# Patient Record
Sex: MALE | Race: WHITE | NOT HISPANIC OR LATINO
[De-identification: names, ages, dates, MRNs, and addresses within clinical notes are randomized per-mention and may not be internally consistent; named-entity substitution may affect disease eponyms.]

---

## 2018-01-11 PROBLEM — Z00.00 ENCOUNTER FOR PREVENTIVE HEALTH EXAMINATION: Status: ACTIVE | Noted: 2018-01-11

## 2018-01-12 ENCOUNTER — APPOINTMENT (OUTPATIENT)
Dept: UROLOGY | Facility: CLINIC | Age: 68
End: 2018-01-12
Payer: MEDICARE

## 2018-01-12 VITALS
HEIGHT: 71 IN | SYSTOLIC BLOOD PRESSURE: 93 MMHG | HEART RATE: 105 BPM | BODY MASS INDEX: 20.16 KG/M2 | WEIGHT: 144 LBS | DIASTOLIC BLOOD PRESSURE: 61 MMHG

## 2018-01-12 PROCEDURE — 99203 OFFICE O/P NEW LOW 30 MIN: CPT

## 2018-01-12 PROCEDURE — 99214 OFFICE O/P EST MOD 30 MIN: CPT

## 2018-01-12 RX ORDER — EZETIMIBE 10 MG/1
10 TABLET ORAL
Qty: 30 | Refills: 0 | Status: ACTIVE | COMMUNITY
Start: 2017-10-23

## 2018-01-12 RX ORDER — TIOTROPIUM BROMIDE 18 UG/1
18 CAPSULE ORAL; RESPIRATORY (INHALATION)
Qty: 30 | Refills: 0 | Status: ACTIVE | COMMUNITY
Start: 2017-10-18

## 2018-01-12 RX ORDER — ALLOPURINOL 100 MG/1
100 TABLET ORAL
Qty: 90 | Refills: 0 | Status: ACTIVE | COMMUNITY
Start: 2017-03-07

## 2018-01-12 RX ORDER — ALBUTEROL SULFATE 0.63 MG/3ML
0.63 SOLUTION RESPIRATORY (INHALATION)
Qty: 300 | Refills: 0 | Status: ACTIVE | COMMUNITY
Start: 2017-10-18

## 2018-01-12 RX ORDER — PANTOPRAZOLE 40 MG/1
40 TABLET, DELAYED RELEASE ORAL
Qty: 30 | Refills: 0 | Status: ACTIVE | COMMUNITY
Start: 2017-12-28

## 2018-01-12 RX ORDER — TRIAMCINOLONE ACETONIDE 1 MG/G
0.1 CREAM TOPICAL
Qty: 454 | Refills: 0 | Status: ACTIVE | COMMUNITY
Start: 2017-10-19

## 2018-05-09 ENCOUNTER — APPOINTMENT (OUTPATIENT)
Dept: UROLOGY | Facility: CLINIC | Age: 68
End: 2018-05-09
Payer: MEDICARE

## 2018-05-09 VITALS
BODY MASS INDEX: 22.19 KG/M2 | HEART RATE: 98 BPM | HEIGHT: 70 IN | DIASTOLIC BLOOD PRESSURE: 67 MMHG | WEIGHT: 155 LBS | SYSTOLIC BLOOD PRESSURE: 106 MMHG

## 2018-05-09 DIAGNOSIS — J98.3 COMPENSATORY EMPHYSEMA: ICD-10-CM

## 2018-05-09 DIAGNOSIS — Z78.9 OTHER SPECIFIED HEALTH STATUS: ICD-10-CM

## 2018-05-09 DIAGNOSIS — J44.9 CHRONIC OBSTRUCTIVE PULMONARY DISEASE, UNSPECIFIED: ICD-10-CM

## 2018-05-09 DIAGNOSIS — E78.00 PURE HYPERCHOLESTEROLEMIA, UNSPECIFIED: ICD-10-CM

## 2018-05-09 PROCEDURE — 99213 OFFICE O/P EST LOW 20 MIN: CPT

## 2018-05-09 RX ORDER — MELOXICAM 7.5 MG/1
7.5 TABLET ORAL
Qty: 30 | Refills: 0 | Status: COMPLETED | COMMUNITY
Start: 2017-11-03 | End: 2018-05-09

## 2018-05-09 RX ORDER — METRONIDAZOLE 250 MG/1
250 TABLET ORAL
Qty: 21 | Refills: 0 | Status: COMPLETED | COMMUNITY
Start: 2018-01-09 | End: 2018-05-09

## 2018-05-09 RX ORDER — FENOFIBRATE 160 MG/1
160 TABLET ORAL
Qty: 90 | Refills: 0 | Status: COMPLETED | COMMUNITY
Start: 2017-10-11 | End: 2018-05-09

## 2018-05-09 RX ORDER — PREDNISONE 10 MG/1
10 TABLET ORAL
Qty: 41 | Refills: 0 | Status: COMPLETED | COMMUNITY
Start: 2017-10-19 | End: 2018-05-09

## 2018-05-09 RX ORDER — IBUPROFEN 600 MG/1
600 TABLET, FILM COATED ORAL
Qty: 28 | Refills: 0 | Status: COMPLETED | COMMUNITY
Start: 2017-08-07 | End: 2018-05-09

## 2018-05-09 RX ORDER — TAMSULOSIN HYDROCHLORIDE 0.4 MG/1
0.4 CAPSULE ORAL
Qty: 90 | Refills: 0 | Status: COMPLETED | COMMUNITY
Start: 2017-07-18 | End: 2018-05-09

## 2018-05-09 RX ORDER — PREDNISONE 50 MG/1
50 TABLET ORAL
Qty: 3 | Refills: 0 | Status: COMPLETED | COMMUNITY
Start: 2017-12-28 | End: 2018-05-09

## 2018-05-09 RX ORDER — DIPHENHYDRAMINE HCL 50 MG/1
50 CAPSULE ORAL
Qty: 1 | Refills: 0 | Status: COMPLETED | COMMUNITY
Start: 2017-12-28 | End: 2018-05-09

## 2018-05-09 RX ORDER — AMOXICILLIN 500 MG/1
500 CAPSULE ORAL
Qty: 30 | Refills: 0 | Status: COMPLETED | COMMUNITY
Start: 2017-08-03 | End: 2018-05-09

## 2018-05-09 RX ORDER — CIPROFLOXACIN HYDROCHLORIDE 500 MG/1
500 TABLET, FILM COATED ORAL
Qty: 7 | Refills: 0 | Status: COMPLETED | COMMUNITY
Start: 2018-01-09 | End: 2018-05-09

## 2018-05-09 RX ORDER — HYDROCODONE BITARTRATE AND ACETAMINOPHEN 5; 325 MG/1; MG/1
5-325 TABLET ORAL
Qty: 20 | Refills: 0 | Status: COMPLETED | COMMUNITY
Start: 2017-08-07 | End: 2018-05-09

## 2018-05-09 NOTE — HISTORY OF PRESENT ILLNESS
[Currently Experiencing ___] :  [unfilled] [Nocturia] : nocturia [Straining] : straining [None] : None [FreeTextEntry1] : NEIL PHOENIX is a 68 year old male with a past medical history of BPH.  Presents to the office today for a follow up, last seen on 1/12/2018. Patient currently experiencing nocturia 2-3 x a night x 20 years along with straining to urinate. Denies dysuria, hematuria, and flank pain. Patient states he is not taking Flomax. CT scan from 12/29/2017 revealed enlarged prostate, and urinary bladder mildly distended, demonstrating wall thickening.\par \par IPSS score is 10\par \par Sonogram in 10/2016 showed prostate 28g with PVR 40\par \par Denies family history of bladder, renal, and prostate cancer. Smoker x 30 years one pack a day. \par PSA 0.5 on 4/10/2018 [Urinary Incontinence] : no urinary incontinence [Urinary Urgency] : no urinary urgency [Urinary Frequency] : no urinary frequency [Weak Stream] : no weak stream [Intermittency] : no intermittency [Post-Void Dribbling] : no post-void dribbling [Dysuria] : no dysuria [Hematuria - Gross] : no gross hematuria

## 2018-05-09 NOTE — LETTER HEADER
[Natalio Underwood MD] : Natalio Underwood MD [[address]] : [address] [[address]] : [address] [[place] [address]] : [place] [address] [Tel [phone]] : Tel [phone] [Fax [phone]] : Fax [phone]

## 2018-05-09 NOTE — ASSESSMENT
[FreeTextEntry1] : NEIL PHOENIX is a 68 year old male with a past medical history of BPH.  Presents to the office today for a follow up, last seen on 1/12/2018. Patient currently experiencing nocturia 2-3 x a night x 20 years along with straining to urinate. Denies dysuria, hematuria, and flank pain. Patient states he is not taking Flomax. CT scan from 12/29/2017 revealed enlarged prostate, and urinary bladder mildly distended, demonstrating wall thickening.\par \par IPSS score is 10\par \par Sonogram in 10/2016 showed prostate 28g with PVR 40\par \par Denies family history of bladder, renal, and prostate cancer. Smoker x 30 years one pack a day. \par PSA 0.5 on 4/10/2018

## 2018-05-09 NOTE — REVIEW OF SYSTEMS
[see HPI] : see HPI [Nocturia] : nocturia [Negative] : Endocrine [Dysuria] : no dysuria [Incontinence] : no incontinence

## 2018-05-09 NOTE — LETTER BODY
[Dear  ___] : Dear  [unfilled], [Courtesy Letter:] : I had the pleasure of seeing your patient, [unfilled], in my office today. [Please see my note below.] : Please see my note below. [Sincerely,] : Sincerely, [FreeTextEntry2] : Dr. Fely Julien\par [address]\[place] [address] [FreeTextEntry3] : Natalio Underwood MD\par Director of Male Sexual Dysfunction and Urologic Prosthetics

## 2018-07-25 ENCOUNTER — APPOINTMENT (OUTPATIENT)
Dept: UROLOGY | Facility: CLINIC | Age: 68
End: 2018-07-25
Payer: MEDICARE

## 2018-07-25 VITALS
SYSTOLIC BLOOD PRESSURE: 99 MMHG | HEART RATE: 90 BPM | WEIGHT: 155 LBS | HEIGHT: 70 IN | DIASTOLIC BLOOD PRESSURE: 61 MMHG | BODY MASS INDEX: 22.19 KG/M2

## 2018-07-25 PROCEDURE — 99212 OFFICE O/P EST SF 10 MIN: CPT

## 2018-07-25 NOTE — HISTORY OF PRESENT ILLNESS
[None] : None [Currently Experiencing ___] :  [unfilled] [Nocturia] : nocturia [FreeTextEntry1] : NEIL PHOENIX is a 68 year old male with a past medical history of BPH. Presents to the office today for a follow up, last seen on 5/9/2018.Patient continues to experience nocturia 2-3 x a night and straining to urinate. CT scan from 12/29/2017 revealed enlarged prostate, and urinary bladder mildly distended, demonstrating wall thickening. Patient refused any medication at last visit. Patient did not have US done that was ordered last visit -- he states that he forgot. Uroflow to be done this visit, but patient was not able to hold his urine long enough to do the procedure, so he emptied bladder in patient bathroom.  Afterwards, he was very frustrated and left the office before further discussion could be had.

## 2018-07-25 NOTE — ASSESSMENT
[FreeTextEntry1] : NEIL PHOENIX is a 68 year old male with a past medical history of BPH. Presents to the office today for a follow up, last seen on 5/9/2018.Patient continues to experience nocturia 2-3 x a night and straining to urinate. CT scan from 12/29/2017 revealed enlarged prostate, and urinary bladder mildly distended, demonstrating wall thickening. Patient refused any medication at last visit. Patient did not have US done that was ordered last visit -- he states that he forgot. Uroflow to be done this visit, but patient was not able to hold his urine long enough to do the procedure, so he emptied bladder in patient bathroom.  Afterwards, he was very frustrated and left the office before further discussion could be had.

## 2018-07-25 NOTE — LETTER HEADER
[FreeTextEntry3] : Natalio Underwood MD \par [address] \par [address] \[place] [address] \par Tel [phone] \par Fax [phone] \par  \par

## 2018-07-25 NOTE — LETTER BODY
[Courtesy Letter:] : I had the pleasure of seeing your patient, [unfilled], in my office today. [Please see my note below.] : Please see my note below. [Consult Closing:] : Thank you very much for allowing me to participate in the care of this patient.  If you have any questions, please do not hesitate to contact me. [Sincerely,] : Sincerely, [FreeTextEntry2] : \par  Dr. Fely Julien\par [address]\[place] [address] \par \par

## 2018-07-30 ENCOUNTER — APPOINTMENT (OUTPATIENT)
Dept: UROLOGY | Facility: CLINIC | Age: 68
End: 2018-07-30
Payer: MEDICARE

## 2018-07-30 VITALS
SYSTOLIC BLOOD PRESSURE: 103 MMHG | WEIGHT: 155 LBS | DIASTOLIC BLOOD PRESSURE: 61 MMHG | HEART RATE: 108 BPM | HEIGHT: 70 IN | BODY MASS INDEX: 22.19 KG/M2

## 2018-07-30 PROCEDURE — 51798 US URINE CAPACITY MEASURE: CPT

## 2018-07-30 PROCEDURE — 51741 ELECTRO-UROFLOWMETRY FIRST: CPT

## 2018-07-30 PROCEDURE — 99213 OFFICE O/P EST LOW 20 MIN: CPT

## 2018-08-03 LAB
BILIRUB UR QL STRIP: NORMAL
CLARITY UR: CLEAR
COLLECTION METHOD: NORMAL
GLUCOSE UR-MCNC: NORMAL
HCG UR QL: NORMAL EU/DL
HGB UR QL STRIP.AUTO: NORMAL
KETONES UR-MCNC: NORMAL
LEUKOCYTE ESTERASE UR QL STRIP: NORMAL
NITRITE UR QL STRIP: NORMAL
PH UR STRIP: 6
PROT UR STRIP-MCNC: NORMAL
SP GR UR STRIP: 1.01

## 2019-06-07 RX ADMIN — Medication 250 MILLIGRAM(S): at 05:22

## 2019-06-14 ENCOUNTER — APPOINTMENT (OUTPATIENT)
Dept: UROLOGY | Facility: CLINIC | Age: 69
End: 2019-06-14
Payer: MEDICARE

## 2019-06-14 VITALS
BODY MASS INDEX: 22.19 KG/M2 | HEART RATE: 105 BPM | WEIGHT: 155 LBS | SYSTOLIC BLOOD PRESSURE: 105 MMHG | HEIGHT: 70 IN | DIASTOLIC BLOOD PRESSURE: 76 MMHG

## 2019-06-14 DIAGNOSIS — Z12.5 ENCOUNTER FOR SCREENING FOR MALIGNANT NEOPLASM OF PROSTATE: ICD-10-CM

## 2019-06-14 DIAGNOSIS — N40.1 BENIGN PROSTATIC HYPERPLASIA WITH LOWER URINARY TRACT SYMPMS: ICD-10-CM

## 2019-06-14 DIAGNOSIS — N13.8 BENIGN PROSTATIC HYPERPLASIA WITH LOWER URINARY TRACT SYMPMS: ICD-10-CM

## 2019-06-14 DIAGNOSIS — R35.1 NOCTURIA: ICD-10-CM

## 2019-06-14 PROCEDURE — 99213 OFFICE O/P EST LOW 20 MIN: CPT

## 2019-06-14 NOTE — PHYSICAL EXAM
[General Appearance - Well Developed] : well developed [General Appearance - Well Nourished] : well nourished [General Appearance - In No Acute Distress] : no acute distress [Well Groomed] : well groomed [Normal Appearance] : normal appearance [Abdomen Soft] : soft [Abdomen Tenderness] : non-tender [Skin Color & Pigmentation] : normal skin color and pigmentation [Costovertebral Angle Tenderness] : no ~M costovertebral angle tenderness [] : no rash [Edema] : no peripheral edema [Mood] : the mood was normal [Exaggerated Use Of Accessory Muscles For Inspiration] : no accessory muscle use [Affect] : the affect was normal [Normal Station and Gait] : the gait and station were normal for the patient's age [Not Anxious] : not anxious [No Focal Deficits] : no focal deficits

## 2019-06-14 NOTE — LETTER BODY
[Dear  ___] : Dear  [unfilled], [Please see my note below.] : Please see my note below. [Consult Letter:] : I had the pleasure of evaluating your patient, [unfilled]. [Consult Closing:] : Thank you very much for allowing me to participate in the care of this patient.  If you have any questions, please do not hesitate to contact me. [Sincerely,] : Sincerely, [FreeTextEntry3] : Natalio Underwood MD\par Director of Male Sexual Dysfunction and Urologic Prosthetics

## 2019-06-14 NOTE — ASSESSMENT
This writer attempted to contact patient's father Anish on 06/26/18    Reason for call return call, message need to wait for Dr. Jaime  and left detailed message.    If patient calls back:   Patient contacted by 1st floor [facility] Team (MA/TC). Inform patient that someone from the team will contact them, document that pt called and route to care team.     Ira Swift MA     [FreeTextEntry1] : NEIL PHOENIX is a 69 year old male with a past medical history of BPH. Presents to the office today for a follow up.Patient continues to experience nocturia 2-3 x a night and straining to urinate. CT scan from 12/29/2017 revealed enlarged prostate, and urinary bladder mildly distended, demonstrating wall thickening. Patient refused any medication at last visit. states that his urination is feeling great\par \par IPSS score is 10\par \par Sonogram in 10/2016 showed prostate 28g with PVR 40\par \par Denies family history of bladder, renal, and prostate cancer. Smoker x 30 years one pack a day. \par PSA 0.5 on 4/10/2018 \par \par Uroflow done last visit\par Qmax 33ml/s\par PVR 33ml. \par \par PSA Profile - Total; will obtain at [facility]

## 2019-06-14 NOTE — HISTORY OF PRESENT ILLNESS
[FreeTextEntry1] : NEIL PHOENIX is a 69 year old male with a past medical history of BPH. Presents to the office today for a follow up.Patient continues to experience nocturia 2-3 x a night and straining to urinate. CT scan from 12/29/2017 revealed enlarged prostate, and urinary bladder mildly distended, demonstrating wall thickening. Patient refused any medication at last visit. states that his urination is feeling great\par \par IPSS score is 10\par \par Sonogram in 10/2016 showed prostate 28g with PVR 40\par \par Denies family history of bladder, renal, and prostate cancer. Smoker x 30 years one pack a day. \par PSA 0.5 on 4/10/2018 \par \par Uroflow done last visit\par Qmax 33ml/s\par PVR 33ml. \par \par PSA Profile - Total; will obtain at [facility]\par

## 2019-09-20 ENCOUNTER — INPATIENT (INPATIENT)
Facility: HOSPITAL | Age: 69
LOS: 4 days | Discharge: HOME | End: 2019-09-25
Attending: INTERNAL MEDICINE | Admitting: INTERNAL MEDICINE
Payer: MEDICARE

## 2019-09-20 VITALS
RESPIRATION RATE: 18 BRPM | SYSTOLIC BLOOD PRESSURE: 139 MMHG | DIASTOLIC BLOOD PRESSURE: 75 MMHG | HEART RATE: 112 BPM | OXYGEN SATURATION: 96 % | TEMPERATURE: 97 F

## 2019-09-20 LAB
ALBUMIN SERPL ELPH-MCNC: 3.4 G/DL — LOW (ref 3.5–5.2)
ALP SERPL-CCNC: 66 U/L — SIGNIFICANT CHANGE UP (ref 30–115)
ALT FLD-CCNC: 45 U/L — HIGH (ref 0–41)
ANION GAP SERPL CALC-SCNC: 12 MMOL/L — SIGNIFICANT CHANGE UP (ref 7–14)
AST SERPL-CCNC: 91 U/L — HIGH (ref 0–41)
BASE EXCESS BLDV CALC-SCNC: 2 MMOL/L — SIGNIFICANT CHANGE UP (ref -2–2)
BASOPHILS # BLD AUTO: 0.09 K/UL — SIGNIFICANT CHANGE UP (ref 0–0.2)
BASOPHILS NFR BLD AUTO: 0.6 % — SIGNIFICANT CHANGE UP (ref 0–1)
BILIRUB SERPL-MCNC: 0.4 MG/DL — SIGNIFICANT CHANGE UP (ref 0.2–1.2)
BUN SERPL-MCNC: 14 MG/DL — SIGNIFICANT CHANGE UP (ref 10–20)
CA-I SERPL-SCNC: 1.17 MMOL/L — SIGNIFICANT CHANGE UP (ref 1.12–1.3)
CALCIUM SERPL-MCNC: 8.6 MG/DL — SIGNIFICANT CHANGE UP (ref 8.5–10.1)
CHLORIDE SERPL-SCNC: 102 MMOL/L — SIGNIFICANT CHANGE UP (ref 98–110)
CK SERPL-CCNC: 1589 U/L — HIGH (ref 0–225)
CO2 SERPL-SCNC: 23 MMOL/L — SIGNIFICANT CHANGE UP (ref 17–32)
CREAT SERPL-MCNC: 0.7 MG/DL — SIGNIFICANT CHANGE UP (ref 0.7–1.5)
EOSINOPHIL # BLD AUTO: 0.17 K/UL — SIGNIFICANT CHANGE UP (ref 0–0.7)
EOSINOPHIL NFR BLD AUTO: 1.1 % — SIGNIFICANT CHANGE UP (ref 0–8)
ERYTHROCYTE [SEDIMENTATION RATE] IN BLOOD: 84 MM/HR — HIGH (ref 0–10)
GAS PNL BLDV: 138 MMOL/L — SIGNIFICANT CHANGE UP (ref 136–145)
GAS PNL BLDV: SIGNIFICANT CHANGE UP
GLUCOSE SERPL-MCNC: 94 MG/DL — SIGNIFICANT CHANGE UP (ref 70–99)
HCO3 BLDV-SCNC: 26 MMOL/L — SIGNIFICANT CHANGE UP (ref 22–29)
HCT VFR BLD CALC: 33.9 % — LOW (ref 42–52)
HCT VFR BLDA CALC: 34.7 % — SIGNIFICANT CHANGE UP (ref 34–44)
HGB BLD CALC-MCNC: 11.3 G/DL — LOW (ref 14–18)
HGB BLD-MCNC: 10.7 G/DL — LOW (ref 14–18)
IMM GRANULOCYTES NFR BLD AUTO: 0.5 % — HIGH (ref 0.1–0.3)
LACTATE BLDV-MCNC: 1.2 MMOL/L — SIGNIFICANT CHANGE UP (ref 0.5–1.6)
LACTATE SERPL-SCNC: 1.3 MMOL/L — SIGNIFICANT CHANGE UP (ref 0.5–2.2)
LDH SERPL L TO P-CCNC: 408 U/L — HIGH (ref 50–242)
LYMPHOCYTES # BLD AUTO: 0.96 K/UL — LOW (ref 1.2–3.4)
LYMPHOCYTES # BLD AUTO: 6.2 % — LOW (ref 20.5–51.1)
MCHC RBC-ENTMCNC: 28.2 PG — SIGNIFICANT CHANGE UP (ref 27–31)
MCHC RBC-ENTMCNC: 31.6 G/DL — LOW (ref 32–37)
MCV RBC AUTO: 89.2 FL — SIGNIFICANT CHANGE UP (ref 80–94)
MONOCYTES # BLD AUTO: 0.85 K/UL — HIGH (ref 0.1–0.6)
MONOCYTES NFR BLD AUTO: 5.4 % — SIGNIFICANT CHANGE UP (ref 1.7–9.3)
NEUTROPHILS # BLD AUTO: 13.45 K/UL — HIGH (ref 1.4–6.5)
NEUTROPHILS NFR BLD AUTO: 86.2 % — HIGH (ref 42.2–75.2)
NRBC # BLD: 0 /100 WBCS — SIGNIFICANT CHANGE UP (ref 0–0)
PCO2 BLDV: 40 MMHG — LOW (ref 41–51)
PH BLDV: 7.43 — SIGNIFICANT CHANGE UP (ref 7.26–7.43)
PLATELET # BLD AUTO: 454 K/UL — HIGH (ref 130–400)
PO2 BLDV: 28 MMHG — SIGNIFICANT CHANGE UP (ref 20–40)
POTASSIUM BLDV-SCNC: 3.5 MMOL/L — SIGNIFICANT CHANGE UP (ref 3.3–5.6)
POTASSIUM SERPL-MCNC: 4 MMOL/L — SIGNIFICANT CHANGE UP (ref 3.5–5)
POTASSIUM SERPL-SCNC: 4 MMOL/L — SIGNIFICANT CHANGE UP (ref 3.5–5)
PROT SERPL-MCNC: 6.5 G/DL — SIGNIFICANT CHANGE UP (ref 6–8)
RBC # BLD: 3.8 M/UL — LOW (ref 4.7–6.1)
RBC # FLD: 13.7 % — SIGNIFICANT CHANGE UP (ref 11.5–14.5)
SAO2 % BLDV: 50 % — SIGNIFICANT CHANGE UP
SODIUM SERPL-SCNC: 137 MMOL/L — SIGNIFICANT CHANGE UP (ref 135–146)
WBC # BLD: 15.6 K/UL — HIGH (ref 4.8–10.8)
WBC # FLD AUTO: 15.6 K/UL — HIGH (ref 4.8–10.8)

## 2019-09-20 PROCEDURE — 93010 ELECTROCARDIOGRAM REPORT: CPT

## 2019-09-20 PROCEDURE — 71045 X-RAY EXAM CHEST 1 VIEW: CPT | Mod: 26

## 2019-09-20 PROCEDURE — 99285 EMERGENCY DEPT VISIT HI MDM: CPT

## 2019-09-20 RX ORDER — INFLUENZA VIRUS VACCINE 15; 15; 15; 15 UG/.5ML; UG/.5ML; UG/.5ML; UG/.5ML
0.5 SUSPENSION INTRAMUSCULAR ONCE
Refills: 0 | Status: DISCONTINUED | OUTPATIENT
Start: 2019-09-20 | End: 2019-09-25

## 2019-09-20 RX ORDER — IPRATROPIUM/ALBUTEROL SULFATE 18-103MCG
3 AEROSOL WITH ADAPTER (GRAM) INHALATION
Refills: 0 | Status: COMPLETED | OUTPATIENT
Start: 2019-09-20 | End: 2019-09-20

## 2019-09-20 RX ORDER — SODIUM CHLORIDE 9 MG/ML
1000 INJECTION, SOLUTION INTRAVENOUS ONCE
Refills: 0 | Status: COMPLETED | OUTPATIENT
Start: 2019-09-20 | End: 2019-09-20

## 2019-09-20 RX ADMIN — Medication 3 MILLILITER(S): at 19:02

## 2019-09-20 RX ADMIN — Medication 3 MILLILITER(S): at 19:46

## 2019-09-20 RX ADMIN — Medication 3 MILLILITER(S): at 19:42

## 2019-09-20 RX ADMIN — SODIUM CHLORIDE 1000 MILLILITER(S): 9 INJECTION, SOLUTION INTRAVENOUS at 16:24

## 2019-09-20 NOTE — ED PROVIDER NOTE - PHYSICAL EXAMINATION
Physical Exam    Vital Signs: I have reviewed the initial vital signs.  Constitutional: well-nourished, appears stated age, no acute distress  Eyes: Conjunctiva pink, Sclera clear, PERRLA, EOMI.  Cardiovascular: S1 and S2, regular rate, regular rhythm, well-perfused extremities, radial pulses equal and 2+  Respiratory: unlabored respiratory effort, clear to auscultation bilaterally no wheezing, rales and rhonchi  Gastrointestinal: soft, non-tender abdomen, no pulsatile mass, normal bowl sounds  Musculoskeletal: supple neck, no lower extremity edema, no midline tenderness, from of extremities.   Integumentary: warm, dry, no rash  Neurologic: awake, alert, cranial nerves II-XII grossly intact, extremities’ motor and sensory functions grossly intact

## 2019-09-20 NOTE — ED PROVIDER NOTE - NS ED ROS FT
Constitutional: (-) fever, (-) chills, (+) myalgias  Eyes: (-)visual changes  ENT: (-) nasal congestion  Cardiovascular: (-) chest pain, (-) syncope  Respiratory: (-) cough, (-) shortness of breath, (-) dyspnea,   Gastrointestinal: (-) vomiting, (-) diarrhea, (-)nausea  Musculoskeletal: (-) neck pain, (-) back pain, (-) joint pain, (+) myalgias  Integumentary: (-) rash,  Neurological: (-) headache, (-) altered mental status, (-) dizziness, (-) tingling, (-)numbness,   Peripheral Vascular: -) leg swelling  :(-)dysuria, (-) hematuria  Allergic/Immunologic: (-) pruritus

## 2019-09-20 NOTE — ED ADULT NURSE NOTE - NSIMPLEMENTINTERV_GEN_ALL_ED
Implemented All Universal Safety Interventions:  [place] to call system. Call bell, personal items and telephone within reach. Instruct patient to call for assistance. Room bathroom lighting operational. Non-slip footwear when patient is off stretcher. Physically safe environment: no spills, clutter or unnecessary equipment. Stretcher in lowest position, wheels locked, appropriate side rails in place.

## 2019-09-20 NOTE — ED ADULT NURSE NOTE - OBJECTIVE STATEMENT
Pt presents to ED, sent in by PCP for new diagnosis of polymyositis. Pt states he was diagnosed in June but never came to the ED and now his PCP told him that it is affecting his liver/kidneys. Pt reports he has lost over 30 lbs since June and he used to be very active but now barely has the energy to walk down the street.

## 2019-09-20 NOTE — ED PROVIDER NOTE - CARE PLAN
Principal Discharge DX:	Rhabdomyolysis  Secondary Diagnosis:	Elevated CK  Secondary Diagnosis:	Leukocytosis

## 2019-09-20 NOTE — ED PROVIDER NOTE - OBJECTIVE STATEMENT
68 yo male, pmh of copd, hypothyroidism, polymyositis, presents to ed for myalgias. As per pt, general body pains for past month, saw giulia getting treated for polymyosists, sent in from pmd for abnl kidney fx and ck elevation. At this time pt states pains come and go, better with meds from giulia, pt thinks steroids but unsure, 68 yo male, pmh of copd, hypothyroidism, polymyositis, presents to ed for myalgias. As per pt, general body pains for past month, saw giulia getting treated for polymyosists, sent in from pmd for abnl kidney fx and ck elevation. At this time pt states pains come and go, better with meds from rhe, pt thinks steroids but unsure. Denies fever, chills, cp, sob, abd pain, nvd, dizziness, ha, visual changes, rash.

## 2019-09-20 NOTE — PATIENT PROFILE ADULT - STATED REASON FOR ADMISSION
"I went to my doctor for pain that started about 3 months ago and got worse over the last month and a loss of appetite with 30lb weight loss over the last 3 months. My doctor told me to go to the hospital because my kidneys were starting to fail."

## 2019-09-20 NOTE — ED PROVIDER NOTE - ATTENDING CONTRIBUTION TO CARE
70 yo m with pmh of hypothyroidism, gerd, ckd, polymyositis, sent by PMD for IVf.  pt says recently had blood work and was told that he needs ivf to help his kidneyes.  no fever, no rash, no n/v/d, no headahce, no cp, no sob.  pt says chronic b/l thighs, shoulder, and pelvic pain.  pt denies being on steroids.  exam: nad, ncat, perrl, eomi, mmm, rrr, ctab, abd soft, nt,nd aox3, imp: pt with h/o polymyositis, sent for admission by pmd for ivf.  check labs, ekg, cxr, ivf

## 2019-09-20 NOTE — PATIENT PROFILE ADULT - VISION (WITH CORRECTIVE LENSES IF THE PATIENT USUALLY WEARS THEM):
Partially impaired: cannot see medication labels or newsprint, but can see obstacles in path, and the surrounding layout; can count fingers at arm's length/Uses glasses for reading

## 2019-09-20 NOTE — ED ADULT NURSE NOTE - NS ED NOTE ABUSE SUSPICION NEGLECT YN
[facility] [place] [address]  [address] [facility] [address]  Dept: [phone]  Dept Fax: [phone]  Loc: [phone]      December 19, 2017    Patient: Gema Quiñones   Date of Visit: 12/19/2017       To Whom It May Concern:    Raymon Rosenthal No

## 2019-09-21 LAB
ANION GAP SERPL CALC-SCNC: 11 MMOL/L — SIGNIFICANT CHANGE UP (ref 7–14)
APPEARANCE UR: CLEAR — SIGNIFICANT CHANGE UP
BACTERIA # UR AUTO: NEGATIVE — SIGNIFICANT CHANGE UP
BASOPHILS # BLD AUTO: 0.06 K/UL — SIGNIFICANT CHANGE UP (ref 0–0.2)
BASOPHILS NFR BLD AUTO: 0.5 % — SIGNIFICANT CHANGE UP (ref 0–1)
BILIRUB UR-MCNC: NEGATIVE — SIGNIFICANT CHANGE UP
BUN SERPL-MCNC: 8 MG/DL — LOW (ref 10–20)
C3 SERPL-MCNC: 122 MG/DL — SIGNIFICANT CHANGE UP (ref 81–157)
C4 SERPL-MCNC: 26 MG/DL — SIGNIFICANT CHANGE UP (ref 13–39)
CALCIUM SERPL-MCNC: 8.1 MG/DL — LOW (ref 8.5–10.1)
CHLORIDE SERPL-SCNC: 103 MMOL/L — SIGNIFICANT CHANGE UP (ref 98–110)
CHOLEST SERPL-MCNC: 107 MG/DL — SIGNIFICANT CHANGE UP (ref 100–200)
CK SERPL-CCNC: 851 U/L — HIGH (ref 0–225)
CO2 SERPL-SCNC: 23 MMOL/L — SIGNIFICANT CHANGE UP (ref 17–32)
COLOR SPEC: YELLOW — SIGNIFICANT CHANGE UP
CREAT SERPL-MCNC: 0.6 MG/DL — LOW (ref 0.7–1.5)
CRP SERPL-MCNC: 9.04 MG/DL — HIGH (ref 0–0.4)
DIFF PNL FLD: NEGATIVE — SIGNIFICANT CHANGE UP
EOSINOPHIL # BLD AUTO: 0.4 K/UL — SIGNIFICANT CHANGE UP (ref 0–0.7)
EOSINOPHIL NFR BLD AUTO: 3.5 % — SIGNIFICANT CHANGE UP (ref 0–8)
EPI CELLS # UR: 0 /HPF — SIGNIFICANT CHANGE UP (ref 0–5)
GLUCOSE SERPL-MCNC: 97 MG/DL — SIGNIFICANT CHANGE UP (ref 70–99)
GLUCOSE UR QL: NEGATIVE — SIGNIFICANT CHANGE UP
HCT VFR BLD CALC: 31.3 % — LOW (ref 42–52)
HCV AB S/CO SERPL IA: 0.17 S/CO — SIGNIFICANT CHANGE UP (ref 0–0.99)
HCV AB SERPL-IMP: SIGNIFICANT CHANGE UP
HDLC SERPL-MCNC: 25 MG/DL — LOW
HGB BLD-MCNC: 10.1 G/DL — LOW (ref 14–18)
HYALINE CASTS # UR AUTO: 2 /LPF — SIGNIFICANT CHANGE UP (ref 0–7)
IMM GRANULOCYTES NFR BLD AUTO: 0.6 % — HIGH (ref 0.1–0.3)
KETONES UR-MCNC: NEGATIVE — SIGNIFICANT CHANGE UP
LEUKOCYTE ESTERASE UR-ACNC: NEGATIVE — SIGNIFICANT CHANGE UP
LIPID PNL WITH DIRECT LDL SERPL: 70 MG/DL — SIGNIFICANT CHANGE UP (ref 4–129)
LYMPHOCYTES # BLD AUTO: 1.04 K/UL — LOW (ref 1.2–3.4)
LYMPHOCYTES # BLD AUTO: 9.1 % — LOW (ref 20.5–51.1)
MAGNESIUM SERPL-MCNC: 1.4 MG/DL — LOW (ref 1.8–2.4)
MCHC RBC-ENTMCNC: 28.5 PG — SIGNIFICANT CHANGE UP (ref 27–31)
MCHC RBC-ENTMCNC: 32.3 G/DL — SIGNIFICANT CHANGE UP (ref 32–37)
MCV RBC AUTO: 88.2 FL — SIGNIFICANT CHANGE UP (ref 80–94)
MONOCYTES # BLD AUTO: 0.87 K/UL — HIGH (ref 0.1–0.6)
MONOCYTES NFR BLD AUTO: 7.6 % — SIGNIFICANT CHANGE UP (ref 1.7–9.3)
NEUTROPHILS # BLD AUTO: 9.02 K/UL — HIGH (ref 1.4–6.5)
NEUTROPHILS NFR BLD AUTO: 78.7 % — HIGH (ref 42.2–75.2)
NITRITE UR-MCNC: NEGATIVE — SIGNIFICANT CHANGE UP
NRBC # BLD: 0 /100 WBCS — SIGNIFICANT CHANGE UP (ref 0–0)
PH UR: 6 — SIGNIFICANT CHANGE UP (ref 5–8)
PLATELET # BLD AUTO: 432 K/UL — HIGH (ref 130–400)
POTASSIUM SERPL-MCNC: 3.4 MMOL/L — LOW (ref 3.5–5)
POTASSIUM SERPL-SCNC: 3.4 MMOL/L — LOW (ref 3.5–5)
PROT UR-MCNC: ABNORMAL
RBC # BLD: 3.55 M/UL — LOW (ref 4.7–6.1)
RBC # FLD: 13.8 % — SIGNIFICANT CHANGE UP (ref 11.5–14.5)
RBC CASTS # UR COMP ASSIST: 1 /HPF — SIGNIFICANT CHANGE UP (ref 0–4)
SODIUM SERPL-SCNC: 137 MMOL/L — SIGNIFICANT CHANGE UP (ref 135–146)
SP GR SPEC: 1.02 — SIGNIFICANT CHANGE UP (ref 1.01–1.02)
TOTAL CHOLESTEROL/HDL RATIO MEASUREMENT: 4.3 RATIO — SIGNIFICANT CHANGE UP (ref 4–5.5)
TRIGL SERPL-MCNC: 94 MG/DL — SIGNIFICANT CHANGE UP (ref 10–149)
TSH SERPL-MCNC: 5.45 UIU/ML — HIGH (ref 0.27–4.2)
UROBILINOGEN FLD QL: SIGNIFICANT CHANGE UP
WBC # BLD: 11.46 K/UL — HIGH (ref 4.8–10.8)
WBC # FLD AUTO: 11.46 K/UL — HIGH (ref 4.8–10.8)
WBC UR QL: 2 /HPF — SIGNIFICANT CHANGE UP (ref 0–5)

## 2019-09-21 PROCEDURE — 99223 1ST HOSP IP/OBS HIGH 75: CPT | Mod: AI

## 2019-09-21 RX ORDER — ALBUTEROL 90 UG/1
2 AEROSOL, METERED ORAL EVERY 6 HOURS
Refills: 0 | Status: DISCONTINUED | OUTPATIENT
Start: 2019-09-21 | End: 2019-09-25

## 2019-09-21 RX ORDER — IPRATROPIUM/ALBUTEROL SULFATE 18-103MCG
3 AEROSOL WITH ADAPTER (GRAM) INHALATION EVERY 6 HOURS
Refills: 0 | Status: DISCONTINUED | OUTPATIENT
Start: 2019-09-21 | End: 2019-09-25

## 2019-09-21 RX ORDER — ALLOPURINOL 300 MG
100 TABLET ORAL DAILY
Refills: 0 | Status: DISCONTINUED | OUTPATIENT
Start: 2019-09-21 | End: 2019-09-25

## 2019-09-21 RX ORDER — ENOXAPARIN SODIUM 100 MG/ML
40 INJECTION SUBCUTANEOUS DAILY
Refills: 0 | Status: DISCONTINUED | OUTPATIENT
Start: 2019-09-21 | End: 2019-09-25

## 2019-09-21 RX ORDER — LEVOTHYROXINE SODIUM 125 MCG
25 TABLET ORAL DAILY
Refills: 0 | Status: DISCONTINUED | OUTPATIENT
Start: 2019-09-21 | End: 2019-09-25

## 2019-09-21 RX ORDER — PANTOPRAZOLE SODIUM 20 MG/1
40 TABLET, DELAYED RELEASE ORAL
Refills: 0 | Status: DISCONTINUED | OUTPATIENT
Start: 2019-09-21 | End: 2019-09-25

## 2019-09-21 RX ORDER — SODIUM CHLORIDE 9 MG/ML
1000 INJECTION INTRAMUSCULAR; INTRAVENOUS; SUBCUTANEOUS
Refills: 0 | Status: DISCONTINUED | OUTPATIENT
Start: 2019-09-21 | End: 2019-09-25

## 2019-09-21 RX ORDER — CHLORHEXIDINE GLUCONATE 213 G/1000ML
1 SOLUTION TOPICAL
Refills: 0 | Status: DISCONTINUED | OUTPATIENT
Start: 2019-09-21 | End: 2019-09-25

## 2019-09-21 RX ORDER — TIOTROPIUM BROMIDE 18 UG/1
1 CAPSULE ORAL; RESPIRATORY (INHALATION) DAILY
Refills: 0 | Status: DISCONTINUED | OUTPATIENT
Start: 2019-09-21 | End: 2019-09-25

## 2019-09-21 RX ORDER — POTASSIUM CHLORIDE 20 MEQ
20 PACKET (EA) ORAL ONCE
Refills: 0 | Status: COMPLETED | OUTPATIENT
Start: 2019-09-21 | End: 2019-09-21

## 2019-09-21 RX ADMIN — Medication 3 MILLILITER(S): at 14:12

## 2019-09-21 RX ADMIN — PANTOPRAZOLE SODIUM 40 MILLIGRAM(S): 20 TABLET, DELAYED RELEASE ORAL at 05:21

## 2019-09-21 RX ADMIN — TIOTROPIUM BROMIDE 1 CAPSULE(S): 18 CAPSULE ORAL; RESPIRATORY (INHALATION) at 10:38

## 2019-09-21 RX ADMIN — SODIUM CHLORIDE 75 MILLILITER(S): 9 INJECTION INTRAMUSCULAR; INTRAVENOUS; SUBCUTANEOUS at 03:04

## 2019-09-21 RX ADMIN — Medication 20 MILLIEQUIVALENT(S): at 22:16

## 2019-09-21 RX ADMIN — Medication 25 MICROGRAM(S): at 05:21

## 2019-09-21 RX ADMIN — Medication 3 MILLILITER(S): at 21:07

## 2019-09-21 RX ADMIN — Medication 100 MILLIGRAM(S): at 12:06

## 2019-09-21 RX ADMIN — ENOXAPARIN SODIUM 40 MILLIGRAM(S): 100 INJECTION SUBCUTANEOUS at 12:06

## 2019-09-21 NOTE — H&P ADULT - ASSESSMENT
70 yo male with PMH pmh of copd not on home oxygen, hypothyroidism, polymyositis presents to ed after being referred by the PMD for elevated CPK and worsening myalgia.    # polymyositis with elevated CPK, rule out rhabdomyolysis:    - CPK 1589, ESR 85  - creatinine 0.7  - start NS at 75/hr  - trend CPK  - start prednisone 50 mg daily   - consider rheumatology consult. won't order inpatient get records from outpatient rheumatology     # Hypothyroid: - c/w synthroid will get TSH level   # DLD: will hold ezetimibe and fenofibrate not sure if cause myositis, will get lipid panel   # COPD: stable, c/w inhalers     # DVT: ppx  # GI ppx: PPI  # Regular diet  # Home 70 yo male with PMH pmh of copd not on home oxygen, hypothyroidism, polymyositis presents to ed after being referred by the PMD for elevated CPK and worsening myalgia.    # polymyositis with elevated CPK, rule out rhabdomyolysis:    - CPK 1589, ESR 85, elevated WBC to 15 in light of recent prednisone intake (no signs of infection)   - creatinine 0.7  - start NS at 75/hr  - trend CPK  - start prednisone 50 mg daily   - consider rheumatology consult. won't order inpatient blood work for now, get records from outpatient rheumatology     # Hypothyroid: - c/w synthroid will get TSH level   # DLD: will hold ezetimibe and fenofibrate not sure if cause myositis, will get lipid panel   # COPD: stable, c/w inhalers     # DVT: ppx  # GI ppx: PPI  # Regular diet  # Home

## 2019-09-21 NOTE — DIETITIAN INITIAL EVALUATION ADULT. - ENERGY NEEDS
Estimated Calorie Needs: MSJ-1400 x AF 1.3-1.5= 1820-2100kcal/day -Due to PCM  Estimated Protein Needs: 80-92grams/day (1.3-1.5grams/kg of admit weight) -Due to PCM  Estimated Fluid Needs: 1820-2100mL/day

## 2019-09-21 NOTE — DIETITIAN INITIAL EVALUATION ADULT. - ADD RECOMMEND
1.Recommend provide Chocolate Ensure Enlive Q8hrs 2.Recommend provide Chocolate Ensure Pudding Q8hrs 3.Recommend provide a MVI Q24hrs 4.Recommend provide an appetite stimulant 5.Recommend conduct a swallow evaluation

## 2019-09-21 NOTE — DIETITIAN INITIAL EVALUATION ADULT. - OTHER INFO
Dx: 68y/o male with pmhx noted above presented with myalgia and elevated CK levels. Admitted with rhabdomyolysis. 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 Skin is intact (Gama Score-18)      Subjective Data: The patient reports following a regular diet at home. However, for the past three month prior to admission, the patient consumed only two meals at <75% (Breakfast: Half of an rico and egg sandwich on a roll), (Dinner: the second half of the rico and egg sandwich on a roll). The patient c/o chewing difficulty due to unknown etiology and states he would chew his food slowly. Denies MVI use.

## 2019-09-21 NOTE — DIETITIAN INITIAL EVALUATION ADULT. - FACTORS AFF FOOD INTAKE
The patient reports consuming <50% secondary to poor appetite and chewing difficulty. Per observation, the patient consumed <10% of turkey sandwich for dinner (9/21). The patient agreed to receive a mechanical soft diet and oral supplements. The patient c/o having diarrhea for a few days and had a pasty bowel movement today (9/21)./difficulty chewing

## 2019-09-21 NOTE — H&P ADULT - HISTORY OF PRESENT ILLNESS
70 yo male with PMH pmh of copd not on home oxygen, hypothyroidism, polymyositis presents to ed after being referred by the PMD for elevated CPK and worsening myalgia. pt  reports history goes back to mid june when he started experiencing all over muscle ache and weakness. he endorses weakness interfering with daily activity like climbing the chair or standing for seated position. he followed up with PMD who referred to rheumatologist who diagnosed him with polymyositis and started on steroids with improvement in the symptoms. pt today went to see him pmd for worsening myalgia and blood work revealed elevated high cpk level and referred him to ED. pt also endorses wt loss of 30 pounds since june, rash on lower ext, generalized arthralgia he denies fever, chills, chest pain, cough, palpitations, SOB, change in bowel or urinary habits. in the ED, pt was hemodynamically stable.

## 2019-09-21 NOTE — DIETITIAN INITIAL EVALUATION ADULT. - PHYSICAL APPEARANCE
BMI-19; Based on nutrition focused physical examination, the patient displays severe muscle wasting located at the bitemporal, clavicular, pectoralis, anterior thigh, patellar and posterior calf regions; severe fat loss located at the periorbital and upper arm regions.

## 2019-09-21 NOTE — DIETITIAN INITIAL EVALUATION ADULT. - RD TO REMAIN AVAILABLE
Intervention:1.Meals and Snacks 2.Medical Food Supplement 3.Vitamin and Mineral Supplement 4.Coordination of Care   Monitor/Evaluate: Diet order, energy intake, nutrition focused physical findings, K, Mg, anemia profile/yes

## 2019-09-21 NOTE — H&P ADULT - NSHPPHYSICALEXAM_GEN_ALL_CORE
GENERAL: NAD, lying in bed comfortably, cachectic   HEAD:  Atraumatic, Normocephalic  EYES: EOMI, PERRLA, conjunctiva and sclera clear  ENT: Moist mucous membranes  NECK: Supple, No JVD  CHEST/LUNG: Clear to auscultation bilaterally; No rales, rhonchi, wheezing, or rubs. Unlabored respirations  HEART: Regular rate and rhythm; No murmurs, rubs, or gallops  ABDOMEN: Bowel sounds present; Soft, Nontender, Nondistended.   EXTREMITIES:  2+ Peripheral Pulses, brisk capillary refill. No clubbing, cyanosis, or edema, rash on lower ext   NERVOUS SYSTEM:  Alert & Oriented X3, speech clear. No deficits

## 2019-09-21 NOTE — H&P ADULT - NSHPLABSRESULTS_GEN_ALL_CORE
Complete Blood Count + Automated Diff (09.20.19 @ 16:05)    WBC Count: 15.60 K/uL    RBC Count: 3.80 M/uL    Hemoglobin: 10.7 g/dL    Hematocrit: 33.9 %    Mean Cell Volume: 89.2 fL    Mean Cell Hemoglobin: 28.2 pg    Mean Cell Hemoglobin Conc: 31.6 g/dL    Red Cell Distrib Width: 13.7 %    Platelet Count - Automated: 454 K/uL    Auto Neutrophil #: 13.45 K/uL    Auto Lymphocyte #: 0.96 K/uL    Auto Monocyte #: 0.85 K/uL    Auto Eosinophil #: 0.17 K/uL    Auto Basophil #: 0.09 K/uL    Auto Neutrophil %: 86.2: Differential percentages must be correlated with absolute numbers for  clinical significance. %    Auto Lymphocyte %: 6.2 %    Auto Monocyte %: 5.4 %    Auto Eosinophil %: 1.1 %    Auto Basophil %: 0.6 %    Auto Immature Granulocyte %: 0.5 %    Nucleated RBC: 0 /100 WBCs    Comprehensive Metabolic Panel (09.20.19 @ 16:05)    Sodium, Serum: 137 mmol/L    Potassium, Serum: 4.0 mmol/L    Chloride, Serum: 102 mmol/L    Carbon Dioxide, Serum: 23 mmol/L    Anion Gap, Serum: 12 mmol/L    Blood Urea Nitrogen, Serum: 14 mg/dL    Creatinine, Serum: 0.7 mg/dL    Glucose, Serum: 94 mg/dL    Calcium, Total Serum: 8.6 mg/dL    Protein Total, Serum: 6.5 g/dL    Albumin, Serum: 3.4 g/dL    Bilirubin Total, Serum: 0.4 mg/dL    Alkaline Phosphatase, Serum: 66 U/L    Aspartate Aminotransferase (AST/SGOT): 91 U/L    Alanine Aminotransferase (ALT/SGPT): 45 U/L    eGFR if Non : 98: Interpretative comment  The units for eGFR are mL/min/1.73M2 (normalized body surface area). The  eGFR is calculated from a serum creatinine using the CKD-EPI equation.  Other variables required for calculation are race, age and sex. Among  patients with chronic kidney disease (CKD), the eGFR is useful in  determining the stage of disease according to KDOQI CKD classification.  All eGFR results are reported numerically with the following  interpretation.          GFR                    With                 Without     (ml/min/1.73 m2)    Kidney Damage       Kidney Damage        >= 90                    Stage 1                     Normal        60-89                    Stage 2                     Decreased GFR        30-59     Stage 3                     Stage 3        15-29                    Stage 4                     Stage 4        < 15                      Stage 5                     Stage 5  Each stage of CKD assumes that the associated GFR level has been in  effect for at least 3 months. Determination of stages one and two (with  eGFR > 59 ml/min/m2) requires estimation of kidney damage for at least 3  months as defined by structural or functional abnormalities.  Limitations: All estimates of GFR will be less accurate for patients at  extremes of muscle mass (including but not limited to frail elderly,  critically ill, or cancer patients), those with unusual diets, and those  with conditions associated with reduced secretion or extrarenal  elimination of creatinine. The eGFR equation is not recommended for use  in patients with unstable creatinine levels. mL/min/1.73M2    eGFR if African American: 113 mL/min/1.73M2    < from: Xray Chest 1 View-PORTABLE IMMEDIATE (09.20.19 @ 16:28) >      Impression:      No radiographic evidence of acute cardiopulmonary disease.    < end of copied text >

## 2019-09-21 NOTE — H&P ADULT - ATTENDING COMMENTS
A 70 yo male with PMH of Hypothyroidism, COPD and skin cancer was sent from his MD office for myalgia and elevated CK level. Patient is c/o muscle aches for tow months, it is getting worse, in his arms, shoulders and his thighs, also he reports poor appetite with weight loss 30 Lbs in three months, he needs two days to finish one [place], also he has multiple skin lesion on his achilles and thighs, macular, painless and large with smaller ones on hand joints. He also reports joints pain in his hands, ankles and shoulders. He has worsening cough, chest pain and muscle pain with cough, no abdominal pain, his stool is loose, no melena or hematochezia. No urinary symptoms. No new changes in his medications, he was on statin but stopped few months ago. In the ED , /75, labs showed WBC 15K, Hb 10.7, CK 1589    PHYSICAL EXAM:  GENERAL: NAD, well-developed  HEAD:  Atraumatic, Normocephalic  EYES: EOMI, PERRLA, conjunctiva and sclera clear  NECK: Supple, No JVD  CHEST/LUNG: Clear to auscultation bilaterally; No wheeze  HEART: Regular rate and rhythm; S1 S2  ABDOMEN: Soft, Nontender, Nondistended; Bowel sounds present  EXTREMITIES:  2+ Peripheral Pulses, No clubbing, cyanosis, or edema  PSYCH: AAOx3  NEUROLOGY: non-focal  SKIN: fair skin, medium size macular spot on the ankles and thighs, small spot on his MCPs.     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   Also facial erythema (Heliotrope sign)  Check WILFREDO, AntiJo-1, Anti-RO, Anti-La  Rheumatology consult, will need skin Biopsy, consult burn.   He will benefit from cancer screening especially lung and colon cancer.     COPD: stable  Continue Spiriva and Combivent and albuterol.

## 2019-09-21 NOTE — DIETITIAN INITIAL EVALUATION ADULT. - PERTINENT LABORATORY DATA
(9/21) Na-137, K-3.4, CL-103, BUN-8, Cr-0.6, Glucose-97mg/dL, Corrected Ca-8.6 (WNL), Mg-1.4, H/H-10.1/31.3

## 2019-09-21 NOTE — DIETITIAN INITIAL EVALUATION ADULT. - MALNUTRITION
Severe protein calorie malnutrition in the context of chronic illness related to myositis as evidenced by <75% PO intake >1 month, >7.5% wt loss in >3 months, severe muscle loss (bitemporal, clavicular, pectoralis, anterior thigh, patellar, posterior calf regions) and severe fat loss (periorbital and upper arms). Severe protein calorie malnutrition in the context of chronic illness Severe protein calorie malnutrition in the context of chronic illness related to COPD and myositis as evidenced by <75% PO intake >1 month, >7.5% wt loss in >3 months, severe muscle loss (bitemporal, clavicular, pectoralis, anterior thigh, patellar, posterior calf regions) and severe fat loss (periorbital and upper arms).

## 2019-09-21 NOTE — CHART NOTE - NSCHARTNOTEFT_GEN_A_CORE
Upon Nutritional Assessment by the Registered Dietitian your patient was determined to meet criteria / has evidence of the following diagnosis/diagnoses:          [ ]  Mild Protein Calorie Malnutrition        [ ]  Moderate Protein Calorie Malnutrition        [x] Severe Protein Calorie Malnutrition        [ ] Unspecified Protein Calorie Malnutrition        [ ] Underweight / BMI <19        [ ] Morbid Obesity / BMI > 40      Severe protein calorie malnutrition in the context of chronic illness related to COPD and myositis as evidenced by <75% PO intake >1 month, >7.5% wt loss in >3 months, severe muscle loss (bitemporal, clavicular, pectoralis, anterior thigh, patellar, posterior calf regions) and severe fat loss (periorbital and upper arms).      Findings as based on:  •  Comprehensive nutrition assessment and consultation      Treatment:    The following diet has been recommended:    1.Recommend change diet to Mechanical Soft  2.Recommend provide Chocolate Ensure Enlive Q8hrs   3.Recommend provide Chocolate Ensure Pudding Q8hrs   4.Recommend provide a MVI Q24hrs   5.Recommend provide an appetite stimulant   6.Recommend conduct a swallow evaluation      PROVIDER Section:       By signing this assessment you are acknowledging and agree with the diagnosis/diagnoses assigned by the Registered Dietitian      Comments:

## 2019-09-22 LAB
ALBUMIN SERPL ELPH-MCNC: 2.6 G/DL — LOW (ref 3.5–5.2)
ALP SERPL-CCNC: 59 U/L — SIGNIFICANT CHANGE UP (ref 30–115)
ALT FLD-CCNC: 33 U/L — SIGNIFICANT CHANGE UP (ref 0–41)
ANION GAP SERPL CALC-SCNC: 13 MMOL/L — SIGNIFICANT CHANGE UP (ref 7–14)
AST SERPL-CCNC: 54 U/L — HIGH (ref 0–41)
BASOPHILS # BLD AUTO: 0.09 K/UL — SIGNIFICANT CHANGE UP (ref 0–0.2)
BASOPHILS NFR BLD AUTO: 0.8 % — SIGNIFICANT CHANGE UP (ref 0–1)
BILIRUB SERPL-MCNC: 0.2 MG/DL — SIGNIFICANT CHANGE UP (ref 0.2–1.2)
BUN SERPL-MCNC: 6 MG/DL — LOW (ref 10–20)
CALCIUM SERPL-MCNC: 8.1 MG/DL — LOW (ref 8.5–10.1)
CHLORIDE SERPL-SCNC: 106 MMOL/L — SIGNIFICANT CHANGE UP (ref 98–110)
CO2 SERPL-SCNC: 21 MMOL/L — SIGNIFICANT CHANGE UP (ref 17–32)
CREAT SERPL-MCNC: 0.6 MG/DL — LOW (ref 0.7–1.5)
EOSINOPHIL # BLD AUTO: 0.51 K/UL — SIGNIFICANT CHANGE UP (ref 0–0.7)
EOSINOPHIL NFR BLD AUTO: 4.4 % — SIGNIFICANT CHANGE UP (ref 0–8)
GLUCOSE SERPL-MCNC: 93 MG/DL — SIGNIFICANT CHANGE UP (ref 70–99)
HCT VFR BLD CALC: 30.8 % — LOW (ref 42–52)
HGB BLD-MCNC: 9.9 G/DL — LOW (ref 14–18)
IMM GRANULOCYTES NFR BLD AUTO: 0.5 % — HIGH (ref 0.1–0.3)
LYMPHOCYTES # BLD AUTO: 1.13 K/UL — LOW (ref 1.2–3.4)
LYMPHOCYTES # BLD AUTO: 9.8 % — LOW (ref 20.5–51.1)
MCHC RBC-ENTMCNC: 28.7 PG — SIGNIFICANT CHANGE UP (ref 27–31)
MCHC RBC-ENTMCNC: 32.1 G/DL — SIGNIFICANT CHANGE UP (ref 32–37)
MCV RBC AUTO: 89.3 FL — SIGNIFICANT CHANGE UP (ref 80–94)
MONOCYTES # BLD AUTO: 0.94 K/UL — HIGH (ref 0.1–0.6)
MONOCYTES NFR BLD AUTO: 8.1 % — SIGNIFICANT CHANGE UP (ref 1.7–9.3)
NEUTROPHILS # BLD AUTO: 8.82 K/UL — HIGH (ref 1.4–6.5)
NEUTROPHILS NFR BLD AUTO: 76.4 % — HIGH (ref 42.2–75.2)
NRBC # BLD: 0 /100 WBCS — SIGNIFICANT CHANGE UP (ref 0–0)
PLATELET # BLD AUTO: 415 K/UL — HIGH (ref 130–400)
POTASSIUM SERPL-MCNC: 4 MMOL/L — SIGNIFICANT CHANGE UP (ref 3.5–5)
POTASSIUM SERPL-SCNC: 4 MMOL/L — SIGNIFICANT CHANGE UP (ref 3.5–5)
PROT SERPL-MCNC: 5.3 G/DL — LOW (ref 6–8)
RBC # BLD: 3.45 M/UL — LOW (ref 4.7–6.1)
RBC # FLD: 14 % — SIGNIFICANT CHANGE UP (ref 11.5–14.5)
SODIUM SERPL-SCNC: 140 MMOL/L — SIGNIFICANT CHANGE UP (ref 135–146)
WBC # BLD: 11.55 K/UL — HIGH (ref 4.8–10.8)
WBC # FLD AUTO: 11.55 K/UL — HIGH (ref 4.8–10.8)

## 2019-09-22 PROCEDURE — 99233 SBSQ HOSP IP/OBS HIGH 50: CPT

## 2019-09-22 RX ORDER — ACETAMINOPHEN 500 MG
650 TABLET ORAL ONCE
Refills: 0 | Status: COMPLETED | OUTPATIENT
Start: 2019-09-22 | End: 2019-09-22

## 2019-09-22 RX ADMIN — ENOXAPARIN SODIUM 40 MILLIGRAM(S): 100 INJECTION SUBCUTANEOUS at 11:35

## 2019-09-22 RX ADMIN — TIOTROPIUM BROMIDE 1 CAPSULE(S): 18 CAPSULE ORAL; RESPIRATORY (INHALATION) at 07:31

## 2019-09-22 RX ADMIN — PANTOPRAZOLE SODIUM 40 MILLIGRAM(S): 20 TABLET, DELAYED RELEASE ORAL at 05:08

## 2019-09-22 RX ADMIN — Medication 3 MILLILITER(S): at 07:32

## 2019-09-22 RX ADMIN — Medication 100 MILLIGRAM(S): at 11:35

## 2019-09-22 RX ADMIN — Medication 650 MILLIGRAM(S): at 05:40

## 2019-09-22 RX ADMIN — Medication 3 MILLILITER(S): at 13:07

## 2019-09-22 RX ADMIN — Medication 3 MILLILITER(S): at 19:38

## 2019-09-22 RX ADMIN — Medication 650 MILLIGRAM(S): at 05:08

## 2019-09-22 RX ADMIN — Medication 25 MICROGRAM(S): at 05:08

## 2019-09-22 RX ADMIN — SODIUM CHLORIDE 75 MILLILITER(S): 9 INJECTION INTRAMUSCULAR; INTRAVENOUS; SUBCUTANEOUS at 12:19

## 2019-09-22 NOTE — SWALLOW BEDSIDE ASSESSMENT ADULT - SLP GENERAL OBSERVATIONS
pt awake alert without c/o pain. generalized weakness noted and low vocal intensity with reduced pitch range

## 2019-09-22 NOTE — SWALLOW BEDSIDE ASSESSMENT ADULT - SLP PERTINENT HISTORY OF CURRENT PROBLEM
PMH pmh of copd not on home oxygen, hypothyroidism, polymyositis presents to ed after being referred by the PMD for elevated CPK and worsening myalgia. pt  reports history goes back to mid june when he started experiencing all over muscle ache and weakness. pt with 30 lb weightloss in several months

## 2019-09-22 NOTE — SWALLOW BEDSIDE ASSESSMENT ADULT - COMMENTS
+ toleration observed without overt symptoms of penetration/aspiration mild oral dysphagia; multiple swallows. pt also reported needed liquid in between bites occasionally

## 2019-09-22 NOTE — SWALLOW BEDSIDE ASSESSMENT ADULT - NS SPL SWALLOW CLINIC TRIAL FT
suspected pharyngeal dysphagia; + toleration observed without overt symptoms of penetration/aspiration for small sips

## 2019-09-22 NOTE — SWALLOW BEDSIDE ASSESSMENT ADULT - SWALLOW EVAL: DIAGNOSIS
mild oral dysphagia; suspected pharyngeal dysphagia. + toleration observed without overt symptoms of penetration/aspiration for soft and thins via small bites/sips

## 2019-09-23 LAB
ALBUMIN SERPL ELPH-MCNC: 2.6 G/DL — LOW (ref 3.5–5.2)
ALP SERPL-CCNC: 59 U/L — SIGNIFICANT CHANGE UP (ref 30–115)
ALT FLD-CCNC: 32 U/L — SIGNIFICANT CHANGE UP (ref 0–41)
ANION GAP SERPL CALC-SCNC: 11 MMOL/L — SIGNIFICANT CHANGE UP (ref 7–14)
AST SERPL-CCNC: 54 U/L — HIGH (ref 0–41)
BASOPHILS # BLD AUTO: 0.09 K/UL — SIGNIFICANT CHANGE UP (ref 0–0.2)
BASOPHILS NFR BLD AUTO: 0.9 % — SIGNIFICANT CHANGE UP (ref 0–1)
BILIRUB SERPL-MCNC: 0.2 MG/DL — SIGNIFICANT CHANGE UP (ref 0.2–1.2)
BUN SERPL-MCNC: 5 MG/DL — LOW (ref 10–20)
CALCIUM SERPL-MCNC: 7.8 MG/DL — LOW (ref 8.5–10.1)
CHLORIDE SERPL-SCNC: 104 MMOL/L — SIGNIFICANT CHANGE UP (ref 98–110)
CO2 SERPL-SCNC: 22 MMOL/L — SIGNIFICANT CHANGE UP (ref 17–32)
CREAT SERPL-MCNC: 0.6 MG/DL — LOW (ref 0.7–1.5)
DSDNA AB FLD-ACNC: <0.2 AI — SIGNIFICANT CHANGE UP
DSDNA AB SER-ACNC: <12 IU/ML — SIGNIFICANT CHANGE UP
ENA JO1 AB SER-ACNC: <0.2 AI — SIGNIFICANT CHANGE UP
ENA SS-A AB FLD IA-ACNC: <0.2 AI — SIGNIFICANT CHANGE UP
EOSINOPHIL # BLD AUTO: 0.43 K/UL — SIGNIFICANT CHANGE UP (ref 0–0.7)
EOSINOPHIL NFR BLD AUTO: 4.1 % — SIGNIFICANT CHANGE UP (ref 0–8)
GLUCOSE SERPL-MCNC: 88 MG/DL — SIGNIFICANT CHANGE UP (ref 70–99)
HCT VFR BLD CALC: 30.3 % — LOW (ref 42–52)
HGB BLD-MCNC: 9.7 G/DL — LOW (ref 14–18)
IMM GRANULOCYTES NFR BLD AUTO: 0.4 % — HIGH (ref 0.1–0.3)
LYMPHOCYTES # BLD AUTO: 1.03 K/UL — LOW (ref 1.2–3.4)
LYMPHOCYTES # BLD AUTO: 9.7 % — LOW (ref 20.5–51.1)
MCHC RBC-ENTMCNC: 28.6 PG — SIGNIFICANT CHANGE UP (ref 27–31)
MCHC RBC-ENTMCNC: 32 G/DL — SIGNIFICANT CHANGE UP (ref 32–37)
MCV RBC AUTO: 89.4 FL — SIGNIFICANT CHANGE UP (ref 80–94)
MONOCYTES # BLD AUTO: 0.94 K/UL — HIGH (ref 0.1–0.6)
MONOCYTES NFR BLD AUTO: 8.9 % — SIGNIFICANT CHANGE UP (ref 1.7–9.3)
NEUTROPHILS # BLD AUTO: 8.04 K/UL — HIGH (ref 1.4–6.5)
NEUTROPHILS NFR BLD AUTO: 76 % — HIGH (ref 42.2–75.2)
NRBC # BLD: 0 /100 WBCS — SIGNIFICANT CHANGE UP (ref 0–0)
PLATELET # BLD AUTO: 408 K/UL — HIGH (ref 130–400)
POTASSIUM SERPL-MCNC: 3.7 MMOL/L — SIGNIFICANT CHANGE UP (ref 3.5–5)
POTASSIUM SERPL-SCNC: 3.7 MMOL/L — SIGNIFICANT CHANGE UP (ref 3.5–5)
PROT SERPL-MCNC: 5.1 G/DL — LOW (ref 6–8)
RBC # BLD: 3.39 M/UL — LOW (ref 4.7–6.1)
RBC # FLD: 14.1 % — SIGNIFICANT CHANGE UP (ref 11.5–14.5)
SODIUM SERPL-SCNC: 137 MMOL/L — SIGNIFICANT CHANGE UP (ref 135–146)
WBC # BLD: 10.57 K/UL — SIGNIFICANT CHANGE UP (ref 4.8–10.8)
WBC # FLD AUTO: 10.57 K/UL — SIGNIFICANT CHANGE UP (ref 4.8–10.8)

## 2019-09-23 PROCEDURE — 99232 SBSQ HOSP IP/OBS MODERATE 35: CPT

## 2019-09-23 RX ORDER — OXYCODONE AND ACETAMINOPHEN 5; 325 MG/1; MG/1
1 TABLET ORAL EVERY 6 HOURS
Refills: 0 | Status: DISCONTINUED | OUTPATIENT
Start: 2019-09-23 | End: 2019-09-25

## 2019-09-23 RX ADMIN — Medication 3 MILLILITER(S): at 07:40

## 2019-09-23 RX ADMIN — Medication 3 MILLILITER(S): at 13:16

## 2019-09-23 RX ADMIN — PANTOPRAZOLE SODIUM 40 MILLIGRAM(S): 20 TABLET, DELAYED RELEASE ORAL at 05:03

## 2019-09-23 RX ADMIN — OXYCODONE AND ACETAMINOPHEN 1 TABLET(S): 5; 325 TABLET ORAL at 07:47

## 2019-09-23 RX ADMIN — ENOXAPARIN SODIUM 40 MILLIGRAM(S): 100 INJECTION SUBCUTANEOUS at 11:53

## 2019-09-23 RX ADMIN — CHLORHEXIDINE GLUCONATE 1 APPLICATION(S): 213 SOLUTION TOPICAL at 05:05

## 2019-09-23 RX ADMIN — OXYCODONE AND ACETAMINOPHEN 1 TABLET(S): 5; 325 TABLET ORAL at 06:40

## 2019-09-23 RX ADMIN — Medication 25 MICROGRAM(S): at 05:03

## 2019-09-23 RX ADMIN — Medication 40 MILLIGRAM(S): at 12:18

## 2019-09-23 RX ADMIN — Medication 100 MILLIGRAM(S): at 11:53

## 2019-09-23 RX ADMIN — Medication 3 MILLILITER(S): at 19:53

## 2019-09-23 RX ADMIN — TIOTROPIUM BROMIDE 1 CAPSULE(S): 18 CAPSULE ORAL; RESPIRATORY (INHALATION) at 07:39

## 2019-09-23 NOTE — MEDICAL STUDENT PROGRESS NOTE(EDUCATION) - SUBJECTIVE AND OBJECTIVE BOX
Medical Student Note    Patient Information:  NEIL PHOENIX / 69y / Male / MRN#:[id number]    Hospital Day: 3d    HPI:  70 yo male with PMH pmh of copd not on home oxygen, hypothyroidism, polymyositis presents to ed after being referred by the PMD for elevated CPK and worsening myalgia. pt  reports history goes back to mid june when he started experiencing all over muscle ache and weakness. he endorses weakness interfering with daily activity like climbing the chair or standing for seated position. he followed up with PMD who referred to rheumatologist who diagnosed him with polymyositis and started on steroids with improvement in the symptoms. pt today went to see him pmd for worsening myalgia and blood work revealed elevated high cpk level and referred him to ED. pt also endorses wt loss of 30 pounds since june, rash on lower ext, generalized arthralgia he denies fever, chills, chest pain, cough, palpitations, SOB, change in bowel or urinary habits. in the ED, pt was hemodynamically stable. (09-21)    Interval History:  Patient seen and examined at bedside. No acute events overnight. Placed on prednisone 40 mg daily.  on 9/21.    Past Medical History:  Gout  COPD, mild  Polymyositis  Hypothyroid    Past Surgical History:  No significant past surgical history    Allergies:  No Known Allergies    Medications:  PRN:  ALBUTerol    90 MICROgram(s) HFA Inhaler 2 Puff(s) Inhalation every 6 hours PRN Bronchospasm  oxyCODONE    5 mG/acetaminophen 325 mG 1 Tablet(s) Oral every 6 hours PRN Moderate Pain (4 - 6)    Standing:  ALBUTerol/ipratropium for Nebulization 3 milliLiter(s) Nebulizer every 6 hours  allopurinol 100 milliGRAM(s) Oral daily  chlorhexidine 4% Liquid 1 Application(s) Topical <User Schedule>  enoxaparin Injectable 40 milliGRAM(s) SubCutaneous daily  influenza   Vaccine 0.5 milliLiter(s) IntraMuscular once  levothyroxine 25 MICROGram(s) Oral daily  pantoprazole    Tablet 40 milliGRAM(s) Oral before breakfast  predniSONE   Tablet 40 milliGRAM(s) Oral daily  sodium chloride 0.9%. 1000 milliLiter(s) (75 mL/Hr) IV Continuous <Continuous>  tiotropium 18 MICROgram(s) Capsule 1 Capsule(s) Inhalation daily    Home:  allopurinol 100 mg oral tablet: 1 tab(s) orally once a day  DuoNeb 0.5 mg-2.5 mg/3 mL inhalation solution: 3 milliliter(s) inhaled 3 times a day, As Needed  ezetimibe 10 mg oral tablet: 1 tab(s) orally once a day  fenofibrate 145 mg oral tablet: 1 tab(s) orally once a day  levothyroxine 25 mcg (0.025 mg) oral tablet: 1 tab(s) orally once a day  pantoprazole 40 mg oral delayed release tablet: 1 tab(s) orally once a day    Vitals:  T(C): 36.2, Max: 36.2 (09-23-19 @ 05:00)  T(F): 97.1, Max: 97.1 (09-23-19 @ 05:00)  HR: 94 (92 - 107)  BP: 96/64 (96/64 - 131/60)  RR: 18 (18 - 18)  SpO2: --    Physical Exam:  General: Awake, Alert. Not in acute distress.  Head: Normocephalic atraumatic.  Neck: Supple  Heart: Regular rate and rhythm; S1, S2; No murmurs.  Lungs: Clear to auscultation bilaterally.  Abdomen: Soft, nontender, nondistended.  Extremities: No edema in upper or lower extremities.  Back: nontender, nonerythematous 5 cm mass near lumbar spine  Neuro: AAOx3, No focal deficits. Muscle strength 5/5 grossly intact.  Skin: purple macules on knees, ankles, and thighs; erythematous around eyes    Labs:  CBC (09-23 @ 06:39)                        Hgb: 9.7<L>  WBC: 10.57 )---------------------( Plts: 408<H>                            Hct: 30.3<L>    Chem (09-23 @ 06:39)  Na: 137  |     Cl: 104     |  BUN: 5<L>  -----------------------------------------< Gluc: 88    K: 3.7   |  HCO3: 22  |  Cr: 0.6<L>    Ca 7.8<L> (09-23 @ 06:39)    LFTs (09-23 @ 06:39)  TPro 5.1<L>  /  Alb 2.6<L>  TBili 0.2  /  DBili x   AST 54<H>  /  ALT 32  /  AlkPhos 59

## 2019-09-23 NOTE — MEDICAL STUDENT PROGRESS NOTE(EDUCATION) - NS MD HP STUD ASPLAN ASSES FT
69M pmh of copd not on home oxygen, hypothyroidism, polymyositis presents to ED after being referred by the PMD for elevated CPK and worsening myalgia. Complaining of muscle weakness and aches since mid June. No fever.

## 2019-09-23 NOTE — MEDICAL STUDENT PROGRESS NOTE(EDUCATION) - NS MD HP STUD ASPLAN PLAN FT
Elevated CPK  -Myositis: possible dermatomyositis or polymyositis      -Repeat CPK  -Pending WILFREDO, AntiJo-1, Anti-RO, Anti-La and other immune workup  -Placed on prednisone 40 mg daily  -F/u Rheum    COPD  -continue Spiriva/Combivent/Albuterol

## 2019-09-24 DIAGNOSIS — R63.4 ABNORMAL WEIGHT LOSS: ICD-10-CM

## 2019-09-24 DIAGNOSIS — M33.90 DERMATOPOLYMYOSITIS, UNSPECIFIED, ORGAN INVOLVEMENT UNSPECIFIED: ICD-10-CM

## 2019-09-24 LAB
ALBUMIN SERPL ELPH-MCNC: 2.9 G/DL — LOW (ref 3.5–5.2)
ALDOLASE SERPL-CCNC: 22.9 U/L — HIGH (ref 3.3–10.3)
ALP SERPL-CCNC: 65 U/L — SIGNIFICANT CHANGE UP (ref 30–115)
ALT FLD-CCNC: 37 U/L — SIGNIFICANT CHANGE UP (ref 0–41)
ANA TITR SER: NEGATIVE — SIGNIFICANT CHANGE UP
ANION GAP SERPL CALC-SCNC: 12 MMOL/L — SIGNIFICANT CHANGE UP (ref 7–14)
AST SERPL-CCNC: 60 U/L — HIGH (ref 0–41)
BASOPHILS # BLD AUTO: 0.04 K/UL — SIGNIFICANT CHANGE UP (ref 0–0.2)
BASOPHILS NFR BLD AUTO: 0.3 % — SIGNIFICANT CHANGE UP (ref 0–1)
BILIRUB SERPL-MCNC: <0.2 MG/DL — SIGNIFICANT CHANGE UP (ref 0.2–1.2)
BUN SERPL-MCNC: 7 MG/DL — LOW (ref 10–20)
CALCIUM SERPL-MCNC: 8.5 MG/DL — SIGNIFICANT CHANGE UP (ref 8.5–10.1)
CHLORIDE SERPL-SCNC: 107 MMOL/L — SIGNIFICANT CHANGE UP (ref 98–110)
CK SERPL-CCNC: 808 U/L — HIGH (ref 0–225)
CO2 SERPL-SCNC: 24 MMOL/L — SIGNIFICANT CHANGE UP (ref 17–32)
CREAT SERPL-MCNC: 0.6 MG/DL — LOW (ref 0.7–1.5)
EOSINOPHIL # BLD AUTO: 0.02 K/UL — SIGNIFICANT CHANGE UP (ref 0–0.7)
EOSINOPHIL NFR BLD AUTO: 0.2 % — SIGNIFICANT CHANGE UP (ref 0–8)
GLUCOSE SERPL-MCNC: 104 MG/DL — HIGH (ref 70–99)
HCT VFR BLD CALC: 31.7 % — LOW (ref 42–52)
HGB BLD-MCNC: 9.9 G/DL — LOW (ref 14–18)
IMM GRANULOCYTES NFR BLD AUTO: 0.4 % — HIGH (ref 0.1–0.3)
LYMPHOCYTES # BLD AUTO: 1.28 K/UL — SIGNIFICANT CHANGE UP (ref 1.2–3.4)
LYMPHOCYTES # BLD AUTO: 10.9 % — LOW (ref 20.5–51.1)
MCHC RBC-ENTMCNC: 28.4 PG — SIGNIFICANT CHANGE UP (ref 27–31)
MCHC RBC-ENTMCNC: 31.2 G/DL — LOW (ref 32–37)
MCV RBC AUTO: 91.1 FL — SIGNIFICANT CHANGE UP (ref 80–94)
MONOCYTES # BLD AUTO: 0.76 K/UL — HIGH (ref 0.1–0.6)
MONOCYTES NFR BLD AUTO: 6.5 % — SIGNIFICANT CHANGE UP (ref 1.7–9.3)
NEUTROPHILS # BLD AUTO: 9.55 K/UL — HIGH (ref 1.4–6.5)
NEUTROPHILS NFR BLD AUTO: 81.7 % — HIGH (ref 42.2–75.2)
NRBC # BLD: 0 /100 WBCS — SIGNIFICANT CHANGE UP (ref 0–0)
PLATELET # BLD AUTO: 477 K/UL — HIGH (ref 130–400)
POTASSIUM SERPL-MCNC: 4.2 MMOL/L — SIGNIFICANT CHANGE UP (ref 3.5–5)
POTASSIUM SERPL-SCNC: 4.2 MMOL/L — SIGNIFICANT CHANGE UP (ref 3.5–5)
PROT SERPL-MCNC: 5.7 G/DL — LOW (ref 6–8)
RBC # BLD: 3.48 M/UL — LOW (ref 4.7–6.1)
RBC # FLD: 14.1 % — SIGNIFICANT CHANGE UP (ref 11.5–14.5)
SODIUM SERPL-SCNC: 143 MMOL/L — SIGNIFICANT CHANGE UP (ref 135–146)
WBC # BLD: 11.7 K/UL — HIGH (ref 4.8–10.8)
WBC # FLD AUTO: 11.7 K/UL — HIGH (ref 4.8–10.8)

## 2019-09-24 PROCEDURE — 99232 SBSQ HOSP IP/OBS MODERATE 35: CPT

## 2019-09-24 PROCEDURE — 99223 1ST HOSP IP/OBS HIGH 75: CPT

## 2019-09-24 RX ORDER — IOHEXOL 300 MG/ML
30 INJECTION, SOLUTION INTRAVENOUS ONCE
Refills: 0 | Status: COMPLETED | OUTPATIENT
Start: 2019-09-24 | End: 2019-09-24

## 2019-09-24 RX ORDER — FOLIC ACID 0.8 MG
1 TABLET ORAL DAILY
Refills: 0 | Status: DISCONTINUED | OUTPATIENT
Start: 2019-09-24 | End: 2019-09-25

## 2019-09-24 RX ORDER — METHOTREXATE 2.5 MG/1
10 TABLET ORAL
Refills: 0 | Status: DISCONTINUED | OUTPATIENT
Start: 2019-09-24 | End: 2019-09-25

## 2019-09-24 RX ADMIN — PANTOPRAZOLE SODIUM 40 MILLIGRAM(S): 20 TABLET, DELAYED RELEASE ORAL at 05:07

## 2019-09-24 RX ADMIN — ENOXAPARIN SODIUM 40 MILLIGRAM(S): 100 INJECTION SUBCUTANEOUS at 11:35

## 2019-09-24 RX ADMIN — SODIUM CHLORIDE 75 MILLILITER(S): 9 INJECTION INTRAMUSCULAR; INTRAVENOUS; SUBCUTANEOUS at 05:07

## 2019-09-24 RX ADMIN — IOHEXOL 30 MILLILITER(S): 300 INJECTION, SOLUTION INTRAVENOUS at 22:27

## 2019-09-24 RX ADMIN — SODIUM CHLORIDE 75 MILLILITER(S): 9 INJECTION INTRAMUSCULAR; INTRAVENOUS; SUBCUTANEOUS at 18:42

## 2019-09-24 RX ADMIN — Medication 3 MILLILITER(S): at 13:22

## 2019-09-24 RX ADMIN — Medication 3 MILLILITER(S): at 07:43

## 2019-09-24 RX ADMIN — Medication 25 MICROGRAM(S): at 05:07

## 2019-09-24 RX ADMIN — Medication 40 MILLIGRAM(S): at 05:07

## 2019-09-24 RX ADMIN — TIOTROPIUM BROMIDE 1 CAPSULE(S): 18 CAPSULE ORAL; RESPIRATORY (INHALATION) at 07:43

## 2019-09-24 RX ADMIN — Medication 3 MILLILITER(S): at 20:25

## 2019-09-24 RX ADMIN — Medication 1 MILLIGRAM(S): at 17:39

## 2019-09-24 RX ADMIN — Medication 100 MILLIGRAM(S): at 11:35

## 2019-09-24 NOTE — MEDICAL STUDENT PROGRESS NOTE(EDUCATION) - NS MD HP STUD ASPLAN PLAN FT
Myositis: possible dermatomyositis or polymyositis  -  -On prednisone 40 mg daily  -F/u Rheum    COPD  -continue Spiriva/Combivent/Albuterol Myositis: possible dermatomyositis or polymyositis  -, f/u tomorrow  -On prednisone 40 mg daily  -Started methotrexate as per Rheum  -CT abd/pelvis as per Rheum    COPD  -continue Spiriva/Combivent/Albuterol

## 2019-09-24 NOTE — PROGRESS NOTE ADULT - SUBJECTIVE AND OBJECTIVE BOX
NEIL PHOENIX  69y  Male      Patient is a 69y old  Male who presents with a chief complaint of     INTERVAL HPI/OVERNIGHT EVENTS:  He still with muscle aches, generally he improves,  Vital Signs Last 24 Hrs  T(C): 36.2 (23 Sep 2019 12:58), Max: 36.2 (23 Sep 2019 05:00)  T(F): 97.1 (23 Sep 2019 12:58), Max: 97.1 (23 Sep 2019 05:00)  HR: 94 (23 Sep 2019 12:58) (92 - 107)  BP: 96/64 (23 Sep 2019 12:58) (96/64 - 131/60)  BP(mean): --  RR: 18 (23 Sep 2019 12:58) (18 - 18)  SpO2: --      09-23-19 @ 07:01  -  09-23-19 @ 16:01  --------------------------------------------------------  IN: 450 mL / OUT: 700 mL / NET: -250 mL            Consultant(s) Notes Reviewed:  [x ] YES  [ ] NO          MEDICATIONS  (STANDING):  ALBUTerol/ipratropium for Nebulization 3 milliLiter(s) Nebulizer every 6 hours  allopurinol 100 milliGRAM(s) Oral daily  chlorhexidine 4% Liquid 1 Application(s) Topical <User Schedule>  enoxaparin Injectable 40 milliGRAM(s) SubCutaneous daily  influenza   Vaccine 0.5 milliLiter(s) IntraMuscular once  levothyroxine 25 MICROGram(s) Oral daily  pantoprazole    Tablet 40 milliGRAM(s) Oral before breakfast  predniSONE   Tablet 40 milliGRAM(s) Oral daily  sodium chloride 0.9%. 1000 milliLiter(s) (75 mL/Hr) IV Continuous <Continuous>  tiotropium 18 MICROgram(s) Capsule 1 Capsule(s) Inhalation daily    MEDICATIONS  (PRN):  ALBUTerol    90 MICROgram(s) HFA Inhaler 2 Puff(s) Inhalation every 6 hours PRN Bronchospasm  oxyCODONE    5 mG/acetaminophen 325 mG 1 Tablet(s) Oral every 6 hours PRN Moderate Pain (4 - 6)      LABS                          9.7    10.57 )-----------( 408      ( 23 Sep 2019 06:39 )             30.3     09-23    137  |  104  |  5<L>  ----------------------------<  88  3.7   |  22  |  0.6<L>    Ca    7.8<L>      23 Sep 2019 06:39    TPro  5.1<L>  /  Alb  2.6<L>  /  TBili  0.2  /  DBili  x   /  AST  54<H>  /  ALT  32  /  AlkPhos  59  09-23          Lactate Trend  09-20 @ 16:05 Lactate:1.3         CAPILLARY BLOOD GLUCOSE            RADIOLOGY & ADDITIONAL TESTS:    Imaging Personally Reviewed:  [ ] YES  [ ] NO    HEALTH ISSUES - PROBLEM Dx:        PHYSICAL EXAM:  GENERAL: NAD, well-developed  HEAD:  Atraumatic, Normocephalic  EYES: EOMI, PERRLA, conjunctiva and sclera clear  NECK: Supple, No JVD  CHEST/LUNG: Clear to auscultation bilaterally; No wheeze  HEART: Regular rate and rhythm; S1 S2  ABDOMEN: Soft, Nontender, Nondistended; Bowel sounds present  EXTREMITIES:  2+ Peripheral Pulses, No clubbing, cyanosis, or edema  PSYCH: AAOx3  NEUROLOGY: non-focal  SKIN: fair skin, medium size macular spot on the ankles and thighs, small spot on his MCPs.
ALBAN NEIL  69y  Male      Patient is a 69y old  Male who presents with a chief complaint of     INTERVAL HPI/OVERNIGHT EVENTS:  He feels better, still with muscle aches, appetite improved.   Vital Signs Last 24 Hrs  T(C): 35.6 (22 Sep 2019 13:08), Max: 36.4 (22 Sep 2019 05:15)  T(F): 96 (22 Sep 2019 13:08), Max: 97.5 (22 Sep 2019 05:15)  HR: 101 (22 Sep 2019 13:08) (92 - 101)  BP: 105/62 (22 Sep 2019 13:08) (105/62 - 116/77)  BP(mean): --  RR: 18 (22 Sep 2019 13:08) (18 - 18)  SpO2: 95% (22 Sep 2019 05:15) (95% - 95%)            Consultant(s) Notes Reviewed:  [x ] YES  [ ] NO          MEDICATIONS  (STANDING):  ALBUTerol/ipratropium for Nebulization 3 milliLiter(s) Nebulizer every 6 hours  allopurinol 100 milliGRAM(s) Oral daily  chlorhexidine 4% Liquid 1 Application(s) Topical <User Schedule>  enoxaparin Injectable 40 milliGRAM(s) SubCutaneous daily  influenza   Vaccine 0.5 milliLiter(s) IntraMuscular once  levothyroxine 25 MICROGram(s) Oral daily  pantoprazole    Tablet 40 milliGRAM(s) Oral before breakfast  sodium chloride 0.9%. 1000 milliLiter(s) (75 mL/Hr) IV Continuous <Continuous>  tiotropium 18 MICROgram(s) Capsule 1 Capsule(s) Inhalation daily    MEDICATIONS  (PRN):  ALBUTerol    90 MICROgram(s) HFA Inhaler 2 Puff(s) Inhalation every 6 hours PRN Bronchospasm      LABS                          9.9    11.55 )-----------( 415      ( 22 Sep 2019 06:39 )             30.8     09-    140  |  106  |  6<L>  ----------------------------<  93  4.0   |  21  |  0.6<L>    Ca    8.1<L>      22 Sep 2019 06:39  Mg     1.4     09-    TPro  5.3<L>  /  Alb  2.6<L>  /  TBili  0.2  /  DBili  x   /  AST  54<H>  /  ALT  33  /  AlkPhos  59  -      Urinalysis Basic - ( 21 Sep 2019 08:00 )    Color: Yellow / Appearance: Clear / S.024 / pH: x  Gluc: x / Ketone: Negative  / Bili: Negative / Urobili: <2 mg/dL   Blood: x / Protein: 30 mg/dL / Nitrite: Negative   Leuk Esterase: Negative / RBC: 1 /HPF / WBC 2 /HPF   Sq Epi: x / Non Sq Epi: 0 /HPF / Bacteria: Negative        Lactate Trend   @ 16:05 Lactate:1.3     CARDIAC MARKERS ( 21 Sep 2019 07:47 )  x     / x     / 851 U/L / x     / x      CARDIAC MARKERS ( 20 Sep 2019 16:05 )  x     / x     / 1589 U/L / x     / x          CAPILLARY BLOOD GLUCOSE            RADIOLOGY & ADDITIONAL TESTS:    Imaging Personally Reviewed:  [ ] YES  [ ] NO    HEALTH ISSUES - PROBLEM Dx:        PHYSICAL EXAM:  GENERAL: NAD, well-developed  HEAD:  Atraumatic, Normocephalic  EYES: EOMI, PERRLA, conjunctiva and sclera clear  NECK: Supple, No JVD  CHEST/LUNG: Clear to auscultation bilaterally; No wheeze  HEART: Regular rate and rhythm; S1 S2  ABDOMEN: Soft, Nontender, Nondistended; Bowel sounds present  EXTREMITIES:  2+ Peripheral Pulses, No clubbing, cyanosis, or edema  PSYCH: AAOx3  NEUROLOGY: non-focal  SKIN: fair skin, medium size macular spot on the ankles and thighs, small spot on his MCPs.
NEIL PHOENIX  69y  Male      Patient is a 69y old  Male who presents with a chief complaint of increase CPK and myalgia (24 Sep 2019 10:51)      INTERVAL HPI/OVERNIGHT EVENTS:  He feels better, muscle pain improved.   Vital Signs Last 24 Hrs  T(C): 36.1 (24 Sep 2019 13:00), Max: 36.1 (24 Sep 2019 13:00)  T(F): 97 (24 Sep 2019 13:00), Max: 97 (24 Sep 2019 13:00)  HR: 87 (24 Sep 2019 13:00) (82 - 104)  BP: 107/68 (24 Sep 2019 13:00) (98/52 - 108/60)  BP(mean): --  RR: 18 (24 Sep 2019 13:00) (18 - 18)  SpO2: --      09-23-19 @ 07:01  -  09-24-19 @ 07:00  --------------------------------------------------------  IN: 900 mL / OUT: 700 mL / NET: 200 mL            Consultant(s) Notes Reviewed:  [x ] YES  [ ] NO          MEDICATIONS  (STANDING):  ALBUTerol/ipratropium for Nebulization 3 milliLiter(s) Nebulizer every 6 hours  allopurinol 100 milliGRAM(s) Oral daily  chlorhexidine 4% Liquid 1 Application(s) Topical <User Schedule>  enoxaparin Injectable 40 milliGRAM(s) SubCutaneous daily  folic acid 1 milliGRAM(s) Oral daily  influenza   Vaccine 0.5 milliLiter(s) IntraMuscular once  levothyroxine 25 MICROGram(s) Oral daily  methotrexate 10 milliGRAM(s) Oral every week  pantoprazole    Tablet 40 milliGRAM(s) Oral before breakfast  predniSONE   Tablet 40 milliGRAM(s) Oral daily  sodium chloride 0.9%. 1000 milliLiter(s) (75 mL/Hr) IV Continuous <Continuous>  tiotropium 18 MICROgram(s) Capsule 1 Capsule(s) Inhalation daily    MEDICATIONS  (PRN):  ALBUTerol    90 MICROgram(s) HFA Inhaler 2 Puff(s) Inhalation every 6 hours PRN Bronchospasm  oxyCODONE    5 mG/acetaminophen 325 mG 1 Tablet(s) Oral every 6 hours PRN Moderate Pain (4 - 6)      LABS                          9.9    11.70 )-----------( 477      ( 24 Sep 2019 06:38 )             31.7     09-24    143  |  107  |  7<L>  ----------------------------<  104<H>  4.2   |  24  |  0.6<L>    Ca    8.5      24 Sep 2019 06:38    TPro  5.7<L>  /  Alb  2.9<L>  /  TBili  <0.2  /  DBili  x   /  AST  60<H>  /  ALT  37  /  AlkPhos  65  09-24          Lactate Trend  09-20 @ 16:05 Lactate:1.3     CARDIAC MARKERS ( 24 Sep 2019 06:38 )  x     / x     / 808 U/L / x     / x          CAPILLARY BLOOD GLUCOSE            RADIOLOGY & ADDITIONAL TESTS:    Imaging Personally Reviewed:  [ ] YES  [ ] NO    HEALTH ISSUES - PROBLEM Dx:  Weight loss, unintentional: Weight loss, unintentional  Dermatomyositis: Dermatomyositis        PHYSICAL EXAM:  GENERAL: NAD, well-developed  HEAD:  Atraumatic, Normocephalic  EYES: EOMI, PERRLA, conjunctiva and sclera clear  NECK: Supple, No JVD  CHEST/LUNG: Clear to auscultation bilaterally; No wheeze  HEART: Regular rate and rhythm; S1 S2  ABDOMEN: Soft, Nontender, Nondistended; Bowel sounds present  EXTREMITIES:  2+ Peripheral Pulses, No clubbing, cyanosis, or edema  PSYCH: AAOx3  NEUROLOGY: non-focal  SKIN: fair skin, medium size macular spot on the ankles and thighs, small spot on his MCPs.

## 2019-09-24 NOTE — CONSULT NOTE ADULT - SUBJECTIVE AND OBJECTIVE BOX
Patient is a 69y old  Male who presents with a chief complaint of     Admitted on: 09-20-19  HPI:  68 yo male with PMH pmh of copd not on home oxygen, hypothyroidism, polymyositis presents to ed after being referred by the PMD for elevated CPK and worsening myalgia. pt  reports history goes back to mid june when he started experiencing all over muscle ache and weakness. he endorses weakness interfering with daily activity like climbing the chair or standing for seated position. he followed up with PMD who referred to rheumatologist who diagnosed him with polymyositis and started on steroids with improvement in the symptoms. pt today went to see him pmd for worsening myalgia and blood work revealed elevated high cpk level and referred him to ED. pt also endorses wt loss of 30 pounds since june, rash on lower ext, generalized arthralgia he denies fever, chills, chest pain, cough, palpitations, SOB, change in bowel or urinary habits. in the ED, pt was hemodynamically stable. (21 Sep 2019 02:25)    PAST MEDICAL & SURGICAL HISTORY:  Gout  COPD, mild  Polymyositis  Hypothyroid  No significant past surgical history      FAMILY HISTORY:      Social History:  Tobacco:  Alcohol:  Drugs:    Home Medications:  allopurinol 100 mg oral tablet: 1 tab(s) orally once a day (21 Sep 2019 02:36)  DuoNeb 0.5 mg-2.5 mg/3 mL inhalation solution: 3 milliliter(s) inhaled 3 times a day, As Needed (21 Sep 2019 02:36)  ezetimibe 10 mg oral tablet: 1 tab(s) orally once a day (21 Sep 2019 02:36)  fenofibrate 145 mg oral tablet: 1 tab(s) orally once a day (21 Sep 2019 02:36)  levothyroxine 25 mcg (0.025 mg) oral tablet: 1 tab(s) orally once a day (20 Sep 2019 16:40)  pantoprazole 40 mg oral delayed release tablet: 1 tab(s) orally once a day (20 Sep 2019 16:40)    MEDICATIONS  (STANDING):  ALBUTerol/ipratropium for Nebulization 3 milliLiter(s) Nebulizer every 6 hours  allopurinol 100 milliGRAM(s) Oral daily  chlorhexidine 4% Liquid 1 Application(s) Topical <User Schedule>  enoxaparin Injectable 40 milliGRAM(s) SubCutaneous daily  influenza   Vaccine 0.5 milliLiter(s) IntraMuscular once  levothyroxine 25 MICROGram(s) Oral daily  pantoprazole    Tablet 40 milliGRAM(s) Oral before breakfast  predniSONE   Tablet 40 milliGRAM(s) Oral daily  sodium chloride 0.9%. 1000 milliLiter(s) (75 mL/Hr) IV Continuous <Continuous>  tiotropium 18 MICROgram(s) Capsule 1 Capsule(s) Inhalation daily    MEDICATIONS  (PRN):  ALBUTerol    90 MICROgram(s) HFA Inhaler 2 Puff(s) Inhalation every 6 hours PRN Bronchospasm  oxyCODONE    5 mG/acetaminophen 325 mG 1 Tablet(s) Oral every 6 hours PRN Moderate Pain (4 - 6)      Allergies  No Known Allergies      Review of Systems:   Constitutional:  No Fever, No Chills  ENT/Mouth:  No Hearing Changes,  No Difficulty Swallowing  Eyes:  No Eye Pain, No Vision Changes  Cardiovascular:  No Chest Pain, No Palpitations  Respiratory:  No Cough, No Dyspnea  Gastrointestinal:  As described in HPI  Musculoskeletal:  No Joint Swelling, No Back Pain  Skin:  No Skin Lesions, No Jaundice  Neuro:  No Syncope, No Dizziness  Heme/Lymph:  No Bruising, No Bleeding.    Physical Examination:  T(C): 35.6 (09-24-19 @ 05:25), Max: 36.2 (09-23-19 @ 12:58)  HR: 82 (09-24-19 @ 05:25) (82 - 104)  BP: 98/52 (09-24-19 @ 05:25) (96/64 - 108/60)  RR: 18 (09-24-19 @ 05:25) (18 - 18)  SpO2: --      09-23-19 @ 07:01  -  09-24-19 @ 07:00  --------------------------------------------------------  IN: 900 mL / OUT: 700 mL / NET: 200 mL        Constitutional: No acute distress.  Eyes:. Conjunctivae are clear, Sclera is non-icteric.  Ears Nose and Throat: The external ears are normal appearing,  Oral mucosa is pink and moist.  Respiratory:  No signs of respiratory distress. Lung sounds are clear bilaterally.  Cardiovascular:  S1 S2, Regular rate and rhythm.  GI: Abdomen is soft, symmetric, and non-tender without distention. There are no visible lesions or scars. Bowel sounds are present and normoactive in all four quadrants. No masses, hepatomegaly, or splenomegaly are noted.   Neuro: No Tremor, No involuntary movements  Skin: No rashes, No Jaundice.    Data:                        9.9    11.70 )-----------( 477      ( 24 Sep 2019 06:38 )             31.7     Hgb Trend:  9.9  09-24-19 @ 06:38  9.7  09-23-19 @ 06:39  9.9  09-22-19 @ 06:39        09-24    143  |  107  |  7<L>  ----------------------------<  104<H>  4.2   |  24  |  0.6<L>    Ca    8.5      24 Sep 2019 06:38    TPro  5.7<L>  /  Alb  2.9<L>  /  TBili  <0.2  /  DBili  x   /  AST  60<H>  /  ALT  37  /  AlkPhos  65  09-24    Liver panel trend:  TBili <0.2   /   AST 60   /   ALT 37   /   AlkP 65   /   Tptn 5.7   /   Alb 2.9    /   DBili --      09-24  TBili 0.2   /   AST 54   /   ALT 32   /   AlkP 59   /   Tptn 5.1   /   Alb 2.6    /   DBili --      09-23  TBili 0.2   /   AST 54   /   ALT 33   /   AlkP 59   /   Tptn 5.3   /   Alb 2.6    /   DBili --      09-22  TBili 0.4   /   AST 91   /   ALT 45   /   AlkP 66   /   Tptn 6.5   /   Alb 3.4    /   DBili --      09-20    Radiology:

## 2019-09-24 NOTE — PROGRESS NOTE ADULT - ASSESSMENT
A 70 yo male with PMH of Hypothyroidism, COPD and skin cancer was sent from his MD office for myalgia and elevated CK level. Patient is c/o muscle aches for tow months, it is getting worse, in his arms, shoulders and his thighs, also he reports poor appetite with weight loss 30 Lbs in three months, he needs two days to finish one [place], also he has multiple skin lesion on his achilles and thighs, macular, painless and large with smaller ones on hand joints. He also reports joints pain in his hands, ankles and shoulders. He has worsening cough, chest pain and muscle pain with cough, no abdominal pain, his stool is loose, no melena or hematochezia. No urinary symptoms. No new changes in his medications, he was on statin but stopped few months ago. 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   On presentation his skin exam showed papular eruption on MCPs, ankles, knees: likely Grotton's signs. Also facial erythema (Heliotrope sign) now improved.   WILFREDO, AntiJo-1, Anti-RO, Anti-La are negative.   Rheumatology consult: continue with prednisone, start on MTX 4 tab weekly for two weeks then increase to 8 tab weekly. Add folic acid 1mg daily.   Malignancy work up, chest, abdomen and Pelvis CT.     COPD: stable  Continue Spiriva and Combivent and albuterol.     #Progress Note Handoff:  Pending (specify): chest, abdomen, pelvis CT  Family discussion:  Disposition: Home in 24 hrs
A 68 yo male with PMH of Hypothyroidism, COPD and skin cancer was sent from his MD office for myalgia and elevated CK level. Patient is c/o muscle aches for tow months, it is getting worse, in his arms, shoulders and his thighs, also he reports poor appetite with weight loss 30 Lbs in three months, he needs two days to finish one [place], also he has multiple skin lesion on his achilles and thighs, macular, painless and large with smaller ones on hand joints. He also reports joints pain in his hands, ankles and shoulders. He has worsening cough, chest pain and muscle pain with cough, no abdominal pain, his stool is loose, no melena or hematochezia. No urinary symptoms. No new changes in his medications, he was on statin but stopped few months ago. In the ED , /75, labs showed WBC 15K, Hb 10.7, CK 1589      A/P:   Myositis: likely dermatomyositis or Polymyositis:   On exam he has papular eruption on MCPs, ankles: likely Grotton's signs. Also facial erythema (Heliotrope sign)  Pending WILFREDO, AntiJo-1, Anti-RO, Anti-La and other immune workup.   Rheumatology consult, will need skin Biopsy, consult burn.   Continue Prednisone.   He will benefit from cancer screening especially lung and colon cancer (outpatient).     COPD: stable  Continue Spiriva and Combivent and albuterol.     #Progress Note Handoff:  Pending (specify): Rheumatology and burn.   Family discussion:  Disposition: Home in 24 hrs
A 70 yo male with PMH of Hypothyroidism, COPD and skin cancer was sent from his MD office for myalgia and elevated CK level. Patient is c/o muscle aches for tow months, it is getting worse, in his arms, shoulders and his thighs, also he reports poor appetite with weight loss 30 Lbs in three months, he needs two days to finish one [place], also he has multiple skin lesion on his achilles and thighs, macular, painless and large with smaller ones on hand joints. He also reports joints pain in his hands, ankles and shoulders. He has worsening cough, chest pain and muscle pain with cough, no abdominal pain, his stool is loose, no melena or hematochezia. No urinary symptoms. No new changes in his medications, he was on statin but stopped few months ago. In the ED , /75, labs showed WBC 15K, Hb 10.7, CK 1589      A/P:   Myositis: likely dermatomyositis or Polymyositis:   On exam he has papular eruption on MCPs, ankles: likely Grotton's signs.   Also facial erythema (Heliotrope sign)  Check WILFREDO, AntiJo-1, Anti-RO, Anti-La  Rheumatology consult, will need skin Biopsy, consult burn.   Continue Prednisone.   He will benefit from cancer screening especially lung and colon cancer.     COPD: stable  Continue Spiriva and Combivent and albuterol.     #Progress Note Handoff:  Pending (specify): Rheumatology burn.   Family discussion:  Disposition: Home

## 2019-09-24 NOTE — MEDICAL STUDENT PROGRESS NOTE(EDUCATION) - SUBJECTIVE AND OBJECTIVE BOX
Medical Student Note    Patient Information:  NEIL PHOENIX / 69y / Male / MRN#:[id number]    Hospital Day: 4d    HPI:  70 yo male with PMH pmh of copd not on home oxygen, hypothyroidism, polymyositis presents to ed after being referred by the PMD for elevated CPK and worsening myalgia. pt  reports history goes back to mid june when he started experiencing all over muscle ache and weakness. he endorses weakness interfering with daily activity like climbing the chair or standing for seated position. he followed up with PMD who referred to rheumatologist who diagnosed him with polymyositis and started on steroids with improvement in the symptoms. pt today went to see him pmd for worsening myalgia and blood work revealed elevated high cpk level and referred him to ED. pt also endorses wt loss of 30 pounds since june, rash on lower ext, generalized arthralgia he denies fever, chills, chest pain, cough, palpitations, SOB, change in bowel or urinary habits. in the ED, pt was hemodynamically stable. (09-21)    Interval History:  Patient seen and examined at bedside. No acute events overnight. States feeling better and asking if he can go home.    Past Medical History:  Gout  COPD, mild  Polymyositis  Hypothyroid    Past Surgical History:  No significant past surgical history    Allergies:  No Known Allergies    Medications:  PRN:  ALBUTerol    90 MICROgram(s) HFA Inhaler 2 Puff(s) Inhalation every 6 hours PRN Bronchospasm  oxyCODONE    5 mG/acetaminophen 325 mG 1 Tablet(s) Oral every 6 hours PRN Moderate Pain (4 - 6)    Standing:  ALBUTerol/ipratropium for Nebulization 3 milliLiter(s) Nebulizer every 6 hours  allopurinol 100 milliGRAM(s) Oral daily  chlorhexidine 4% Liquid 1 Application(s) Topical <User Schedule>  enoxaparin Injectable 40 milliGRAM(s) SubCutaneous daily  influenza   Vaccine 0.5 milliLiter(s) IntraMuscular once  levothyroxine 25 MICROGram(s) Oral daily  pantoprazole    Tablet 40 milliGRAM(s) Oral before breakfast  predniSONE   Tablet 40 milliGRAM(s) Oral daily  sodium chloride 0.9%. 1000 milliLiter(s) (75 mL/Hr) IV Continuous <Continuous>  tiotropium 18 MICROgram(s) Capsule 1 Capsule(s) Inhalation daily    Home:  allopurinol 100 mg oral tablet: 1 tab(s) orally once a day  DuoNeb 0.5 mg-2.5 mg/3 mL inhalation solution: 3 milliliter(s) inhaled 3 times a day, As Needed  ezetimibe 10 mg oral tablet: 1 tab(s) orally once a day  fenofibrate 145 mg oral tablet: 1 tab(s) orally once a day  levothyroxine 25 mcg (0.025 mg) oral tablet: 1 tab(s) orally once a day  pantoprazole 40 mg oral delayed release tablet: 1 tab(s) orally once a day    Vitals:  T(C): 35.6, Max: 36.2 (09-23-19 @ 12:58)  T(F): 96, Max: 97.1 (09-23-19 @ 12:58)  HR: 82 (82 - 104)  BP: 98/52 (96/64 - 108/60)  RR: 18 (18 - 18)  SpO2: --    Physical Exam:  General: Awake, Alert. Not in acute distress.  Head: Normocephalic atraumatic.  Neck: Supple  Heart: Regular rate and rhythm; S1, S2; No murmurs.  Lungs: Clear to auscultation bilaterally.  Abdomen: Soft, nontender, nondistended.  Extremities: No edema in upper or lower extremities.  Back: nontender, nonerythematous 5 cm mass near lumbar spine  Neuro: AAOx3, No focal deficits. Muscle strength 5/5 grossly intact.  Skin: purple macules on knees, ankles, and thighs; erythematous around eyes    Labs:  CBC (09-23 @ 06:39)                        Hgb: 9.7<L>  WBC: 10.57 )---------------------( Plts: 408<H>                            Hct: 30.3<L>    Chem (09-24 @ 06:38)  Na: 143  |     Cl: 107     |  BUN: 7<L>  -----------------------------------------< Gluc: 104<H>    K: 4.2   |  HCO3: 24  |  Cr: 0.6<L>    Ca 8.5 (09-24 @ 06:38)    LFTs (09-24 @ 06:38)  TPro 5.7<L>  /  Alb 2.9<L>  TBili <0.2  /  DBili x   AST 60<H>  /  ALT 37  /  AlkPhos 65    Cardiac Markers (09-24 @ 06:38)  Troponin I x      Troponin T x       U/L<H>  CKMB x      CKMB Units x      Myoglobin x      Lactate x      ESR x

## 2019-09-24 NOTE — CHART NOTE - NSCHARTNOTEFT_GEN_A_CORE
Registered Dietitian Follow-Up     Patient Profile Reviewed                           Yes [x]   No []     Nutrition History Previously Obtained        Yes [x]  No []       Pertinent Subjective Information: Pt says he is eating fairly well but not great. says he has not received any ensure enlive supplements and would like them.      Pertinent Medical Interventions: Pt here for possible dermatomyositis or polymyositis. Hx COPD, Gout.     Diet order: dysphaga-3-thins     Anthropometrics:  - Ht. 70"  - Wt. 135# no new wts  - %wt change  - BMI 19.4  - #+/-10%     Pertinent Lab Data: 9/24: H/H 9.9/31.7, BUN 7, Cr 0.6, s gluc 104, AST 60     Pertinent Meds: lovenox, NS 75ml/h, folic acid, prednisone, albuterol,   zyloprim, prednisone, zyloprim, synthroid, protonix     Physical Findings:  - Appearance: severe muscle and fat depletion observed per previous assessment  - GI function: last BM 9/23, denies s/s  - Tubes:  - Oral/Mouth cavity: WDL  - Skin: WDL, BS 18     Nutrition Requirements  Weight Used: 135# per initial assessment     Continue [x]  Estimated Calorie Needs: [id number] x AF 1.3-1.5= 1820-2100kcal/day -Due to PCM  Estimated Protein Needs: 80-92grams/day (1.3-1.5grams/kg of admit weight) -Due to PCM  Estimated Fluid Needs: 1820-2100mL/day     Nutrient Intake: likely not meeting needs, needs/wants ensure        [] Previous Nutrition Diagnosis: severe PCM            [x] Ongoing          [] Resolved    [] No active nutrition diagnosis identified at this time     Nutrition Intervention meals and snacks, med food supplements, vit/min supplement     Goal/Expected Outcome: Pt to consume >75% of meal trays within 3 days     Indicator/Monitoring: RD to monitor diet order, energy intake, NFPF, body comp, glucose and renal profile.    Pt at risk f/u 3 days.    RECS: Continue diet consistency per SLP. Add Ensure Enlive TID. Add daily MVI/min. reviewed with covering resident

## 2019-09-24 NOTE — CONSULT NOTE ADULT - ATTENDING COMMENTS
This is a 68 yo M admitted with proximal muscle weakness, elevated CK 1500s, new onset rash, and several months of unintentional weight loss. He is currently able to ambulate without significant weakness or limitations although still feels some aching at his proximal muscles. On exam he was asked about fine gottron's papules over 2 (possibly 3) MCPs, gottron's sign over his knees, and mildly increased facial erythema - all of which are new. He did have very abnormal nailfold capillaroscopy demonstrating tortuous, dilated capillaries with drop out (grossly there is periungual erythema). His muscle strength testing was all 5/5 proximally as well as neck flexors 5/5. He was able to stand from a seated position with ease without use of UE for assistance. He appears older than stated age. No FHx of first degree relatives having myositis or other autoimmune conditions to the patient's knowledge.     Dermatomyositis (CK 1500s, findings of cutaneous features: gottron's papules, gottron's sign over knees, facial erythema, and abnormal nailfold capillaries)  Thrombocytosis    Dermatomyositis is a clinical exam not necessarily requiring biopsy for confirmation, and this patient fits this picture well especially given his classic skin findings. Agree with Prednisone 40 mg daily - plan for very extended taper despite improvement at least in myositis symptoms. Would also add steroid sparing agent MTX with FA supplementation - should start MTX 4 tabs weekly x 2 weeks then plan to increase to 8 tabs weekly if well tolerated. Should add FA 1 mg daily. Will f/u with me in my office in 2 weeks with CBC/CMP/CK/ESR/CRP beforehand. Agree with serologies already ordered - multiple labs still remain pending. WILFREDO notably neg. Should have CT chest/abdomen/pelvis for malignancy screening before discharge. I am very concerned for malignancy given new onset dermatomyositis as well as report of substantial unintentional weight loss. He reports being UTD with colonoscopy although these records are not in our EMR. Would recommend PT eval as an outpatient. Will follow. This is a 70 yo M admitted with proximal muscle weakness, elevated CK 1500s, new onset rash, and several months of unintentional weight loss. He is currently able to ambulate without significant weakness or limitations although still feels some aching at his proximal muscles. On exam he was asked about fine gottron's papules over 2 (possibly 3) MCPs, gottron's sign over his knees, and mildly increased facial erythema - all of which are new. He did have very abnormal nailfold capillaroscopy demonstrating tortuous, dilated capillaries with drop out (grossly there is periungual erythema). His muscle strength testing was all 5/5 proximally as well as neck flexors 5/5. He was able to stand from a seated position with ease without use of UE for assistance. He appears older than stated age. No FHx of first degree relatives having myositis or other autoimmune conditions to the patient's knowledge.     Dermatomyositis (CK 1500s, findings of cutaneous features: gottron's papules, gottron's sign over knees, facial erythema, and abnormal nailfold capillaries)  Thrombocytosis    Dermatomyositis is a clinical exam not necessarily requiring biopsy for confirmation, and this patient fits this picture well especially given his classic skin findings. Agree with Prednisone 40 mg daily - plan for very extended taper despite improvement at least in myositis symptoms. Would also add steroid sparing agent MTX with FA supplementation - should start MTX 4 tabs weekly x 2 weeks then plan to increase to 8 tabs weekly if well tolerated. Should add FA 1 mg daily. Discussed potential medication side effects of MTX and Prednisone with the patient. Will f/u with me in my office in 2 weeks with CBC/CMP/CK/ESR/CRP beforehand. Agree with serologies already ordered - multiple labs still remain pending. WILFREDO notably neg. Should have CT chest/abdomen/pelvis for malignancy screening before discharge. I am very concerned for malignancy given new onset dermatomyositis as well as report of substantial unintentional weight loss. He reports being UTD with colonoscopy although these records are not in our EMR. Would recommend PT eval as an outpatient. Will follow.

## 2019-09-25 ENCOUNTER — TRANSCRIPTION ENCOUNTER (OUTPATIENT)
Age: 69
End: 2019-09-25

## 2019-09-25 VITALS
DIASTOLIC BLOOD PRESSURE: 65 MMHG | HEART RATE: 82 BPM | SYSTOLIC BLOOD PRESSURE: 111 MMHG | RESPIRATION RATE: 18 BRPM | TEMPERATURE: 97 F

## 2019-09-25 LAB
ALBUMIN SERPL ELPH-MCNC: 2.6 G/DL — LOW (ref 3.5–5.2)
ALP SERPL-CCNC: 60 U/L — SIGNIFICANT CHANGE UP (ref 30–115)
ALT FLD-CCNC: 37 U/L — SIGNIFICANT CHANGE UP (ref 0–41)
ANION GAP SERPL CALC-SCNC: 10 MMOL/L — SIGNIFICANT CHANGE UP (ref 7–14)
AST SERPL-CCNC: 63 U/L — HIGH (ref 0–41)
BASOPHILS # BLD AUTO: 0.07 K/UL — SIGNIFICANT CHANGE UP (ref 0–0.2)
BASOPHILS NFR BLD AUTO: 0.5 % — SIGNIFICANT CHANGE UP (ref 0–1)
BILIRUB SERPL-MCNC: <0.2 MG/DL — SIGNIFICANT CHANGE UP (ref 0.2–1.2)
BUN SERPL-MCNC: 11 MG/DL — SIGNIFICANT CHANGE UP (ref 10–20)
CALCIUM SERPL-MCNC: 8.2 MG/DL — LOW (ref 8.5–10.1)
CHLORIDE SERPL-SCNC: 106 MMOL/L — SIGNIFICANT CHANGE UP (ref 98–110)
CK SERPL-CCNC: 771 U/L — HIGH (ref 0–225)
CO2 SERPL-SCNC: 24 MMOL/L — SIGNIFICANT CHANGE UP (ref 17–32)
CREAT SERPL-MCNC: 0.6 MG/DL — LOW (ref 0.7–1.5)
EOSINOPHIL # BLD AUTO: 0.13 K/UL — SIGNIFICANT CHANGE UP (ref 0–0.7)
EOSINOPHIL NFR BLD AUTO: 0.9 % — SIGNIFICANT CHANGE UP (ref 0–8)
GLUCOSE SERPL-MCNC: 77 MG/DL — SIGNIFICANT CHANGE UP (ref 70–99)
HCT VFR BLD CALC: 28.8 % — LOW (ref 42–52)
HGB BLD-MCNC: 9 G/DL — LOW (ref 14–18)
IMM GRANULOCYTES NFR BLD AUTO: 0.7 % — HIGH (ref 0.1–0.3)
LYMPHOCYTES # BLD AUTO: 1.92 K/UL — SIGNIFICANT CHANGE UP (ref 1.2–3.4)
LYMPHOCYTES # BLD AUTO: 12.6 % — LOW (ref 20.5–51.1)
MCHC RBC-ENTMCNC: 28 PG — SIGNIFICANT CHANGE UP (ref 27–31)
MCHC RBC-ENTMCNC: 31.3 G/DL — LOW (ref 32–37)
MCV RBC AUTO: 89.7 FL — SIGNIFICANT CHANGE UP (ref 80–94)
MONOCYTES # BLD AUTO: 1.01 K/UL — HIGH (ref 0.1–0.6)
MONOCYTES NFR BLD AUTO: 6.6 % — SIGNIFICANT CHANGE UP (ref 1.7–9.3)
NEUTROPHILS # BLD AUTO: 12 K/UL — HIGH (ref 1.4–6.5)
NEUTROPHILS NFR BLD AUTO: 78.7 % — HIGH (ref 42.2–75.2)
NRBC # BLD: 0 /100 WBCS — SIGNIFICANT CHANGE UP (ref 0–0)
PLATELET # BLD AUTO: 416 K/UL — HIGH (ref 130–400)
POTASSIUM SERPL-MCNC: 3.7 MMOL/L — SIGNIFICANT CHANGE UP (ref 3.5–5)
POTASSIUM SERPL-SCNC: 3.7 MMOL/L — SIGNIFICANT CHANGE UP (ref 3.5–5)
PROT SERPL-MCNC: 5.1 G/DL — LOW (ref 6–8)
RBC # BLD: 3.21 M/UL — LOW (ref 4.7–6.1)
RBC # FLD: 14.1 % — SIGNIFICANT CHANGE UP (ref 11.5–14.5)
SODIUM SERPL-SCNC: 140 MMOL/L — SIGNIFICANT CHANGE UP (ref 135–146)
WBC # BLD: 15.23 K/UL — HIGH (ref 4.8–10.8)
WBC # FLD AUTO: 15.23 K/UL — HIGH (ref 4.8–10.8)

## 2019-09-25 PROCEDURE — 74177 CT ABD & PELVIS W/CONTRAST: CPT | Mod: 26

## 2019-09-25 PROCEDURE — 71260 CT THORAX DX C+: CPT | Mod: 26

## 2019-09-25 PROCEDURE — 99239 HOSP IP/OBS DSCHRG MGMT >30: CPT

## 2019-09-25 RX ORDER — METHOTREXATE 2.5 MG/1
4 TABLET ORAL
Qty: 12 | Refills: 0
Start: 2019-09-25 | End: 2019-10-08

## 2019-09-25 RX ORDER — FOLIC ACID 0.8 MG
1 TABLET ORAL
Qty: 30 | Refills: 0
Start: 2019-09-25 | End: 2019-10-24

## 2019-09-25 RX ADMIN — PANTOPRAZOLE SODIUM 40 MILLIGRAM(S): 20 TABLET, DELAYED RELEASE ORAL at 05:52

## 2019-09-25 RX ADMIN — ENOXAPARIN SODIUM 40 MILLIGRAM(S): 100 INJECTION SUBCUTANEOUS at 11:41

## 2019-09-25 RX ADMIN — Medication 1 MILLIGRAM(S): at 11:39

## 2019-09-25 RX ADMIN — METHOTREXATE 10 MILLIGRAM(S): 2.5 TABLET ORAL at 11:40

## 2019-09-25 RX ADMIN — Medication 3 MILLILITER(S): at 14:08

## 2019-09-25 RX ADMIN — Medication 100 MILLIGRAM(S): at 11:39

## 2019-09-25 RX ADMIN — Medication 25 MICROGRAM(S): at 05:52

## 2019-09-25 RX ADMIN — Medication 3 MILLILITER(S): at 08:04

## 2019-09-25 RX ADMIN — Medication 40 MILLIGRAM(S): at 05:52

## 2019-09-25 RX ADMIN — TIOTROPIUM BROMIDE 1 CAPSULE(S): 18 CAPSULE ORAL; RESPIRATORY (INHALATION) at 08:03

## 2019-09-25 NOTE — MEDICAL STUDENT PROGRESS NOTE(EDUCATION) - NS MD HP STUD SUPERVISOR COMMENTS FT
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   Clinical evidence for Grotton's signs and Heliotrope sign) now improved.   WILFREDO, AntiJo-1, Anti-RO, Anti-La are negative.   Rheumatology consult: continue with prednisone, start on MTX 4 tab weekly for two weeks then increase to 8 tab weekly. Add folic acid 1mg daily.   Malignancy work up including  chest, abdomen and Pelvis CT are negative.   COPD: stable  Continue Spiriva and Combivent and albuterol.   Stable for discharge. follow up with rheumatology in 2 weeks  Disposition: Home today
I agree with the content of this note

## 2019-09-25 NOTE — DISCHARGE NOTE NURSING/CASE MANAGEMENT/SOCIAL WORK - PATIENT PORTAL LINK FT
You can access the FollowMyHealth Patient Portal offered by [facility] by registering at the following website: [email or web address]. By joining [employer]’s FollowMyHealth portal, you will also be able to view your health information using other applications (apps) compatible with our system.

## 2019-09-25 NOTE — DISCHARGE NOTE PROVIDER - CARE PROVIDER_API CALL
Fely Julien (MD)  Internal Medicine  [address] [place] [address]  Phone: [phone]  Fax: [phone]  Follow Up Time: 2 weeks    Laurie Wadsworth (DO)  Internal Medicine  [address] [place] [address]  Phone: [phone]  Fax: [phone]  Follow Up Time: 2 weeks Fely Julien (MD)  Internal Medicine  [address] [place] [address]  Phone: [phone]  Fax: [phone]  Follow Up Time: 2 weeks    Laurie Wadsworth (DO)  Internal Medicine  [address] [place] [address]  Phone: [phone]  Fax: [phone]  Follow Up Time: 1 week

## 2019-09-25 NOTE — MEDICAL STUDENT PROGRESS NOTE(EDUCATION) - SUBJECTIVE AND OBJECTIVE BOX
Medical Student Note    Patient Information:  NEIL PHOENIX / 69y / Male / MRN#:[id number]    Hospital Day: 5d    HPI:  70 yo male with PMH pmh of copd not on home oxygen, hypothyroidism, polymyositis presents to ed after being referred by the PMD for elevated CPK and worsening myalgia. pt  reports history goes back to mid june when he started experiencing all over muscle ache and weakness. he endorses weakness interfering with daily activity like climbing the chair or standing for seated position. he followed up with PMD who referred to rheumatologist who diagnosed him with polymyositis and started on steroids with improvement in the symptoms. pt today went to see him pmd for worsening myalgia and blood work revealed elevated high cpk level and referred him to ED. pt also endorses wt loss of 30 pounds since june, rash on lower ext, generalized arthralgia he denies fever, chills, chest pain, cough, palpitations, SOB, change in bowel or urinary habits. in the ED, pt was hemodynamically stable. (09-21)    Interval History:  Patient seen and examined at bedside. No acute events overnight.    Past Medical History:  Gout  COPD, mild  Polymyositis  Hypothyroid    Past Surgical History:  No significant past surgical history    Allergies:  No Known Allergies    Medications:  PRN:  ALBUTerol    90 MICROgram(s) HFA Inhaler 2 Puff(s) Inhalation every 6 hours PRN Bronchospasm  oxyCODONE    5 mG/acetaminophen 325 mG 1 Tablet(s) Oral every 6 hours PRN Moderate Pain (4 - 6)    Standing:  ALBUTerol/ipratropium for Nebulization 3 milliLiter(s) Nebulizer every 6 hours  allopurinol 100 milliGRAM(s) Oral daily  chlorhexidine 4% Liquid 1 Application(s) Topical <User Schedule>  enoxaparin Injectable 40 milliGRAM(s) SubCutaneous daily  folic acid 1 milliGRAM(s) Oral daily  influenza   Vaccine 0.5 milliLiter(s) IntraMuscular once  levothyroxine 25 MICROGram(s) Oral daily  methotrexate 10 milliGRAM(s) Oral every week  pantoprazole    Tablet 40 milliGRAM(s) Oral before breakfast  predniSONE   Tablet 40 milliGRAM(s) Oral daily  sodium chloride 0.9%. 1000 milliLiter(s) (75 mL/Hr) IV Continuous <Continuous>  tiotropium 18 MICROgram(s) Capsule 1 Capsule(s) Inhalation daily    Home:  allopurinol 100 mg oral tablet: 1 tab(s) orally once a day  DuoNeb 0.5 mg-2.5 mg/3 mL inhalation solution: 3 milliliter(s) inhaled 3 times a day, As Needed  ezetimibe 10 mg oral tablet: 1 tab(s) orally once a day  fenofibrate 145 mg oral tablet: 1 tab(s) orally once a day  levothyroxine 25 mcg (0.025 mg) oral tablet: 1 tab(s) orally once a day  pantoprazole 40 mg oral delayed release tablet: 1 tab(s) orally once a day    Vitals:  T(C): 36.1, Max: 36.1 (09-24-19 @ 13:00)  T(F): 96.9, Max: 97 (09-24-19 @ 13:00)  HR: 81 (80 - 87)  BP: 105/59 (105/59 - 120/86)  RR: 185 (18 - 185)  SpO2: 95% (95% - 95%)    Physical Exam:  General: Awake, Alert. Not in acute distress.  Head: Normocephalic atraumatic.  Neck: Supple  Heart: Regular rate and rhythm; S1, S2; No murmurs.  Lungs: Clear to auscultation bilaterally.  Abdomen: Soft, nontender, nondistended.  Extremities: No edema in upper or lower extremities.  Neuro: AAOx3, No focal deficits.  Skin: papular eruptions on MCPs, ankles, knees; erythematous rash around eyes    Labs:  CBC (09-25 @ 06:30)                        Hgb: 9.0<L>  WBC: 15.23<H> )---------------------( Plts: 416<H>                            Hct: 28.8<L>    Chem (09-25 @ 06:30)  Na: 140  |     Cl: 106     |  BUN: 11  -----------------------------------------< Gluc: 77    K: 3.7   |  HCO3: 24  |  Cr: 0.6<L>    Ca 8.2<L> (09-25 @ 06:30)    LFTs (09-25 @ 06:30)  TPro 5.1<L>  /  Alb 2.6<L>  TBili <0.2  /  DBili x   AST 63<H>  /  ALT 37  /  AlkPhos 60    Cardiac Markers (09-25 @ 10:45)  Troponin I x      Troponin T x       U/L<H>  CKMB x      CKMB Units x      Myoglobin x      Lactate x      ESR x        Cardiac Markers (09-24 @ 06:38)  Troponin I x      Troponin T x       U/L<H>  CKMB x      CKMB Units x      Myoglobin x      Lactate x      ESR x        Radiology:

## 2019-09-25 NOTE — MEDICAL STUDENT PROGRESS NOTE(EDUCATION) - NS MD HP STUD ASPLAN PLAN FT
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   -Papular eruption on MCPs, ankles, knees: likely Grotton's signs. Facial erythema (Heliotrope sign) improving  -WILFREDO, AntiJo-1, Anti-RO, Anti-La are negative.   -Rheumatology consult: continue with prednisone, start on MTX 4 tab weekly for two weeks then increase to 8 tab weekly. Add folic acid 1mg daily.   -Chest CT: Bilateral pleural effusions, left greater than right, with underlying compressive atelectasis.   -Pending Abd CT  -    COPD: stable  -Continue Spiriva and Combivent and albuterol. Patient was seen independently. Latest vital signs and labs were reviewed. Case was discussed with resident in morning rounds.  Agree with resident's assessment & plan  likely dermatomyositis.   -Papular eruption on MCPs, ankles, knees: likely Grotton's signs. Facial erythema (Heliotrope sign) improving  -WILFREDO, AntiJo-1, Anti-RO, Anti-La are negative.   -Rheumatology consult: continue with prednisone, start on MTX 4 tab weekly for two weeks then increase to 8 tab weekly. Add folic acid 1mg daily.   -Chest CT: Bilateral pleural effusions, left greater than right, with underlying compressive atelectasis.   -Pending Abd CT  -    COPD: stable  -Continue Spiriva and Combivent and albuterol.

## 2019-09-25 NOTE — DISCHARGE NOTE PROVIDER - NSDCCPGOAL_GEN_ALL_CORE_FT
If your symptoms worsen, please return to the ED.  Please follow up with your primary care doctor in 2 weeks.  Please follow up with the rheumatlogist in 2 weeks.  To get better and follow your care plan as instructed.  Please take your medication as instructed. If your symptoms worsen, please return to the ED.  Please follow up with the rheumatologist in 1 week.  Please follow up with your primary care doctor in 2 weeks.  Please take your medication as instructed.  Methotrexate: 4 tabs weekly for two weeks then increase to 8 tabs weekly.

## 2019-09-25 NOTE — DISCHARGE NOTE PROVIDER - NSDCCPCAREPLAN_GEN_ALL_CORE_FT
PRINCIPAL DISCHARGE DIAGNOSIS  Diagnosis: Dermatomyositis  Assessment and Plan of Treatment: An uncommon inflammatory disease marked by muscle weakness and a distinctive skin rash.      SECONDARY DISCHARGE DIAGNOSES  Diagnosis: Leukocytosis  Assessment and Plan of Treatment: Leukocytosis is white cells (the leukocyte count) above the normal range in the blood.    Diagnosis: Elevated CK  Assessment and Plan of Treatment: Levels of CK can rise after a heart attack, skeletal muscle injury, strenuous exercise, or drinking too much alcohol, and from taking certain medicines or supplements.

## 2019-09-25 NOTE — MEDICAL STUDENT PROGRESS NOTE(EDUCATION) - NS MD HP STUD ASPLAN ASSES FT
69M pmh of copd not on home oxygen, hypothyroidism, polymyositis complaining of muscle weakness and aches since mid June. Working diagnosis of dermatomyositis.

## 2019-09-25 NOTE — DISCHARGE NOTE PROVIDER - PROVIDER TOKENS
PROVIDER:[TOKEN:[[id number]],FOLLOWUP:[2 weeks]],PROVIDER:[TOKEN:[[id number]],FOLLOWUP:[2 weeks]] PROVIDER:[TOKEN:[[id number]],FOLLOWUP:[2 weeks]],PROVIDER:[TOKEN:[[id number]],FOLLOWUP:[1 week]]

## 2019-09-26 LAB
DRVVT SCREEN TO CONFIRM RATIO: SIGNIFICANT CHANGE UP
LA NT DPL PPP QL: SIGNIFICANT CHANGE UP
NORMALIZED SCT PPP-RTO: 0.78 RATIO — SIGNIFICANT CHANGE UP (ref 0–1.16)
NORMALIZED SCT PPP-RTO: SIGNIFICANT CHANGE UP

## 2019-09-30 DIAGNOSIS — E03.9 HYPOTHYROIDISM, UNSPECIFIED: ICD-10-CM

## 2019-09-30 DIAGNOSIS — D72.829 ELEVATED WHITE BLOOD CELL COUNT, UNSPECIFIED: ICD-10-CM

## 2019-09-30 DIAGNOSIS — E43 UNSPECIFIED SEVERE PROTEIN-CALORIE MALNUTRITION: ICD-10-CM

## 2019-09-30 DIAGNOSIS — M33.13 OTHER DERMATOMYOSITIS WITHOUT MYOPATHY: ICD-10-CM

## 2019-09-30 DIAGNOSIS — J90 PLEURAL EFFUSION, NOT ELSEWHERE CLASSIFIED: ICD-10-CM

## 2019-09-30 DIAGNOSIS — Z85.828 PERSONAL HISTORY OF OTHER MALIGNANT NEOPLASM OF SKIN: ICD-10-CM

## 2019-09-30 DIAGNOSIS — D47.3 ESSENTIAL (HEMORRHAGIC) THROMBOCYTHEMIA: ICD-10-CM

## 2019-09-30 DIAGNOSIS — E78.5 HYPERLIPIDEMIA, UNSPECIFIED: ICD-10-CM

## 2019-09-30 DIAGNOSIS — M62.82 RHABDOMYOLYSIS: ICD-10-CM

## 2019-09-30 DIAGNOSIS — J44.9 CHRONIC OBSTRUCTIVE PULMONARY DISEASE, UNSPECIFIED: ICD-10-CM

## 2019-09-30 DIAGNOSIS — Z87.891 PERSONAL HISTORY OF NICOTINE DEPENDENCE: ICD-10-CM

## 2019-09-30 DIAGNOSIS — J98.11 ATELECTASIS: ICD-10-CM

## 2019-10-02 ENCOUNTER — INPATIENT (INPATIENT)
Facility: HOSPITAL | Age: 69
LOS: 6 days | Discharge: HOME HEALTH | End: 2019-10-09
Attending: INTERNAL MEDICINE | Admitting: INTERNAL MEDICINE
Payer: MEDICARE

## 2019-10-02 VITALS
RESPIRATION RATE: 18 BRPM | HEART RATE: 88 BPM | SYSTOLIC BLOOD PRESSURE: 138 MMHG | DIASTOLIC BLOOD PRESSURE: 79 MMHG | OXYGEN SATURATION: 94 % | TEMPERATURE: 98 F

## 2019-10-02 DIAGNOSIS — Z90.89 ACQUIRED ABSENCE OF OTHER ORGANS: Chronic | ICD-10-CM

## 2019-10-02 PROBLEM — M10.9 GOUT, UNSPECIFIED: Chronic | Status: ACTIVE | Noted: 2019-09-21

## 2019-10-02 PROBLEM — J44.9 CHRONIC OBSTRUCTIVE PULMONARY DISEASE, UNSPECIFIED: Chronic | Status: ACTIVE | Noted: 2019-09-21

## 2019-10-02 PROBLEM — E03.9 HYPOTHYROIDISM, UNSPECIFIED: Chronic | Status: ACTIVE | Noted: 2019-09-20

## 2019-10-02 LAB
ALBUMIN SERPL ELPH-MCNC: 3.5 G/DL — SIGNIFICANT CHANGE UP (ref 3.5–5.2)
ALP SERPL-CCNC: 62 U/L — SIGNIFICANT CHANGE UP (ref 30–115)
ALT FLD-CCNC: 52 U/L — HIGH (ref 0–41)
ANION GAP SERPL CALC-SCNC: 16 MMOL/L — HIGH (ref 7–14)
APTT BLD: 28.2 SEC — SIGNIFICANT CHANGE UP (ref 27–39.2)
AST SERPL-CCNC: 80 U/L — HIGH (ref 0–41)
BASOPHILS # BLD AUTO: 0.03 K/UL — SIGNIFICANT CHANGE UP (ref 0–0.2)
BASOPHILS # BLD AUTO: 0.03 K/UL — SIGNIFICANT CHANGE UP (ref 0–0.2)
BASOPHILS NFR BLD AUTO: 0.1 % — SIGNIFICANT CHANGE UP (ref 0–1)
BASOPHILS NFR BLD AUTO: 0.1 % — SIGNIFICANT CHANGE UP (ref 0–1)
BILIRUB SERPL-MCNC: 0.4 MG/DL — SIGNIFICANT CHANGE UP (ref 0.2–1.2)
BLD GP AB SCN SERPL QL: SIGNIFICANT CHANGE UP
BUN SERPL-MCNC: 28 MG/DL — HIGH (ref 10–20)
CALCIUM SERPL-MCNC: 9.2 MG/DL — SIGNIFICANT CHANGE UP (ref 8.5–10.1)
CHLORIDE SERPL-SCNC: 95 MMOL/L — LOW (ref 98–110)
CO2 SERPL-SCNC: 27 MMOL/L — SIGNIFICANT CHANGE UP (ref 17–32)
CREAT SERPL-MCNC: 0.8 MG/DL — SIGNIFICANT CHANGE UP (ref 0.7–1.5)
EOSINOPHIL # BLD AUTO: 0 K/UL — SIGNIFICANT CHANGE UP (ref 0–0.7)
EOSINOPHIL # BLD AUTO: 0.01 K/UL — SIGNIFICANT CHANGE UP (ref 0–0.7)
EOSINOPHIL NFR BLD AUTO: 0 % — SIGNIFICANT CHANGE UP (ref 0–8)
EOSINOPHIL NFR BLD AUTO: 0 % — SIGNIFICANT CHANGE UP (ref 0–8)
GLUCOSE SERPL-MCNC: 111 MG/DL — HIGH (ref 70–99)
HCT VFR BLD CALC: 30.3 % — LOW (ref 42–52)
HCT VFR BLD CALC: 34.8 % — LOW (ref 42–52)
HGB BLD-MCNC: 11.1 G/DL — LOW (ref 14–18)
HGB BLD-MCNC: 9.7 G/DL — LOW (ref 14–18)
IMM GRANULOCYTES NFR BLD AUTO: 0.6 % — HIGH (ref 0.1–0.3)
IMM GRANULOCYTES NFR BLD AUTO: 0.9 % — HIGH (ref 0.1–0.3)
INR BLD: 1.25 RATIO — SIGNIFICANT CHANGE UP (ref 0.65–1.3)
LACTATE SERPL-SCNC: 1.5 MMOL/L — SIGNIFICANT CHANGE UP (ref 0.5–2.2)
LIDOCAIN IGE QN: 29 U/L — SIGNIFICANT CHANGE UP (ref 7–60)
LYMPHOCYTES # BLD AUTO: 1.44 K/UL — SIGNIFICANT CHANGE UP (ref 1.2–3.4)
LYMPHOCYTES # BLD AUTO: 1.54 K/UL — SIGNIFICANT CHANGE UP (ref 1.2–3.4)
LYMPHOCYTES # BLD AUTO: 5.7 % — LOW (ref 20.5–51.1)
LYMPHOCYTES # BLD AUTO: 6.8 % — LOW (ref 20.5–51.1)
MCHC RBC-ENTMCNC: 28.4 PG — SIGNIFICANT CHANGE UP (ref 27–31)
MCHC RBC-ENTMCNC: 28.5 PG — SIGNIFICANT CHANGE UP (ref 27–31)
MCHC RBC-ENTMCNC: 31.9 G/DL — LOW (ref 32–37)
MCHC RBC-ENTMCNC: 32 G/DL — SIGNIFICANT CHANGE UP (ref 32–37)
MCV RBC AUTO: 88.6 FL — SIGNIFICANT CHANGE UP (ref 80–94)
MCV RBC AUTO: 89.5 FL — SIGNIFICANT CHANGE UP (ref 80–94)
MONOCYTES # BLD AUTO: 1.42 K/UL — HIGH (ref 0.1–0.6)
MONOCYTES # BLD AUTO: 1.51 K/UL — HIGH (ref 0.1–0.6)
MONOCYTES NFR BLD AUTO: 6 % — SIGNIFICANT CHANGE UP (ref 1.7–9.3)
MONOCYTES NFR BLD AUTO: 6.3 % — SIGNIFICANT CHANGE UP (ref 1.7–9.3)
NEUTROPHILS # BLD AUTO: 19.42 K/UL — HIGH (ref 1.4–6.5)
NEUTROPHILS # BLD AUTO: 22.12 K/UL — HIGH (ref 1.4–6.5)
NEUTROPHILS NFR BLD AUTO: 86.2 % — HIGH (ref 42.2–75.2)
NEUTROPHILS NFR BLD AUTO: 87.3 % — HIGH (ref 42.2–75.2)
NRBC # BLD: 0 /100 WBCS — SIGNIFICANT CHANGE UP (ref 0–0)
NRBC # BLD: 0 /100 WBCS — SIGNIFICANT CHANGE UP (ref 0–0)
PLATELET # BLD AUTO: 402 K/UL — HIGH (ref 130–400)
PLATELET # BLD AUTO: 491 K/UL — HIGH (ref 130–400)
POTASSIUM SERPL-MCNC: 4.7 MMOL/L — SIGNIFICANT CHANGE UP (ref 3.5–5)
POTASSIUM SERPL-SCNC: 4.7 MMOL/L — SIGNIFICANT CHANGE UP (ref 3.5–5)
PROT SERPL-MCNC: 6.5 G/DL — SIGNIFICANT CHANGE UP (ref 6–8)
PROTHROM AB SERPL-ACNC: 14.4 SEC — HIGH (ref 9.95–12.87)
RBC # BLD: 3.42 M/UL — LOW (ref 4.7–6.1)
RBC # BLD: 3.89 M/UL — LOW (ref 4.7–6.1)
RBC # FLD: 15.7 % — HIGH (ref 11.5–14.5)
RBC # FLD: 15.9 % — HIGH (ref 11.5–14.5)
SODIUM SERPL-SCNC: 138 MMOL/L — SIGNIFICANT CHANGE UP (ref 135–146)
TROPONIN T SERPL-MCNC: 0.2 NG/ML — CRITICAL HIGH
TROPONIN T SERPL-MCNC: 0.2 NG/ML — CRITICAL HIGH
WBC # BLD: 22.55 K/UL — HIGH (ref 4.8–10.8)
WBC # BLD: 25.35 K/UL — HIGH (ref 4.8–10.8)
WBC # FLD AUTO: 22.55 K/UL — HIGH (ref 4.8–10.8)
WBC # FLD AUTO: 25.35 K/UL — HIGH (ref 4.8–10.8)

## 2019-10-02 PROCEDURE — 99232 SBSQ HOSP IP/OBS MODERATE 35: CPT

## 2019-10-02 PROCEDURE — 74177 CT ABD & PELVIS W/CONTRAST: CPT | Mod: 26

## 2019-10-02 PROCEDURE — 99285 EMERGENCY DEPT VISIT HI MDM: CPT | Mod: 25

## 2019-10-02 PROCEDURE — 43753 TX GASTRO INTUB W/ASP: CPT

## 2019-10-02 PROCEDURE — 93010 ELECTROCARDIOGRAM REPORT: CPT

## 2019-10-02 PROCEDURE — 71045 X-RAY EXAM CHEST 1 VIEW: CPT | Mod: 26

## 2019-10-02 PROCEDURE — 99284 EMERGENCY DEPT VISIT MOD MDM: CPT

## 2019-10-02 PROCEDURE — 71260 CT THORAX DX C+: CPT | Mod: 26

## 2019-10-02 RX ORDER — PANTOPRAZOLE SODIUM 20 MG/1
1 TABLET, DELAYED RELEASE ORAL
Qty: 0 | Refills: 0 | DISCHARGE

## 2019-10-02 RX ORDER — PANTOPRAZOLE SODIUM 20 MG/1
80 TABLET, DELAYED RELEASE ORAL ONCE
Refills: 0 | Status: COMPLETED | OUTPATIENT
Start: 2019-10-02 | End: 2019-10-02

## 2019-10-02 RX ORDER — SODIUM CHLORIDE 9 MG/ML
1000 INJECTION, SOLUTION INTRAVENOUS
Refills: 0 | Status: DISCONTINUED | OUTPATIENT
Start: 2019-10-02 | End: 2019-10-05

## 2019-10-02 RX ORDER — CHLORHEXIDINE GLUCONATE 213 G/1000ML
1 SOLUTION TOPICAL
Refills: 0 | Status: DISCONTINUED | OUTPATIENT
Start: 2019-10-02 | End: 2019-10-09

## 2019-10-02 RX ORDER — MORPHINE SULFATE 50 MG/1
2 CAPSULE, EXTENDED RELEASE ORAL ONCE
Refills: 0 | Status: DISCONTINUED | OUTPATIENT
Start: 2019-10-02 | End: 2019-10-02

## 2019-10-02 RX ORDER — PANTOPRAZOLE SODIUM 20 MG/1
8 TABLET, DELAYED RELEASE ORAL
Qty: 80 | Refills: 0 | Status: DISCONTINUED | OUTPATIENT
Start: 2019-10-02 | End: 2019-10-02

## 2019-10-02 RX ORDER — PANTOPRAZOLE SODIUM 20 MG/1
8 TABLET, DELAYED RELEASE ORAL
Qty: 80 | Refills: 0 | Status: DISCONTINUED | OUTPATIENT
Start: 2019-10-02 | End: 2019-10-04

## 2019-10-02 RX ORDER — ENOXAPARIN SODIUM 100 MG/ML
40 INJECTION SUBCUTANEOUS DAILY
Refills: 0 | Status: DISCONTINUED | OUTPATIENT
Start: 2019-10-02 | End: 2019-10-03

## 2019-10-02 RX ORDER — MORPHINE SULFATE 50 MG/1
4 CAPSULE, EXTENDED RELEASE ORAL ONCE
Refills: 0 | Status: DISCONTINUED | OUTPATIENT
Start: 2019-10-02 | End: 2019-10-02

## 2019-10-02 RX ORDER — PANTOPRAZOLE SODIUM 20 MG/1
80 TABLET, DELAYED RELEASE ORAL ONCE
Refills: 0 | Status: DISCONTINUED | OUTPATIENT
Start: 2019-10-02 | End: 2019-10-02

## 2019-10-02 RX ORDER — LEVOTHYROXINE SODIUM 125 MCG
1 TABLET ORAL
Qty: 0 | Refills: 0 | DISCHARGE

## 2019-10-02 RX ORDER — SODIUM CHLORIDE 9 MG/ML
1000 INJECTION, SOLUTION INTRAVENOUS
Refills: 0 | Status: DISCONTINUED | OUTPATIENT
Start: 2019-10-02 | End: 2019-10-02

## 2019-10-02 RX ADMIN — MORPHINE SULFATE 4 MILLIGRAM(S): 50 CAPSULE, EXTENDED RELEASE ORAL at 11:10

## 2019-10-02 RX ADMIN — MORPHINE SULFATE 2 MILLIGRAM(S): 50 CAPSULE, EXTENDED RELEASE ORAL at 10:07

## 2019-10-02 RX ADMIN — PANTOPRAZOLE SODIUM 10 MG/HR: 20 TABLET, DELAYED RELEASE ORAL at 17:02

## 2019-10-02 RX ADMIN — PANTOPRAZOLE SODIUM 80 MILLIGRAM(S): 20 TABLET, DELAYED RELEASE ORAL at 17:02

## 2019-10-02 NOTE — H&P ADULT - ASSESSMENT
70 yo male with PMH of COPD (not on home oxygen), hypothyroidism, gout, RA, polymyositis (on prednisone and methotrexate) presented to the ED after calling 911 for CP and SOB assocated with multiple episodes of coffee ground emesis.    # Gastric Outlet Obstruction with Vomiting and Coffee-Ground Emesis  - NGT was placed and 2L of dark coffee ground emesis was suctioned out.   - CT scan showed large distended stomach with abrupt transition to normal caliber distal stomach without obvious mass or obstruction lesion.  - Seen by surgery in the ED  - keep NGT to suction  - f/u 8 pm CBC  - keep type and screen active. two large bore peripheral IVs. transfuse if Hb <7  - protonix drip and IVF  - f/u GI consult for EGD and possible stenting    # Leukocytosis - likely secondary to steroids  - f/u AM labs    # Troponemia - likely secondary to demand ischemia. rule out ACS  - elevated at 0.2. no baseline  - EKG showing sinus tachycardia, left axis deviation, and RBBB (on prior EKGs as well).   - f/u 8 pm troponin. trend troponin. serial EKGs    # Polymyositis - pt reports symptoms have been improving somewhat since discharge  - holding all PO meds for now  - outpatient rheum follow up.    # COPD - stable  - duonebs PRN    # Hypothyroidism   - f/u TSH  - holding all PO meds for now    dvt ppx: lovenox  diet: NPO with NG tube to suction  out of bed to chair  dispo: from home  FULL CODE

## 2019-10-02 NOTE — ED ADULT TRIAGE NOTE - CHIEF COMPLAINT QUOTE
BIBA for sharp left sided chest pain that began last night, pt also presenting with nausea and had 1 episode of dark vomit prior to arrival BIBA for sharp left sided chest pain 9-10/10 that began last night, pt is 9/10 pain right now. pt also presenting with nausea and had 1 episode of dark vomit prior to arrival

## 2019-10-02 NOTE — H&P ADULT - NSHPSOCIALHISTORY_GEN_ALL_CORE
Former smoker. Smoked 1-1.5 packs per day for 35 years. Quit 3 years ago.   Occasional, social EtOH  Denies illicit drug use.     Worked as a mailman.

## 2019-10-02 NOTE — ED ADULT NURSE NOTE - NSIMPLEMENTINTERV_GEN_ALL_ED
Implemented All Fall Risk Interventions:  [place] to call system. Call bell, personal items and telephone within reach. Instruct patient to call for assistance. Room bathroom lighting operational. Non-slip footwear when patient is off stretcher. Physically safe environment: no spills, clutter or unnecessary equipment. Stretcher in lowest position, wheels locked, appropriate side rails in place. Provide visual cue, wrist band, yellow gown, etc. Monitor gait and stability. Monitor for mental status changes and reorient to person, place, and time. Review medications for side effects contributing to fall risk. Reinforce activity limits and safety measures with patient and family.

## 2019-10-02 NOTE — H&P ADULT - NSHPPHYSICALEXAM_GEN_ALL_CORE
GENERAL: NAD, speaks in full sentences, no signs of respiratory distress  HEAD: Atraumatic; NG tube to suction.   NECK: Supple  CHEST/LUNG: Clear to auscultation bilaterally; No wheeze or crackles  HEART: S1, S2; RRR; No murmurs, rubs, or gallops  ABDOMEN: BS+; Soft, Non-tender, Non-distended  EXTREMITIES:  2+ Peripheral Pulses, No clubbing, cyanosis, or edema; discoloration of knees bilaterally  PSYCH: AAOx3  NEUROLOGY: non-focal  SKIN: discoloration of knees bilaterally

## 2019-10-02 NOTE — ED PROVIDER NOTE - CLINICAL SUMMARY MEDICAL DECISION MAKING FREE TEXT BOX
Patient presented with persisted chest pain, dyspnea and hiccuping, s/p 1 episode of black vomiting. On arrival patient HD stable, abd non-tender, but hiccuping persistently on exam. Obtained labs which showed Hb 11.9, normal lactate, troponin 0.2 (EKG non-specific), otherwise grossly unremarkable. CT chest did not show any evidence of PE but partially visualized dilatation of gastric area. Subsequent CT abd/pelvis therefore obtained showing gastric outlet obstruction. Consulted surgery who evaluated patient and determined no surgical intervention. Patient had 2 episodes of coffee ground emesis in ED thereafter, requiring NG tube placement and patient started on protonix. Repeat CBC showed Hb 9. Spoke with GI who will be on consult but recommended admission to telemetry and they will follow. Patient remained HD stable during ED course. Agrees with plan to admit. HD stable at time of admission.

## 2019-10-02 NOTE — ED PROVIDER NOTE - OBJECTIVE STATEMENT
69 year old male with pmhx of RA ( recently placed on prednisone and methotrexate) and COPD, presents with chest pain that started last night. While on way to ED in ambulance, had episode of coffee ground emesis. + nausea. no abd pain, sob, cough, fever, chills, rectal bleeding or dark stool. no weakness or syncope.

## 2019-10-02 NOTE — ED ADULT NURSE NOTE - OBJECTIVE STATEMENT
chest pain and vomiting that started this morning dark ground emesis and one episode while here in the ed at 15:00

## 2019-10-02 NOTE — ED PROVIDER NOTE - PHYSICAL EXAMINATION
CONST: Well appearing in NAD  EYES: PERRL, EOMI, Sclera and conjunctiva clear.   ENT: No nasal discharge. TM's clear B/L without drainage. Oropharynx normal appearing, no erythema or exudates. No abscess or swelling. Uvula midline.   NECK: Non-tender, no meningeal signs. normal ROM. supple   CARD: Normal S1 S2; Normal rate and rhythm  RESP: Equal BS B/L, No wheezes, rhonchi or rales. No distress  GI: Soft, non-tender, non-distended. no cva tenderness. normal BS  MS: Normal ROM in all extremities. No midline spinal tenderness. pulses 2 +. no calf tenderness or swelling  SKIN: Warm, dry, no acute rashes. Good turgor  NEURO: A&Ox3, No focal deficits. Strength 5/5 with no sensory deficits. Steady gait.

## 2019-10-02 NOTE — CONSULT NOTE ADULT - SUBJECTIVE AND OBJECTIVE BOX
NEIL PHOENIX [id number]  69y Male    HPI:  Patient is a 69 year old male with a PMH of COPD not on home O2, Hypothyroidism, and RA on       PAST MEDICAL & SURGICAL HISTORY:  Gout  COPD, mild  Polymyositis  Hypothyroid  No significant past surgical history    MEDICATIONS  (STANDING):  pantoprazole  Injectable 80 milliGRAM(s) IV Push Once  pantoprazole Infusion 8 mG/Hr (10 mL/Hr) IV Continuous <Continuous>    MEDICATIONS  (PRN):      Allergies:  No Known Allergies    REVIEW OF SYSTEMS    [x] A ten-point review of systems was otherwise negative except as noted.  [ ] Due to altered mental status/intubation, subjective information were not able to be obtained from the patient. History was obtained, to the extent possible, from review of the chart and collateral sources of information.      Vital Signs Last 24 Hrs  T(C): 36.5 (02 Oct 2019 09:38), Max: 36.5 (02 Oct 2019 09:38)  T(F): 97.7 (02 Oct 2019 09:38), Max: 97.7 (02 Oct 2019 09:38)  HR: 103 (02 Oct 2019 16:30) (88 - 103)  BP: 115/68 (02 Oct 2019 16:30) (115/68 - 138/79)  BP(mean): --  RR: 18 (02 Oct 2019 16:30) (18 - 18)  SpO2: 95% (02 Oct 2019 16:30) (94% - 100%)    PHYSICAL EXAM:  GENERAL: NAD, well-appearing, NGT in place  CHEST/LUNG: mild wheezes bilaterally   HEART: Regular rate and rhythm  ABDOMEN: Soft, Nontender, mildly distended;       LABS:                      11.1   25.35 )-----------( 491      ( 02 Oct 2019 10:00 )             34.8       Auto Neutrophil %: 87.3 % (10-02-19 @ 10:00)  Auto Immature Granulocyte %: 0.9 % (10-02-19 @ 10:00)    10-02    138  |  95<L>  |  28<H>  ----------------------------<  111<H>  4.7   |  27  |  0.8      Calcium, Total Serum: 9.2 mg/dL (10-02-19 @ 10:00)      LFTs:             6.5  | 0.4  | 80       ------------------[62      ( 02 Oct 2019 10:00 )  3.5  | x    | 52          Lipase:29       Lactate, Blood: 1.5 mmol/L (10-02-19 @ 10:00)      Coags:     14.40  ----< 1.25    ( 02 Oct 2019 10:00 )     28.2        CARDIAC MARKERS ( 02 Oct 2019 10:00 )  x     / 0.20 ng/mL / x     / x     / x        RADIOLOGY & ADDITIONAL STUDIES:  < from: CT Abdomen and Pelvis w/ IV Cont (10.02.19 @ 15:30) >  PERITONEUM/MESENTERY/BOWEL: Again seen is a distended fluid-filled   stomach with an apparent transition to normal caliber in the distal   stomach. An underlying lesion is not well seen by CT. No bowel   obstruction free fluid or free air. Diffuse colonic diverticulosis noted  without evidence of diverticulitis.    BONES/SOFT TISSUES: Unremarkable.      IMPRESSION:     Findings suspicious for gastric outlet obstruction. No underlying lesion   seen by CT.    Consider upper endoscopy for further evaluation.    < end of copied text > NEIL PHOENIX [id number]  69y Male    HPI:  Patient is a 69 year old male with a PMH of COPD not on home O2, Hypothyroidism, and RA on prednisone presented to the ED after calling 911 for CP and SOB assocated with episodes of coffee ground emesis. He states that he has not been feeling well for a few weekns now and it was progressively worsening until he was having trouble getting around the house. He states his CP was substernal and sharp and was causing him to have trouble breathing. Nothing he did made the pain any better or worse. He had a large volume emesis in transport to the ED and while here an NGT was placed and 1800cc of dark coffee ground emesis was suctioned out. He states he feels much improved after decompression. Patient denies any prior history of GI bleed. In the ED he is hemodynamically stable but mildly tachycardic to the low 100s. CT scan showed large distended stomach with abrupt transition to normal caliber distal stomach without obvious mass or obstruction lesion.      PAST MEDICAL & SURGICAL HISTORY:  Gout  COPD, mild  Polymyositis  Hypothyroid  No significant past surgical history    MEDICATIONS  (STANDING):  pantoprazole  Injectable 80 milliGRAM(s) IV Push Once  pantoprazole Infusion 8 mG/Hr (10 mL/Hr) IV Continuous <Continuous>    MEDICATIONS  (PRN):      Allergies:  No Known Allergies    REVIEW OF SYSTEMS    [x] A ten-point review of systems was otherwise negative except as noted.  [ ] Due to altered mental status/intubation, subjective information were not able to be obtained from the patient. History was obtained, to the extent possible, from review of the chart and collateral sources of information.      Vital Signs Last 24 Hrs  T(C): 36.5 (02 Oct 2019 09:38), Max: 36.5 (02 Oct 2019 09:38)  T(F): 97.7 (02 Oct 2019 09:38), Max: 97.7 (02 Oct 2019 09:38)  HR: 103 (02 Oct 2019 16:30) (88 - 103)  BP: 115/68 (02 Oct 2019 16:30) (115/68 - 138/79)  BP(mean): --  RR: 18 (02 Oct 2019 16:30) (18 - 18)  SpO2: 95% (02 Oct 2019 16:30) (94% - 100%)    PHYSICAL EXAM:  GENERAL: NAD, well-appearing, NGT in place  CHEST/LUNG: mild wheezes bilaterally   HEART: Regular rate and rhythm  ABDOMEN: Soft, Nontender, mildly distended;       LABS:                      11.1   25.35 )-----------( 491      ( 02 Oct 2019 10:00 )             34.8       Auto Neutrophil %: 87.3 % (10-02-19 @ 10:00)  Auto Immature Granulocyte %: 0.9 % (10-02-19 @ 10:00)    10-02    138  |  95<L>  |  28<H>  ----------------------------<  111<H>  4.7   |  27  |  0.8      Calcium, Total Serum: 9.2 mg/dL (10-02-19 @ 10:00)      LFTs:             6.5  | 0.4  | 80       ------------------[62      ( 02 Oct 2019 10:00 )  3.5  | x    | 52          Lipase:29       Lactate, Blood: 1.5 mmol/L (10-02-19 @ 10:00)      Coags:     14.40  ----< 1.25    ( 02 Oct 2019 10:00 )     28.2      CARDIAC MARKERS ( 02 Oct 2019 10:00 )  x     / 0.20 ng/mL / x     / x     / x        RADIOLOGY & ADDITIONAL STUDIES:  < from: CT Abdomen and Pelvis w/ IV Cont (10.02.19 @ 15:30) >  PERITONEUM/MESENTERY/BOWEL: Again seen is a distended fluid-filled   stomach with an apparent transition to normal caliber in the distal   stomach. An underlying lesion is not well seen by CT. No bowel   obstruction free fluid or free air. Diffuse colonic diverticulosis noted  without evidence of diverticulitis.    BONES/SOFT TISSUES: Unremarkable.      IMPRESSION:     Findings suspicious for gastric outlet obstruction. No underlying lesion   seen by CT.    Consider upper endoscopy for further evaluation.    < end of copied text > NEIL PHOENIX [id number]  69y Male    HPI:  Patient is a 69 year old male with a PMH of COPD not on home O2, Hypothyroidism, and RA on prednisone presented to the ED after calling 911 for CP and SOB assocated with episodes of coffee ground emesis. He states that he has not been feeling well since June with early satiety decreased appetite, and unintentional 20 lbs weight loss. He states his CP was substernal and sharp and was causing him to have trouble breathing. Nothing he did made the pain any better or worse. He had a large volume emesis in transport to the ED and while here an NGT was placed and 1800cc of dark coffee ground emesis was suctioned out. He states he feels much improved after decompression. Patient denies any prior history of GI bleed. In the ED he is hemodynamically stable but mildly tachycardic to the low 100s. CT scan showed large distended stomach with abrupt transition to normal caliber distal stomach without obvious mass or obstruction lesion.      PAST MEDICAL & SURGICAL HISTORY:  Gout  COPD, mild  Polymyositis  Hypothyroid  No significant past surgical history    MEDICATIONS  (STANDING):  pantoprazole  Injectable 80 milliGRAM(s) IV Push Once  pantoprazole Infusion 8 mG/Hr (10 mL/Hr) IV Continuous <Continuous>    MEDICATIONS  (PRN):      Allergies:  No Known Allergies    REVIEW OF SYSTEMS    [x] A ten-point review of systems was otherwise negative except as noted.  [ ] Due to altered mental status/intubation, subjective information were not able to be obtained from the patient. History was obtained, to the extent possible, from review of the chart and collateral sources of information.      Vital Signs Last 24 Hrs  T(C): 36.5 (02 Oct 2019 09:38), Max: 36.5 (02 Oct 2019 09:38)  T(F): 97.7 (02 Oct 2019 09:38), Max: 97.7 (02 Oct 2019 09:38)  HR: 103 (02 Oct 2019 16:30) (88 - 103)  BP: 115/68 (02 Oct 2019 16:30) (115/68 - 138/79)  BP(mean): --  RR: 18 (02 Oct 2019 16:30) (18 - 18)  SpO2: 95% (02 Oct 2019 16:30) (94% - 100%)    PHYSICAL EXAM:  GENERAL: NAD, well-appearing, NGT in place  CHEST/LUNG: mild wheezes bilaterally   HEART: Regular rate and rhythm  ABDOMEN: Soft, Nontender, mildly distended;       LABS:                      11.1   25.35 )-----------( 491      ( 02 Oct 2019 10:00 )             34.8       Auto Neutrophil %: 87.3 % (10-02-19 @ 10:00)  Auto Immature Granulocyte %: 0.9 % (10-02-19 @ 10:00)    10-02    138  |  95<L>  |  28<H>  ----------------------------<  111<H>  4.7   |  27  |  0.8      Calcium, Total Serum: 9.2 mg/dL (10-02-19 @ 10:00)      LFTs:             6.5  | 0.4  | 80       ------------------[62      ( 02 Oct 2019 10:00 )  3.5  | x    | 52          Lipase:29       Lactate, Blood: 1.5 mmol/L (10-02-19 @ 10:00)      Coags:     14.40  ----< 1.25    ( 02 Oct 2019 10:00 )     28.2      CARDIAC MARKERS ( 02 Oct 2019 10:00 )  x     / 0.20 ng/mL / x     / x     / x        RADIOLOGY & ADDITIONAL STUDIES:  < from: CT Abdomen and Pelvis w/ IV Cont (10.02.19 @ 15:30) >  PERITONEUM/MESENTERY/BOWEL: Again seen is a distended fluid-filled   stomach with an apparent transition to normal caliber in the distal   stomach. An underlying lesion is not well seen by CT. No bowel   obstruction free fluid or free air. Diffuse colonic diverticulosis noted  without evidence of diverticulitis.    BONES/SOFT TISSUES: Unremarkable.      IMPRESSION:     Findings suspicious for gastric outlet obstruction. No underlying lesion   seen by CT.    Consider upper endoscopy for further evaluation.    < end of copied text >

## 2019-10-02 NOTE — H&P ADULT - HISTORY OF PRESENT ILLNESS
68 yo male with PMH of COPD (not on home oxygen), hypothyroidism, gout, RA, polymyositis (on prednisone and methotrexate) presented to the ED after calling 911 for CP and SOB assocated with multiple episodes of coffee ground emesis. He states that he has not been feeling himself since mid-June and reports decreased appetite and an unintentional 30 lb weight loss. He was recently discharged from [facility] on Sept 25th after being admitted for weakness and found to have polymyositis. He endorses intermittent chest pain started approx 4 days ago and N/V 3 days ago. He states his CP was substernal and sharp and was causing him to have trouble breathing. Burping and sneezing made the pain worse. No alleviating factors. He states his last normal BM was 2 weeks ago. Since that time he has had very small, hard BMs every few days. No bright red blood or dark stool noted. Patient denies any prior history of GI bleed. His last colonoscopy was 3 years ago ago by [facility] on [address], he may have had polyps removed at that time but was not sure. Never had EGD before.     Patient had a large volume emesis in transport to the ED and while here an NGT was placed and 2L of dark coffee ground emesis was suctioned out. He states he feels much improved after decompression. He remained hemodynamically stable but mildly tachycardic to the low 100s. CT scan showed large distended stomach with abrupt transition to normal caliber distal stomach without obvious mass or obstruction lesion. Seen by surgery in the ED. Admitted to medicine for further workup and management.

## 2019-10-02 NOTE — H&P ADULT - NSHPLABSRESULTS_GEN_ALL_CORE
11.1   25.35 )-----------( 491      ( 02 Oct 2019 10:00 )             34.8       10-02    138  |  95<L>  |  28<H>  ----------------------------<  111<H>  4.7   |  27  |  0.8    Ca    9.2      02 Oct 2019 10:00    TPro  6.5  /  Alb  3.5  /  TBili  0.4  /  DBili  x   /  AST  80<H>  /  ALT  52<H>  /  AlkPhos  62  10-02      PT/INR - ( 02 Oct 2019 10:00 )   PT: 14.40 sec;   INR: 1.25 ratio         PTT - ( 02 Oct 2019 10:00 )  PTT:28.2 sec      IMPRESSION:    Bibasilar nodular airspace opacities suspicious for   infectious/inflammatory etiologies.    Small left pleural effusion.    Right posterior pleural nodularity abutting the right lower lobe.    Loculated fluid seen in the posterior aspect of the right oblique   fissure.     Recommend a three-month follow-up chest CT.    Distended esophagus and partially imaged stomach. A CT scan of the   abdomen and pelvis has been ordered.      < end of copied text >    < from: CT Abdomen and Pelvis w/ IV Cont (10.02.19 @ 15:30) >      IMPRESSION:     Findings suspicious for gastric outlet obstruction. No underlying lesion   seen by CT.    Consider upper endoscopy for further evaluation.          < end of copied text >

## 2019-10-02 NOTE — ED ADULT NURSE NOTE - CHIEF COMPLAINT QUOTE
BIBA for sharp left sided chest pain 9-10/10 that began last night, pt is 9/10 pain right now. pt also presenting with nausea and had 1 episode of dark vomit prior to arrival

## 2019-10-02 NOTE — CONSULT NOTE ADULT - ATTENDING COMMENTS
70yo male with PMHx of COPD, hypothyroidism, RA on prednisone presenting with coffee ground emesis, chest pain and shortness of breath. Decrease appetite and weight loss for several months. NG tube revealed 1800cc dark coffee ground contents. Patient reports feeling better after placement of NG tube. Vital signs significant for tachycardia to 100's. Hb 11, WBC 25. CT A/P reveals distended stomach with abrupt transition point at D4, no mass or lesion. Recommend continued NG tube on suction, monitor I/O, replace NG tube output if >1L, monitor electrolytes and replace PRN. GI consultation for EGD and biopsy. Leukocytosis likely secondary to steroids, will monitor. Recommend serial H/H q6h, transfuse PRN, monitor vital signs, monitor for further bleeding. No surgical intervention at this time. Surgery to follow.

## 2019-10-02 NOTE — ED PROVIDER NOTE - EMS DETAILS FREE TEXT FOR MDM ADDL HISTORY OBTAINED FROM QUESTION
Patient had 1 episode of black vomit en route to the hospital. Patient not on AC or anti-platelets, no known hx GIB.

## 2019-10-02 NOTE — ED PROVIDER NOTE - PROGRESS NOTE DETAILS
ALISHA: Patient had episode of black emesis - on visualization appears to be coffee-ground. Patient had not vomited since presentation to ED. On POCUS done by US team, possible SBO? CT abd/pelvis had been ordered and patient being wheeled to CT now. Remaining HD stable, airway patent, mentating appropriately, abd remains non-tender.

## 2019-10-02 NOTE — CONSULT NOTE ADULT - ASSESSMENT
69M with gastric outlet obstruction from an unknown cause       Plan:  Will discuss with surgical attending   GI consult for endoscopy 69M with grossly dilated stomach on CT scan which is an acute change from CT done 1 week ago. Patient has relief of symptoms with NGT decompression but the CT is concerning for a duodenal obstruction given the acute caliber change in the 4th portion of the duodenum.     Plan:  No indication for acute surgical intervention   GI consult for upper endoscopy with possible biopsy   May require upper GI series   IVF resuscitation  Surgery will follow    NGT to [facility]

## 2019-10-02 NOTE — H&P ADULT - ATTENDING COMMENTS
Patient seen and examined independently. I agree with the resident's note, physical exam, and plan except as below.  Vital Signs Last 24 Hrs  T(C): 36.7 (03 Oct 2019 15:54), Max: 36.8 (03 Oct 2019 00:22)  T(F): 98.1 (03 Oct 2019 15:54), Max: 98.2 (03 Oct 2019 00:22)  HR: 105 (03 Oct 2019 15:54) (96 - 112)  BP: 109/66 (03 Oct 2019 15:54) (109/66 - 116/66)  BP(mean): --  RR: 18 (03 Oct 2019 15:54) (18 - 18)  SpO2: 97% (03 Oct 2019 15:54) (95% - 97%)  PE  nad  aaox3  [id number]  ctabl  soft ntnd+bs  cachectic  no cce    #coffee ground emesis - due to gastric outlet obstruction   NGT for decompression  npo  ivfs  nutrition eval incase of prolonged npo  awaiting EGD after cardio clearance - keep on tele until cleared by cardio - then can dc  protonix gtt  monitorh/h  high risk of malignant obstruction - associated with polymyosistis     #polymyositis/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

## 2019-10-02 NOTE — ED PROVIDER NOTE - NS ED ROS FT
Review of Systems:  	•	CONSTITUTIONAL - no fever, no diaphoresis, no chills  	•	SKIN - no rash  	•	HEMATOLOGIC - + bleeding, no bruising  	•	EYES - no eye pain, no blurry vision  	•	ENT - no change in hearing, no sore throat, no ear pain or tinnitus  	•	RESPIRATORY - no shortness of breath, no cough  	•	CARDIAC - + chest pain, no palpitations  	•	GI - + abd pain, no nausea, + vomiting, no diarrhea, no constipation  	•	GENITO-URINARY - no discharge, no dysuria; no hematuria, no increased urinary frequency  	•	MUSCULOSKELETAL - no joint paint, no swelling, no redness  	•	NEUROLOGIC - no weakness, no headache, no paresthesias, no LOC  	•	PSYCH - no anxiety, non suicidal, non homicidal, no hallucination, no depression

## 2019-10-03 LAB
ALBUMIN SERPL ELPH-MCNC: 3.2 G/DL — LOW (ref 3.5–5.2)
ALP SERPL-CCNC: 60 U/L — SIGNIFICANT CHANGE UP (ref 30–115)
ALT FLD-CCNC: 41 U/L — SIGNIFICANT CHANGE UP (ref 0–41)
ANION GAP SERPL CALC-SCNC: 13 MMOL/L — SIGNIFICANT CHANGE UP (ref 7–14)
AST SERPL-CCNC: 53 U/L — HIGH (ref 0–41)
BASOPHILS # BLD AUTO: 0.04 K/UL — SIGNIFICANT CHANGE UP (ref 0–0.2)
BASOPHILS NFR BLD AUTO: 0.2 % — SIGNIFICANT CHANGE UP (ref 0–1)
BILIRUB SERPL-MCNC: 0.5 MG/DL — SIGNIFICANT CHANGE UP (ref 0.2–1.2)
BUN SERPL-MCNC: 22 MG/DL — HIGH (ref 10–20)
CALCIUM SERPL-MCNC: 8.8 MG/DL — SIGNIFICANT CHANGE UP (ref 8.5–10.1)
CHLORIDE SERPL-SCNC: 98 MMOL/L — SIGNIFICANT CHANGE UP (ref 98–110)
CO2 SERPL-SCNC: 33 MMOL/L — HIGH (ref 17–32)
CREAT SERPL-MCNC: 0.7 MG/DL — SIGNIFICANT CHANGE UP (ref 0.7–1.5)
EOSINOPHIL # BLD AUTO: 0.02 K/UL — SIGNIFICANT CHANGE UP (ref 0–0.7)
EOSINOPHIL NFR BLD AUTO: 0.1 % — SIGNIFICANT CHANGE UP (ref 0–8)
GLUCOSE SERPL-MCNC: 80 MG/DL — SIGNIFICANT CHANGE UP (ref 70–99)
HCT VFR BLD CALC: 31.6 % — LOW (ref 42–52)
HGB BLD-MCNC: 10.1 G/DL — LOW (ref 14–18)
IMM GRANULOCYTES NFR BLD AUTO: 0.8 % — HIGH (ref 0.1–0.3)
LYMPHOCYTES # BLD AUTO: 1.27 K/UL — SIGNIFICANT CHANGE UP (ref 1.2–3.4)
LYMPHOCYTES # BLD AUTO: 5.6 % — LOW (ref 20.5–51.1)
MAGNESIUM SERPL-MCNC: 1.6 MG/DL — LOW (ref 1.8–2.4)
MCHC RBC-ENTMCNC: 28.7 PG — SIGNIFICANT CHANGE UP (ref 27–31)
MCHC RBC-ENTMCNC: 32 G/DL — SIGNIFICANT CHANGE UP (ref 32–37)
MCV RBC AUTO: 89.8 FL — SIGNIFICANT CHANGE UP (ref 80–94)
MONOCYTES # BLD AUTO: 1.47 K/UL — HIGH (ref 0.1–0.6)
MONOCYTES NFR BLD AUTO: 6.5 % — SIGNIFICANT CHANGE UP (ref 1.7–9.3)
NEUTROPHILS # BLD AUTO: 19.63 K/UL — HIGH (ref 1.4–6.5)
NEUTROPHILS NFR BLD AUTO: 86.8 % — HIGH (ref 42.2–75.2)
NRBC # BLD: 0 /100 WBCS — SIGNIFICANT CHANGE UP (ref 0–0)
PLATELET # BLD AUTO: 415 K/UL — HIGH (ref 130–400)
POTASSIUM SERPL-MCNC: 4.1 MMOL/L — SIGNIFICANT CHANGE UP (ref 3.5–5)
POTASSIUM SERPL-SCNC: 4.1 MMOL/L — SIGNIFICANT CHANGE UP (ref 3.5–5)
PROT SERPL-MCNC: 5.7 G/DL — LOW (ref 6–8)
RBC # BLD: 3.52 M/UL — LOW (ref 4.7–6.1)
RBC # FLD: 16 % — HIGH (ref 11.5–14.5)
SODIUM SERPL-SCNC: 144 MMOL/L — SIGNIFICANT CHANGE UP (ref 135–146)
SRP AB SERPL QL: SIGNIFICANT CHANGE UP
TROPONIN T SERPL-MCNC: 0.26 NG/ML — CRITICAL HIGH
TSH SERPL-MCNC: 5.94 UIU/ML — HIGH (ref 0.27–4.2)
WBC # BLD: 22.6 K/UL — HIGH (ref 4.8–10.8)
WBC # FLD AUTO: 22.6 K/UL — HIGH (ref 4.8–10.8)

## 2019-10-03 PROCEDURE — 99232 SBSQ HOSP IP/OBS MODERATE 35: CPT

## 2019-10-03 PROCEDURE — 99223 1ST HOSP IP/OBS HIGH 75: CPT

## 2019-10-03 PROCEDURE — 99222 1ST HOSP IP/OBS MODERATE 55: CPT

## 2019-10-03 PROCEDURE — 71045 X-RAY EXAM CHEST 1 VIEW: CPT | Mod: 26

## 2019-10-03 RX ORDER — IPRATROPIUM/ALBUTEROL SULFATE 18-103MCG
3 AEROSOL WITH ADAPTER (GRAM) INHALATION EVERY 6 HOURS
Refills: 0 | Status: DISCONTINUED | OUTPATIENT
Start: 2019-10-03 | End: 2019-10-07

## 2019-10-03 RX ADMIN — CHLORHEXIDINE GLUCONATE 1 APPLICATION(S): 213 SOLUTION TOPICAL at 05:53

## 2019-10-03 RX ADMIN — Medication 3 MILLILITER(S): at 13:30

## 2019-10-03 RX ADMIN — PANTOPRAZOLE SODIUM 10 MG/HR: 20 TABLET, DELAYED RELEASE ORAL at 05:58

## 2019-10-03 RX ADMIN — Medication 3 MILLILITER(S): at 08:25

## 2019-10-03 RX ADMIN — SODIUM CHLORIDE 75 MILLILITER(S): 9 INJECTION, SOLUTION INTRAVENOUS at 05:59

## 2019-10-03 RX ADMIN — SODIUM CHLORIDE 100 MILLILITER(S): 9 INJECTION, SOLUTION INTRAVENOUS at 07:32

## 2019-10-03 NOTE — CONSULT NOTE ADULT - SUBJECTIVE AND OBJECTIVE BOX
ADMISSION HPI:  70 yo male with PMH of COPD (not on home oxygen), hypothyroidism, gout, RA, polymyositis (on prednisone and methotrexate) presented to the ED after calling 911 for CP and SOB assocated with multiple episodes of coffee ground emesis. He states that he has not been feeling himself since mid-June and reports decreased appetite and an unintentional 30 lb weight loss. He was recently discharged from [facility] on Sept 25th after being admitted for weakness and found to have polymyositis. He endorses intermittent chest pain started approx 4 days ago and N/V 3 days ago. He states his CP was substernal and sharp and was causing him to have trouble breathing. Burping and sneezing made the pain worse. No alleviating factors. He states his last normal BM was 2 weeks ago. Since that time he has had very small, hard BMs every few days. No bright red blood or dark stool noted. Patient denies any prior history of GI bleed. His last colonoscopy was 3 years ago ago by [facility] on [address], he may have had polyps removed at that time but was not sure. Never had EGD before.     Patient had a large volume emesis in transport to the ED and while here an NGT was placed and 2L of dark coffee ground emesis was suctioned out. He states he feels much improved after decompression. He remained hemodynamically stable but mildly tachycardic to the low 100s. CT scan showed large distended stomach with abrupt transition to normal caliber distal stomach without obvious mass or obstruction lesion. Seen by surgery in the ED. Admitted to medicine for further workup and management. (02 Oct 2019 18:16)    CARDIOLOGY HPI:        PAST MEDICAL & SURGICAL HISTORY  Gout  COPD, mild  Polymyositis  Hypothyroid  S/P tonsillectomy      FAMILY HISTORY:  FAMILY HISTORY:  No pertinent family history in first degree relatives      SOCIAL HISTORY:  [X] Former smoker (40 pack year, quit ~4 years ago)  []Alcohol  []Drug    ALLERGIES:  No Known Allergies      MEDICATIONS:  MEDICATIONS  (STANDING):  ALBUTerol/ipratropium for Nebulization 3 milliLiter(s) Nebulizer every 6 hours  chlorhexidine 4% Liquid 1 Application(s) Topical <User Schedule>  enoxaparin Injectable 40 milliGRAM(s) SubCutaneous daily  lactated ringers. 1000 milliLiter(s) (100 mL/Hr) IV Continuous <Continuous>  pantoprazole Infusion 8 mG/Hr (10 mL/Hr) IV Continuous <Continuous>    MEDICATIONS  (PRN):      HOME MEDICATIONS:  Home Medications:  allopurinol 100 mg oral tablet: 1 tab(s) orally once a day (02 Oct 2019 18:28)  DuoNeb 0.5 mg-2.5 mg/3 mL inhalation solution: 3 milliliter(s) inhaled 3 times a day, As Needed (02 Oct 2019 18:28)  ezetimibe 10 mg oral tablet: 1 tab(s) orally once a day (02 Oct 2019 18:28)  fenofibrate 145 mg oral tablet: 1 tab(s) orally once a day (02 Oct 2019 18:28)  levothyroxine 25 mcg (0.025 mg) oral tablet: 1 tab(s) orally once a day (02 Oct 2019 18:28)  pantoprazole 40 mg oral delayed release tablet: 1 tab(s) orally once a day (02 Oct 2019 18:28)      VITALS:   T(F): 98.1 (10-03 @ 07:49), Max: 98.2 (10-03 @ 00:22)  HR: 96 (10-03 @ 07:49) (88 - 112)  BP: 111/67 (10-03 @ 07:49) (111/63 - 138/79)  BP(mean): --  RR: 18 (10-03 @ 07:49) (18 - 18)  SpO2: 97% (10-03 @ 07:49) (94% - 100%)    I&O's Summary      PHYSICAL EXAM:  NEURO: AAOX3  GEN: Not in acute distress  LUNGS: Clear to auscultation bilaterally   CARDIOVASCULAR: S1/S2 present, RRR , no murmus or rubs, no JVD, + PP bilaterally  ABD: Soft, non-tender, non-distended  EXT: No SHILPA  SKIN: Intact    LABS:                        10.1   22.60 )-----------( 415      ( 03 Oct 2019 04:00 )             31.6     10-03    144  |  98  |  22<H>  ----------------------------<  80  4.1   |  33<H>  |  0.7    Ca    8.8      03 Oct 2019 05:42  Mg     1.6     10-03    TPro  5.7<L>  /  Alb  3.2<L>  /  TBili  0.5  /  DBili  x   /  AST  53<H>  /  ALT  41  /  AlkPhos  60  10-03    PT/INR - ( 02 Oct 2019 10:00 )   PT: 14.40 sec;   INR: 1.25 ratio         PTT - ( 02 Oct 2019 10:00 )  PTT:28.2 sec    Troponin trend:  Troponin T, Serum: 0.20 ng/mL <HH> (10-02-19 @ 20:57)    CARDIAC MARKERS ( 02 Oct 2019 20:57 )  x     / 0.20 ng/mL / x     / x     / x      CARDIAC MARKERS ( 02 Oct 2019 10:00 )  x     / 0.20 ng/mL / x     / x     / x        09-21 Chol 107 LDL 70 HDL 25<L> Trig 94  Hemoglobin A1C   Thyroid      RADIOLOGY:  - CT chest, abdomen and pelvis:   < from: CT Chest w/ IV Cont (10.02.19 @ 12:26) >  IMPRESSION:    Bibasilar nodular airspace opacities suspicious for   infectious/inflammatory etiologies.    Small left pleural effusion.    Right posterior pleural nodularity abutting the right lower lobe.    Loculated fluid seen in the posterior aspect of the right oblique   fissure.     Recommend a three-month follow-up chest CT.    Distended esophagus and partially imaged stomach. A CT scan of the   abdomen and pelvis has been ordered.    < end of copied text >    < from: CT Abdomen and Pelvis w/ IV Cont (10.02.19 @ 15:30) >  IMPRESSION:     Findings suspicious for gastric outlet obstruction. No underlying lesion   seen by CT.    Consider upper endoscopy for further evaluation.    < end of copied text >    -TTE:  -CCTA:  -STRESS TEST:  -CATHETERIZATION:    ECG:    TELEMETRY EVENTS: ADMISSION HPI:  70 yo male with PMH of COPD (not on home oxygen), hypothyroidism, gout, RA, polymyositis (on prednisone and methotrexate) presented to the ED after calling 911 for CP and SOB assocated with multiple episodes of coffee ground emesis. He states that he has not been feeling himself since mid-June and reports decreased appetite and an unintentional 30 lb weight loss. He was recently discharged from [facility] on Sept 25th after being admitted for weakness and found to have polymyositis. He endorses intermittent chest pain started approx 4 days ago and N/V 3 days ago. He states his CP was substernal and sharp and was causing him to have trouble breathing. Burping and sneezing made the pain worse. No alleviating factors. He states his last normal BM was 2 weeks ago. Since that time he has had very small, hard BMs every few days. No bright red blood or dark stool noted. Patient denies any prior history of GI bleed. His last colonoscopy was 3 years ago ago by [facility] on [address], he may have had polyps removed at that time but was not sure. Never had EGD before.     Patient had a large volume emesis in transport to the ED and while here an NGT was placed and 2L of dark coffee ground emesis was suctioned out. He states he feels much improved after decompression. He remained hemodynamically stable but mildly tachycardic to the low 100s. CT scan showed large distended stomach with abrupt transition to normal caliber distal stomach without obvious mass or obstruction lesion. Seen by surgery in the ED. Admitted to medicine for further workup and management. (02 Oct 2019 18:16)    CARDIOLOGY HPI:    Patient reports not feeling well and weight loss since June. He has generalized body aches including midsternal chest pain characterized as sharp, non-radiating, continuous, worsens with cough, sneezing, burping and deep breaths, no dyspnea at rest or on exertion. He was recently diagnosed with polymyositis. He denies ever following a cardiologist or having any cardiac symptoms. He was found to have elevated troponin which remained stable without rising.    PAST MEDICAL & SURGICAL HISTORY  Gout  COPD, mild  Polymyositis  Hypothyroid  S/P tonsillectomy      FAMILY HISTORY:  FAMILY HISTORY:  No pertinent family history in first degree relatives      SOCIAL HISTORY:  [X] Former smoker (40 pack year, quit ~4 years ago)  []Alcohol  []Drug    ALLERGIES:  No Known Allergies      MEDICATIONS:  MEDICATIONS  (STANDING):  ALBUTerol/ipratropium for Nebulization 3 milliLiter(s) Nebulizer every 6 hours  chlorhexidine 4% Liquid 1 Application(s) Topical <User Schedule>  enoxaparin Injectable 40 milliGRAM(s) SubCutaneous daily  lactated ringers. 1000 milliLiter(s) (100 mL/Hr) IV Continuous <Continuous>  pantoprazole Infusion 8 mG/Hr (10 mL/Hr) IV Continuous <Continuous>    MEDICATIONS  (PRN):      HOME MEDICATIONS:  Home Medications:  allopurinol 100 mg oral tablet: 1 tab(s) orally once a day (02 Oct 2019 18:28)  DuoNeb 0.5 mg-2.5 mg/3 mL inhalation solution: 3 milliliter(s) inhaled 3 times a day, As Needed (02 Oct 2019 18:28)  ezetimibe 10 mg oral tablet: 1 tab(s) orally once a day (02 Oct 2019 18:28)  fenofibrate 145 mg oral tablet: 1 tab(s) orally once a day (02 Oct 2019 18:28)  levothyroxine 25 mcg (0.025 mg) oral tablet: 1 tab(s) orally once a day (02 Oct 2019 18:28)  pantoprazole 40 mg oral delayed release tablet: 1 tab(s) orally once a day (02 Oct 2019 18:28)      VITALS:   T(F): 98.1 (10-03 @ 07:49), Max: 98.2 (10-03 @ 00:22)  HR: 96 (10-03 @ 07:49) (88 - 112)  BP: 111/67 (10-03 @ 07:49) (111/63 - 138/79)  BP(mean): --  RR: 18 (10-03 @ 07:49) (18 - 18)  SpO2: 97% (10-03 @ 07:49) (94% - 100%)    I&O's Summary      PHYSICAL EXAM:  NEURO: AAOX3  GEN: Not in acute distress  LUNGS: Decreased bibasilar air entry. No wheezing or rales.  CARDIOVASCULAR: S1/S2 present, RRR , no murmus or rubs, no JVD, + PP bilaterally  ABD: Soft, non-tender, non-distended  EXT: No SHILPA  SKIN: Intact    LABS:                        10.1   22.60 )-----------( 415      ( 03 Oct 2019 04:00 )             31.6     10-03    144  |  98  |  22<H>  ----------------------------<  80  4.1   |  33<H>  |  0.7    Ca    8.8      03 Oct 2019 05:42  Mg     1.6     10-03    TPro  5.7<L>  /  Alb  3.2<L>  /  TBili  0.5  /  DBili  x   /  AST  53<H>  /  ALT  41  /  AlkPhos  60  10-03    PT/INR - ( 02 Oct 2019 10:00 )   PT: 14.40 sec;   INR: 1.25 ratio         PTT - ( 02 Oct 2019 10:00 )  PTT:28.2 sec    Troponin trend:  Troponin T, Serum: 0.20 ng/mL <HH> (10-02-19 @ 20:57)    CARDIAC MARKERS ( 02 Oct 2019 20:57 )  x     / 0.20 ng/mL / x     / x     / x      CARDIAC MARKERS ( 02 Oct 2019 10:00 )  x     / 0.20 ng/mL / x     / x     / x        09-21 Chol 107 LDL 70 HDL 25<L> Trig 94  Hemoglobin A1C   Thyroid      RADIOLOGY:  - CT chest, abdomen and pelvis:   < from: CT Chest w/ IV Cont (10.02.19 @ 12:26) >  IMPRESSION:    Bibasilar nodular airspace opacities suspicious for   infectious/inflammatory etiologies.    Small left pleural effusion.    Right posterior pleural nodularity abutting the right lower lobe.    Loculated fluid seen in the posterior aspect of the right oblique   fissure.     Recommend a three-month follow-up chest CT.    Distended esophagus and partially imaged stomach. A CT scan of the   abdomen and pelvis has been ordered.    < end of copied text >    < from: CT Abdomen and Pelvis w/ IV Cont (10.02.19 @ 15:30) >  IMPRESSION:     Findings suspicious for gastric outlet obstruction. No underlying lesion   seen by CT.    Consider upper endoscopy for further evaluation.    < end of copied text >    ECG:    < from: 12 Lead ECG (10.02.19 @ 09:42) >  Ventricular Rate 102 BPM    Atrial Rate 102 BPM    P-R Interval 150 ms    QRS Duration 148 ms    Q-T Interval 404 ms    QTC Calculation(Bezet) 526 ms    P Axis 72 degrees    R Axis -61 degrees    T Axis -2 degrees    Diagnosis Line Sinus tachycardia with Premature atrial complexes  Left axis deviation  Right bundle branch block  Abnormal ECG    < end of copied text >    TELEMETRY EVENTS: ADMISSION HPI:  68 yo male with PMH of COPD (not on home oxygen), hypothyroidism, gout, RA, polymyositis (on prednisone and methotrexate) presented to the ED after calling 911 for CP and SOB assocated with multiple episodes of coffee ground emesis. He states that he has not been feeling himself since mid-June and reports decreased appetite and an unintentional 30 lb weight loss. He was recently discharged from [facility] on Sept 25th after being admitted for weakness and found to have polymyositis. He endorses intermittent chest pain started approx 4 days ago and N/V 3 days ago. He states his CP was substernal and sharp and was causing him to have trouble breathing. Burping and sneezing made the pain worse. No alleviating factors. He states his last normal BM was 2 weeks ago. Since that time he has had very small, hard BMs every few days. No bright red blood or dark stool noted. Patient denies any prior history of GI bleed. His last colonoscopy was 3 years ago ago by [facility] on [address], he may have had polyps removed at that time but was not sure. Never had EGD before.     Patient had a large volume emesis in transport to the ED and while here an NGT was placed and 2L of dark coffee ground emesis was suctioned out. He states he feels much improved after decompression. He remained hemodynamically stable but mildly tachycardic to the low 100s. CT scan showed large distended stomach with abrupt transition to normal caliber distal stomach without obvious mass or obstruction lesion. Seen by surgery in the ED. Admitted to medicine for further workup and management. (02 Oct 2019 18:16)    CARDIOLOGY HPI:    Patient reports not feeling well and weight loss since June. He has generalized body aches including midsternal chest pain characterized as sharp, non-radiating, continuous, worsens with cough, sneezing, burping and deep breaths, no dyspnea at rest or on exertion. He was recently diagnosed with polymyositis. He denies ever following a cardiologist or having any cardiac symptoms. He was found to have elevated troponin which remained stable without rising.    PAST MEDICAL & SURGICAL HISTORY  Gout  COPD, mild  Polymyositis  Hypothyroid  S/P tonsillectomy      FAMILY HISTORY:  FAMILY HISTORY:  No pertinent family history in first degree relatives      SOCIAL HISTORY:  [X] Former smoker (40 pack year, quit ~4 years ago)  []Alcohol  []Drug    ALLERGIES:  No Known Allergies      MEDICATIONS:  MEDICATIONS  (STANDING):  ALBUTerol/ipratropium for Nebulization 3 milliLiter(s) Nebulizer every 6 hours  chlorhexidine 4% Liquid 1 Application(s) Topical <User Schedule>  enoxaparin Injectable 40 milliGRAM(s) SubCutaneous daily  lactated ringers. 1000 milliLiter(s) (100 mL/Hr) IV Continuous <Continuous>  pantoprazole Infusion 8 mG/Hr (10 mL/Hr) IV Continuous <Continuous>    MEDICATIONS  (PRN):      HOME MEDICATIONS:  Home Medications:  allopurinol 100 mg oral tablet: 1 tab(s) orally once a day (02 Oct 2019 18:28)  DuoNeb 0.5 mg-2.5 mg/3 mL inhalation solution: 3 milliliter(s) inhaled 3 times a day, As Needed (02 Oct 2019 18:28)  ezetimibe 10 mg oral tablet: 1 tab(s) orally once a day (02 Oct 2019 18:28)  fenofibrate 145 mg oral tablet: 1 tab(s) orally once a day (02 Oct 2019 18:28)  levothyroxine 25 mcg (0.025 mg) oral tablet: 1 tab(s) orally once a day (02 Oct 2019 18:28)  pantoprazole 40 mg oral delayed release tablet: 1 tab(s) orally once a day (02 Oct 2019 18:28)      VITALS:   T(F): 98.1 (10-03 @ 07:49), Max: 98.2 (10-03 @ 00:22)  HR: 96 (10-03 @ 07:49) (88 - 112)  BP: 111/67 (10-03 @ 07:49) (111/63 - 138/79)  BP(mean): --  RR: 18 (10-03 @ 07:49) (18 - 18)  SpO2: 97% (10-03 @ 07:49) (94% - 100%)    I&O's Summary      PHYSICAL EXAM:  NEURO: AAOX3  GEN: Not in acute distress  LUNGS: Decreased bibasilar air entry. No wheezing or rales.  CARDIOVASCULAR: S1/S2 present, RRR , no murmus or rubs, no JVD, + PP bilaterally  ABD: Soft, non-tender, non-distended  EXT: No SHILPA  SKIN: Intact    LABS:                        10.1   22.60 )-----------( 415      ( 03 Oct 2019 04:00 )             31.6     10-03    144  |  98  |  22<H>  ----------------------------<  80  4.1   |  33<H>  |  0.7    Ca    8.8      03 Oct 2019 05:42  Mg     1.6     10-03    TPro  5.7<L>  /  Alb  3.2<L>  /  TBili  0.5  /  DBili  x   /  AST  53<H>  /  ALT  41  /  AlkPhos  60  10-03    PT/INR - ( 02 Oct 2019 10:00 )   PT: 14.40 sec;   INR: 1.25 ratio         PTT - ( 02 Oct 2019 10:00 )  PTT:28.2 sec    Troponin trend:  Troponin T, Serum: 0.20 ng/mL <HH> (10-02-19 @ 20:57)    CARDIAC MARKERS ( 02 Oct 2019 20:57 )  x     / 0.20 ng/mL / x     / x     / x      CARDIAC MARKERS ( 02 Oct 2019 10:00 )  x     / 0.20 ng/mL / x     / x     / x        09-21 Chol 107 LDL 70 HDL 25<L> Trig 94    RADIOLOGY:  - CT chest, abdomen and pelvis:   < from: CT Chest w/ IV Cont (10.02.19 @ 12:26) >  IMPRESSION:    Bibasilar nodular airspace opacities suspicious for   infectious/inflammatory etiologies.    Small left pleural effusion.    Right posterior pleural nodularity abutting the right lower lobe.    Loculated fluid seen in the posterior aspect of the right oblique   fissure.     Recommend a three-month follow-up chest CT.    Distended esophagus and partially imaged stomach. A CT scan of the   abdomen and pelvis has been ordered.    < end of copied text >    < from: CT Abdomen and Pelvis w/ IV Cont (10.02.19 @ 15:30) >  IMPRESSION:     Findings suspicious for gastric outlet obstruction. No underlying lesion   seen by CT.    Consider upper endoscopy for further evaluation.    < end of copied text >    ECG:    < from: 12 Lead ECG (10.02.19 @ 09:42) >  Ventricular Rate 102 BPM    Atrial Rate 102 BPM    P-R Interval 150 ms    QRS Duration 148 ms    Q-T Interval 404 ms    QTC Calculation(Bezet) 526 ms    P Axis 72 degrees    R Axis -61 degrees    T Axis -2 degrees    Diagnosis Line Sinus tachycardia with Premature atrial complexes  Left axis deviation  Right bundle branch block  Abnormal ECG    < end of copied text >    TELEMETRY EVENTS: ADMISSION HPI:  68 yo male with PMH of COPD (not on home oxygen), hypothyroidism, gout, RA, polymyositis (on prednisone and methotrexate) presented to the ED after calling 911 for CP and SOB assocated with multiple episodes of coffee ground emesis. He states that he has not been feeling himself since mid-June and reports decreased appetite and an unintentional 30 lb weight loss. He was recently discharged from [facility] on Sept 25th after being admitted for weakness and found to have polymyositis. He endorses intermittent chest pain started approx 4 days ago and N/V 3 days ago. He states his CP was substernal and sharp and was causing him to have trouble breathing. Burping and sneezing made the pain worse. No alleviating factors. He states his last normal BM was 2 weeks ago. Since that time he has had very small, hard BMs every few days. No bright red blood or dark stool noted. Patient denies any prior history of GI bleed. His last colonoscopy was 3 years ago ago by [facility] on [address], he may have had polyps removed at that time but was not sure. Never had EGD before.     Patient had a large volume emesis in transport to the ED and while here an NGT was placed and 2L of dark coffee ground emesis was suctioned out. He states he feels much improved after decompression. He remained hemodynamically stable but mildly tachycardic to the low 100s. CT scan showed large distended stomach with abrupt transition to normal caliber distal stomach without obvious mass or obstruction lesion. Seen by surgery in the ED. Admitted to medicine for further workup and management. (02 Oct 2019 18:16)    CARDIOLOGY HPI:    Patient reports not feeling well and weight loss since June. He has generalized body aches including midsternal chest pain characterized as sharp, non-radiating, continuous, worsens with cough, sneezing, burping and deep breaths, no dyspnea at rest or on exertion. He was recently diagnosed with polymyositis. He denies ever following a cardiologist or having any cardiac symptoms. He was found to have elevated troponin which remained stable without rising.    ROS is otherwise unrem    PAST MEDICAL & SURGICAL HISTORY  Gout  COPD, mild  Polymyositis  Hypothyroid  S/P tonsillectomy      FAMILY HISTORY:  FAMILY HISTORY:  No pertinent family history in first degree relatives      SOCIAL HISTORY:  [X] Former smoker (40 pack year, quit ~4 years ago)  []Alcohol  []Drug    ALLERGIES:  No Known Allergies      MEDICATIONS:  MEDICATIONS  (STANDING):  ALBUTerol/ipratropium for Nebulization 3 milliLiter(s) Nebulizer every 6 hours  chlorhexidine 4% Liquid 1 Application(s) Topical <User Schedule>  enoxaparin Injectable 40 milliGRAM(s) SubCutaneous daily  lactated ringers. 1000 milliLiter(s) (100 mL/Hr) IV Continuous <Continuous>  pantoprazole Infusion 8 mG/Hr (10 mL/Hr) IV Continuous <Continuous>    MEDICATIONS  (PRN):      HOME MEDICATIONS:  Home Medications:  allopurinol 100 mg oral tablet: 1 tab(s) orally once a day (02 Oct 2019 18:28)  DuoNeb 0.5 mg-2.5 mg/3 mL inhalation solution: 3 milliliter(s) inhaled 3 times a day, As Needed (02 Oct 2019 18:28)  ezetimibe 10 mg oral tablet: 1 tab(s) orally once a day (02 Oct 2019 18:28)  fenofibrate 145 mg oral tablet: 1 tab(s) orally once a day (02 Oct 2019 18:28)  levothyroxine 25 mcg (0.025 mg) oral tablet: 1 tab(s) orally once a day (02 Oct 2019 18:28)  pantoprazole 40 mg oral delayed release tablet: 1 tab(s) orally once a day (02 Oct 2019 18:28)      VITALS:   T(F): 98.1 (10-03 @ 07:49), Max: 98.2 (10-03 @ 00:22)  HR: 96 (10-03 @ 07:49) (88 - 112)  BP: 111/67 (10-03 @ 07:49) (111/63 - 138/79)  BP(mean): --  RR: 18 (10-03 @ 07:49) (18 - 18)  SpO2: 97% (10-03 @ 07:49) (94% - 100%)    I&O's Summary      PHYSICAL EXAM:  NEURO: AAOX3  GEN: Not in acute distress  NECK: supple, no jvd  LUNGS: Decreased bibasilar air entry. No wheezing or rales.  CARDIOVASCULAR: S1/S2 present, RRR , no murmurs or rubs, no JVD, + PP bilaterally  ABD: Soft, non-tender, non-distended  EXT/MS: No SHILPA  SKIN: Intact    LABS:                        10.1   22.60 )-----------( 415      ( 03 Oct 2019 04:00 )             31.6     10-03    144  |  98  |  22<H>  ----------------------------<  80  4.1   |  33<H>  |  0.7    Ca    8.8      03 Oct 2019 05:42  Mg     1.6     10-03    TPro  5.7<L>  /  Alb  3.2<L>  /  TBili  0.5  /  DBili  x   /  AST  53<H>  /  ALT  41  /  AlkPhos  60  10-03    PT/INR - ( 02 Oct 2019 10:00 )   PT: 14.40 sec;   INR: 1.25 ratio         PTT - ( 02 Oct 2019 10:00 )  PTT:28.2 sec    Troponin trend:  Troponin T, Serum: 0.20 ng/mL <HH> (10-02-19 @ 20:57)    CARDIAC MARKERS ( 02 Oct 2019 20:57 )  x     / 0.20 ng/mL / x     / x     / x      CARDIAC MARKERS ( 02 Oct 2019 10:00 )  x     / 0.20 ng/mL / x     / x     / x        09-21 Chol 107 LDL 70 HDL 25<L> Trig 94    RADIOLOGY:  - CT chest, abdomen and pelvis:   < from: CT Chest w/ IV Cont (10.02.19 @ 12:26) >  IMPRESSION:    Bibasilar nodular airspace opacities suspicious for   infectious/inflammatory etiologies.    Small left pleural effusion.    Right posterior pleural nodularity abutting the right lower lobe.    Loculated fluid seen in the posterior aspect of the right oblique   fissure.     Recommend a three-month follow-up chest CT.    Distended esophagus and partially imaged stomach. A CT scan of the   abdomen and pelvis has been ordered.    < end of copied text >    < from: CT Abdomen and Pelvis w/ IV Cont (10.02.19 @ 15:30) >  IMPRESSION:     Findings suspicious for gastric outlet obstruction. No underlying lesion   seen by CT.    Consider upper endoscopy for further evaluation.    < end of copied text >    ECG:    < from: 12 Lead ECG (10.02.19 @ 09:42) >  Ventricular Rate 102 BPM    Atrial Rate 102 BPM    P-R Interval 150 ms    QRS Duration 148 ms    Q-T Interval 404 ms    QTC Calculation(Bezet) 526 ms    P Axis 72 degrees    R Axis -61 degrees    T Axis -2 degrees    Diagnosis Line Sinus tachycardia with Premature atrial complexes  Left axis deviation  Right bundle branch block  Abnormal ECG    < end of copied text >    TELEMETRY EVENTS:

## 2019-10-03 NOTE — CONSULT NOTE ADULT - SUBJECTIVE AND OBJECTIVE BOX
Gastroenterology Consultation:    Patient is a 69y old  Male who presents with a chief complaint of Gastric Outlet Obstruction (02 Oct 2019 18:16)      Admitted on: 10-02-19  HPI:  68 yo male with PMH of COPD (not on home oxygen), hypothyroidism, gout, RA, polymyositis (on prednisone and methotrexate) presented to the ED after calling 911 for CP and SOB assocated with multiple episodes of coffee ground emesis. He states that he has not been feeling himself since mid-June and reports decreased appetite and an unintentional 30 lb weight loss. He was recently discharged from [facility] on Sept 25th after being admitted for weakness and found to have polymyositis. He endorses intermittent chest pain started approx 4 days ago and N/V 3 days ago. He states his CP was substernal and sharp and was causing him to have trouble breathing.  No alleviating factors. He states his last normal BM was 2 weeks ago. Since that time he has had very small, hard BMs every few days. No bright red blood or dark stool noted. Patient denies any prior history of GI bleed. His last colonoscopy was 3 years ago ago by [facility] on [address], he may have had polyps removed at that time but was not sure. Had an endoscopy around 3 years ago , and was told he has an ulcer. Patient has intermittent symptoms of GERD, has no dysphagia or odynophagia.     Patient had a large volume emesis in transport to the ED and while here an NGT was placed and 2L of dark coffee ground emesis was suctioned out. He states he feels much improved after decompression. He remained hemodynamically stable but mildly tachycardic to the low 100s. CT scan showed large distended stomach with abrupt transition to normal caliber distal stomach without obvious mass or obstruction lesion. Seen by surgery in the ED. Admitted to medicine for further workup and management. (02 Oct 2019 18:16)      Prior records Reviewed (Y/N): Y   History obtained from person other than patient (Y/N): N         PAST MEDICAL & SURGICAL HISTORY:  Gout  COPD, mild  Polymyositis  Hypothyroid  S/P tonsillectomy      FAMILY HISTORY:  No pertinent family history in first degree relatives , No history of colon or gastric cancer      Social History:  Tobacco: 40 pack year history stopped 4 years ago  Alcohol: social alcohol   Drugs: no drugs     Home Medications:  allopurinol 100 mg oral tablet: 1 tab(s) orally once a day (02 Oct 2019 18:28)  DuoNeb 0.5 mg-2.5 mg/3 mL inhalation solution: 3 milliliter(s) inhaled 3 times a day, As Needed (02 Oct 2019 18:28)  ezetimibe 10 mg oral tablet: 1 tab(s) orally once a day (02 Oct 2019 18:28)  fenofibrate 145 mg oral tablet: 1 tab(s) orally once a day (02 Oct 2019 18:28)  levothyroxine 25 mcg (0.025 mg) oral tablet: 1 tab(s) orally once a day (02 Oct 2019 18:28)  pantoprazole 40 mg oral delayed release tablet: 1 tab(s) orally once a day (02 Oct 2019 18:28)    MEDICATIONS  (STANDING):  ALBUTerol/ipratropium for Nebulization 3 milliLiter(s) Nebulizer every 6 hours  chlorhexidine 4% Liquid 1 Application(s) Topical <User Schedule>  enoxaparin Injectable 40 milliGRAM(s) SubCutaneous daily  lactated ringers. 1000 milliLiter(s) (100 mL/Hr) IV Continuous <Continuous>  pantoprazole Infusion 8 mG/Hr (10 mL/Hr) IV Continuous <Continuous>    MEDICATIONS  (PRN):      Allergies  No Known Allergies      Review of Systems:   Constitutional:  No Fever, No Chills  ENT/Mouth:  No Hearing Changes,  No Difficulty Swallowing  Eyes:  No Eye Pain, No Vision Changes  Cardiovascular:  + Chest Pain, No Palpitations  Respiratory:  No Cough, + Dyspnea  Gastrointestinal:  As described in HPI  Musculoskeletal: diffuse myalgia intermittently   Skin:  No Skin Lesions, No Jaundice  Neuro:  No Syncope, No Dizziness  Heme/Lymph:  No Bruising, No Bleeding.          Physical Examination:  T(C): 36.7 (10-03-19 @ 07:49), Max: 36.8 (10-03-19 @ 00:22)  HR: 96 (10-03-19 @ 07:49) (95 - 112)  BP: 111/67 (10-03-19 @ 07:49) (111/63 - 134/85)  RR: 18 (10-03-19 @ 07:49) (18 - 18)  SpO2: 97% (10-03-19 @ 07:49) (95% - 100%)  Height (cm): 177.8 (10-02-19 @ 15:00)  Weight (kg): 63.5 (10-02-19 @ 15:00)      Constitutional: No acute distress.  Eyes:. Conjunctivae are clear, Sclera is non-icteric.  Ears Nose and Throat: The external ears are normal appearing,  Oral mucosa is pink and moist. , NG in place  Respiratory:  No signs of respiratory distress. Lung sounds are clear bilaterally.  Cardiovascular:  S1 S2, Regular rate and rhythm.  GI: Abdomen is soft, symmetric, and non-tender without distention.  Bowel sounds are present and normoactive in all four quadrants. No masses, hepatomegaly, or splenomegaly are noted.   Neuro: No Tremor, No involuntary movements  Skin: No rashes, No Jaundice.          Data: (reviewed by attending)                        10.1   22.60 )-----------( 415      ( 03 Oct 2019 04:00 )             31.6     Hgb Trend:  10.1  10-03-19 @ 04:00  9.7  10-02-19 @ 20:57  11.1  10-02-19 @ 10:00      10-03    144  |  98  |  22<H>  ----------------------------<  80  4.1   |  33<H>  |  0.7    Ca    8.8      03 Oct 2019 05:42  Mg     1.6     10-03    TPro  5.7<L>  /  Alb  3.2<L>  /  TBili  0.5  /  DBili  x   /  AST  53<H>  /  ALT  41  /  AlkPhos  60  10-03    Liver panel trend:  TBili 0.5   /   AST 53   /   ALT 41   /   AlkP 60   /   Tptn 5.7   /   Alb 3.2    /   DBili --      10-03  TBili 0.4   /   AST 80   /   ALT 52   /   AlkP 62   /   Tptn 6.5   /   Alb 3.5    /   DBili --      10-02  TBili <0.2   /   AST 63   /   ALT 37   /   AlkP 60   /   Tptn 5.1   /   Alb 2.6    /   DBili --      09-25  TBili <0.2   /   AST 60   /   ALT 37   /   AlkP 65   /   Tptn 5.7   /   Alb 2.9    /   DBili --      09-24      PT/INR - ( 02 Oct 2019 10:00 )   PT: 14.40 sec;   INR: 1.25 ratio         PTT - ( 02 Oct 2019 10:00 )  PTT:28.2 sec        Radiology:(reviewed by attending)  CT Abdomen and Pelvis w/ IV Cont:   EXAM:  CT ABDOMEN AND PELVIS IC            PROCEDURE DATE:  10/02/2019            INTERPRETATION:  CLINICAL STATEMENT: Abdominal pain. Evaluate for   obstruction.      TECHNIQUE: Contiguous axial CT images were obtained from the lower chest   to the pubic symphysis following administration of 100cc Optiray 320   intravenous contrast.  Oral contrast was not administered.  Reformatted   images in the coronal and sagittal planes were acquired.    COMPARISON CT: 9/25/2019.    OTHER STUDIES USED FORCORRELATION: None.       FINDINGS:    LOWER CHEST: See same day chest CT for details.    HEPATOBILIARY: Unremarkable.    SPLEEN: Unremarkable.    PANCREAS: Unremarkable.    ADRENAL GLANDS: Unremarkable.    KIDNEYS: Contrast seen within the collectingsystem. No   hydroureteronephrosis bilaterally. Symmetric renal enhancement.    ABDOMINOPELVIC NODES: Unremarkable.    PELVIC ORGANS: Urinary bladder is well-distended with excreted IV   contrast and appears unremarkable.    PERITONEUM/MESENTERY/BOWEL: Again seen is a distended fluid-filled   stomach with an apparent transition to normal caliber in the distal   stomach. An underlying lesion is not well seen by CT. No bowel   obstruction free fluid or free air. Diffuse colonic diverticulosis noted  without evidence of diverticulitis.    BONES/SOFT TISSUES: Unremarkable.      IMPRESSION:     Findings suspicious for gastric outlet obstruction. No underlying lesion   seen by CT.    Consider upper endoscopy for further evaluation.                  OVI MAGANA M.D., ATTENDING RADIOLOGIST  This document has been electronically signed. Oct  2 2019  3:51PM             (10-02-19 @ 15:30)

## 2019-10-03 NOTE — CONSULT NOTE ADULT - ASSESSMENT
68 yo male with PMH of COPD (not on home oxygen), hypothyroidism, gout, RA, polymyositis (on prednisone and methotrexate) presenting with chest pain , nausea and coffee ground vomiting of 3 days duration, patient also have dec po intake and weight loss of 30 pounds. Patient is not on antiplatelets or anticoagulation , no NSAID usage    CT scan abdomen and pelvis:   Findings suspicious for gastric outlet obstruction. No underlying lesion seen by CT.    #gastric outlet obstruction:   r/o gastric cancer , r/o gastric ulcer  hemodynamically stable , no active bleeding , hgb stable   keep patient NPO  NG suction  Protonix 40 mg IV bid   cardiology evaluation for chest pain and troponemia   will schedule for endoscopy after cardiology evaluation 68 yo male with PMH of COPD (not on home oxygen), hypothyroidism, gout, RA, polymyositis (on prednisone and methotrexate) presenting with chest pain , nausea and coffee ground vomiting of 3 days duration, patient also have dec po intake and weight loss of 30 pounds. Patient is not on antiplatelets or anticoagulation , no NSAID usage    CT scan abdomen and pelvis:   Findings suspicious for gastric outlet obstruction. No underlying lesion seen by CT.    #gastric outlet obstruction:   r/o gastric cancer , r/o gastric ulcer  hemodynamically stable , no active bleeding , hgb stable   keep patient NPO  NG suction  Protonix 40 mg IV bid   cardiology evaluation for chest pain and troponemia   EGD tomorrow am 68 yo male with PMH of COPD (not on home oxygen), hypothyroidism, gout, RA, polymyositis (on prednisone and methotrexate) presenting with chest pain , nausea and coffee ground vomiting of 3 days duration, patient also have dec po intake and weight loss of 30 pounds. Patient is not on antiplatelets or anticoagulation , no NSAID usage    CT scan abdomen and pelvis:   Findings suspicious for gastric outlet obstruction. No underlying lesion seen by CT.    #gastric outlet obstruction:   r/o gastric cancer , r/o gastric ulcer  hemodynamically stable , no active bleeding , hgb stable   keep patient NPO  NG suction  Protonix 40 mg IV bid   cardiology evaluation for chest pain and troponemia   EGD tomorrow am   risks and benefits d/w pt

## 2019-10-03 NOTE — PROGRESS NOTE ADULT - SUBJECTIVE AND OBJECTIVE BOX
GENERAL SURGERY PROGRESS NOTE     NEIL PHOENIX  69y  Male  Hospital day :1d  POD:  Procedure:   OVERNIGHT EVENTS: NGT placed with 2L removed. Found to have trops of 0.2 x2. CT demonstrated gastric outlet obstruction.     T(F): 98.1 (10-03-19 @ 07:49), Max: 98.2 (10-03-19 @ 00:22)  HR: 96 (10-03-19 @ 07:49) (95 - 112)  BP: 111/67 (10-03-19 @ 07:49) (111/63 - 134/85)  ABP: --  ABP(mean): --  RR: 18 (10-03-19 @ 07:49) (18 - 18)  SpO2: 97% (10-03-19 @ 07:49) (95% - 100%)    DIET/FLUIDS: lactated ringers. 1000 milliLiter(s) IV Continuous <Continuous>    NG:                                                                              DRAINS:   BM:   EMESIS:   URINE:    GI proph:  pantoprazole Infusion 8 mG/Hr IV Continuous <Continuous>    AC/ proph:   ABx:     PHYSICAL EXAM:  GENERAL: NAD, well-appearing  CHEST/LUNG: Clear to auscultation bilaterally  HEART: Regular rate and rhythm  ABDOMEN: Soft, Nontender, Nondistended;   EXTREMITIES:  No clubbing, cyanosis, or edema      LABS  Labs:  CAPILLARY BLOOD GLUCOSE                              10.1   22.60 )-----------( 415      ( 03 Oct 2019 04:00 )             31.6       Auto Immature Granulocyte %: 0.8 % (10-03-19 @ 04:00)  Auto Neutrophil %: 86.8 % (10-03-19 @ 04:00)  Auto Neutrophil %: 86.2 % (10-02-19 @ 20:57)  Auto Immature Granulocyte %: 0.6 % (10-02-19 @ 20:57)    10-03    144  |  98  |  22<H>  ----------------------------<  80  4.1   |  33<H>  |  0.7      Calcium, Total Serum: 8.8 mg/dL (10-03-19 @ 05:42)      LFTs:             5.7  | 0.5  | 53       ------------------[60      ( 03 Oct 2019 05:42 )  3.2  | x    | 41          Lipase:x      Amylase:x         Lactate, Blood: 1.5 mmol/L (10-02-19 @ 10:00)      Coags:     14.40  ----< 1.25    ( 02 Oct 2019 10:00 )     28.2        CARDIAC MARKERS ( 02 Oct 2019 20:57 )  x     / 0.20 ng/mL / x     / x     / x      CARDIAC MARKERS ( 02 Oct 2019 10:00 )  x     / 0.20 ng/mL / x     / x     / x            RADIOLOGY & ADDITIONAL TESTS:  < from: CT Abdomen and Pelvis w/ IV Cont (10.02.19 @ 15:30) >  IMPRESSION:     Findings suspicious for gastric outlet obstruction. No underlying lesion   seen by CT.    Consider upper endoscopy for further evaluation.        < end of copied text >

## 2019-10-03 NOTE — PROGRESS NOTE ADULT - ASSESSMENT
69M with grossly dilated stomach on CT scan which is an acute change from CT done 1 week ago. Patient has relief of symptoms with NGT decompression but the CT is concerning for a duodenal obstruction given the acute caliber change in the 4th portion of the duodenum.     Plan:   - GI to perform endoscopy after cardiology evaluation   - NGT   - PTX 40mg BID   - f/u cards (consult placed)

## 2019-10-03 NOTE — PROGRESS NOTE ADULT - ASSESSMENT
________________________________________________________________________________  DAILY PROGRESS NOTE:    ================== SUBJECTIVE ==================    NEIL ALBAN  /   69y  /  Male  /  MRN#: [id number]  Patient is a 69y old Male who presents with a chief complaint of Gastric Outlet Obstruction (03 Oct 2019 10:28)  Currently admitted to medicine with the primary diagnosis of Upper GI bleed      HOSPITAL DAY: 1d     SUMMARY OF HOSPITAL COURSE TO DATE     OVERNIGHT EVENTS:     TODAY: Patient was seen this morning at bedside. Currently, the patient reports ....    Review of systems is otherwise negative.    =================== OBJECTIVE ===================    VITAL SIGNS: Last 24 Hours  T(C): 36.7 (03 Oct 2019 07:49), Max: 36.8 (03 Oct 2019 00:22)  T(F): 98.1 (03 Oct 2019 07:49), Max: 98.2 (03 Oct 2019 00:22)  HR: 96 (03 Oct 2019 07:49) (95 - 112)  BP: 111/67 (03 Oct 2019 07:49) (111/63 - 134/85)  BP(mean): --  RR: 18 (03 Oct 2019 07:49) (18 - 18)  SpO2: 97% (03 Oct 2019 07:49) (95% - 100%)    PHYSICAL EXAM:  GENERAL: NAD, well-developed  HEAD:  Atraumatic, Normocephalic  EYES: EOMI, PERRLA, conjunctiva and sclera clear  NECK: Supple, No JVD  CHEST/LUNG: Clear to auscultation bilaterally; No wheeze  HEART: Regular rate and rhythm; No murmurs, rubs, or gallops  ABDOMEN: Soft, Nontender, Nondistended; Bowel sounds present  EXTREMITIES:  2+ Peripheral Pulses, No clubbing, cyanosis, or edema  PSYCH: AAOx3  NEUROLOGY: non-focal  General:  Appearance is consistent with chronologic age.  No abnormal facies.   General: AA&Ox3.  Fund of knowledge is intact and normal.  Language with normal repetition, comprehension and naming.  Nondysarthric.    Cranial nerves: intact VA, VFF.  EOMI w/o nystagmus, skew or reported double vision.  PERRL.  No ptosis/weakness of eyelid closure.  Facial sensation is normal with normal bite.  No facial asymmetry.  Hearing grossly intact b/l.  Palate elevates midline.  Tongue midline.  Motor examination:   Normal tone, bulk and range of motion. ++right hand tremor     Formal Muscle Strength Testing: (MRC grade R/L) 5/5 UE; 5/5 LE.  No observable drift. ++ rigidity on circular wrist maneuver and on circular elbow maneuver. - Tinel test    Reflexes:   2+ b/l biceps and brachioradialis.   Sensory examination:   Intact to light touch and pinprick, pain, temperature and proprioception and vibration in all extremities.  Cerebellum:   FTN intact with normal IBAN in all limbs.  No dysmetria or dysdiadokinesia.    Gait: ambulates with walker, ++ asymmetric steps, decreased heel strike   SKIN: No rashes or lesions    ===================== LABS =====================                        10.1   22.60 )-----------( 415      ( 03 Oct 2019 04:00 )             31.6     10-03    144  |  98  |  22<H>  ----------------------------<  80  4.1   |  33<H>  |  0.7    Ca    8.8      03 Oct 2019 05:42  Mg     1.6     10-03    TPro  5.7<L>  /  Alb  3.2<L>  /  TBili  0.5  /  DBili  x   /  AST  53<H>  /  ALT  41  /  AlkPhos  60  10-03    PT/INR - ( 02 Oct 2019 10:00 )   PT: 14.40 sec;   INR: 1.25 ratio         PTT - ( 02 Oct 2019 10:00 )  PTT:28.2 sec      Troponin T, Serum: 0.20 ng/mL <HH> (10-02-19 @ 20:57)    CARDIAC MARKERS ( 02 Oct 2019 20:57 )  x     / 0.20 ng/mL / x     / x     / x      CARDIAC MARKERS ( 02 Oct 2019 10:00 )  x     / 0.20 ng/mL / x     / x     / x            ================= MICROBIOLOGY ================      ================== IMAGING TO DATE ==================    ================== OTHER SIGNIFICANT FINDINGS ==================    ================== ALLERGIES ===================  No Known Allergies      ==================== MEDS =====================  ALBUTerol/ipratropium for Nebulization 3 milliLiter(s) Nebulizer every 6 hours  chlorhexidine 4% Liquid 1 Application(s) Topical <User Schedule>  enoxaparin Injectable 40 milliGRAM(s) SubCutaneous daily  lactated ringers. 1000 milliLiter(s) IV Continuous <Continuous>  pantoprazole Infusion 8 mG/Hr IV Continuous <Continuous>    PRN MEDICATIONS      ================= ASSESSMENT/PLAN ==================  70 yo male with PMH of COPD (not on home oxygen), hypothyroidism, gout, RA, polymyositis (on prednisone and methotrexate) presented to the ED after calling 911 for CP and SOB assocated with multiple episodes of coffee ground emesis.    # Gastric Outlet Obstruction with Vomiting and Coffee-Ground Emesis  - NGT was placed and 2L of dark coffee ground emesis was suctioned out.   - CT scan showed large distended stomach with abrupt transition to normal caliber distal stomach without obvious mass or obstruction lesion.  - Seen by surgery in the ED  - keep NGT to suction  - f/u 8 pm CBC  - keep type and screen active. two large bore peripheral IVs. transfuse if Hb <7  - protonix drip and IVF  - f/u GI consult for EGD and possible stenting    # Leukocytosis - likely secondary to steroids  - f/u AM labs    # Troponemia - likely secondary to demand ischemia. rule out ACS  - elevated at 0.2. no baseline  - EKG showing sinus tachycardia, left axis deviation, and RBBB (on prior EKGs as well).   - f/u 8 pm troponin. trend troponin. serial EKGs    # Polymyositis - pt reports symptoms have been improving somewhat since discharge  - holding all PO meds for now  - outpatient rheum follow up.    # COPD - stable  - duonebs PRN    # Hypothyroidism   - f/u TSH  - holding all PO meds for now    dvt ppx: lovenox  diet: NPO with NG tube to suction  out of bed to chair  dispo: from home  FULL CODE ________________________________________________________________________________  DAILY PROGRESS NOTE:    ================== SUBJECTIVE ==================    NEIL ALBAN  /   69y  /  Male  /  MRN#: [id number]  Patient is a 69y old Male who presents with a chief complaint of Gastric Outlet Obstruction (03 Oct 2019 10:28)  Currently admitted to medicine with the primary diagnosis of Upper GI bleed    HOSPITAL DAY: 1d    OVERNIGHT EVENTS: None    TODAY: Patient was seen this morning at bedside. Currently, the patient reports no complaints, no shortness of breath, no chest pain, no abdominal pain.     Review of systems is otherwise negative.    =================== OBJECTIVE ===================    VITAL SIGNS: Last 24 Hours  T(C): 36.7 (03 Oct 2019 07:49), Max: 36.8 (03 Oct 2019 00:22)  T(F): 98.1 (03 Oct 2019 07:49), Max: 98.2 (03 Oct 2019 00:22)  HR: 96 (03 Oct 2019 07:49) (95 - 112)  BP: 111/67 (03 Oct 2019 07:49) (111/63 - 134/85)  BP(mean): --  RR: 18 (03 Oct 2019 07:49) (18 - 18)  SpO2: 97% (03 Oct 2019 07:49) (95% - 100%)    PHYSICAL EXAM:  GENERAL: NAD, frail   CHEST/LUNG: Clear to auscultation bilaterally; No wheeze  HEART: Regular rate and rhythm; No murmurs, rubs, or gallops  ABDOMEN: Soft, mildly tender   EXTREMITIES:  2+ Peripheral Pulses, No clubbing, cyanosis, or edema  PSYCH: AAOx3  NEUROLOGY: non-focal  SKIN: No rashes or lesions    ===================== LABS =====================                        10.1   22.60 )-----------( 415      ( 03 Oct 2019 04:00 )             31.6     10-03    144  |  98  |  22<H>  ----------------------------<  80  4.1   |  33<H>  |  0.7    Ca    8.8      03 Oct 2019 05:42  Mg     1.6     10-03    TPro  5.7<L>  /  Alb  3.2<L>  /  TBili  0.5  /  DBili  x   /  AST  53<H>  /  ALT  41  /  AlkPhos  60  10-03    PT/INR - ( 02 Oct 2019 10:00 )   PT: 14.40 sec;   INR: 1.25 ratio         PTT - ( 02 Oct 2019 10:00 )  PTT:28.2 sec      Troponin T, Serum: 0.20 ng/mL <HH> (10-02-19 @ 20:57)    CARDIAC MARKERS ( 02 Oct 2019 20:57 )  x     / 0.20 ng/mL / x     / x     / x      CARDIAC MARKERS ( 02 Oct 2019 10:00 )  x     / 0.20 ng/mL / x     / x     / x          ================== IMAGING TO DATE ==================    < from: CT Abdomen and Pelvis w/ IV Cont (10.02.19 @ 15:30) >  IMPRESSION:     Findings suspicious for gastric outlet obstruction. No underlying lesion   seen by CT.    Consider upper endoscopy for further evaluation.    < end of copied text >    ================== ALLERGIES ===================  No Known Allergies      ==================== MEDS =====================  ALBUTerol/ipratropium for Nebulization 3 milliLiter(s) Nebulizer every 6 hours  chlorhexidine 4% Liquid 1 Application(s) Topical <User Schedule>  enoxaparin Injectable 40 milliGRAM(s) SubCutaneous daily  lactated ringers. 1000 milliLiter(s) IV Continuous <Continuous>  pantoprazole Infusion 8 mG/Hr IV Continuous <Continuous>    PRN MEDICATIONS      ================= ASSESSMENT/PLAN ==================  68 yo male with PMH of COPD (not on home oxygen), hypothyroidism, gout, RA, polymyositis (on prednisone and methotrexate) presented to the ED after calling 911 for CP and SOB assocated with multiple episodes of coffee ground emesis.    # Gastric Outlet Obstruction with Vomiting and Coffee-Ground Emesis  - NGT was placed and 2L of dark coffee ground emesis was suctioned out. Suction still draining ~ 300 cc this morning   - CT scan showed large distended stomach with abrupt transition to normal caliber distal stomach without obvious mass or obstruction lesion.  - Seen by surgery in the ED: no intervention on their part   - keep NGT to suction for now   - CBC stable keep type and screen active. two large bore peripheral IVs. transfuse if Hb <7  - NPO, protonix drip and IVF for now   - Will be scheduled for endoscopy after cardiology eval     # Leukocytosis - likely secondary to steroids - stable. No signs of infection or sepsis     # Troponemia - likely secondary to demand ischemia. rule out ACS - stable trops   - elevated at 0.2 - Trending   - EKG showing sinus tachycardia, left axis deviation, and RBBB (on prior EKGs as well).   - 2D echo   - Continue tele for now   - Cardiology eval     # Polymyositis - pt reports symptoms have been improving somewhat since discharge  - holding all PO meds for now  - outpatient rheum follow up.    # COPD - stable  - duonebs PRN    # Hypothyroidism   - f/u TSH  - holding all PO meds for now    dvt ppx: lovenox  diet: NPO with NG tube to suction  out of bed to chair  dispo: from home  FULL CODE

## 2019-10-03 NOTE — CONSULT NOTE ADULT - ASSESSMENT
70 yo male with PMH of COPD (not on home oxygen), hypothyroidism, gout, RA, polymyositis (on prednisone and methotrexate) presented to the ED after calling 911 for CP and SOB assocated with multiple episodes of coffee ground emesis. Currently admitted with diagnosis of gastric outlet obstruction.  Cardiology consulted for chest pain, elevated troponin and risk stratification     IMPRESSIONS:  1. Chest pain  2. Troponinemia  3. Risk stratification for EGD    RECOMMENDATIONS: 68 yo male with PMH of COPD (not on home oxygen), hypothyroidism, gout, RA, polymyositis (on prednisone and methotrexate) presented to the ED after calling 911 for CP and SOB assocated with multiple episodes of coffee ground emesis. Currently admitted with diagnosis of gastric outlet obstruction.  Cardiology consulted for chest pain, elevated troponin and risk stratification     IMPRESSIONS:  1. Atypical chest pain  2. Troponinemia  3. Risk stratification for EGD    RECOMMENDATIONS: 68 yo male with PMH of COPD (not on home oxygen), hypothyroidism, gout, RA, polymyositis (on prednisone and methotrexate) presented to the ED after calling 911 for CP and SOB assocated with multiple episodes of coffee ground emesis. Currently admitted with diagnosis of gastric outlet obstruction.  Cardiology consulted for chest pain, elevated troponin and risk stratification     IMPRESSIONS:  1. Atypical chest pain - likely musculoskeletal due to polymyositis  2. Troponinemia (Troponin T) - likely secondary to polymyositis (can have elevated CK, CK-MB, Troponin T [vs Troponin I]) vs demand ischemia  3. Risk stratification for EGD    RECOMMENDATIONS:  - Get 2D-Echo  - No further inpatient cardiac intervention  - Patient is a low-to-moderate risk for a low risk procedure 70 yo male with PMH of COPD (not on home oxygen), hypothyroidism, gout, RA, polymyositis (on prednisone and methotrexate) presented to the ED after calling 911 for CP and SOB assocated with multiple episodes of coffee ground emesis. Currently admitted with diagnosis of gastric outlet obstruction.  Cardiology consulted for chest pain, elevated troponin and risk stratification     IMPRESSIONS:  1. Atypical chest pain - likely musculoskeletal due to polymyositis  2. Troponinemia (Troponin T) - likely secondary to polymyositis (can have elevated CK, CK-MB, Troponin T [vs Troponin I]) vs demand ischemia  3. Risk stratification for EGD    RECOMMENDATIONS:  - Get 2D-Echo  - No further inpatient cardiac intervention  - Patient is a low-to-moderate risk for a low risk procedure    *ATTENDING TO SEE PATIENT* 70 yo male with PMH of COPD (not on home oxygen), hypothyroidism, gout, RA, polymyositis (on prednisone and methotrexate) presented to the ED after calling 911 for CP and SOB assocated with multiple episodes of coffee ground emesis. Currently admitted with diagnosis of gastric outlet obstruction.  Cardiology consulted for chest pain, elevated troponin and risk stratification     IMPRESSIONS:  1. Atypical chest pain - likely musculoskeletal due to polymyositis  2. Troponinemia (Troponin T) - likely secondary to polymyositis (can have elevated CPK, CK-MB, Troponin T [but not Troponin I]) vs demand ischemia  3. Risk stratification for EGD    RECOMMENDATIONS:  - Get 2D-Echo  - No further inpatient cardiac intervention  - Patient is a low-to-moderate risk for a low risk procedure    *ATTENDING TO SEE PATIENT* 68 yo male with PMH of COPD (not on home oxygen), hypothyroidism, gout, RA, polymyositis (on prednisone and methotrexate) presented to the ED after calling 911 for CP and SOB assocated with multiple episodes of coffee ground emesis. Currently admitted with diagnosis of gastric outlet obstruction.  Cardiology consulted for chest pain, elevated troponin and risk stratification     IMPRESSIONS:  1. Atypical chest pain - likely musculoskeletal due to polymyositis  2. Elevated Troponin T - likely secondary to polymyositis (can have elevated CPK, CK-MB, Troponin T [but not Troponin I]) vs demand ischemia  3. Risk stratification for EGD    RECOMMENDATIONS:  - Get 2D-Echo  - No further inpatient cardiac intervention  - Patient is a low-to-moderate risk for a low risk procedure, can proceed with EGD

## 2019-10-04 ENCOUNTER — RESULT REVIEW (OUTPATIENT)
Age: 69
End: 2019-10-04

## 2019-10-04 ENCOUNTER — TRANSCRIPTION ENCOUNTER (OUTPATIENT)
Age: 69
End: 2019-10-04

## 2019-10-04 LAB
ALBUMIN SERPL ELPH-MCNC: 2.9 G/DL — LOW (ref 3.5–5.2)
ALP SERPL-CCNC: 60 U/L — SIGNIFICANT CHANGE UP (ref 30–115)
ALT FLD-CCNC: 38 U/L — SIGNIFICANT CHANGE UP (ref 0–41)
ANION GAP SERPL CALC-SCNC: 13 MMOL/L — SIGNIFICANT CHANGE UP (ref 7–14)
AST SERPL-CCNC: 57 U/L — HIGH (ref 0–41)
BASOPHILS # BLD AUTO: 0.03 K/UL — SIGNIFICANT CHANGE UP (ref 0–0.2)
BASOPHILS NFR BLD AUTO: 0.1 % — SIGNIFICANT CHANGE UP (ref 0–1)
BILIRUB SERPL-MCNC: 0.5 MG/DL — SIGNIFICANT CHANGE UP (ref 0.2–1.2)
BUN SERPL-MCNC: 19 MG/DL — SIGNIFICANT CHANGE UP (ref 10–20)
CALCIUM SERPL-MCNC: 8.4 MG/DL — LOW (ref 8.5–10.1)
CHLORIDE SERPL-SCNC: 101 MMOL/L — SIGNIFICANT CHANGE UP (ref 98–110)
CO2 SERPL-SCNC: 27 MMOL/L — SIGNIFICANT CHANGE UP (ref 17–32)
CREAT SERPL-MCNC: 0.6 MG/DL — LOW (ref 0.7–1.5)
EOSINOPHIL # BLD AUTO: 0.07 K/UL — SIGNIFICANT CHANGE UP (ref 0–0.7)
EOSINOPHIL NFR BLD AUTO: 0.3 % — SIGNIFICANT CHANGE UP (ref 0–8)
GLUCOSE BLDC GLUCOMTR-MCNC: 102 MG/DL — HIGH (ref 70–99)
GLUCOSE BLDC GLUCOMTR-MCNC: 62 MG/DL — LOW (ref 70–99)
GLUCOSE SERPL-MCNC: 79 MG/DL — SIGNIFICANT CHANGE UP (ref 70–99)
HCT VFR BLD CALC: 26.5 % — LOW (ref 42–52)
HCT VFR BLD CALC: 31 % — LOW (ref 42–52)
HGB BLD-MCNC: 8.2 G/DL — LOW (ref 14–18)
HGB BLD-MCNC: 9.8 G/DL — LOW (ref 14–18)
IMM GRANULOCYTES NFR BLD AUTO: 0.7 % — HIGH (ref 0.1–0.3)
LYMPHOCYTES # BLD AUTO: 1.09 K/UL — LOW (ref 1.2–3.4)
LYMPHOCYTES # BLD AUTO: 5.4 % — LOW (ref 20.5–51.1)
MAGNESIUM SERPL-MCNC: 1.7 MG/DL — LOW (ref 1.8–2.4)
MCHC RBC-ENTMCNC: 28.6 PG — SIGNIFICANT CHANGE UP (ref 27–31)
MCHC RBC-ENTMCNC: 28.6 PG — SIGNIFICANT CHANGE UP (ref 27–31)
MCHC RBC-ENTMCNC: 30.9 G/DL — LOW (ref 32–37)
MCHC RBC-ENTMCNC: 31.6 G/DL — LOW (ref 32–37)
MCV RBC AUTO: 90.4 FL — SIGNIFICANT CHANGE UP (ref 80–94)
MCV RBC AUTO: 92.3 FL — SIGNIFICANT CHANGE UP (ref 80–94)
MONOCYTES # BLD AUTO: 1.34 K/UL — HIGH (ref 0.1–0.6)
MONOCYTES NFR BLD AUTO: 6.6 % — SIGNIFICANT CHANGE UP (ref 1.7–9.3)
NEUTROPHILS # BLD AUTO: 17.52 K/UL — HIGH (ref 1.4–6.5)
NEUTROPHILS NFR BLD AUTO: 86.9 % — HIGH (ref 42.2–75.2)
NRBC # BLD: 0 /100 WBCS — SIGNIFICANT CHANGE UP (ref 0–0)
NRBC # BLD: 0 /100 WBCS — SIGNIFICANT CHANGE UP (ref 0–0)
PHOSPHATE SERPL-MCNC: 2.8 MG/DL — SIGNIFICANT CHANGE UP (ref 2.1–4.9)
PLATELET # BLD AUTO: 353 K/UL — SIGNIFICANT CHANGE UP (ref 130–400)
PLATELET # BLD AUTO: 381 K/UL — SIGNIFICANT CHANGE UP (ref 130–400)
POTASSIUM SERPL-MCNC: 4 MMOL/L — SIGNIFICANT CHANGE UP (ref 3.5–5)
POTASSIUM SERPL-SCNC: 4 MMOL/L — SIGNIFICANT CHANGE UP (ref 3.5–5)
PROT SERPL-MCNC: 5.5 G/DL — LOW (ref 6–8)
RBC # BLD: 2.87 M/UL — LOW (ref 4.7–6.1)
RBC # BLD: 3.43 M/UL — LOW (ref 4.7–6.1)
RBC # FLD: 16.3 % — HIGH (ref 11.5–14.5)
RBC # FLD: 16.3 % — HIGH (ref 11.5–14.5)
SODIUM SERPL-SCNC: 141 MMOL/L — SIGNIFICANT CHANGE UP (ref 135–146)
TRIGL SERPL-MCNC: 92 MG/DL — SIGNIFICANT CHANGE UP (ref 10–149)
WBC # BLD: 20.19 K/UL — HIGH (ref 4.8–10.8)
WBC # BLD: 23.24 K/UL — HIGH (ref 4.8–10.8)
WBC # FLD AUTO: 20.19 K/UL — HIGH (ref 4.8–10.8)
WBC # FLD AUTO: 23.24 K/UL — HIGH (ref 4.8–10.8)

## 2019-10-04 PROCEDURE — 93306 TTE W/DOPPLER COMPLETE: CPT | Mod: 26

## 2019-10-04 PROCEDURE — 99233 SBSQ HOSP IP/OBS HIGH 50: CPT

## 2019-10-04 PROCEDURE — 43239 EGD BIOPSY SINGLE/MULTIPLE: CPT

## 2019-10-04 PROCEDURE — 71045 X-RAY EXAM CHEST 1 VIEW: CPT | Mod: 26,77

## 2019-10-04 PROCEDURE — 99232 SBSQ HOSP IP/OBS MODERATE 35: CPT

## 2019-10-04 PROCEDURE — 71045 X-RAY EXAM CHEST 1 VIEW: CPT | Mod: 26

## 2019-10-04 PROCEDURE — 74018 RADEX ABDOMEN 1 VIEW: CPT | Mod: 26

## 2019-10-04 PROCEDURE — 88305 TISSUE EXAM BY PATHOLOGIST: CPT | Mod: 26

## 2019-10-04 PROCEDURE — 88312 SPECIAL STAINS GROUP 1: CPT | Mod: 26

## 2019-10-04 RX ORDER — PIPERACILLIN AND TAZOBACTAM 4; .5 G/20ML; G/20ML
3.38 INJECTION, POWDER, LYOPHILIZED, FOR SOLUTION INTRAVENOUS ONCE
Refills: 0 | Status: COMPLETED | OUTPATIENT
Start: 2019-10-04 | End: 2019-10-04

## 2019-10-04 RX ORDER — MAGNESIUM SULFATE 500 MG/ML
2 VIAL (ML) INJECTION ONCE
Refills: 0 | Status: COMPLETED | OUTPATIENT
Start: 2019-10-04 | End: 2019-10-04

## 2019-10-04 RX ORDER — PANTOPRAZOLE SODIUM 20 MG/1
40 TABLET, DELAYED RELEASE ORAL
Refills: 0 | Status: DISCONTINUED | OUTPATIENT
Start: 2019-10-04 | End: 2019-10-09

## 2019-10-04 RX ORDER — HEPARIN SODIUM 5000 [USP'U]/ML
5000 INJECTION INTRAVENOUS; SUBCUTANEOUS THREE TIMES A DAY
Refills: 0 | Status: DISCONTINUED | OUTPATIENT
Start: 2019-10-04 | End: 2019-10-09

## 2019-10-04 RX ORDER — PIPERACILLIN AND TAZOBACTAM 4; .5 G/20ML; G/20ML
3.38 INJECTION, POWDER, LYOPHILIZED, FOR SOLUTION INTRAVENOUS EVERY 8 HOURS
Refills: 0 | Status: DISCONTINUED | OUTPATIENT
Start: 2019-10-04 | End: 2019-10-09

## 2019-10-04 RX ORDER — ELECTROLYTE SOLUTION,INJ
1 VIAL (ML) INTRAVENOUS
Refills: 0 | Status: DISCONTINUED | OUTPATIENT
Start: 2019-10-04 | End: 2019-10-04

## 2019-10-04 RX ORDER — BENZOCAINE 10 %
1 GEL (GRAM) MUCOUS MEMBRANE EVERY 4 HOURS
Refills: 0 | Status: DISCONTINUED | OUTPATIENT
Start: 2019-10-04 | End: 2019-10-09

## 2019-10-04 RX ORDER — TIOTROPIUM BROMIDE AND OLODATEROL 3.124; 2.736 UG/1; UG/1
2 SPRAY, METERED RESPIRATORY (INHALATION) DAILY
Refills: 0 | Status: DISCONTINUED | OUTPATIENT
Start: 2019-10-04 | End: 2019-10-04

## 2019-10-04 RX ORDER — IPRATROPIUM/ALBUTEROL SULFATE 18-103MCG
3 AEROSOL WITH ADAPTER (GRAM) INHALATION ONCE
Refills: 0 | Status: COMPLETED | OUTPATIENT
Start: 2019-10-04 | End: 2019-10-04

## 2019-10-04 RX ORDER — TIOTROPIUM BROMIDE 18 UG/1
1 CAPSULE ORAL; RESPIRATORY (INHALATION) DAILY
Refills: 0 | Status: DISCONTINUED | OUTPATIENT
Start: 2019-10-04 | End: 2019-10-09

## 2019-10-04 RX ORDER — I.V. FAT EMULSION 20 G/100ML
1.72 EMULSION INTRAVENOUS
Qty: 100.1 | Refills: 0 | Status: DISCONTINUED | OUTPATIENT
Start: 2019-10-04 | End: 2019-10-04

## 2019-10-04 RX ORDER — VANCOMYCIN HCL 1 G
1000 VIAL (EA) INTRAVENOUS EVERY 12 HOURS
Refills: 0 | Status: DISCONTINUED | OUTPATIENT
Start: 2019-10-04 | End: 2019-10-07

## 2019-10-04 RX ADMIN — PIPERACILLIN AND TAZOBACTAM 25 GRAM(S): 4; .5 INJECTION, POWDER, LYOPHILIZED, FOR SOLUTION INTRAVENOUS at 21:46

## 2019-10-04 RX ADMIN — PANTOPRAZOLE SODIUM 40 MILLIGRAM(S): 20 TABLET, DELAYED RELEASE ORAL at 18:41

## 2019-10-04 RX ADMIN — Medication 33.33 GRAM(S): at 16:54

## 2019-10-04 RX ADMIN — HEPARIN SODIUM 5000 UNIT(S): 5000 INJECTION INTRAVENOUS; SUBCUTANEOUS at 21:56

## 2019-10-04 RX ADMIN — CHLORHEXIDINE GLUCONATE 1 APPLICATION(S): 213 SOLUTION TOPICAL at 06:01

## 2019-10-04 RX ADMIN — SODIUM CHLORIDE 100 MILLILITER(S): 9 INJECTION, SOLUTION INTRAVENOUS at 12:07

## 2019-10-04 RX ADMIN — Medication 1 EACH: at 20:21

## 2019-10-04 RX ADMIN — TIOTROPIUM BROMIDE 1 CAPSULE(S): 18 CAPSULE ORAL; RESPIRATORY (INHALATION) at 11:55

## 2019-10-04 RX ADMIN — Medication 250 MILLIGRAM(S): at 18:41

## 2019-10-04 RX ADMIN — I.V. FAT EMULSION 31.28 GM/KG/DAY: 20 EMULSION INTRAVENOUS at 20:22

## 2019-10-04 RX ADMIN — Medication 3 MILLILITER(S): at 07:56

## 2019-10-04 RX ADMIN — PIPERACILLIN AND TAZOBACTAM 200 GRAM(S): 4; .5 INJECTION, POWDER, LYOPHILIZED, FOR SOLUTION INTRAVENOUS at 11:55

## 2019-10-04 RX ADMIN — Medication 1 SPRAY(S): at 11:58

## 2019-10-04 RX ADMIN — Medication 3 MILLILITER(S): at 20:38

## 2019-10-04 RX ADMIN — Medication 250 MILLIGRAM(S): at 11:55

## 2019-10-04 NOTE — PROGRESS NOTE ADULT - ASSESSMENT
70 yo male with PMH of COPD (not on home oxygen), hypothyroidism, gout, RA, polymyositis (on prednisone and methotrexate) presented to the ED after calling 911 for CP and SOB assocated with multiple episodes of coffee ground emesis.    1.	Coffee ground Emesis  2.	Possible gastric outlet obstruction.   3.	Aspiration PNA  4.	Polymyositis  5.	COPD  6.	RA / Gout  7.	CP likely musculoskeleta  8.	Hypothyroidism         PLAN: 68 yo male with PMH of COPD (not on home oxygen), hypothyroidism, gout, RA, polymyositis (on prednisone and methotrexate) presented to the ED after calling 911 for CP and SOB assocated with multiple episodes of coffee ground emesis.    1.	Coffee ground Emesis  2.	Possible gastric outlet obstruction.   3.	Aspiration PNA  4.	Polymyositis  5.	COPD  6.	RA / Gout  7.	CP likely musculoskeleta  8.	Hypothyroidism         PLAN:    ·	Elevated troponin likely due to polymyositis  ·	NPO and on NGT suction  ·	GI eval noted. EGD today  ·	Cont IV Protonix.   ·	Follow CBC  ·	Started him on IV Zosyn and Vanco  ·	Check MRSA PCR  ·	ID eval  ·	Hold prednisone and Methotrexate for now  ·	Cont nebs  ·	Will restart his PO meds when off suction

## 2019-10-04 NOTE — CONSULT NOTE ADULT - ASSESSMENT
Patient is a 69y old  Male with PMH of COPD (not on home oxygen), hypothyroidism, gout, RA, polymyositis (on prednisone and methotrexate) presenting with chest pain , nausea and coffee ground vomiting of 3 days duration, admitted with diagnosis of Gastric Outlet obstruction. He is on zosyn and Vancomycin for pneumonia and the ID consult requested to assist with further  evaluation and antibiotic management.     # B/L Pneumonia  # Leukocytosis     would recommend:    1. Obtain Legionella urine Ag and   2. Nasal swab for MRSA PCR, if negative then discontinue Vancomycin  3. Aspiration precaution  4. Continue current antimicrobials until work up is done  5. Supplemental oxygenation and bronchodilator as needed  6. Monitor WBC count    will follow the patient with you and make further recommendation based on the clinical course and Lab results  Thank you for the opportunity to participate in Mr. PHOENIX's care

## 2019-10-04 NOTE — CONSULT NOTE ADULT - ASSESSMENT
ASSESSMENT  68 yo male with PMH of COPD (not on home oxygen), hypothyroidism, gout, RA, polymyositis (on prednisone and methotrexate) presented to the ED after calling 911 for CP and SOB assocated with multiple episodes of coffee ground emesis.    1.	Coffee ground Emesis  2.	Possible gastric outlet obstruction.   3.	Aspiration PNA  4.	Polymyositis  5.	COPD  6.	RA / Gout  7.	CP likely musculoskeleta  8.	Hypothyroidism  9.	PLAN  10.	check triglyceride level   11.	ppn order for tonight   12.	if pt tro stay NPO longer need a central access fo TPN  13.	check  bmp/phos and mg and correct lytes  14. ASSESSMENT  70 yo male with PMH of COPD (not on home oxygen), hypothyroidism, gout, RA, polymyositis (on prednisone and methotrexate) presented to the ED after calling 911 for CP and SOB assocated with multiple episodes of coffee ground emesis.    - Coffee ground Emesis   - gastric outlet obstruction.   - Aspiration PNA  - Polymyositis  - COPD  - RA / Gout  - CP likely musculoskeletal  - Hypothyroidism  - unintentional weight loss    PLAN:  1.	check triglyceride level   2.	PPN ordered for tonight   3.	if pt to stay NPO longer need a central access fo TPN  4.	check  bmp/phos and mg and correct lytes  5.	for EGD today - will f/u with GI for findings

## 2019-10-04 NOTE — CONSULT NOTE ADULT - SUBJECTIVE AND OBJECTIVE BOX
HPI:  70 yo male presented to the ED after calling 911 for CP and SOB assocated with multiple episodes of coffee ground emesis.  He was recently discharged from [facility] on Sept 25th after being admitted for weakness and found to have polymyositis. He states that he has not been feeling himself since mid-June and reports decreased appetite and an unintentional 30 lb weight loss He started approx 4 days ago and N/V 3 days ago. He states his CP was substernal and sharp and was causing him to have trouble breathing. Burping and sneezing made the pain worse. No alleviating factors. He states his last normal BM was 2 weeks ago. Since that time he has had very small, hard BMs every few days. No bright red blood or dark stool noted. Patient denies any prior history of GI bleed. His last colonoscopy was 3 years ago ago by [facility] on [address], he may have had polyps removed at that time but was not sure. Never had EGD before.   Patient had a large volume emesis in transport to the ED and while here an NGT was placed and 2L of dark coffee ground emesis was suctioned out. He states he feels much improved after decompression. He remained hemodynamically stable but mildly tachycardic to the low 100s. CT scan showed large distended stomach with abrupt transition to normal caliber distal stomach without obvious mass or obstruction lesion. Seen by surgery in the ED. Admitted to medicine for further workup and management. (02 Oct 2019 18:16)      PAST MEDICAL & SURGICAL HISTORY:  Gout  COPD, mild  Polymyositis  Hypothyroid  S/P tonsillectomy  ICU Vital Signs Last 24 Hrs  T(C): 35.8 (04 Oct 2019 05:14), Max: 37.3 (03 Oct 2019 21:47)  T(F): 96.5 (04 Oct 2019 05:14), Max: 99.2 (03 Oct 2019 21:47)  HR: 95 (04 Oct 2019 05:14) (95 - 105)  BP: 119/64 (04 Oct 2019 05:14) (109/66 - 135/86)  RR: 18 (04 Oct 2019 05:14) (18 - 18)  SpO2: 97% (03 Oct 2019 15:54) (97% - 97%)    Height (cm): 177.8 (10-03-19 @ 21:47), 177.8 (09-21-19 @ 18:48)  Weight (kg): 58.2 (10-03-19 @ 21:47), 61.4 (09-20-19 @ 22:35)  BMI (kg/m2): 18.4 (10-03-19 @ 21:47), 19.4 (09-21-19 @ 18:48)  BSA (m2): 1.73 (10-03-19 @ 21:47), 1.77 (09-21-19 @ 18:48)    I&O's Detail    03 Oct 2019 07:01  -  04 Oct 2019 07:00  --------------------------------------------------------  IN:    lactated ringers.: 900 mL  Total IN: 900 mL    OUT:    Voided: 200 mL  Total OUT: 200 mL    Total NET: 700 mL  PHYSICAL EXAM:  GENERAL: NAD, well-groomed, well-developed, Alert & Oriented X3  HEENT:  Moist mucous membranes, Good dentition, No lesions, Supple, No JVD, Normal thyroid  ABDOMEN: Soft, Nontender, Nondistended  EXTREMITIES: no edema  SKIN: No lesions  IV ACCESS: peripheral IV LINE  FEEDING ACCESS: NPO    MEDICATIONS  (STANDING):  ALBUTerol/ipratropium for Nebulization 3 milliLiter(s) Nebulizer every 6 hours  ALBUTerol/ipratropium for Nebulization 3 milliLiter(s) Nebulizer once  chlorhexidine 4% Liquid 1 Application(s) Topical <User Schedule>  lactated ringers. 1000 milliLiter(s) (100 mL/Hr) IV Continuous <Continuous>  pantoprazole Infusion 8 mG/Hr (10 mL/Hr) IV Continuous <Continuous>  piperacillin/tazobactam IVPB.. 3.375 Gram(s) IV Intermittent every 8 hours  tiotropium 18 MICROgram(s) Capsule 1 Capsule(s) Inhalation daily  vancomycin  IVPB 1000 milliGRAM(s) IV Intermittent every 12 hours    MEDICATIONS  (PRN):  benzocaine 20% Spray 1 Spray(s) Topical every 4 hours PRN NGT irritation  Allergies: NKDA    LABS:    10-04    141  |  101  |  19  ----------------------------<  79  4.0   |  27  |  0.6<L>    Ca    8.4<L>      04 Oct 2019 05:30  Mg     1.7     10-04    TPro  5.5<L>  /  Alb  2.9<L>  /  TBili  0.5  /  DBili  x   /  AST  57<H>  /  ALT  38  /  AlkPhos  60  10-04                        9.8    20.19 )-----------( 381      ( 04 Oct 2019 05:30 )             31.0     RADIOLOGY:  < from: Xray Chest 1 View- PORTABLE-Routine (10.04.19 @ 01:33) >  Impression:    Stable bibasilar opacities.  < from: CT Abdomen and Pelvis w/ IV Cont (10.02.19 @ 15:30) >    IMPRESSION:     Findings suspicious for gastric outlet obstruction. No underlying lesion   seen by CT.  Consider upper endoscopy for further evaluation.    Current Diet: [  ]NPO HPI:  68 yo male presented to the ED after calling 911 for CP and SOB assocated with multiple episodes of coffee ground emesis.  He was recently discharged from [facility] on Sept 25th after being admitted for weakness and found to have polymyositis. He states that he has not been feeling himself since mid-June and reports decreased appetite and an unintentional 30 lb weight loss He started approx 4 days ago and N/V 3 days ago. He states his CP was substernal and sharp and was causing him to have trouble breathing. Burping and sneezing made the pain worse. No alleviating factors. He states his last normal BM was 2 weeks ago. Since that time he has had very small, hard BMs every few days. No bright red blood or dark stool noted. Patient denies any prior history of GI bleed. His last colonoscopy was 3 years ago ago by [facility] on [address], he may have had polyps removed at that time but was not sure. Never had EGD before.   Patient had a large volume emesis in transport to the ED and while here an NGT was placed and 2L of dark coffee ground emesis was suctioned out. He states he feels much improved after decompression. He remained hemodynamically stable but mildly tachycardic to the low 100s. CT scan showed large distended stomach with abrupt transition to normal caliber distal stomach without obvious mass or obstruction lesion. Seen by surgery in the ED. Admitted to medicine for further workup and management. (02 Oct 2019 18:16)    PAST MEDICAL & SURGICAL HISTORY:  Gout  COPD, mild  Polymyositis  Hypothyroid  S/P tonsillectomy    ICU Vital Signs Last 24 Hrs  T(C): 35.8 (04 Oct 2019 05:14), Max: 37.3 (03 Oct 2019 21:47)  T(F): 96.5 (04 Oct 2019 05:14), Max: 99.2 (03 Oct 2019 21:47)  HR: 95 (04 Oct 2019 05:14) (95 - 105)  BP: 119/64 (04 Oct 2019 05:14) (109/66 - 135/86)  RR: 18 (04 Oct 2019 05:14) (18 - 18)  SpO2: 97% (03 Oct 2019 15:54) (97% - 97%)    Height (cm): 177.8 (10-03-19 @ 21:47), 177.8 (09-21-19 @ 18:48)  Weight (kg): 58.2 (10-03-19 @ 21:47), 61.4 (09-20-19 @ 22:35)  BMI (kg/m2): 18.4 (10-03-19 @ 21:47), 19.4 (09-21-19 @ 18:48)  BSA (m2): 1.73 (10-03-19 @ 21:47), 1.77 (09-21-19 @ 18:48)    I&O's Detail    03 Oct 2019 07:01  -  04 Oct 2019 07:00  --------------------------------------------------------  IN:    lactated ringers.: 900 mL  Total IN: 900 mL    OUT:    Voided: 200 mL  Total OUT: 200 mL    Total NET: 700 mL  PHYSICAL EXAM:  GENERAL: NAD, well-groomed, well-developed, Alert & Oriented X3  HEENT:  Moist mucous membranes, Good dentition, No lesions, Supple, No JVD, Normal thyroid  ABDOMEN: Soft, Nontender, Nondistended  EXTREMITIES: no edema  SKIN: No lesions  IV ACCESS: peripheral IV LINE  FEEDING ACCESS: NPO    MEDICATIONS  (STANDING):  ALBUTerol/ipratropium for Nebulization 3 milliLiter(s) Nebulizer every 6 hours  ALBUTerol/ipratropium for Nebulization 3 milliLiter(s) Nebulizer once  chlorhexidine 4% Liquid 1 Application(s) Topical <User Schedule>  lactated ringers. 1000 milliLiter(s) (100 mL/Hr) IV Continuous <Continuous>  pantoprazole Infusion 8 mG/Hr (10 mL/Hr) IV Continuous <Continuous>  piperacillin/tazobactam IVPB.. 3.375 Gram(s) IV Intermittent every 8 hours  tiotropium 18 MICROgram(s) Capsule 1 Capsule(s) Inhalation daily  vancomycin  IVPB 1000 milliGRAM(s) IV Intermittent every 12 hours    MEDICATIONS  (PRN):  benzocaine 20% Spray 1 Spray(s) Topical every 4 hours PRN NGT irritation  Allergies: NKDA    LABS:    10-04    141  |  101  |  19  ----------------------------<  79  4.0   |  27  |  0.6<L>    Ca    8.4<L>      04 Oct 2019 05:30  Mg     1.7     10-04    TPro  5.5<L>  /  Alb  2.9<L>  /  TBili  0.5  /  DBili  x   /  AST  57<H>  /  ALT  38  /  AlkPhos  60  10-04                        9.8    20.19 )-----------( 381      ( 04 Oct 2019 05:30 )             31.0     RADIOLOGY:  < from: Xray Chest 1 View- PORTABLE-Routine (10.04.19 @ 01:33) >  Impression:    Stable bibasilar opacities.  < from: CT Abdomen and Pelvis w/ IV Cont (10.02.19 @ 15:30) >    IMPRESSION:     Findings suspicious for gastric outlet obstruction. No underlying lesion   seen by CT.  Consider upper endoscopy for further evaluation.    Current Diet: [  ]NPO

## 2019-10-04 NOTE — PROGRESS NOTE ADULT - SUBJECTIVE AND OBJECTIVE BOX
GENERAL SURGERY PROGRESS NOTE     NEIL PHOENIX  69y  Male  Hospital day :2d  POD:  Procedure:     OVERNIGHT EVENTS:  NGT replaced 10/4 AM.     T(F): 96.5 (10-04-19 @ 05:14), Max: 99.2 (10-03-19 @ 21:47)  HR: 95 (10-04-19 @ 05:14) (95 - 105)  BP: 119/64 (10-04-19 @ 05:14) (109/66 - 135/86)  ABP: --  ABP(mean): --  RR: 18 (10-04-19 @ 05:14) (18 - 18)  SpO2: 97% (10-03-19 @ 15:54) (97% - 97%)    DIET/FLUIDS: lactated ringers. 1000 milliLiter(s) IV Continuous <Continuous     GI proph:  pantoprazole Infusion 8 mG/Hr IV Continuous <Continuous>    ABx: piperacillin/tazobactam IVPB. 3.375 Gram(s) IV Intermittent once  piperacillin/tazobactam IVPB.. 3.375 Gram(s) IV Intermittent every 8 hours  vancomycin  IVPB 1000 milliGRAM(s) IV Intermittent every 12 hours    PHYSICAL EXAM:  GENERAL: NAD, well-appearing, NGT to continuous low suction  CHEST/LUNG: wheezes b/l at lung bases, NC in place  HEART: Regular rate and rhythm  ABDOMEN: Soft, Nontender, Nondistended;   EXTREMITIES:  No clubbing, cyanosis, or edema    LABS  Labs:  CAPILLARY BLOOD GLUCOSE                     9.8    20.19 )-----------( 381      ( 04 Oct 2019 05:30 )             31.0       Auto Neutrophil %: 86.9 % (10-04-19 @ 05:30)  Auto Immature Granulocyte %: 0.7 % (10-04-19 @ 05:30)    10-04    141  |  101  |  19  ----------------------------<  79  4.0   |  27  |  0.6<L>    Calcium, Total Serum: 8.4 mg/dL (10-04-19 @ 05:30)    LFTs:             5.5  | 0.5  | 57       ------------------[60      ( 04 Oct 2019 05:30 )  2.9  | x    | 38          Lactate, Blood: 1.5 mmol/L (10-02-19 @ 10:00)    Coags:    CARDIAC MARKERS ( 03 Oct 2019 16:15 )  x     / 0.26 ng/mL / x     / x     / x      CARDIAC MARKERS ( 02 Oct 2019 20:57 )  x     / 0.20 ng/mL / x     / x     / x        RADIOLOGY & ADDITIONAL TESTS:

## 2019-10-04 NOTE — PROGRESS NOTE ADULT - ASSESSMENT
68 yo male with PMH of COPD (not on home oxygen), hypothyroidism, gout, RA, polymyositis (on prednisone and methotrexate) presented to the ED after calling 911 for CP and SOB assocated with multiple episodes of coffee ground emesis.    # Gastric Outlet Obstruction with Vomiting and Coffee-Ground Emesis  - NGT was placed and 2L of dark coffee ground emesis was suctioned out in ED.   - CT scan showed large distended stomach with abrupt transition to normal caliber distal stomach without obvious mass or obstruction lesion.  - Seen by surgery in the ED: no intervention on their part   - CBC stable keep type and screen active. two large bore peripheral IVs. transfuse if Hb <7  - NPO, protonix drip and IVF for now   - EGD 10/4, cleared by cardio    # Leukocytosis - likely secondary to steroids   - stable. No signs of infection or sepsis     # Troponemia - likely secondary to demand ischemia. rule out ACS - stable trops   - elevated at 0.2 - Trending   - EKG showing sinus tachycardia, left axis deviation, and RBBB (on prior EKGs as well).   - 2D echo   - Cardiology indicated tropenemia secondary to polymyocytis    # Polymyositis - pt reports symptoms have been improving somewhat since discharge  - holding all PO meds for now  - outpatient rheum follow up.    # COPD - stable  - duonebs PRN    # Hypothyroidism   - f/u TSH  - holding all PO meds for now    dvt ppx: lovenox  diet: NPO with NG tube to suction  out of bed to chair  dispo: from home  FULL CODE 70 yo male with PMH of COPD (not on home oxygen), hypothyroidism, gout, RA, polymyositis (on prednisone and methotrexate) presented to the ED after calling 911 for CP and SOB assocated with multiple episodes of coffee ground emesis.    #Shortness of breath possibly aspiration PNA, crackles in RLL and possible opacity on CXR  -Afebrile and left shift Leukocytyosis doubled from last week when last seen in hospital and was on steroids 1 week ago  -Vancomyin  -zosyn  -id consult  -f/u mrsa nares  -f/u officla CXR read    # Gastric Outlet Obstruction with Vomiting and Coffee-Ground Emesis  - NGT was placed and 2L of dark coffee ground emesis was suctioned out in ED.   - CT scan showed large distended stomach with abrupt transition to normal caliber distal stomach without obvious mass or obstruction lesion.  - Seen by surgery in the ED: no intervention on their part   - CBC stable keep type and screen active. two large bore peripheral IVs. transfuse if Hb <7  - NPO, protonix drip and IVF for now   - EGD 10/4, cleared by cardio    # Leukocytosis - likely secondary to steroids   - stable. No signs of infection or sepsis     # Troponemia - likely secondary to demand ischemia. rule out ACS - stable trops   - elevated at 0.2 - Trending   - EKG showing sinus tachycardia, left axis deviation, and RBBB (on prior EKGs as well).   - 2D echo   - Cardiology indicated tropenemia secondary to polymyocytis    # Polymyositis - pt reports symptoms have been improving somewhat since discharge  - holding all PO meds for now  - outpatient rheum follow up.    # COPD - stable  - duonebs PRN    # Hypothyroidism   - f/u TSH  - holding all PO meds for now    dvt ppx: lovenox  diet: NPO with NG tube to suction  out of bed to chair  dispo: from home  FULL CODE

## 2019-10-04 NOTE — PROGRESS NOTE ADULT - ATTENDING COMMENTS
70yo male with PMHx of COPD, hypothyroidism, RA on prednisone presenting with coffee ground emesis, chest pain and shortness of breath. Decrease appetite and weight loss for several months. NG tube revealed 1800cc dark coffee ground contents. Patient reports feeling better after placement of NG tube. Vital signs significant for tachycardia to 100's. Hb 11, WBC 25. CT A/P reveals distended stomach with abrupt transition point at D4, no mass or lesion. Recommend continued NG tube on suction, monitor I/O, replace NG tube output if >1L, monitor electrolytes and replace PRN. GI - EGD and biopsy. Leukocytosis likely secondary to steroids, will monitor. Recommend serial H/H q6h, transfuse PRN, monitor vital signs, monitor for further bleeding. No surgical intervention at this time. Surgery to follow. 68yo male with coffee ground emesis and duodenal obstruction. NG tube in place, monitor I/O, replace NG tube output if >1L, monitor electrolytes and replace PRN. GI - EGD and biopsy pending. Leukocytosis likely secondary to steroids. Recommend serial H/H q6h, transfuse PRN, monitor vital signs, monitor for further bleeding. No surgical intervention at this time. Surgery to follow.

## 2019-10-04 NOTE — PROGRESS NOTE ADULT - SUBJECTIVE AND OBJECTIVE BOX
NEIL PHOENIX  69y Male    CHIEF COMPLAINT:    Patient is a 69y old  Male who presents with a chief complaint of Gastric Outlet Obstruction (04 Oct 2019 09:03)      INTERVAL HPI/OVERNIGHT EVENTS:    Patient seen and examined.    ROS: All other systems are negative.    Vital Signs:    T(F): 96.5 (10-04-19 @ 05:14), Max: 99.2 (10-03-19 @ 21:47)  HR: 95 (10-04-19 @ 05:14) (95 - 105)  BP: 119/64 (10-04-19 @ 05:14) (109/66 - 135/86)  RR: 18 (10-04-19 @ 05:14) (18 - 18)  SpO2: 97% (10-03-19 @ 15:54) (97% - 97%)  I&O's Summary    03 Oct 2019 07:01  -  04 Oct 2019 07:00  --------------------------------------------------------  IN: 900 mL / OUT: 200 mL / NET: 700 mL      Daily Height in cm: 177.8 (03 Oct 2019 21:47)    Daily   CAPILLARY BLOOD GLUCOSE          PHYSICAL EXAM:    GENERAL:  NAD  SKIN: No rashes or lesions  HENT: Atrumatic. Normocephalic. PERRL. Moist membranes.  NECK: Supple, No JVD. No lymphadenopathy.  PULMONARY: CTA B/L. No wheezing. No rales  CVS: Normal S1, S2. Rate and Rythm are regular. No murmurs.  ABDOMEN/GI: Soft, Nontender, Nondistended; BS present  EXTREMITIES: Peripheral pulses intact. No edema B/L LE.  NEUROLOGIC:  No motor or sensory deficit.  PSYCH: Alert & oriented x 3    Consultant(s) Notes Reviewed:  [x ] YES  [ ] NO  Care Discussed with Consultants/Other Providers [ x] YES  [ ] NO    EKG reviewed  Telemetry reviewed    LABS:                        9.8    20.19 )-----------( 381      ( 04 Oct 2019 05:30 )             31.0     10-04    141  |  101  |  19  ----------------------------<  79  4.0   |  27  |  0.6<L>    Ca    8.4<L>      04 Oct 2019 05:30  Mg     1.7     10-04    TPro  5.5<L>  /  Alb  2.9<L>  /  TBili  0.5  /  DBili  x   /  AST  57<H>  /  ALT  38  /  AlkPhos  60  10-04        Trop 0.26, CKMB --, CK --, 10-03-19 @ 16:15  Trop 0.20, CKMB --, CK --, 10-02-19 @ 20:57  Trop 0.20, CKMB --, CK --, 10-02-19 @ 10:00        RADIOLOGY & ADDITIONAL TESTS:    < from: CT Abdomen and Pelvis w/ IV Cont (10.02.19 @ 15:30) >    IMPRESSION:     Findings suspicious for gastric outlet obstruction. No underlying lesion   seen by CT.    Consider upper endoscopy for further evaluation.      < end of copied text >    Imaging or report Personally Reviewed:  [ ] YES  [ ] NO    Medications:  Standing  ALBUTerol/ipratropium for Nebulization 3 milliLiter(s) Nebulizer every 6 hours  ALBUTerol/ipratropium for Nebulization 3 milliLiter(s) Nebulizer once  chlorhexidine 4% Liquid 1 Application(s) Topical <User Schedule>  lactated ringers. 1000 milliLiter(s) IV Continuous <Continuous>  pantoprazole Infusion 8 mG/Hr IV Continuous <Continuous>  piperacillin/tazobactam IVPB. 3.375 Gram(s) IV Intermittent once  piperacillin/tazobactam IVPB.. 3.375 Gram(s) IV Intermittent every 8 hours  tiotropium 18 MICROgram(s) Capsule 1 Capsule(s) Inhalation daily  vancomycin  IVPB 1000 milliGRAM(s) IV Intermittent every 12 hours    PRN Meds  benzocaine 20% Spray 1 Spray(s) Topical every 4 hours PRN      Case discussed with resident    Care discussed with pt/family NEIL PHOENIX  69y Male    CHIEF COMPLAINT:    Patient is a 69y old  Male who presents with a chief complaint of Gastric Outlet Obstruction (04 Oct 2019 09:03)      INTERVAL HPI/OVERNIGHT EVENTS:    Patient seen and examined. C/O cough and sob. Complains that he gets cp when he coughs. No fever. On NGT suction    ROS: All other systems are negative.    Vital Signs:    T(F): 96.5 (10-04-19 @ 05:14), Max: 99.2 (10-03-19 @ 21:47)  HR: 95 (10-04-19 @ 05:14) (95 - 105)  BP: 119/64 (10-04-19 @ 05:14) (109/66 - 135/86)  RR: 18 (10-04-19 @ 05:14) (18 - 18)  SpO2: 97% (10-03-19 @ 15:54) (97% - 97%)  I&O's Summary    03 Oct 2019 07:01  -  04 Oct 2019 07:00  --------------------------------------------------------  IN: 900 mL / OUT: 200 mL / NET: 700 mL      Daily Height in cm: 177.8 (03 Oct 2019 21:47)    Daily   CAPILLARY BLOOD GLUCOSE          PHYSICAL EXAM:    GENERAL:  NAD  SKIN: No rashes or lesions  HENT: Atraumatic Normocephalic. PERRL. Moist membranes.  NECK: Supple, No JVD. No lymphadenopathy.  PULMONARY: +ve crackles in the Rt. Lower chest post  CVS: Normal S1, S2. Rate and Rhythm are regular. No murmurs.  ABDOMEN/GI: Soft, Nontender, Nondistended; BS present  EXTREMITIES: Peripheral pulses intact. No edema B/L LE.  NEUROLOGIC:  No motor or sensory deficit.  PSYCH: Alert & oriented x 3    Consultant(s) Notes Reviewed:  [x ] YES  [ ] NO  Care Discussed with Consultants/Other Providers [ x] YES  [ ] NO    EKG reviewed  Telemetry reviewed    LABS:                        9.8    20.19 )-----------( 381      ( 04 Oct 2019 05:30 )             31.0   Hemoglobin: 9.8 g/dL (10-04 @ 05:30)  Hemoglobin: 10.1 g/dL (10-03 @ 04:00)  Hemoglobin: 9.7 g/dL (10-02 @ 20:57)  Hemoglobin: 11.1 g/dL (10-02 @ 10:00)    10-04    141  |  101  |  19  ----------------------------<  79  4.0   |  27  |  0.6<L>    Ca    8.4<L>      04 Oct 2019 05:30  Mg     1.7     10-04    TPro  5.5<L>  /  Alb  2.9<L>  /  TBili  0.5  /  DBili  x   /  AST  57<H>  /  ALT  38  /  AlkPhos  60  10-04        Trop 0.26, CKMB --, CK --, 10-03-19 @ 16:15  Trop 0.20, CKMB --, CK --, 10-02-19 @ 20:57  Trop 0.20, CKMB --, CK --, 10-02-19 @ 10:00        RADIOLOGY & ADDITIONAL TESTS:    < from: CT Abdomen and Pelvis w/ IV Cont (10.02.19 @ 15:30) >    IMPRESSION:     Findings suspicious for gastric outlet obstruction. No underlying lesion   seen by CT.    Consider upper endoscopy for further evaluation.      < end of copied text >    Imaging or report Personally Reviewed:  [ ] YES  [ ] NO    Medications:  Standing  ALBUTerol/ipratropium for Nebulization 3 milliLiter(s) Nebulizer every 6 hours  ALBUTerol/ipratropium for Nebulization 3 milliLiter(s) Nebulizer once  chlorhexidine 4% Liquid 1 Application(s) Topical <User Schedule>  lactated ringers. 1000 milliLiter(s) IV Continuous <Continuous>  pantoprazole Infusion 8 mG/Hr IV Continuous <Continuous>  piperacillin/tazobactam IVPB. 3.375 Gram(s) IV Intermittent once  piperacillin/tazobactam IVPB.. 3.375 Gram(s) IV Intermittent every 8 hours  tiotropium 18 MICROgram(s) Capsule 1 Capsule(s) Inhalation daily  vancomycin  IVPB 1000 milliGRAM(s) IV Intermittent every 12 hours    PRN Meds  benzocaine 20% Spray 1 Spray(s) Topical every 4 hours PRN      Case discussed with resident    Care discussed with pt/family

## 2019-10-04 NOTE — CHART NOTE - NSCHARTNOTEFT_GEN_A_CORE
PACU ANESTHESIA ADMISSION NOTE      Procedure:   Post op diagnosis:      ____  Intubated  TV:______       Rate: ______      FiO2: ______    _x___  Patent Airway    _x___  Full return of protective reflexes    _x___  Full recovery from anesthesia / back to baseline status  96  Vitals:            T:                BP :   122/79             R:      20        Sat:     96          P:108      Mental Status:  _x___ Awake   _____ Alert   _____ Drowsy   _____ Sedated    Nausea/Vomiting:  _x___  NO       ______Yes,   See Post - Op Orders         Pain Scale (0-10):  __0___    Treatment: _x___ None    ____ See Post - Op/PCA Orders    Post - Operative Fluids:   __x__ Oral   ____ See Post - Op Orders    Plan: Discharge:   _x___Home       _____Floor     _____Critical Care    _____  Other:_________________    Comments:  No anesthesia issues or complications noted.  Discharge when criteria met.

## 2019-10-04 NOTE — CONSULT NOTE ADULT - SUBJECTIVE AND OBJECTIVE BOX
Patient is a 69y old  Male who presents with a chief complaint of Gastric Outlet Obstruction (04 Oct 2019 12:22)      INTERVAL HPI/OVERNIGHT EVENTS:        PAST MEDICAL & SURGICAL HISTORY:  Gout  COPD, mild  Polymyositis  Hypothyroid  S/P tonsillectomy      REVIEW OF SYSTEMS: Total of twelve systems have been reviewed with patient and found to be negative unless mentioned in HPI      SOCIAL HISTORY  Alcohol: Does not drink  Tobacco: Does not smoke  Illicit substance use: None      FAMILY HISTORY: Non contributory to the present illness        No Known Allergies        T(C): 37.3 (10-04-19 @ 20:15), Max: 37.3 (10-04-19 @ 20:15)  HR: 104 (10-04-19 @ 20:15) (95 - 106)  BP: 115/71 (10-04-19 @ 20:15) (108/68 - 136/73)  RR: 18 (10-04-19 @ 20:15) (18 - 20)  SpO2: 99% (10-04-19 @ 20:01) (95% - 99%)      10-03-19 @ 07:01  -  10-04-19 @ 07:00  --------------------------------------------------------  IN: 900 mL / OUT: 200 mL / NET: 700 mL    10-04-19 @ 07:01  -  10-04-19 @ 22:49  --------------------------------------------------------  IN: 984.8 mL / OUT: 250 mL / NET: 734.8 mL        PHYSICAL EXAM:  GENERAL: Not in distress   CHEST/LUNG:  Aire ntry bilaterally  HEART: s1 and s2 present  ABDOMEN:  Nontender and  Nondistended  EXTREMITIES: No pedal  edema  CNS: Awake and Alert      LABS:                        8.2    23.24 )-----------( 353      ( 04 Oct 2019 16:51 )             26.5     10-04    141  |  101  |  19  ----------------------------<  79  4.0   |  27  |  0.6<L>    Ca    8.4<L>      04 Oct 2019 05:30  Phos  2.8     10-04  Mg     1.7     10-04    TPro  5.5<L>  /  Alb  2.9<L>  /  TBili  0.5  /  DBili  x   /  AST  57<H>  /  ALT  38  /  AlkPhos  60  10-04        CAPILLARY BLOOD GLUCOSE  POCT Blood Glucose.: 102 mg/dL (04 Oct 2019 21:24)  POCT Blood Glucose.: 62 mg/dL (04 Oct 2019 16:52)        MEDICATIONS  (STANDING):  ALBUTerol/ipratropium for Nebulization 3 milliLiter(s) Nebulizer every 6 hours  chlorhexidine 4% Liquid 1 Application(s) Topical <User Schedule>  fat emulsion (Plant Based) 20% Infusion 1.72 Gm/kG/Day (31.282 mL/Hr) IV Continuous <Continuous>  heparin  Injectable 5000 Unit(s) SubCutaneous three times a day  lactated ringers. 1000 milliLiter(s) (100 mL/Hr) IV Continuous <Continuous>  pantoprazole  Injectable 40 milliGRAM(s) IV Push two times a day  Parenteral Nutrition - Adult 1 Each (70 mL/Hr) TPN Continuous <Continuous>  piperacillin/tazobactam IVPB.. 3.375 Gram(s) IV Intermittent every 8 hours  tiotropium 18 MICROgram(s) Capsule 1 Capsule(s) Inhalation daily  vancomycin  IVPB 1000 milliGRAM(s) IV Intermittent every 12 hours    MEDICATIONS  (PRN):  benzocaine 20% Spray 1 Spray(s) Topical every 4 hours PRN NGT irritation          RADIOLOGY & ADDITIONAL TESTS:    < from: Xray Chest 1 View- PORTABLE-Urgent (10.04.19 @ 13:30) >  Support devices: Endotracheal tube with interval distal advancement.   There is a side opening which is identified in the expected region of the   proximal body of the stomach    Cardiac/mediastinum/hilum: Stable    Lung parenchyma/Pleura:No significant interval changes, stable findings    Skeleton/soft tissues: Stable      < end of copied text > Patient is a 69y old  Male who presents with a chief complaint of Gastric Outlet Obstruction (04 Oct 2019 12:22)      REVIEW OF SYSTEMS: Total of twelve systems have been reviewed with patient and found to be negative unless mentioned in HPI      PAST MEDICAL & SURGICAL HISTORY:  Gout  COPD, mild  Polymyositis  Hypothyroid  S/P tonsillectomy            SOCIAL HISTORY  Alcohol: Does not drink  Tobacco: Does not smoke  Illicit substance use: None      FAMILY HISTORY: Non contributory to the present illness        ALLERGIES: No Known Allergies        Vital Signs Last 24 Hrs  T(C): 37.3 (04 Oct 2019 20:15), Max: 37.3 (04 Oct 2019 20:15)  T(F): 99.1 (04 Oct 2019 20:15), Max: 99.1 (04 Oct 2019 20:15)  HR: 104 (04 Oct 2019 20:15) (95 - 106)  BP: 115/71 (04 Oct 2019 20:15) (108/68 - 136/73)  BP(mean): --  RR: 18 (04 Oct 2019 20:15) (18 - 20)  SpO2: 99% (04 Oct 2019 20:01) (95% - 99%)      PHYSICAL EXAM:  GENERAL: Not in distress   CHEST/LUNG:  Aire ntry bilaterally  HEART: s1 and s2 present  ABDOMEN:  Nontender and  Nondistended  EXTREMITIES: No pedal  edema  CNS: Awake and Alert      LABS:                        8.2    23.24 )-----------( 353      ( 04 Oct 2019 16:51 )             26.5     10-04    141  |  101  |  19  ----------------------------<  79  4.0   |  27  |  0.6<L>    Ca    8.4<L>      04 Oct 2019 05:30  Phos  2.8     10-04  Mg     1.7     10-04    TPro  5.5<L>  /  Alb  2.9<L>  /  TBili  0.5  /  DBili  x   /  AST  57<H>  /  ALT  38  /  AlkPhos  60  10-04        CAPILLARY BLOOD GLUCOSE  POCT Blood Glucose.: 102 mg/dL (04 Oct 2019 21:24)  POCT Blood Glucose.: 62 mg/dL (04 Oct 2019 16:52)        MEDICATIONS  (STANDING):  ALBUTerol/ipratropium for Nebulization 3 milliLiter(s) Nebulizer every 6 hours  chlorhexidine 4% Liquid 1 Application(s) Topical <User Schedule>  fat emulsion (Plant Based) 20% Infusion 1.72 Gm/kG/Day (31.282 mL/Hr) IV Continuous <Continuous>  heparin  Injectable 5000 Unit(s) SubCutaneous three times a day  lactated ringers. 1000 milliLiter(s) (100 mL/Hr) IV Continuous <Continuous>  pantoprazole  Injectable 40 milliGRAM(s) IV Push two times a day  Parenteral Nutrition - Adult 1 Each (70 mL/Hr) TPN Continuous <Continuous>  piperacillin/tazobactam IVPB.. 3.375 Gram(s) IV Intermittent every 8 hours  tiotropium 18 MICROgram(s) Capsule 1 Capsule(s) Inhalation daily  vancomycin  IVPB 1000 milliGRAM(s) IV Intermittent every 12 hours    MEDICATIONS  (PRN):  benzocaine 20% Spray 1 Spray(s) Topical every 4 hours PRN NGT irritation          RADIOLOGY & ADDITIONAL TESTS:    < from: Xray Chest 1 View- PORTABLE-Urgent (10.04.19 @ 13:30) >  Support devices: Endotracheal tube with interval distal advancement.   There is a side opening which is identified in the expected region of the   proximal body of the stomach    Cardiac/mediastinum/hilum: Stable    Lung parenchyma/Pleura:No significant interval changes, stable findings    Skeleton/soft tissues: Stable      < end of copied text > Patient is a 69y old  Male with PMH of COPD (not on home oxygen), hypothyroidism, gout, RA, polymyositis (on prednisone and methotrexate) presenting with chest pain , nausea and coffee ground vomiting of 3 days duration, admitted with diagnosis of Gastric Outlet obstruction. He is on zosyn and Vancomycin for pneumonia and the ID consult requested to assist with further  evaluation and antibiotic management.       REVIEW OF SYSTEMS: Total of twelve systems have been reviewed with patient and found to be negative unless mentioned in HPI        PAST MEDICAL & SURGICAL HISTORY:  Gout  COPD, mild  Polymyositis  Hypothyroid  S/P tonsillectomy        SOCIAL HISTORY  Alcohol: Does not drink  Tobacco: Does not smoke  Illicit substance use: None      FAMILY HISTORY: Non contributory to the present illness        ALLERGIES: No Known Allergies        Vital Signs Last 24 Hrs  T(C): 37.3 (04 Oct 2019 20:15), Max: 37.3 (04 Oct 2019 20:15)  T(F): 99.1 (04 Oct 2019 20:15), Max: 99.1 (04 Oct 2019 20:15)  HR: 104 (04 Oct 2019 20:15) (95 - 106)  BP: 115/71 (04 Oct 2019 20:15) (108/68 - 136/73)  BP(mean): --  RR: 18 (04 Oct 2019 20:15) (18 - 20)  SpO2: 99% (04 Oct 2019 20:01) (95% - 99%)      PHYSICAL EXAM:  GENERAL: Not in distress   CHEST/LUNG:  Aire ntry bilaterally  HEART: s1 and s2 present  ABDOMEN:  Nontender and  Nondistended  EXTREMITIES: No pedal  edema  CNS: Awake and Alert      LABS:                        8.2    23.24 )-----------( 353      ( 04 Oct 2019 16:51 )             26.5     10-04    141  |  101  |  19  ----------------------------<  79  4.0   |  27  |  0.6<L>    Ca    8.4<L>      04 Oct 2019 05:30  Phos  2.8     10-04  Mg     1.7     10-04    TPro  5.5<L>  /  Alb  2.9<L>  /  TBili  0.5  /  DBili  x   /  AST  57<H>  /  ALT  38  /  AlkPhos  60  10-04        CAPILLARY BLOOD GLUCOSE  POCT Blood Glucose.: 102 mg/dL (04 Oct 2019 21:24)  POCT Blood Glucose.: 62 mg/dL (04 Oct 2019 16:52)        MEDICATIONS  (STANDING):  ALBUTerol/ipratropium for Nebulization 3 milliLiter(s) Nebulizer every 6 hours  chlorhexidine 4% Liquid 1 Application(s) Topical <User Schedule>  fat emulsion (Plant Based) 20% Infusion 1.72 Gm/kG/Day (31.282 mL/Hr) IV Continuous <Continuous>  heparin  Injectable 5000 Unit(s) SubCutaneous three times a day  lactated ringers. 1000 milliLiter(s) (100 mL/Hr) IV Continuous <Continuous>  pantoprazole  Injectable 40 milliGRAM(s) IV Push two times a day  Parenteral Nutrition - Adult 1 Each (70 mL/Hr) TPN Continuous <Continuous>  piperacillin/tazobactam IVPB.. 3.375 Gram(s) IV Intermittent every 8 hours  tiotropium 18 MICROgram(s) Capsule 1 Capsule(s) Inhalation daily  vancomycin  IVPB 1000 milliGRAM(s) IV Intermittent every 12 hours    MEDICATIONS  (PRN):  benzocaine 20% Spray 1 Spray(s) Topical every 4 hours PRN NGT irritation          RADIOLOGY & ADDITIONAL TESTS:    < from: Xray Chest 1 View- PORTABLE-Urgent (10.04.19 @ 13:30) >  Support devices: Endotracheal tube with interval distal advancement.   There is a side opening which is identified in the expected region of the   proximal body of the stomach    Cardiac/mediastinum/hilum: Stable    Lung parenchyma/Pleura:No significant interval changes, stable findings    Skeleton/soft tissues: Stable      < end of copied text >      < from: Xray Kidney Ureter Bladder (10.04.19 @ 20:45) >  No definite evidence for bowel obstruction.    < end of copied text >      < from: CT Chest w/ IV Cont (10.02.19 @ 12:26) >  Bibasilar nodular airspace opacities suspicious for   infectious/inflammatory etiologies.    Small left pleural effusion.    Right posterior pleural nodularity abutting the right lower lobe.    Loculated fluid seen in the posterior aspect of the right oblique   fissure.     Recommend a three-month follow-up chest CT.    < end of copied text >

## 2019-10-04 NOTE — PROGRESS NOTE ADULT - SUBJECTIVE AND OBJECTIVE BOX
NEIL PHOENIX 69y Male  MRN#: [id number]     SUBJECTIVE  Patient is a 69y old Male who presents with a chief complaint of Gastric Outlet Obstruction (03 Oct 2019 13:43)  Today is hospital day 2d, and this morning he is lying in bed without distress.   No acute overnight events. abdominal discomfort improved  no cehst pain  no SOB  no fevers chills nausea or vomiting    Complains of some pain in proximal leg muscle  Has decreased appetite and recent 30b weight loss   has small BMs. no melena no hematechezia  OBJECTIVE  PAST MEDICAL & SURGICAL HISTORY  Gout  COPD, mild  Polymyositis  Hypothyroid  S/P tonsillectomy    ALLERGIES:  No Known Allergies    MEDICATIONS:  STANDING MEDICATIONS  ALBUTerol/ipratropium for Nebulization 3 milliLiter(s) Nebulizer every 6 hours  chlorhexidine 4% Liquid 1 Application(s) Topical <User Schedule>  lactated ringers. 1000 milliLiter(s) IV Continuous <Continuous>  pantoprazole Infusion 8 mG/Hr IV Continuous <Continuous>    PRN MEDICATIONS    HOME MEDICATIONS  Home Medications:  allopurinol 100 mg oral tablet: 1 tab(s) orally once a day (02 Oct 2019 18:28)  DuoNeb 0.5 mg-2.5 mg/3 mL inhalation solution: 3 milliliter(s) inhaled 3 times a day, As Needed (02 Oct 2019 18:28)  ezetimibe 10 mg oral tablet: 1 tab(s) orally once a day (02 Oct 2019 18:28)  fenofibrate 145 mg oral tablet: 1 tab(s) orally once a day (02 Oct 2019 18:28)  levothyroxine 25 mcg (0.025 mg) oral tablet: 1 tab(s) orally once a day (02 Oct 2019 18:28)  pantoprazole 40 mg oral delayed release tablet: 1 tab(s) orally once a day (02 Oct 2019 18:28)      VITAL SIGNS: Last 24 Hours  T(C): 35.8 (04 Oct 2019 05:14), Max: 37.3 (03 Oct 2019 21:47)  T(F): 96.5 (04 Oct 2019 05:14), Max: 99.2 (03 Oct 2019 21:47)  HR: 95 (04 Oct 2019 05:14) (95 - 105)  BP: 119/64 (04 Oct 2019 05:14) (109/66 - 135/86)  BP(mean): --  RR: 18 (04 Oct 2019 05:14) (18 - 18)  SpO2: 97% (03 Oct 2019 15:54) (97% - 97%)    10-03-19 @ 07:01  -  10-04-19 @ 07:00  --------------------------------------------------------  IN: 900 mL / OUT: 200 mL / NET: 700 mL        LABS:                        9.8    20.19 )-----------( 381      ( 04 Oct 2019 05:30 )             31.0     10-04    141  |  101  |  19  ----------------------------<  79  4.0   |  27  |  0.6<L>    Ca    8.4<L>      04 Oct 2019 05:30  Mg     1.7     10-04    TPro  5.5<L>  /  Alb  2.9<L>  /  TBili  0.5  /  DBili  x   /  AST  57<H>  /  ALT  38  /  AlkPhos  60  10-04    LIVER FUNCTIONS - ( 04 Oct 2019 05:30 )  Alb: 2.9 g/dL / Pro: 5.5 g/dL / ALK PHOS: 60 U/L / ALT: 38 U/L / AST: 57 U/L / GGT: x           PT/INR - ( 02 Oct 2019 10:00 )   PT: 14.40 sec;   INR: 1.25 ratio         PTT - ( 02 Oct 2019 10:00 )  PTT:28.2 sec      Troponin T, Serum: 0.26 ng/mL <HH> (10-03-19 @ 16:15)      CARDIAC MARKERS ( 03 Oct 2019 16:15 )  x     / 0.26 ng/mL / x     / x     / x      CARDIAC MARKERS ( 02 Oct 2019 20:57 )  x     / 0.20 ng/mL / x     / x     / x      CARDIAC MARKERS ( 02 Oct 2019 10:00 )  x     / 0.20 ng/mL / x     / x     / x          CAPILLARY BLOOD GLUCOSE          RADIOLOGY:    PHYSICAL EXAM:  PHYSICAL EXAM:  GENERAL: NAD, AAO x 4, 69y M,  HEAD:  Atraumatic, Normocephalic, NG tube , bitemproalwasting  CHEST/LUNG: Clear to auscultation bilaterally; No wheeze; No crackles; No accessory muscles used  HEART: Regular rate and rhythm;   ABDOMEN: Soft, Nontender, Nondistended; Bowel sounds present; No guarding  EXTREMITIES:  2+ Peripheral Pulses, No cyanosis or edema, tender thigh muscels tender to palpation   NEUROLOGY: non-focal    ADMISSION SUMMARY  Patient is a 69y old Male who presents with a chief complaint of Gastric Outlet Obstruction (03 Oct 2019 13:43) NEIL PHOENIX 69y Male  MRN#: [id number]     SUBJECTIVE  Patient is a 69y old Male who presents with a chief complaint of Gastric Outlet Obstruction (03 Oct 2019 13:43)  Today is hospital day 2d, and this morning he is lying in bed without distress.   No acute overnight events. abdominal discomfort improved  no cehst pain  no SOB  no fevers chills nausea or vomiting    Complains of some pain in proximal leg muscle  Has decreased appetite and recent 30b weight loss   has small BMs. no melena no hematechezia  OBJECTIVE  PAST MEDICAL & SURGICAL HISTORY  Gout  COPD, mild  Polymyositis  Hypothyroid  S/P tonsillectomy    ALLERGIES:  No Known Allergies    MEDICATIONS:  STANDING MEDICATIONS  ALBUTerol/ipratropium for Nebulization 3 milliLiter(s) Nebulizer every 6 hours  chlorhexidine 4% Liquid 1 Application(s) Topical <User Schedule>  lactated ringers. 1000 milliLiter(s) IV Continuous <Continuous>  pantoprazole Infusion 8 mG/Hr IV Continuous <Continuous>    PRN MEDICATIONS    HOME MEDICATIONS  Home Medications:  allopurinol 100 mg oral tablet: 1 tab(s) orally once a day (02 Oct 2019 18:28)  DuoNeb 0.5 mg-2.5 mg/3 mL inhalation solution: 3 milliliter(s) inhaled 3 times a day, As Needed (02 Oct 2019 18:28)  ezetimibe 10 mg oral tablet: 1 tab(s) orally once a day (02 Oct 2019 18:28)  fenofibrate 145 mg oral tablet: 1 tab(s) orally once a day (02 Oct 2019 18:28)  levothyroxine 25 mcg (0.025 mg) oral tablet: 1 tab(s) orally once a day (02 Oct 2019 18:28)  pantoprazole 40 mg oral delayed release tablet: 1 tab(s) orally once a day (02 Oct 2019 18:28)      VITAL SIGNS: Last 24 Hours  T(C): 35.8 (04 Oct 2019 05:14), Max: 37.3 (03 Oct 2019 21:47)  T(F): 96.5 (04 Oct 2019 05:14), Max: 99.2 (03 Oct 2019 21:47)  HR: 95 (04 Oct 2019 05:14) (95 - 105)  BP: 119/64 (04 Oct 2019 05:14) (109/66 - 135/86)  BP(mean): --  RR: 18 (04 Oct 2019 05:14) (18 - 18)  SpO2: 97% (03 Oct 2019 15:54) (97% - 97%)    10-03-19 @ 07:01  -  10-04-19 @ 07:00  --------------------------------------------------------  IN: 900 mL / OUT: 200 mL / NET: 700 mL        LABS:                        9.8    20.19 )-----------( 381      ( 04 Oct 2019 05:30 )             31.0     10-04    141  |  101  |  19  ----------------------------<  79  4.0   |  27  |  0.6<L>    Ca    8.4<L>      04 Oct 2019 05:30  Mg     1.7     10-04    TPro  5.5<L>  /  Alb  2.9<L>  /  TBili  0.5  /  DBili  x   /  AST  57<H>  /  ALT  38  /  AlkPhos  60  10-04    LIVER FUNCTIONS - ( 04 Oct 2019 05:30 )  Alb: 2.9 g/dL / Pro: 5.5 g/dL / ALK PHOS: 60 U/L / ALT: 38 U/L / AST: 57 U/L / GGT: x           PT/INR - ( 02 Oct 2019 10:00 )   PT: 14.40 sec;   INR: 1.25 ratio         PTT - ( 02 Oct 2019 10:00 )  PTT:28.2 sec      Troponin T, Serum: 0.26 ng/mL <HH> (10-03-19 @ 16:15)      CARDIAC MARKERS ( 03 Oct 2019 16:15 )  x     / 0.26 ng/mL / x     / x     / x      CARDIAC MARKERS ( 02 Oct 2019 20:57 )  x     / 0.20 ng/mL / x     / x     / x      CARDIAC MARKERS ( 02 Oct 2019 10:00 )  x     / 0.20 ng/mL / x     / x     / x          CAPILLARY BLOOD GLUCOSE          RADIOLOGY:    PHYSICAL EXAM:  PHYSICAL EXAM:  GENERAL: NAD, AAO x 4, 69y M,  HEAD:  Atraumatic, Normocephalic, NG tube , bitemproalwasting  CHEST/LUNG: coarse crackles in RLL  HEART: Regular rate and rhythm;   ABDOMEN: Soft, Nontender, Nondistended; Bowel sounds present; No guarding  EXTREMITIES:  2+ Peripheral Pulses, No cyanosis or edema, tender thigh muscels tender to palpation   NEUROLOGY: non-focal    ADMISSION SUMMARY  Patient is a 69y old Male who presents with a chief complaint of Gastric Outlet Obstruction (03 Oct 2019 13:43)

## 2019-10-05 LAB
ANION GAP SERPL CALC-SCNC: 9 MMOL/L — SIGNIFICANT CHANGE UP (ref 7–14)
BASOPHILS # BLD AUTO: 0.03 K/UL — SIGNIFICANT CHANGE UP (ref 0–0.2)
BASOPHILS NFR BLD AUTO: 0.1 % — SIGNIFICANT CHANGE UP (ref 0–1)
BLD GP AB SCN SERPL QL: SIGNIFICANT CHANGE UP
BUN SERPL-MCNC: 14 MG/DL — SIGNIFICANT CHANGE UP (ref 10–20)
CALCIUM SERPL-MCNC: 7.7 MG/DL — LOW (ref 8.5–10.1)
CHLORIDE SERPL-SCNC: 98 MMOL/L — SIGNIFICANT CHANGE UP (ref 98–110)
CHOLEST SERPL-MCNC: 88 MG/DL — LOW (ref 100–200)
CO2 SERPL-SCNC: 27 MMOL/L — SIGNIFICANT CHANGE UP (ref 17–32)
CREAT SERPL-MCNC: 0.7 MG/DL — SIGNIFICANT CHANGE UP (ref 0.7–1.5)
EOSINOPHIL # BLD AUTO: 0.21 K/UL — SIGNIFICANT CHANGE UP (ref 0–0.7)
EOSINOPHIL NFR BLD AUTO: 1 % — SIGNIFICANT CHANGE UP (ref 0–8)
GLUCOSE BLDC GLUCOMTR-MCNC: 77 MG/DL — SIGNIFICANT CHANGE UP (ref 70–99)
GLUCOSE SERPL-MCNC: 139 MG/DL — HIGH (ref 70–99)
HCT VFR BLD CALC: 28.3 % — LOW (ref 42–52)
HCT VFR BLD CALC: 29.5 % — LOW (ref 42–52)
HDLC SERPL-MCNC: 36 MG/DL — LOW
HGB BLD-MCNC: 9 G/DL — LOW (ref 14–18)
HGB BLD-MCNC: 9.4 G/DL — LOW (ref 14–18)
IMM GRANULOCYTES NFR BLD AUTO: 0.8 % — HIGH (ref 0.1–0.3)
LACTATE SERPL-SCNC: 1 MMOL/L — SIGNIFICANT CHANGE UP (ref 0.5–2.2)
LIPID PNL WITH DIRECT LDL SERPL: 38 MG/DL — SIGNIFICANT CHANGE UP (ref 4–129)
LYMPHOCYTES # BLD AUTO: 1.4 K/UL — SIGNIFICANT CHANGE UP (ref 1.2–3.4)
LYMPHOCYTES # BLD AUTO: 6.5 % — LOW (ref 20.5–51.1)
MAGNESIUM SERPL-MCNC: 2.1 MG/DL — SIGNIFICANT CHANGE UP (ref 1.8–2.4)
MCHC RBC-ENTMCNC: 28.5 PG — SIGNIFICANT CHANGE UP (ref 27–31)
MCHC RBC-ENTMCNC: 28.7 PG — SIGNIFICANT CHANGE UP (ref 27–31)
MCHC RBC-ENTMCNC: 31.8 G/DL — LOW (ref 32–37)
MCHC RBC-ENTMCNC: 31.9 G/DL — LOW (ref 32–37)
MCV RBC AUTO: 89.6 FL — SIGNIFICANT CHANGE UP (ref 80–94)
MCV RBC AUTO: 89.9 FL — SIGNIFICANT CHANGE UP (ref 80–94)
MONOCYTES # BLD AUTO: 1.17 K/UL — HIGH (ref 0.1–0.6)
MONOCYTES NFR BLD AUTO: 5.4 % — SIGNIFICANT CHANGE UP (ref 1.7–9.3)
NEUTROPHILS # BLD AUTO: 18.63 K/UL — HIGH (ref 1.4–6.5)
NEUTROPHILS NFR BLD AUTO: 86.2 % — HIGH (ref 42.2–75.2)
NRBC # BLD: 0 /100 WBCS — SIGNIFICANT CHANGE UP (ref 0–0)
NRBC # BLD: 0 /100 WBCS — SIGNIFICANT CHANGE UP (ref 0–0)
PHOSPHATE SERPL-MCNC: 2.3 MG/DL — SIGNIFICANT CHANGE UP (ref 2.1–4.9)
PLATELET # BLD AUTO: 384 K/UL — SIGNIFICANT CHANGE UP (ref 130–400)
PLATELET # BLD AUTO: 413 K/UL — HIGH (ref 130–400)
POTASSIUM SERPL-MCNC: 3.8 MMOL/L — SIGNIFICANT CHANGE UP (ref 3.5–5)
POTASSIUM SERPL-SCNC: 3.8 MMOL/L — SIGNIFICANT CHANGE UP (ref 3.5–5)
RBC # BLD: 3.16 M/UL — LOW (ref 4.7–6.1)
RBC # BLD: 3.28 M/UL — LOW (ref 4.7–6.1)
RBC # FLD: 16.2 % — HIGH (ref 11.5–14.5)
RBC # FLD: 16.3 % — HIGH (ref 11.5–14.5)
SODIUM SERPL-SCNC: 134 MMOL/L — LOW (ref 135–146)
TOTAL CHOLESTEROL/HDL RATIO MEASUREMENT: 2.4 RATIO — LOW (ref 4–5.5)
TRIGL SERPL-MCNC: 119 MG/DL — SIGNIFICANT CHANGE UP (ref 10–149)
TROPONIN T SERPL-MCNC: 0.22 NG/ML — CRITICAL HIGH
WBC # BLD: 21.61 K/UL — HIGH (ref 4.8–10.8)
WBC # BLD: 27.38 K/UL — HIGH (ref 4.8–10.8)
WBC # FLD AUTO: 21.61 K/UL — HIGH (ref 4.8–10.8)
WBC # FLD AUTO: 27.38 K/UL — HIGH (ref 4.8–10.8)

## 2019-10-05 PROCEDURE — 74177 CT ABD & PELVIS W/CONTRAST: CPT | Mod: 26

## 2019-10-05 PROCEDURE — 99233 SBSQ HOSP IP/OBS HIGH 50: CPT

## 2019-10-05 PROCEDURE — 71045 X-RAY EXAM CHEST 1 VIEW: CPT | Mod: 26

## 2019-10-05 RX ORDER — I.V. FAT EMULSION 20 G/100ML
1.72 EMULSION INTRAVENOUS
Qty: 100.1 | Refills: 0 | Status: DISCONTINUED | OUTPATIENT
Start: 2019-10-05 | End: 2019-10-05

## 2019-10-05 RX ORDER — SODIUM CHLORIDE 9 MG/ML
1000 INJECTION INTRAMUSCULAR; INTRAVENOUS; SUBCUTANEOUS
Refills: 0 | Status: DISCONTINUED | OUTPATIENT
Start: 2019-10-05 | End: 2019-10-05

## 2019-10-05 RX ORDER — IOHEXOL 300 MG/ML
30 INJECTION, SOLUTION INTRAVENOUS ONCE
Refills: 0 | Status: COMPLETED | OUTPATIENT
Start: 2019-10-05 | End: 2019-10-05

## 2019-10-05 RX ORDER — SODIUM CHLORIDE 9 MG/ML
500 INJECTION INTRAMUSCULAR; INTRAVENOUS; SUBCUTANEOUS ONCE
Refills: 0 | Status: COMPLETED | OUTPATIENT
Start: 2019-10-05 | End: 2019-10-05

## 2019-10-05 RX ORDER — ELECTROLYTE SOLUTION,INJ
1 VIAL (ML) INTRAVENOUS
Refills: 0 | Status: DISCONTINUED | OUTPATIENT
Start: 2019-10-05 | End: 2019-10-06

## 2019-10-05 RX ADMIN — HEPARIN SODIUM 5000 UNIT(S): 5000 INJECTION INTRAVENOUS; SUBCUTANEOUS at 13:55

## 2019-10-05 RX ADMIN — IOHEXOL 30 MILLILITER(S): 300 INJECTION, SOLUTION INTRAVENOUS at 17:30

## 2019-10-05 RX ADMIN — PIPERACILLIN AND TAZOBACTAM 25 GRAM(S): 4; .5 INJECTION, POWDER, LYOPHILIZED, FOR SOLUTION INTRAVENOUS at 13:54

## 2019-10-05 RX ADMIN — PANTOPRAZOLE SODIUM 40 MILLIGRAM(S): 20 TABLET, DELAYED RELEASE ORAL at 17:29

## 2019-10-05 RX ADMIN — SODIUM CHLORIDE 75 MILLILITER(S): 9 INJECTION INTRAMUSCULAR; INTRAVENOUS; SUBCUTANEOUS at 20:04

## 2019-10-05 RX ADMIN — Medication 3 MILLILITER(S): at 08:21

## 2019-10-05 RX ADMIN — Medication 3 MILLILITER(S): at 14:10

## 2019-10-05 RX ADMIN — SODIUM CHLORIDE 1000 MILLILITER(S): 9 INJECTION INTRAMUSCULAR; INTRAVENOUS; SUBCUTANEOUS at 13:53

## 2019-10-05 RX ADMIN — CHLORHEXIDINE GLUCONATE 1 APPLICATION(S): 213 SOLUTION TOPICAL at 05:06

## 2019-10-05 RX ADMIN — PIPERACILLIN AND TAZOBACTAM 25 GRAM(S): 4; .5 INJECTION, POWDER, LYOPHILIZED, FOR SOLUTION INTRAVENOUS at 22:11

## 2019-10-05 RX ADMIN — SODIUM CHLORIDE 75 MILLILITER(S): 9 INJECTION INTRAMUSCULAR; INTRAVENOUS; SUBCUTANEOUS at 13:53

## 2019-10-05 RX ADMIN — Medication 250 MILLIGRAM(S): at 17:28

## 2019-10-05 RX ADMIN — HEPARIN SODIUM 5000 UNIT(S): 5000 INJECTION INTRAVENOUS; SUBCUTANEOUS at 05:06

## 2019-10-05 RX ADMIN — I.V. FAT EMULSION 31.28 GM/KG/DAY: 20 EMULSION INTRAVENOUS at 20:03

## 2019-10-05 RX ADMIN — PIPERACILLIN AND TAZOBACTAM 25 GRAM(S): 4; .5 INJECTION, POWDER, LYOPHILIZED, FOR SOLUTION INTRAVENOUS at 06:38

## 2019-10-05 RX ADMIN — SODIUM CHLORIDE 100 MILLILITER(S): 9 INJECTION, SOLUTION INTRAVENOUS at 06:29

## 2019-10-05 RX ADMIN — Medication 250 MILLIGRAM(S): at 05:05

## 2019-10-05 RX ADMIN — PANTOPRAZOLE SODIUM 40 MILLIGRAM(S): 20 TABLET, DELAYED RELEASE ORAL at 05:06

## 2019-10-05 RX ADMIN — Medication 1 EACH: at 20:04

## 2019-10-05 RX ADMIN — HEPARIN SODIUM 5000 UNIT(S): 5000 INJECTION INTRAVENOUS; SUBCUTANEOUS at 22:11

## 2019-10-05 NOTE — PROGRESS NOTE ADULT - SUBJECTIVE AND OBJECTIVE BOX
NEIL PHOENIX  69y Male    CHIEF COMPLAINT:    Patient is a 69y old  Male who presents with a chief complaint of Gastric Outlet Obstruction (05 Oct 2019 07:54)      INTERVAL HPI/OVERNIGHT EVENTS:    Patient seen and examined. Complains that he did not move his bowel and not passing any gas either. No abdominal pain. No N/V. No fever.    ROS: All other systems are negative.    Vital Signs:    T(F): 96.5 (10-05-19 @ 05:30), Max: 99.1 (10-04-19 @ 20:15)  HR: 94 (10-05-19 @ 05:30) (94 - 106)  BP: 118/65 (10-05-19 @ 05:30) (108/68 - 136/73)  RR: 18 (10-05-19 @ 05:30) (18 - 20)  SpO2: 99% (10-04-19 @ 20:01) (95% - 99%)  I&O's Summary    04 Oct 2019 07:01  -  05 Oct 2019 07:00  --------------------------------------------------------  IN: 2242 mL / OUT: 750 mL / NET: 1492 mL    05 Oct 2019 07:01  -  05 Oct 2019 12:01  --------------------------------------------------------  IN: 0 mL / OUT: 380 mL / NET: -380 mL      Daily Height in cm: 177.8 (04 Oct 2019 13:54)    Daily   CAPILLARY BLOOD GLUCOSE      POCT Blood Glucose.: 102 mg/dL (04 Oct 2019 21:24)  POCT Blood Glucose.: 62 mg/dL (04 Oct 2019 16:52)      PHYSICAL EXAM:    GENERAL:  NAD  SKIN: No rashes or lesions  HENT: Atraumatic Normocephalic. PERRL. Moist membranes.  NECK: Supple, No JVD. No lymphadenopathy.  PULMONARY: +ve crackles in the lower lungs B/L  CVS: Normal S1, S2. Rate and Rhythm are regular. No murmurs.  ABDOMEN/GI: Soft, Nontender, distended; BS present  EXTREMITIES: Peripheral pulses intact. No edema B/L LE.  NEUROLOGIC:  No motor or sensory deficit.  PSYCH: Alert & oriented x 3    Consultant(s) Notes Reviewed:  [x ] YES  [ ] NO  Care Discussed with Consultants/Other Providers [ x] YES  [ ] NO    EKG reviewed  Telemetry reviewed    LABS:                        9.0    21.61 )-----------( 384      ( 05 Oct 2019 06:23 )             28.3     10-05    134<L>  |  98  |  14  ----------------------------<  139<H>  3.8   |  27  |  0.7    Ca    7.7<L>      05 Oct 2019 06:23  Phos  2.3     10-05  Mg     2.1     10-05    TPro  5.5<L>  /  Alb  2.9<L>  /  TBili  0.5  /  DBili  x   /  AST  57<H>  /  ALT  38  /  AlkPhos  60  10-04        Trop 0.26, CKMB --, CK --, 10-03-19 @ 16:15  Trop 0.20, CKMB --, CK --, 10-02-19 @ 20:57        RADIOLOGY & ADDITIONAL TESTS:    < from: EGD (10.04.19 @ 11:30) >    Impressions:    Grade D esophagitis compatible with nonspecific erosive esophagitis.    Hiatal Hernia.    Erythema in the stomach compatible with non-erosive gastritis. (Biopsy).    Normal mucosa in the whole examined duodenum.    No evidence of gastric outlet obstruction, gastric mucosa have evidence of NG  tube induced erosions.     < end of copied text >    Imaging or report Personally Reviewed:  [ ] YES  [ ] NO    Medications:  Standing  ALBUTerol/ipratropium for Nebulization 3 milliLiter(s) Nebulizer every 6 hours  chlorhexidine 4% Liquid 1 Application(s) Topical <User Schedule>  fat emulsion (Plant Based) 20% Infusion 1.72 Gm/kG/Day IV Continuous <Continuous>  fat emulsion (Plant Based) 20% Infusion 1.72 Gm/kG/Day IV Continuous <Continuous>  heparin  Injectable 5000 Unit(s) SubCutaneous three times a day  iohexol 300 mG (iodine)/mL Oral Solution 30 milliLiter(s) Oral once  pantoprazole  Injectable 40 milliGRAM(s) IV Push two times a day  Parenteral Nutrition - Adult 1 Each TPN Continuous <Continuous>  Parenteral Nutrition - Adult 1 Each TPN Continuous <Continuous>  piperacillin/tazobactam IVPB.. 3.375 Gram(s) IV Intermittent every 8 hours  sodium chloride 0.9% Bolus 500 milliLiter(s) IV Bolus once  sodium chloride 0.9%. 1000 milliLiter(s) IV Continuous <Continuous>  tiotropium 18 MICROgram(s) Capsule 1 Capsule(s) Inhalation daily  vancomycin  IVPB 1000 milliGRAM(s) IV Intermittent every 12 hours    PRN Meds  benzocaine 20% Spray 1 Spray(s) Topical every 4 hours PRN      Case discussed with resident    Care discussed with pt/family

## 2019-10-05 NOTE — PROGRESS NOTE ADULT - SUBJECTIVE AND OBJECTIVE BOX
GENERAL SURGERY PROGRESS NOTE     NEIL PHOENIX  69y  Male  Hospital day :2d  POD:  Procedure:     OVERNIGHT EVENTS: EGD completed yesterday, pending results     T(F): 96.5 (10-04-19 @ 05:14), Max: 99.2 (10-03-19 @ 21:47)  HR: 95 (10-04-19 @ 05:14) (95 - 105)  BP: 119/64 (10-04-19 @ 05:14) (109/66 - 135/86)  RR: 18 (10-04-19 @ 05:14) (18 - 18)  SpO2: 97% (10-03-19 @ 15:54) (97% - 97%)    DIET/FLUIDS: lactated ringers. 1000 milliLiter(s) IV Continuous <Continuous     GI proph:  pantoprazole Infusion 8 mG/Hr IV Continuous <Continuous>    ABx: piperacillin/tazobactam IVPB. 3.375 Gram(s) IV Intermittent once  piperacillin/tazobactam IVPB.. 3.375 Gram(s) IV Intermittent every 8 hours  vancomycin  IVPB 1000 milliGRAM(s) IV Intermittent every 12 hours    PHYSICAL EXAM:  GENERAL: NAD, well-appearing, NGT to continuous low suction  CHEST/LUNG: wheezes b/l at lung bases, NC in place  HEART: Regular rate and rhythm  ABDOMEN: Soft, Nontender, Nondistended;   EXTREMITIES:  No clubbing, cyanosis, or edema    LABS  Labs:  CAPILLARY BLOOD GLUCOSE                     9.8    20.19 )-----------( 381      ( 04 Oct 2019 05:30 )             31.0       Auto Neutrophil %: 86.9 % (10-04-19 @ 05:30)  Auto Immature Granulocyte %: 0.7 % (10-04-19 @ 05:30)    10-04    141  |  101  |  19  ----------------------------<  79  4.0   |  27  |  0.6<L>    Calcium, Total Serum: 8.4 mg/dL (10-04-19 @ 05:30)    LFTs:             5.5  | 0.5  | 57       ------------------[60      ( 04 Oct 2019 05:30 )  2.9  | x    | 38          Lactate, Blood: 1.5 mmol/L (10-02-19 @ 10:00)    Coags:    CARDIAC MARKERS ( 03 Oct 2019 16:15 )  x     / 0.26 ng/mL / x     / x     / x      CARDIAC MARKERS ( 02 Oct 2019 20:57 )  x     / 0.20 ng/mL / x     / x     / x        RADIOLOGY & ADDITIONAL TESTS:

## 2019-10-05 NOTE — PROGRESS NOTE ADULT - ASSESSMENT
69M with RA on prednisone, gout, COPD, and hypothyroidism who presents with coffee ground emesis found to have duodenal obstruction.     Plan:  ·	No indication for acute surgical intervention  ·	FU EGD results  ·	NPO, IVF, NGT to low continuous suction

## 2019-10-05 NOTE — PROGRESS NOTE ADULT - ASSESSMENT
68 yo male with PMH of COPD (not on home oxygen), hypothyroidism, gout, RA, polymyositis (on prednisone and methotrexate) presented to the ED after calling 911 for CP and SOB assocated with multiple episodes of coffee ground emesis.    1.	Possible SBO vs Ileus  2.	No gastric outlet obstruction.   3.	Aspiration PNA  4.	Polymyositis  5.	COPD  6.	RA / Gout  7.	CP likely musculoskeleta  8.	Hypothyroidism         PLAN:    ·	NPO  ·	NGT on suction  ·	Care D/W the surgical resident. Will do CT abdomen/pelvis with po and iv contrast  ·	Elevated troponin likely due to polymyositis  ·	S/P EGD. Showed esophagitis and gastritis. No gastric outlet obstruction  ·	Cont IV Protonix as per GI  ·	On PPN  ·	Follow CBC & BMP  ·	ID eval noted. Cont him on IV Zosyn and Vanco  ·	Check MRSA PCR  ·	Hold prednisone and Methotrexate for now  ·	Cont nebs  ·	Will restart his PO meds when off suction

## 2019-10-06 LAB
ANION GAP SERPL CALC-SCNC: 11 MMOL/L — SIGNIFICANT CHANGE UP (ref 7–14)
BASOPHILS # BLD AUTO: 0.04 K/UL — SIGNIFICANT CHANGE UP (ref 0–0.2)
BASOPHILS NFR BLD AUTO: 0.2 % — SIGNIFICANT CHANGE UP (ref 0–1)
BUN SERPL-MCNC: 11 MG/DL — SIGNIFICANT CHANGE UP (ref 10–20)
CALCIUM SERPL-MCNC: 7.6 MG/DL — LOW (ref 8.5–10.1)
CHLORIDE SERPL-SCNC: 97 MMOL/L — LOW (ref 98–110)
CO2 SERPL-SCNC: 24 MMOL/L — SIGNIFICANT CHANGE UP (ref 17–32)
CREAT SERPL-MCNC: 0.6 MG/DL — LOW (ref 0.7–1.5)
EOSINOPHIL # BLD AUTO: 0.37 K/UL — SIGNIFICANT CHANGE UP (ref 0–0.7)
EOSINOPHIL NFR BLD AUTO: 2.2 % — SIGNIFICANT CHANGE UP (ref 0–8)
GLUCOSE SERPL-MCNC: 124 MG/DL — HIGH (ref 70–99)
HCT VFR BLD CALC: 28.5 % — LOW (ref 42–52)
HGB BLD-MCNC: 8.9 G/DL — LOW (ref 14–18)
IMM GRANULOCYTES NFR BLD AUTO: 0.6 % — HIGH (ref 0.1–0.3)
LYMPHOCYTES # BLD AUTO: 1.03 K/UL — LOW (ref 1.2–3.4)
LYMPHOCYTES # BLD AUTO: 6.1 % — LOW (ref 20.5–51.1)
MAGNESIUM SERPL-MCNC: 2 MG/DL — SIGNIFICANT CHANGE UP (ref 1.8–2.4)
MCHC RBC-ENTMCNC: 27.9 PG — SIGNIFICANT CHANGE UP (ref 27–31)
MCHC RBC-ENTMCNC: 31.2 G/DL — LOW (ref 32–37)
MCV RBC AUTO: 89.3 FL — SIGNIFICANT CHANGE UP (ref 80–94)
MONOCYTES # BLD AUTO: 0.98 K/UL — HIGH (ref 0.1–0.6)
MONOCYTES NFR BLD AUTO: 5.8 % — SIGNIFICANT CHANGE UP (ref 1.7–9.3)
NEUTROPHILS # BLD AUTO: 14.49 K/UL — HIGH (ref 1.4–6.5)
NEUTROPHILS NFR BLD AUTO: 85.1 % — HIGH (ref 42.2–75.2)
NRBC # BLD: 0 /100 WBCS — SIGNIFICANT CHANGE UP (ref 0–0)
PHOSPHATE SERPL-MCNC: 2.3 MG/DL — SIGNIFICANT CHANGE UP (ref 2.1–4.9)
PLATELET # BLD AUTO: 373 K/UL — SIGNIFICANT CHANGE UP (ref 130–400)
POTASSIUM SERPL-MCNC: 3.8 MMOL/L — SIGNIFICANT CHANGE UP (ref 3.5–5)
POTASSIUM SERPL-SCNC: 3.8 MMOL/L — SIGNIFICANT CHANGE UP (ref 3.5–5)
RBC # BLD: 3.19 M/UL — LOW (ref 4.7–6.1)
RBC # FLD: 15.9 % — HIGH (ref 11.5–14.5)
SODIUM SERPL-SCNC: 132 MMOL/L — LOW (ref 135–146)
WBC # BLD: 17.02 K/UL — HIGH (ref 4.8–10.8)
WBC # FLD AUTO: 17.02 K/UL — HIGH (ref 4.8–10.8)

## 2019-10-06 PROCEDURE — 99233 SBSQ HOSP IP/OBS HIGH 50: CPT

## 2019-10-06 PROCEDURE — 71045 X-RAY EXAM CHEST 1 VIEW: CPT | Mod: 26

## 2019-10-06 PROCEDURE — 74018 RADEX ABDOMEN 1 VIEW: CPT | Mod: 26

## 2019-10-06 PROCEDURE — 99232 SBSQ HOSP IP/OBS MODERATE 35: CPT

## 2019-10-06 RX ORDER — ELECTROLYTE SOLUTION,INJ
1 VIAL (ML) INTRAVENOUS
Refills: 0 | Status: DISCONTINUED | OUTPATIENT
Start: 2019-10-06 | End: 2019-10-06

## 2019-10-06 RX ORDER — I.V. FAT EMULSION 20 G/100ML
1.7 EMULSION INTRAVENOUS
Qty: 100.3 | Refills: 0 | Status: DISCONTINUED | OUTPATIENT
Start: 2019-10-06 | End: 2019-10-06

## 2019-10-06 RX ADMIN — PIPERACILLIN AND TAZOBACTAM 25 GRAM(S): 4; .5 INJECTION, POWDER, LYOPHILIZED, FOR SOLUTION INTRAVENOUS at 13:26

## 2019-10-06 RX ADMIN — I.V. FAT EMULSION 31.34 GM/KG/DAY: 20 EMULSION INTRAVENOUS at 20:50

## 2019-10-06 RX ADMIN — Medication 3 MILLILITER(S): at 08:32

## 2019-10-06 RX ADMIN — PIPERACILLIN AND TAZOBACTAM 25 GRAM(S): 4; .5 INJECTION, POWDER, LYOPHILIZED, FOR SOLUTION INTRAVENOUS at 05:28

## 2019-10-06 RX ADMIN — Medication 1 EACH: at 20:49

## 2019-10-06 RX ADMIN — Medication 3 MILLILITER(S): at 13:33

## 2019-10-06 RX ADMIN — Medication 250 MILLIGRAM(S): at 05:15

## 2019-10-06 RX ADMIN — Medication 3 MILLILITER(S): at 20:24

## 2019-10-06 RX ADMIN — Medication 70 EACH: at 20:30

## 2019-10-06 RX ADMIN — HEPARIN SODIUM 5000 UNIT(S): 5000 INJECTION INTRAVENOUS; SUBCUTANEOUS at 05:14

## 2019-10-06 RX ADMIN — HEPARIN SODIUM 5000 UNIT(S): 5000 INJECTION INTRAVENOUS; SUBCUTANEOUS at 22:18

## 2019-10-06 RX ADMIN — PIPERACILLIN AND TAZOBACTAM 25 GRAM(S): 4; .5 INJECTION, POWDER, LYOPHILIZED, FOR SOLUTION INTRAVENOUS at 22:19

## 2019-10-06 RX ADMIN — PANTOPRAZOLE SODIUM 40 MILLIGRAM(S): 20 TABLET, DELAYED RELEASE ORAL at 18:18

## 2019-10-06 RX ADMIN — Medication 250 MILLIGRAM(S): at 18:18

## 2019-10-06 RX ADMIN — HEPARIN SODIUM 5000 UNIT(S): 5000 INJECTION INTRAVENOUS; SUBCUTANEOUS at 13:27

## 2019-10-06 RX ADMIN — PANTOPRAZOLE SODIUM 40 MILLIGRAM(S): 20 TABLET, DELAYED RELEASE ORAL at 05:14

## 2019-10-06 NOTE — PROGRESS NOTE ADULT - SUBJECTIVE AND OBJECTIVE BOX
GENERAL SURGERY PROGRESS NOTE     NEIL PHOENIX  69y  Male  Hospital day :4d  POD:  Procedure:     OVERNIGHT EVENTS:  EGD showed Grade D esophagitis, hiatal hernia, non-erosive gastritis, normal duodenal mucosa, and no gastric outlet obstruction. NGT replaced. CT w/ contrast through NGT performed.     T(F): 97.3 (10-05-19 @ 20:30), Max: 97.3 (10-05-19 @ 20:30)  HR: 99 (10-05-19 @ 20:30) (94 - 99)  BP: 118/65 (10-05-19 @ 20:30) (118/65 - 122/60)  ABP: --  ABP(mean): --  RR: 18 (10-05-19 @ 20:30) (17 - 18)  SpO2: 93% (10-05-19 @ 20:27) (93% - 98%)    DIET/FLUIDS: fat emulsion (Plant Based) 20% Infusion 1.72 Gm/kG/Day IV Continuous <Continuous>  Parenteral Nutrition - Adult 1 Each TPN Continuous <Continuous>     GI proph:  pantoprazole  Injectable 40 milliGRAM(s) IV Push two times a day  AC/ proph: heparin  Injectable 5000 Unit(s) SubCutaneous three times a day    ABx: piperacillin/tazobactam IVPB.. 3.375 Gram(s) IV Intermittent every 8 hours  vancomycin  IVPB 1000 milliGRAM(s) IV Intermittent every 12 hours    PHYSICAL EXAM:  GENERAL: NAD, NGT to continuous suction  CHEST/LUNG: Clear to auscultation bilaterally  HEART: Regular rate and rhythm  ABDOMEN: Soft, Nontender, distended  EXTREMITIES:  No clubbing, cyanosis, or edema    LABS  Labs:  CAPILLARY BLOOD GLUCOSE    POCT Blood Glucose.: 77 mg/dL (05 Oct 2019 21:41)               9.0    21.61 )-----------( 384      ( 05 Oct 2019 06:23 )             28.3       Auto Immature Granulocyte %: 0.8 % (10-05-19 @ 06:23)  Auto Neutrophil %: 86.2 % (10-05-19 @ 06:23)    10-05    134<L>  |  98  |  14  ----------------------------<  139<H>  3.8   |  27  |  0.7    Calcium, Total Serum: 7.7 mg/dL (10-05-19 @ 06:23)    LFTs:             5.5  | 0.5  | 57       ------------------[60      ( 04 Oct 2019 05:30 )  2.9  | x    | 38          Lactate, Blood: 1.0 mmol/L (10-05-19 @ 18:33)    CARDIAC MARKERS ( 05 Oct 2019 18:33 )  x     / 0.22 ng/mL / x     / x     / x        Culture - Blood (collected 04 Oct 2019 12:24)  Source: .Blood None  Preliminary Report (05 Oct 2019 23:01):    No growth to date.    RADIOLOGY & ADDITIONAL TESTS:  EGD Report Date: 10/4/2019 11:30 AM    Limitations/Complications: There were no apparent limitations or complications  Findings:   Esophagus Mucosa Grade D esophagitis was seen in the esophagus, compatible with  nonspecific erosive esophagitis.   Stomach Lumen A medium size hiatal hernia was seen. Retroflexion view in the  stomach confirmed the size and morphology of the hernia.   Mucosa Diffuse erythema of the mucosa was noted in the stomach. These findings  are compatible with non-erosive gastritis. Multiple cold forceps biopsies were  performed for histology.   Additional stomach findings No evidence of gastric outlet obstruction , gastric  mucosa have evidence of NG tube induced erosions.   Duodenum Mucosa Normal mucosa was noted in the whole examined duodenum.     Impressions:    Grade D esophagitis compatible with nonspecific erosive esophagitis.    Hiatal Hernia.    Erythema in the stomach compatible with non-erosive gastritis. (Biopsy).    Normal mucosa in the whole examined duodenum.    No evidence of gastric outlet obstruction, gastric mucosa have evidence of NG  tube induced erosions.     Plan: Clear Liquid Diet  NO need for NG tube  Protonix 40 mg IV bid  Await pathology results  Repeat EGD in 8 weeks for documentation of healing    < from: CT Abdomen and Pelvis w/ Oral Cont and w/ IV Cont (10.05.19 @ 19:30) >  PERITONEUM/MESENTERY/BOWEL: Interval placement of enteric tube   terminating within the stomach. Previously noted distention of the   stomach is no longer visualized. Oral contrast reaches the level of a   small bowel loop in the mid pelvis (series 5, image 299). No small bowel   loop distention or air-fluid levels suggestive of an obstructive pattern.   Stool and air noted within the colon. No free fluid or pneumoperitoneum.   Colonic diverticulosis without diverticulitis.    BONES/SOFT TISSUES: Degenerative change of the spine.    OTHER: Atherosclerotic disease.    IMPRESSION:  Since October 2, 2019:  1.  No evidence of obstruction.  2.  Enteric tube terminating within the stomach which is no longer   distended.    < end of copied text > GENERAL SURGERY PROGRESS NOTE     NEIL PHOENIX  69y  Male  Hospital day :4d    OVERNIGHT EVENTS:  EGD showed Grade D esophagitis, hiatal hernia, non-erosive gastritis, normal duodenal mucosa, and no gastric outlet obstruction. NGT replaced. CT w/ contrast through NGT performed.     T(F): 97.3 (10-05-19 @ 20:30), Max: 97.3 (10-05-19 @ 20:30)  HR: 99 (10-05-19 @ 20:30) (94 - 99)  BP: 118/65 (10-05-19 @ 20:30) (118/65 - 122/60)  ABP: --  ABP(mean): --  RR: 18 (10-05-19 @ 20:30) (17 - 18)  SpO2: 93% (10-05-19 @ 20:27) (93% - 98%)    DIET/FLUIDS: fat emulsion (Plant Based) 20% Infusion 1.72 Gm/kG/Day IV Continuous <Continuous>  Parenteral Nutrition - Adult 1 Each TPN Continuous <Continuous>     GI proph:  pantoprazole  Injectable 40 milliGRAM(s) IV Push two times a day  AC/ proph: heparin  Injectable 5000 Unit(s) SubCutaneous three times a day    ABx: piperacillin/tazobactam IVPB.. 3.375 Gram(s) IV Intermittent every 8 hours  vancomycin  IVPB 1000 milliGRAM(s) IV Intermittent every 12 hours    PHYSICAL EXAM:  GENERAL: NAD, NGT to continuous suction  CHEST/LUNG: Clear to auscultation bilaterally  HEART: Regular rate and rhythm  ABDOMEN: Soft, Nontender, distended  EXTREMITIES:  No clubbing, cyanosis, or edema    LABS  Labs:  CAPILLARY BLOOD GLUCOSE    POCT Blood Glucose.: 77 mg/dL (05 Oct 2019 21:41)               9.0    21.61 )-----------( 384      ( 05 Oct 2019 06:23 )             28.3       Auto Immature Granulocyte %: 0.8 % (10-05-19 @ 06:23)  Auto Neutrophil %: 86.2 % (10-05-19 @ 06:23)    10-05    134<L>  |  98  |  14  ----------------------------<  139<H>  3.8   |  27  |  0.7    Calcium, Total Serum: 7.7 mg/dL (10-05-19 @ 06:23)    LFTs:             5.5  | 0.5  | 57       ------------------[60      ( 04 Oct 2019 05:30 )  2.9  | x    | 38          Lactate, Blood: 1.0 mmol/L (10-05-19 @ 18:33)    CARDIAC MARKERS ( 05 Oct 2019 18:33 )  x     / 0.22 ng/mL / x     / x     / x        Culture - Blood (collected 04 Oct 2019 12:24)  Source: .Blood None  Preliminary Report (05 Oct 2019 23:01):    No growth to date.    RADIOLOGY & ADDITIONAL TESTS:  EGD Report Date: 10/4/2019 11:30 AM    Limitations/Complications: There were no apparent limitations or complications  Findings:   Esophagus Mucosa Grade D esophagitis was seen in the esophagus, compatible with  nonspecific erosive esophagitis.   Stomach Lumen A medium size hiatal hernia was seen. Retroflexion view in the  stomach confirmed the size and morphology of the hernia.   Mucosa Diffuse erythema of the mucosa was noted in the stomach. These findings  are compatible with non-erosive gastritis. Multiple cold forceps biopsies were  performed for histology.   Additional stomach findings No evidence of gastric outlet obstruction , gastric  mucosa have evidence of NG tube induced erosions.   Duodenum Mucosa Normal mucosa was noted in the whole examined duodenum.     Impressions:    Grade D esophagitis compatible with nonspecific erosive esophagitis.    Hiatal Hernia.    Erythema in the stomach compatible with non-erosive gastritis. (Biopsy).    Normal mucosa in the whole examined duodenum.    No evidence of gastric outlet obstruction, gastric mucosa have evidence of NG  tube induced erosions.     Plan: Clear Liquid Diet  NO need for NG tube  Protonix 40 mg IV bid  Await pathology results  Repeat EGD in 8 weeks for documentation of healing    < from: CT Abdomen and Pelvis w/ Oral Cont and w/ IV Cont (10.05.19 @ 19:30) >  PERITONEUM/MESENTERY/BOWEL: Interval placement of enteric tube   terminating within the stomach. Previously noted distention of the   stomach is no longer visualized. Oral contrast reaches the level of a   small bowel loop in the mid pelvis (series 5, image 299). No small bowel   loop distention or air-fluid levels suggestive of an obstructive pattern.   Stool and air noted within the colon. No free fluid or pneumoperitoneum.   Colonic diverticulosis without diverticulitis.    BONES/SOFT TISSUES: Degenerative change of the spine.    OTHER: Atherosclerotic disease.    IMPRESSION:  Since October 2, 2019:  1.  No evidence of obstruction.  2.  Enteric tube terminating within the stomach which is no longer   distended.    < end of copied text >

## 2019-10-06 NOTE — PROGRESS NOTE ADULT - ASSESSMENT
68 yo male with PMH of COPD (not on home oxygen), hypothyroidism, gout, RA, polymyositis (on prednisone and methotrexate) presented to the ED after calling 911 for CP and SOB assocated with multiple episodes of coffee ground emesis.    #Shortness of breath possibly aspiration PNA, crackles in RLL and possible opacity on CXR  -Afebrile and left shift Leukocytyosis doubled from last week when last seen in hospital and was on steroids 1 week ago  -Vancomyin  -zosyn  -id recs appreciate  -f/u mrsa nares  -cxr shows bibasilar opacities     # Gastric Outlet Obstruction with Vomiting and Coffee-Ground Emesis  - NGT was placed and 2L of dark coffee ground emesis was suctioned out in ED.   - CT scan showed large distended stomach with abrupt transition to normal caliber distal stomach without obvious mass or obstruction lesion.  - Seen by surgery in the ED: no intervention on their part   - CBC stable keep type and screen active. two large bore peripheral IVs. transfuse if Hb <7  - EGD 10/4, cleared by cardio, showed erosive esphagitis No obstruction  -Start soft diet?    # Troponemia - likely secondary to demand ischemia. rule out ACS - stable trops   - elevated at 0.2 - Trending   - EKG showing sinus tachycardia, left axis deviation, and RBBB (on prior EKGs as well).   - 2D echo   - Cardiology indicated tropenemia secondary to polymyocytis    # Polymyositis - pt reports symptoms have been improving somewhat since discharge  - holding all PO meds for now  - outpatient rheum follow up.    # COPD - stable  - duonebs PRN    # Hypothyroidism   - f/u TSH  - holding all PO meds for now    dvt ppx: lovenox  diet: NPO with NG tube to suction  out of bed to chair  dispo: from home  FULL CODE

## 2019-10-06 NOTE — PROGRESS NOTE ADULT - ASSESSMENT
68 yo male with PMH of COPD (not on home oxygen), hypothyroidism, gout, RA, polymyositis (on prednisone and methotrexate) presented to the ED after calling 911 for CP and SOB assocated with multiple episodes of coffee ground emesis.    1.	Possible SBO vs Ileus  2.	No gastric outlet obstruction.   3.	Aspiration PNA  4.	Polymyositis  5.	COPD  6.	RA / Gout  7.	CP likely musculoskeleta  8.	Hypothyroidism         PLAN:    ·	NPO  ·	NGT on suction  ·	Care D/W the surgical resident. Will do CT abdomen/pelvis with po and iv contrast  ·	Elevated troponin likely due to polymyositis  ·	S/P EGD. Showed esophagitis and gastritis. No gastric outlet obstruction  ·	Cont IV Protonix as per GI  ·	On PPN  ·	Follow CBC & BMP  ·	ID eval noted. Cont him on IV Zosyn and Vanco  ·	Check MRSA PCR  ·	Hold prednisone and Methotrexate for now  ·	Cont nebs  ·	Will restart his PO meds when off suction 70 yo male with PMH of COPD (not on home oxygen), hypothyroidism, gout, RA, polymyositis (on prednisone and methotrexate) presented to the ED after calling 911 for CP and SOB assocated with multiple episodes of coffee ground emesis.    1.	Possible Ileus  2.	No gastric outlet obstruction.   3.	Aspiration PNA  4.	Polymyositis  5.	COPD  6.	RA / Gout  7.	CP likely musculoskeleta  8.	Hypothyroidism  9.	Hyponatremia         PLAN:    ·	Cont NPO  ·	Surgical F/U  ·	CT abd/pelvis reviewed. No obstruction  ·	Elevated troponin likely due to polymyositis  ·	S/P EGD. Showed esophagitis and gastritis. No gastric outlet obstruction  ·	Cont IV Protonix as per GI  ·	On PPN  ·	Follow CBC & BMP  ·	ID eval noted. Cont him on IV Zosyn and Vanco  ·	Check MRSA PCR  ·	Hold prednisone and Methotrexate for now  ·	Cont nebs  ·	Will restart his PO meds when off suction

## 2019-10-06 NOTE — CHART NOTE - NSCHARTNOTEFT_GEN_A_CORE
Received a call from RN ~ 1:00 re: NGT and that it was not the correct tube for continuous suction. RN stated NGT would have to be replaced for salem sum and that no sucction had been performed since NGT was placed Saturday day, because pt was gone for CT scan. Spoke to pt and he refused to have NGT replaced due to extreme pain/discomfort from earlier NGT placement. Pt stated he was in no pain and actually felt like he was going to have a loose bm. On physical exam he was not distended, abd was soft. Received a call from RN ~ 1:00 re: NGT. RN stated NGT would have to be replaced for toyin flaherty because it was not suitable to continuous suction and that no suction had been performed since NGT was placed Saturday day, because pt was gone for CT scan. Spoke to pt and he refused to have NGT replaced due to extreme pain/discomfort from earlier NGT placement. Pt stated he was in no pain and actually felt like he was going to have a loose bm. On physical exam abd was not distended, non tender, soft.    Plan:  - Keep current NGT  - Monitor bm  - Reassess in the am

## 2019-10-06 NOTE — PROGRESS NOTE ADULT - ASSESSMENT
69M with RA on prednisone, gout, COPD, and hypothyroidism who presents with coffee ground emesis thought to have duodenal obstruction on admission CT. EGD showed Grade D esophagitis compatible with nonspecific erosive esophagitis, hiatal hernia, non-erosive gastritis, normal duodenal mucosa, and no gastric outlet obstruction. CT with contrast through NGT showed no evidence of obstruction.      Plan:  ·	No indication for acute surgical intervention  ·	NPO, IVF, NGT to low continuous suction  ·	will discuss w/ attending

## 2019-10-06 NOTE — PROGRESS NOTE ADULT - ATTENDING COMMENTS
Patient seen and examined with surgery team on rounds and discussed management plans. ng tube in place. no drainage now abd soft benign  possible remove tube and start po liquids.

## 2019-10-06 NOTE — PROGRESS NOTE ADULT - SUBJECTIVE AND OBJECTIVE BOX
NEIL PHOENIX 69y Male  MRN#: [id number]     SUBJECTIVE  Patient is a 69y old Male who presents with a chief complaint of Gastric Outlet Obstruction (06 Oct 2019 10:11)  Today is hospital day 4d, and this morning he is lying in bed without distress.   No acute overnight events. feels well.  walked to bathroom unassisted  no chest pain  no shortness of breath  no abndominal pain  no constipation no diarhea  hasnt had a BM in a klong time    OBJECTIVE  PAST MEDICAL & SURGICAL HISTORY  Gout  COPD, mild  Polymyositis  Hypothyroid  S/P tonsillectomy    ALLERGIES:  No Known Allergies    MEDICATIONS:  STANDING MEDICATIONS  ALBUTerol/ipratropium for Nebulization 3 milliLiter(s) Nebulizer every 6 hours  chlorhexidine 4% Liquid 1 Application(s) Topical <User Schedule>  fat emulsion (Plant Based) 20% Infusion 1.72 Gm/kG/Day IV Continuous <Continuous>  fat emulsion (Plant Based) 20% Infusion 1.7 Gm/kG/Day IV Continuous <Continuous>  heparin  Injectable 5000 Unit(s) SubCutaneous three times a day  pantoprazole  Injectable 40 milliGRAM(s) IV Push two times a day  Parenteral Nutrition - Adult 1 Each TPN Continuous <Continuous>  Parenteral Nutrition - Adult 1 Each TPN Continuous <Continuous>  piperacillin/tazobactam IVPB.. 3.375 Gram(s) IV Intermittent every 8 hours  tiotropium 18 MICROgram(s) Capsule 1 Capsule(s) Inhalation daily  vancomycin  IVPB 1000 milliGRAM(s) IV Intermittent every 12 hours    PRN MEDICATIONS  benzocaine 20% Spray 1 Spray(s) Topical every 4 hours PRN    HOME MEDICATIONS  Home Medications:  allopurinol 100 mg oral tablet: 1 tab(s) orally once a day (02 Oct 2019 18:28)  DuoNeb 0.5 mg-2.5 mg/3 mL inhalation solution: 3 milliliter(s) inhaled 3 times a day, As Needed (02 Oct 2019 18:28)  ezetimibe 10 mg oral tablet: 1 tab(s) orally once a day (02 Oct 2019 18:28)  fenofibrate 145 mg oral tablet: 1 tab(s) orally once a day (02 Oct 2019 18:28)  levothyroxine 25 mcg (0.025 mg) oral tablet: 1 tab(s) orally once a day (02 Oct 2019 18:28)  pantoprazole 40 mg oral delayed release tablet: 1 tab(s) orally once a day (02 Oct 2019 18:28)      VITAL SIGNS: Last 24 Hours  T(C): 36.4 (06 Oct 2019 04:55), Max: 36.4 (06 Oct 2019 04:55)  T(F): 97.5 (06 Oct 2019 04:55), Max: 97.5 (06 Oct 2019 04:55)  HR: 73 (06 Oct 2019 04:55) (73 - 99)  BP: 119/59 (06 Oct 2019 04:55) (118/65 - 122/60)  BP(mean): --  RR: 18 (05 Oct 2019 20:30) (17 - 18)  SpO2: 93% (05 Oct 2019 20:27) (93% - 98%)    10-05-19 @ 07:01  -  10-06-19 @ 07:00  --------------------------------------------------------  IN: 662 mL / OUT: 1480 mL / NET: -818 mL        LABS:                        8.9    17.02 )-----------( 373      ( 06 Oct 2019 06:23 )             28.5     10-06    132<L>  |  97<L>  |  11  ----------------------------<  124<H>  3.8   |  24  |  0.6<L>    Ca    7.6<L>      06 Oct 2019 06:23  Phos  2.3     10-06  Mg     2.0     10-06              Lactate, Blood: 1.0 mmol/L (10-05-19 @ 18:33)  Troponin T, Serum: 0.22 ng/mL <HH> (10-05-19 @ 18:33)      Culture - Blood (collected 04 Oct 2019 12:24)  Source: .Blood None  Preliminary Report (05 Oct 2019 23:01):    No growth to date.      CARDIAC MARKERS ( 05 Oct 2019 18:33 )  x     / 0.22 ng/mL / x     / x     / x          CAPILLARY BLOOD GLUCOSE      POCT Blood Glucose.: 77 mg/dL (05 Oct 2019 21:41)      RADIOLOGY:    PHYSICAL EXAM:  PHYSICAL EXAM:  GENERAL: NAD, AAO x 4, 69y M  HEAD:  Atraumatic, Normocephalic  CHEST/LUNG: coarse sounds bilateral bases R>L  HEART: Regular rate and rhythm;    ABDOMEN: Soft, Nontender, Nondistended; diminished Bowel sounds ; No guarding  EXTREMITIES:  2+ Peripheral Pulses, No cyanosis or edema      ADMISSION SUMMARY  Patient is a 69y old Male who presents with a chief complaint of Gastric Outlet Obstruction (06 Oct 2019 10:11)

## 2019-10-06 NOTE — PROGRESS NOTE ADULT - SUBJECTIVE AND OBJECTIVE BOX
NEIL PHOENIX  69y Male    CHIEF COMPLAINT:    Patient is a 69y old  Male who presents with a chief complaint of Gastric Outlet Obstruction (06 Oct 2019 03:01)      INTERVAL HPI/OVERNIGHT EVENTS:    Patient seen and examined.    ROS: All other systems are negative.    Vital Signs:    T(F): 97.5 (10-06-19 @ 04:55), Max: 97.5 (10-06-19 @ 04:55)  HR: 73 (10-06-19 @ 04:55) (73 - 99)  BP: 119/59 (10-06-19 @ 04:55) (118/65 - 122/60)  RR: 18 (10-05-19 @ 20:30) (17 - 18)  SpO2: 93% (10-05-19 @ 20:27) (93% - 98%)  I&O's Summary    05 Oct 2019 07:01  -  06 Oct 2019 07:00  --------------------------------------------------------  IN: 662 mL / OUT: 1480 mL / NET: -818 mL      Daily     Daily   CAPILLARY BLOOD GLUCOSE      POCT Blood Glucose.: 77 mg/dL (05 Oct 2019 21:41)      PHYSICAL EXAM:    GENERAL:  NAD  SKIN: No rashes or lesions  HENT: Atrumatic. Normocephalic. PERRL. Moist membranes.  NECK: Supple, No JVD. No lymphadenopathy.  PULMONARY: CTA B/L. No wheezing. No rales  CVS: Normal S1, S2. Rate and Rythm are regular. No murmurs.  ABDOMEN/GI: Soft, Nontender, Nondistended; BS present  EXTREMITIES: Peripheral pulses intact. No edema B/L LE.  NEUROLOGIC:  No motor or sensory deficit.  PSYCH: Alert & oriented x 3    Consultant(s) Notes Reviewed:  [x ] YES  [ ] NO  Care Discussed with Consultants/Other Providers [ x] YES  [ ] NO    EKG reviewed  Telemetry reviewed    LABS:                        8.9    17.02 )-----------( 373      ( 06 Oct 2019 06:23 )             28.5     10-06    132<L>  |  97<L>  |  11  ----------------------------<  124<H>  3.8   |  24  |  0.6<L>    Ca    7.6<L>      06 Oct 2019 06:23  Phos  2.3     10-06  Mg     2.0     10-06          Trop 0.22, CKMB --, CK --, 10-05-19 @ 18:33  Trop 0.26, CKMB --, CK --, 10-03-19 @ 16:15      Culture - Blood (collected 04 Oct 2019 12:24)  Source: .Blood None  Preliminary Report (05 Oct 2019 23:01):    No growth to date.        RADIOLOGY & ADDITIONAL TESTS:      Imaging or report Personally Reviewed:  [ ] YES  [ ] NO    Medications:  Standing  ALBUTerol/ipratropium for Nebulization 3 milliLiter(s) Nebulizer every 6 hours  chlorhexidine 4% Liquid 1 Application(s) Topical <User Schedule>  fat emulsion (Plant Based) 20% Infusion 1.72 Gm/kG/Day IV Continuous <Continuous>  fat emulsion (Plant Based) 20% Infusion 1.7 Gm/kG/Day IV Continuous <Continuous>  heparin  Injectable 5000 Unit(s) SubCutaneous three times a day  pantoprazole  Injectable 40 milliGRAM(s) IV Push two times a day  Parenteral Nutrition - Adult 1 Each TPN Continuous <Continuous>  Parenteral Nutrition - Adult 1 Each TPN Continuous <Continuous>  piperacillin/tazobactam IVPB.. 3.375 Gram(s) IV Intermittent every 8 hours  tiotropium 18 MICROgram(s) Capsule 1 Capsule(s) Inhalation daily  vancomycin  IVPB 1000 milliGRAM(s) IV Intermittent every 12 hours    PRN Meds  benzocaine 20% Spray 1 Spray(s) Topical every 4 hours PRN      Case discussed with resident    Care discussed with pt/family NEIL PHOENIX  69y Male    CHIEF COMPLAINT:    Patient is a 69y old  Male who presents with a chief complaint of Gastric Outlet Obstruction (06 Oct 2019 03:01)      INTERVAL HPI/OVERNIGHT EVENTS:    Patient seen and examined. No BM yet. States that he passed gas. No abd pain. No N/V.    ROS: All other systems are negative.    Vital Signs:    T(F): 97.5 (10-06-19 @ 04:55), Max: 97.5 (10-06-19 @ 04:55)  HR: 73 (10-06-19 @ 04:55) (73 - 99)  BP: 119/59 (10-06-19 @ 04:55) (118/65 - 122/60)  RR: 18 (10-05-19 @ 20:30) (17 - 18)  SpO2: 93% (10-05-19 @ 20:27) (93% - 98%)  I&O's Summary    05 Oct 2019 07:01  -  06 Oct 2019 07:00  --------------------------------------------------------  IN: 662 mL / OUT: 1480 mL / NET: -818 mL      Daily     Daily   CAPILLARY BLOOD GLUCOSE      POCT Blood Glucose.: 77 mg/dL (05 Oct 2019 21:41)      PHYSICAL EXAM:    GENERAL:  NAD  SKIN: No rashes or lesions  HENT: Atraumatic Normocephalic. PERRL. Moist membranes.  NECK: Supple, No JVD. No lymphadenopathy.  PULMONARY: B/L rales in the lower chest post.   CVS: Normal S1, S2. Rate and Rhythm are regular. No murmurs.  ABDOMEN/GI: Soft, Nontender, Nondistended; BS absent  EXTREMITIES: Peripheral pulses intact. No edema B/L LE.  NEUROLOGIC:  No motor or sensory deficit.  PSYCH: Alert & oriented x 3    Consultant(s) Notes Reviewed:  [x ] YES  [ ] NO  Care Discussed with Consultants/Other Providers [ x] YES  [ ] NO    EKG reviewed  Telemetry reviewed    LABS:                        8.9    17.02 )-----------( 373      ( 06 Oct 2019 06:23 )             28.5     10-06    132<L>  |  97<L>  |  11  ----------------------------<  124<H>  3.8   |  24  |  0.6<L>    Ca    7.6<L>      06 Oct 2019 06:23  Phos  2.3     10-06  Mg     2.0     10-06          Trop 0.22, CKMB --, CK --, 10-05-19 @ 18:33  Trop 0.26, CKMB --, CK --, 10-03-19 @ 16:15      Culture - Blood (collected 04 Oct 2019 12:24)  Source: .Blood None  Preliminary Report (05 Oct 2019 23:01):    No growth to date.        RADIOLOGY & ADDITIONAL TESTS:      Imaging or report Personally Reviewed:  [ ] YES  [ ] NO    Medications:  Standing  ALBUTerol/ipratropium for Nebulization 3 milliLiter(s) Nebulizer every 6 hours  chlorhexidine 4% Liquid 1 Application(s) Topical <User Schedule>  fat emulsion (Plant Based) 20% Infusion 1.72 Gm/kG/Day IV Continuous <Continuous>  fat emulsion (Plant Based) 20% Infusion 1.7 Gm/kG/Day IV Continuous <Continuous>  heparin  Injectable 5000 Unit(s) SubCutaneous three times a day  pantoprazole  Injectable 40 milliGRAM(s) IV Push two times a day  Parenteral Nutrition - Adult 1 Each TPN Continuous <Continuous>  Parenteral Nutrition - Adult 1 Each TPN Continuous <Continuous>  piperacillin/tazobactam IVPB.. 3.375 Gram(s) IV Intermittent every 8 hours  tiotropium 18 MICROgram(s) Capsule 1 Capsule(s) Inhalation daily  vancomycin  IVPB 1000 milliGRAM(s) IV Intermittent every 12 hours    PRN Meds  benzocaine 20% Spray 1 Spray(s) Topical every 4 hours PRN      Case discussed with resident    Care discussed with pt/family

## 2019-10-07 ENCOUNTER — TRANSCRIPTION ENCOUNTER (OUTPATIENT)
Age: 69
End: 2019-10-07

## 2019-10-07 LAB
ANION GAP SERPL CALC-SCNC: 12 MMOL/L — SIGNIFICANT CHANGE UP (ref 7–14)
BASOPHILS # BLD AUTO: 0.05 K/UL — SIGNIFICANT CHANGE UP (ref 0–0.2)
BASOPHILS NFR BLD AUTO: 0.3 % — SIGNIFICANT CHANGE UP (ref 0–1)
BUN SERPL-MCNC: 10 MG/DL — SIGNIFICANT CHANGE UP (ref 10–20)
CALCIUM SERPL-MCNC: 8 MG/DL — LOW (ref 8.5–10.1)
CHLORIDE SERPL-SCNC: 101 MMOL/L — SIGNIFICANT CHANGE UP (ref 98–110)
CO2 SERPL-SCNC: 24 MMOL/L — SIGNIFICANT CHANGE UP (ref 17–32)
CREAT SERPL-MCNC: 0.6 MG/DL — LOW (ref 0.7–1.5)
EOSINOPHIL # BLD AUTO: 0.23 K/UL — SIGNIFICANT CHANGE UP (ref 0–0.7)
EOSINOPHIL NFR BLD AUTO: 1.5 % — SIGNIFICANT CHANGE UP (ref 0–8)
GLUCOSE SERPL-MCNC: 97 MG/DL — SIGNIFICANT CHANGE UP (ref 70–99)
HCT VFR BLD CALC: 29.9 % — LOW (ref 42–52)
HGB BLD-MCNC: 9.5 G/DL — LOW (ref 14–18)
IMM GRANULOCYTES NFR BLD AUTO: 0.5 % — HIGH (ref 0.1–0.3)
LYMPHOCYTES # BLD AUTO: 0.96 K/UL — LOW (ref 1.2–3.4)
LYMPHOCYTES # BLD AUTO: 6.2 % — LOW (ref 20.5–51.1)
MAGNESIUM SERPL-MCNC: 2.1 MG/DL — SIGNIFICANT CHANGE UP (ref 1.8–2.4)
MCHC RBC-ENTMCNC: 28.1 PG — SIGNIFICANT CHANGE UP (ref 27–31)
MCHC RBC-ENTMCNC: 31.8 G/DL — LOW (ref 32–37)
MCV RBC AUTO: 88.5 FL — SIGNIFICANT CHANGE UP (ref 80–94)
MONOCYTES # BLD AUTO: 0.92 K/UL — HIGH (ref 0.1–0.6)
MONOCYTES NFR BLD AUTO: 5.9 % — SIGNIFICANT CHANGE UP (ref 1.7–9.3)
MRSA PCR RESULT.: NEGATIVE — SIGNIFICANT CHANGE UP
NEUTROPHILS # BLD AUTO: 13.26 K/UL — HIGH (ref 1.4–6.5)
NEUTROPHILS NFR BLD AUTO: 85.6 % — HIGH (ref 42.2–75.2)
NRBC # BLD: 0 /100 WBCS — SIGNIFICANT CHANGE UP (ref 0–0)
PHOSPHATE SERPL-MCNC: 2.4 MG/DL — SIGNIFICANT CHANGE UP (ref 2.1–4.9)
PLATELET # BLD AUTO: 352 K/UL — SIGNIFICANT CHANGE UP (ref 130–400)
POTASSIUM SERPL-MCNC: 4.2 MMOL/L — SIGNIFICANT CHANGE UP (ref 3.5–5)
POTASSIUM SERPL-SCNC: 4.2 MMOL/L — SIGNIFICANT CHANGE UP (ref 3.5–5)
RBC # BLD: 3.38 M/UL — LOW (ref 4.7–6.1)
RBC # FLD: 15.9 % — HIGH (ref 11.5–14.5)
SODIUM SERPL-SCNC: 137 MMOL/L — SIGNIFICANT CHANGE UP (ref 135–146)
WBC # BLD: 15.5 K/UL — HIGH (ref 4.8–10.8)
WBC # FLD AUTO: 15.5 K/UL — HIGH (ref 4.8–10.8)

## 2019-10-07 PROCEDURE — 99222 1ST HOSP IP/OBS MODERATE 55: CPT

## 2019-10-07 PROCEDURE — 99232 SBSQ HOSP IP/OBS MODERATE 35: CPT

## 2019-10-07 PROCEDURE — 99233 SBSQ HOSP IP/OBS HIGH 50: CPT

## 2019-10-07 RX ORDER — I.V. FAT EMULSION 20 G/100ML
1.7 EMULSION INTRAVENOUS
Qty: 100 | Refills: 0 | Status: DISCONTINUED | OUTPATIENT
Start: 2019-10-07 | End: 2019-10-07

## 2019-10-07 RX ORDER — ELECTROLYTE SOLUTION,INJ
1 VIAL (ML) INTRAVENOUS
Refills: 0 | Status: DISCONTINUED | OUTPATIENT
Start: 2019-10-07 | End: 2019-10-07

## 2019-10-07 RX ADMIN — PIPERACILLIN AND TAZOBACTAM 25 GRAM(S): 4; .5 INJECTION, POWDER, LYOPHILIZED, FOR SOLUTION INTRAVENOUS at 13:26

## 2019-10-07 RX ADMIN — Medication 250 MILLIGRAM(S): at 06:24

## 2019-10-07 RX ADMIN — HEPARIN SODIUM 5000 UNIT(S): 5000 INJECTION INTRAVENOUS; SUBCUTANEOUS at 13:26

## 2019-10-07 RX ADMIN — Medication 1 EACH: at 22:43

## 2019-10-07 RX ADMIN — PIPERACILLIN AND TAZOBACTAM 25 GRAM(S): 4; .5 INJECTION, POWDER, LYOPHILIZED, FOR SOLUTION INTRAVENOUS at 06:24

## 2019-10-07 RX ADMIN — Medication 3 MILLILITER(S): at 07:20

## 2019-10-07 RX ADMIN — I.V. FAT EMULSION 31.25 GM/KG/DAY: 20 EMULSION INTRAVENOUS at 22:43

## 2019-10-07 RX ADMIN — PANTOPRAZOLE SODIUM 40 MILLIGRAM(S): 20 TABLET, DELAYED RELEASE ORAL at 06:25

## 2019-10-07 RX ADMIN — PANTOPRAZOLE SODIUM 40 MILLIGRAM(S): 20 TABLET, DELAYED RELEASE ORAL at 17:10

## 2019-10-07 RX ADMIN — PIPERACILLIN AND TAZOBACTAM 25 GRAM(S): 4; .5 INJECTION, POWDER, LYOPHILIZED, FOR SOLUTION INTRAVENOUS at 22:31

## 2019-10-07 RX ADMIN — HEPARIN SODIUM 5000 UNIT(S): 5000 INJECTION INTRAVENOUS; SUBCUTANEOUS at 22:31

## 2019-10-07 RX ADMIN — HEPARIN SODIUM 5000 UNIT(S): 5000 INJECTION INTRAVENOUS; SUBCUTANEOUS at 06:24

## 2019-10-07 NOTE — PROGRESS NOTE ADULT - ASSESSMENT
70 yo male with PMH of COPD (not on home oxygen), hypothyroidism, gout, RA, polymyositis (on prednisone and methotrexate) presented to the ED after calling 911 for CP and SOB assocated with multiple episodes of coffee ground emesis.    1.	Possible Ileus  2.	No gastric outlet obstruction.   3.	Aspiration PNA  4.	Polymyositis  5.	COPD  6.	RA / Gout  7.	CP likely musculoskeleta  8.	Hypothyroidism  9.	Hyponatremia  10.	Pharyngeal dysphagia         PLAN:    ·	Had BM and passing gas. Will start him on clear liquids and start weaning PPN  ·	SLP eval noted. Has pharyngeal dysphagia. Will follow their recommendations.  ·	ID F/U noted. D/C Vanco. Cont Zosyn. Will switch to PO Augmentin on D/C. Will complete one week course of Abx.   ·	CT abd/pelvis reviewed. No obstruction  ·	Elevated troponin likely due to polymyositis  ·	S/P EGD. Showed esophagitis and gastritis. No gastric outlet obstruction  ·	Switch to Protonix 40 mg po q 12h  ·	Restart his prednisone 5 mg po daily. Cont to hold Methotrexate for now  ·	Follow CBC & BMP in AM  ·	Check MRSA PCR  ·	D/C nebs

## 2019-10-07 NOTE — DISCHARGE NOTE NURSING/CASE MANAGEMENT/SOCIAL WORK - NSDCPEEMAIL_GEN_ALL_CORE
[facility] for Tobacco Control email [email or web address]

## 2019-10-07 NOTE — PROGRESS NOTE ADULT - ASSESSMENT
ASSESSMENT  69y old  Male with PMH of COPD (not on home oxygen), hypothyroidism, gout, RA, polymyositis (on prednisone and methotrexate) presenting with chest pain , nausea and coffee ground vomiting of 3 days duration, admitted with diagnosis of Gastric Outlet obstruction.    IMPRESSION  # B/L Pneumonia, CT Chest 10/2 bibasilar nodular opacities   # Leukocytosis , downtrending   # CTAP 10/5 no e/o obstruction   # QTC prolongation      would recommend:  - D/C Vanc , doubt MRSA infection  - On zosyn since 10/4  *** This note is not complete, all recommendations to follow. ASSESSMENT  69y old  Male with PMH of COPD (not on home oxygen), hypothyroidism, gout, RA, polymyositis (on prednisone and methotrexate) presenting with chest pain , nausea and coffee ground vomiting of 3 days duration, admitted with diagnosis of Gastric Outlet obstruction.    IMPRESSION  # B/L Pneumonia, CT Chest 10/2 bibasilar nodular opacities     not on chronic steroids, not at risk of OI  # Leukocytosis , downtrending   # CTAP 10/5 no e/o obstruction   # QTC prolongation    would recommend:  - D/C Vanc , doubt MRSA infection  - On zosyn since 10/4, continue while in-house, once d/c can complete 7 day course with PO augmentin 875mg BID

## 2019-10-07 NOTE — CHART NOTE - NSCHARTNOTEFT_GEN_A_CORE
s/p EGD on 10/4, showed esophagitis, non-erosive gastritis, hiatal hernia  EGD did not show gastric outlet obstruction. NGT was d/c'd  PPN started on 10/4 and pt remains NPO    T(F): 98 (10-07-19 @ 12:39), Max: 98 (10-07-19 @ 12:39)  HR: 108 (10-07-19 @ 12:39) (94 - 108)  BP: 112/73 (10-07-19 @ 12:39) (107/65 - 117/69)  RR: 17 (10-07-19 @ 12:39) (17 - 18)  SpO2: 97% (10-07-19 @ 11:49) (95% - 97%)    10-07    137  |  101  |  10  ----------------------------<  97  4.2   |  24  |  0.6<L>    Ca    8.0<L>      07 Oct 2019 05:58  Phos  2.4     10-07  Mg     2.1     10-07    Lipid Profile in AM (10.05.19 @ 06:23)    Triglycerides, Serum: 119 mg/dL                       9.5    15.50 )-----------( 352      ( 07 Oct 2019 05:58 )             29.9     < from: EGD (10.04.19 @ 11:30) >  EGD Report Date: 10/4/2019 11:30 AM   Patient Name: NEIL PHOENIX     History of Present Illness:   H&P was previously reviewed.     Administered Medications: As per Anesthesiology Record     Indications: gastric outlet obstruction  Procedure:   The procedure, indications, preparation and potential complications were  explained to the patient, who indicated understanding and signed the  corresponding consent forms. MAC was administered by anesthesiologist.  Continuous pulse oximetry and blood pressure monitoring were used throughout the  procedure. Supplemental oxygen was used. Patient was placed in the left lateral  decubitus position. The endoscope was introduced through the mouth and advanced  under direct visualization until the second part of the duodenum was reached.  Patient tolerance to the procedure was good. The procedure was not difficult.  Blood loss was minimal.    Limitations/Complications: There were no apparent limitations or complications  Findings:   Esophagus Mucosa Grade D esophagitis was seen in the esophagus, compatible with  nonspecific erosive esophagitis.   Stomach Lumen A medium size hiatal hernia was seen. Retroflexion view in the  stomach confirmed the size and morphology of the hernia.   Mucosa Diffuse erythema of the mucosa was noted in the stomach. These findings  are compatible with non-erosive gastritis. Multiple cold forceps biopsies were  performed for histology.   Additional stomach findings No evidence of gastric outlet obstruction , gastric  mucosa have evidence of NG tube induced erosions.   Duodenum Mucosa Normal mucosa was noted in the whole examined duodenum.     Impressions:    Grade D esophagitis compatible with nonspecific erosive esophagitis.    Hiatal Hernia.    Erythema in the stomach compatible with non-erosive gastritis. (Biopsy).    Normal mucosa in the whole examined duodenum.    No evidence of gastric outlet obstruction, gastric mucosa have evidence of NG  tube induced erosions.     Plan: Clear Liquid Diet  NO need for NG tube   Protonix 40 mg IV bid  Await pathology results    Repeat EGD in 8 weeks for documentation of healing.  < end of copied text >    ASSESSMENT  70 yo male with PMH of COPD (not on home oxygen), hypothyroidism, gout, RA, polymyositis (on prednisone and methotrexate) presented to the ED after calling 911 for CP and SOB assocated with multiple episodes of coffee ground emesis.    - Coffee ground Emesis  - esophagitis, non- erosive gastritis, hiatal hernia  - Aspiration PNA  - Polymyositis  - COPD  - RA / Gout  - CP likely musculoskeletal  - Hypothyroidism  - unintentional weight loss    PLAN:  - cont PPN tonight   - if pt to stay NPO over the next 5-7d need a central access fo TPN  - monitor bmp/phos and mg and correct lytes

## 2019-10-07 NOTE — PROGRESS NOTE ADULT - SUBJECTIVE AND OBJECTIVE BOX
NEIL PHOENIX  69y Male    CHIEF COMPLAINT:    Patient is a 69y old  Male who presents with a chief complaint of Gastric Outlet Obstruction (07 Oct 2019 10:47)      INTERVAL HPI/OVERNIGHT EVENTS:    Patient seen and examined. Had BM today. No N/V. No abdominal pain. No fever. On PPN    ROS: All other systems are negative.    Vital Signs:    T(F): 98 (10-07-19 @ 12:39), Max: 98 (10-07-19 @ 12:39)  HR: 108 (10-07-19 @ 12:39) (94 - 108)  BP: 112/73 (10-07-19 @ 12:39) (107/65 - 117/69)  RR: 17 (10-07-19 @ 12:39) (17 - 18)  SpO2: 97% (10-07-19 @ 11:49) (95% - 97%)  I&O's Summary    06 Oct 2019 07:01  -  07 Oct 2019 07:00  --------------------------------------------------------  IN: 0 mL / OUT: 900 mL / NET: -900 mL    07 Oct 2019 07:01  -  07 Oct 2019 16:22  --------------------------------------------------------  IN: 700 mL / OUT: 2300 mL / NET: -1600 mL      Daily     Daily   CAPILLARY BLOOD GLUCOSE          PHYSICAL EXAM:    GENERAL:  NAD  SKIN: No rashes or lesions  HENT: Atraumatic Normocephalic. PERRL. Moist membranes.  NECK: Supple, No JVD. No lymphadenopathy.  PULMONARY: CTA B/L. No wheezing. No rales  CVS: Normal S1, S2. Rate and Rhythm are regular. No murmurs.  ABDOMEN/GI: Soft, Nontender, Nondistended; BS present  EXTREMITIES: Peripheral pulses intact. No edema B/L LE.  NEUROLOGIC:  No motor or sensory deficit.  PSYCH: Alert & oriented x 3    Consultant(s) Notes Reviewed:  [x ] YES  [ ] NO  Care Discussed with Consultants/Other Providers [ x] YES  [ ] NO    EKG reviewed  Telemetry reviewed    LABS:                        9.5    15.50 )-----------( 352      ( 07 Oct 2019 05:58 )             29.9     10-07    137  |  101  |  10  ----------------------------<  97  4.2   |  24  |  0.6<L>    Ca    8.0<L>      07 Oct 2019 05:58  Phos  2.4     10-07  Mg     2.1     10-07          Trop 0.22, CKMB --, CK --, 10-05-19 @ 18:33        RADIOLOGY & ADDITIONAL TESTS:      Imaging or report Personally Reviewed:  [ ] YES  [ ] NO    Medications:  Standing  chlorhexidine 4% Liquid 1 Application(s) Topical <User Schedule>  fat emulsion (Plant Based) 20% Infusion 1.7 Gm/kG/Day IV Continuous <Continuous>  fat emulsion (Plant Based) 20% Infusion 1.695 Gm/kG/Day IV Continuous <Continuous>  heparin  Injectable 5000 Unit(s) SubCutaneous three times a day  pantoprazole  Injectable 40 milliGRAM(s) IV Push two times a day  Parenteral Nutrition - Adult 1 Each TPN Continuous <Continuous>  Parenteral Nutrition - Adult 1 Each TPN Continuous <Continuous>  piperacillin/tazobactam IVPB.. 3.375 Gram(s) IV Intermittent every 8 hours  tiotropium 18 MICROgram(s) Capsule 1 Capsule(s) Inhalation daily  vancomycin  IVPB 1000 milliGRAM(s) IV Intermittent every 12 hours    PRN Meds  benzocaine 20% Spray 1 Spray(s) Topical every 4 hours PRN      Case discussed with resident    Care discussed with pt/family

## 2019-10-07 NOTE — PROGRESS NOTE ADULT - SUBJECTIVE AND OBJECTIVE BOX
GENERAL SURGERY PROGRESS NOTE     NEIL PHOENIX  69y  Male  Hospital day :5d  POD:  Procedure:   OVERNIGHT EVENTS: No acute events. Feeding tube removed. Still no bowel function. Patient not nauseous or vomited.      T(F): 97.6 (10-06-19 @ 20:15), Max: 97.6 (10-06-19 @ 20:15)  HR: 94 (10-06-19 @ 20:15) (73 - 104)  BP: 117/69 (10-06-19 @ 20:15) (117/69 - 119/59)  ABP: --  ABP(mean): --  RR: 18 (10-06-19 @ 20:15) (18 - 18)  SpO2: 95% (10-06-19 @ 19:48) (95% - 95%)    DIET/FLUIDS: fat emulsion (Plant Based) 20% Infusion 1.7 Gm/kG/Day IV Continuous <Continuous>  Parenteral Nutrition - Adult 1 Each TPN Continuous <Continuous>       GI proph:  pantoprazole  Injectable 40 milliGRAM(s) IV Push two times a day    AC/ proph: heparin  Injectable 5000 Unit(s) SubCutaneous three times a day    ABx: piperacillin/tazobactam IVPB.. 3.375 Gram(s) IV Intermittent every 8 hours  vancomycin  IVPB 1000 milliGRAM(s) IV Intermittent every 12 hours      PHYSICAL EXAM:  GENERAL: NAD, well-appearing  CHEST/LUNG: Clear to auscultation bilaterally  HEART: Regular rate and rhythm  ABDOMEN: Soft, Nontender, distended; skin around lower abdomen feels thickened   EXTREMITIES:  No clubbing, cyanosis, or edema      LABS  Labs:  CAPILLARY BLOOD GLUCOSE                              8.9    17.02 )-----------( 373      ( 06 Oct 2019 06:23 )             28.5       Auto Neutrophil %: 85.1 % (10-06-19 @ 06:23)  Auto Immature Granulocyte %: 0.6 % (10-06-19 @ 06:23)    10-06    132<L>  |  97<L>  |  11  ----------------------------<  124<H>  3.8   |  24  |  0.6<L>      Calcium, Total Serum: 7.6 mg/dL (10-06-19 @ 06:23)      LFTs:     Lactate, Blood: 1.0 mmol/L (10-05-19 @ 18:33)      Coags:    CARDIAC MARKERS ( 05 Oct 2019 18:33 )  x     / 0.22 ng/mL / x     / x     / x          Culture - Blood (collected 04 Oct 2019 12:24)  Source: .Blood None  Preliminary Report (05 Oct 2019 23:01):    No growth to date.          RADIOLOGY & ADDITIONAL TESTS:  < from: Xray Kidney Ureter Bladder (10.06.19 @ 10:20) >    INTERPRETATION:  Clinical History / Reason for exam: Abdominal pain  Single image  Comparison: 419  Contrast is seen within the colon.  The bowel gas pattern is normal.  A feeding tube is seen in the expected position of the stomach.  No free air is seen however it should be noted that free air cannot be   excluded on this supine portable exam.  Impression  Contrast in colon. Normal bowel gas pattern    < end of copied text >

## 2019-10-07 NOTE — DISCHARGE NOTE NURSING/CASE MANAGEMENT/SOCIAL WORK - PATIENT PORTAL LINK FT
You can access the FollowMyHealth Patient Portal offered by [facility] by registering at the following website: [email or web address]. By joining [facility]’s FollowMyHealth portal, you will also be able to view your health information using other applications (apps) compatible with our system.

## 2019-10-07 NOTE — DISCHARGE NOTE NURSING/CASE MANAGEMENT/SOCIAL WORK - [email or web address]_CORE
[place] website --- [email or web address] Tobacco [email or web address] website --- [email or web address]

## 2019-10-07 NOTE — PROGRESS NOTE ADULT - SUBJECTIVE AND OBJECTIVE BOX
ALBANNEIL  69y, Male  Allergy: No Known Allergies      CHIEF COMPLAINT: Gastric Outlet Obstruction (07 Oct 2019 02:28)      INTERVAL EVENTS/HPI  - No acute events overnight  - T(F): , Max: 97.6 (10-06-19 @ 20:15)  - Denies any worsening symptoms  - Tolerating medication  - WBC Count: 15.50 (10-07-19 @ 05:58) <--WBC Count: 17.02 (10-06-19 @ 06:23)      ROS  General: Denies rigors, nightsweats  HEENT: Denies headache, rhinorrhea, sore throat, eye pain  CV: Denies CP, palpitations  PULM: Denies SOB, wheezing  GI: Denies hematemesis, hematochezia, melena  : Denies discharge, hematuria  MSK: Denies arthralgias, myalgias  SKIN: Denies rash, lesions  NEURO: Denies paresthesias, weakness  PSYCH: Denies depression, anxiety    VITALS:  T(F): 97.5, Max: 97.6 (10-06-19 @ 20:15)  HR: 96  BP: 107/65  RR: 18Vital Signs Last 24 Hrs  T(C): 36.4 (07 Oct 2019 04:58), Max: 36.4 (06 Oct 2019 20:15)  T(F): 97.5 (07 Oct 2019 04:58), Max: 97.6 (06 Oct 2019 20:15)  HR: 96 (07 Oct 2019 04:58) (94 - 104)  BP: 107/65 (07 Oct 2019 04:58) (107/65 - 118/59)  BP(mean): --  RR: 18 (07 Oct 2019 04:58) (18 - 18)  SpO2: 95% (06 Oct 2019 19:48) (95% - 95%)    PHYSICAL EXAM:  Gen: NAD, resting in bed  HEENT: Normocephalic, atraumatic  Neck: supple, no lymphadenopathy  CV: Regular rate & regular rhythm  Lungs: decreased BS at bases, no fremitus  Abdomen: Soft, BS present  Ext: Warm, well perfused  Neuro: non focal, awake  Skin: no rash, no erythema  Lines: no phlebitis    FH: Non-contributory  Social Hx: Non-contributory    TESTS & MEASUREMENTS:                        9.5    15.50 )-----------( 352      ( 07 Oct 2019 05:58 )             29.9     10-07    137  |  101  |  10  ----------------------------<  97  4.2   |  24  |  0.6<L>    Ca    8.0<L>      07 Oct 2019 05:58  Phos  2.4     10-07  Mg     2.1     10-07      eGFR if Non African American: 104 mL/min/1.73M2 (10-07-19 @ 05:58)  eGFR if African American: 121 mL/min/1.73M2 (10-07-19 @ 05:58)          Culture - Blood (collected 10-04-19 @ 12:24)  Source: .Blood None  Preliminary Report (10-05-19 @ 23:01):    No growth to date.        Lactate, Blood: 1.0 mmol/L (10-05-19 @ 18:33)      INFECTIOUS DISEASES TESTING      RADIOLOGY & ADDITIONAL TESTS:  I have personally reviewed the last Chest xray  CXR  Xray Chest 1 View- PORTABLE-Urgent:   EXAM:  XR CHEST PORTABLE URGENT 1V            PROCEDURE DATE:  10/06/2019            INTERPRETATION:  Clinical History / Reason for exam: Abdominal distention.    Comparison : Chest radiograph October 5, 2019.    Technique/Positioning: Single frontal view of the chest..    Findings:    Support devices: Gastric tube courses below the diaphragm.    Cardiac/mediastinum/hilum: Unremarkable    Lung parenchyma/Pleura: Bibasilar opacities are not significantly   changed. No evidence of pleural effusion or pneumothorax.    Skeleton/soft tissues: Unremarkable.    Impression:      Stable bibasilar opacities.                      BETTY JARAMILLO M.D., ATTENDING RADIOLOGIST  This document has been electronically signed. Oct  6 2019 11:32AM             (10-06-19 @ 10:19)  Xray Chest 1 View- PORTABLE-Urgent:   EXAM:  XR CHEST PORTABLE URGENT 1V            PROCEDURE DATE:  10/04/2019            INTERPRETATION:  Clinical History / Reason for exam: Shortness of breath    Comparison : Chest radiograph 10/3/2019.    Technique/Positioning: Frontal view.    Impression:    Support devices: Endotracheal tube with interval distal advancement.   There is a side opening which is identified in the expected region of the   proximal body of the stomach    Cardiac/mediastinum/hilum: Stable    Lung parenchyma/Pleura:No significant interval changes, stable findings    Skeleton/soft tissues: Stable                    NADEEN LAUREANOD., ATTENDING RADIOLOGIST  This document has been electronically signed. Oct  4 2019  3:01PM             (10-04-19 @ 13:30)      CT  CT Abdomen and Pelvis w/ Oral Cont and w/ IV Cont:   EXAM:  CT ABDOMEN AND PELVIS OC IC            PROCEDURE DATE:  10/05/2019            INTERPRETATION:  CLINICAL HISTORY/REASON FOR EXAM: Concern for   obstruction.    TECHNIQUE: Contiguous axial CT images were obtained from the lower chest   to the pubic symphysis following administration of Optiray 320   intravenous contrast. Oral contrast was administered. Reformatted images   in the coronal and sagittal planes were acquired.    COMPARISON: CT abdomen pelvis October 2, 2019.      FINDINGS:    LOWER CHEST: Worsening bibasilar consolidations with pleural effusions.    HEPATOBILIARY: Hyperdensity layering within the gallbladder likely   vicarious excretion of IV contrast from prior scan. Unremarkable CT   appearance of the liver.    SPLEEN: Unremarkable.    PANCREAS: Unremarkable.    ADRENAL GLANDS: Unremarkable.    KIDNEYS: Symmetric pattern of renal enhancement. No hydronephrosis   bilaterally.    ABDOMINOPELVIC NODES: No lymphadenopathy.    PELVIC ORGANS: Coarse calcifications within the prostate.    PERITONEUM/MESENTERY/BOWEL: Interval placement of enteric tube   terminating within the stomach. Previously noted distention of the   stomach is no longer visualized. Oral contrast reaches the level of a   small bowel loop in the mid pelvis (series 5, image 299). No small bowel   loop distention or air-fluid levels suggestive of an obstructive pattern.   Stool and air noted within the colon. No free fluid or pneumoperitoneum.   Colonic diverticulosis without diverticulitis.    BONES/SOFT TISSUES: Degenerative change of the spine.    OTHER: Atherosclerotic disease.      IMPRESSION:  Since October 2, 2019:  1.  No evidence of obstruction.  2.  Enteric tube terminating within the stomach which is no longer   distended.                  ELIJAH TAYLOR M.D., ATTENDING RADIOLOGIST  This document has been electronically signed. Oct  5 2019  7:54PM             (10-05-19 @ 19:30)      CARDIOLOGY TESTING  Transthoracic Echocardiogram:    EXAM:  2-D ECHO (TTE) COMPLETE        PROCEDURE DATE:  10/04/2019      INTERPRETATION:  REPORT:    TRANSTHORACIC ECHOCARDIOGRAM REPORT         Patient Name:   NEIL PHOENIX Accession #: [id number]  Medical Rec #:  [id number]     Height:      70.0 in 177.8 cm  YOB: 1950     Weight:      127.0 lb 57.61 kg  Patient Age:    69 years      BSA:         1.72 m²  Patient Gender: M             BP:          119/64 mmHg       Date of Exam:        10/4/2019 10:02:45 AM  Referring Physician: [id number] ED UNASSIGNED  Sonographer:         Maryann Tolbert  Reading Physician:   Dejon Barker M.D.    Procedure:   2D Echo/Doppler/Color Doppler Complete.  Indications: R94.31 - Abnormal Electrocardiogram [ECG] [EKG]  Diagnosis:   Abnormal electrocardiogram [ECG] [EKG] - R94.31         Summary:   1. Normal global left ventricular systolic function.   2. Moderate mitral annular calcification.   3. Thickening of the anterior mitral valve leaflet.   4. There is mild aortic root calcification.    PHYSICIAN INTERPRETATION:  Left Ventricle: The left ventricular internal cavity size is normal. Left   ventricular wall thickness is normal. Global LV systolic function was   normal. Normal segmental left ventricular systolic function.  Right Ventricle: Normal right ventricular size and function.  Left Atrium: Normal left atrial size.  Right Atrium: Normal right atrial size.  Pericardium: There is no evidence of pericardial effusion.  Mitral Valve: Thickening of the anterior mitral valve leaflet. There is   moderate mitral annular calcification. No evidence of mitral valve   regurgitation is seen.  Tricuspid Valve: The tricuspid valve is normal in structure. Trivial   tricuspid regurgitation is visualized.  Aortic Valve: The aortic valve is trileaflet. No evidence of aortic   stenosis. No evidence of aortic valve regurgitation is seen.  Aorta: The aortic root is normal in size and structure. There is mild   aortic root calcification.       2D AND M-MODE MEASUREMENTS (normal ranges within parentheses):  Left                  Normal   Aorta/Left             Normal  Ventricle:                     Atrium:  IVSd (2D):  0.73 cm  (0.7-1.1) AoV Cusp       1.59  (1.5-2.6)  LVPWd       0.66 cm  (0.7-1.1) Separation:     cm  (2D):  Left Atrium    2.62  (1.9-4.0)  LVIDd       4.67 cm  (3.4-5.7) (Mmode):        cm  (2D):                          Right  LVIDs       2.90 cm            Ventricle:  (2D):                          RVd (2D):      2.27 cm  LV FS       37.9 %    (>25%)  (2D):  IVSd        0.68 cm  (0.7-1.1)  (Mmode):  LVPWd       1.03 cm  (0.7-1.1)  (Mmode):  LVIDd       4.30 cm  (3.4-5.7)  (Mmode):  LVIDs       2.92 cm  (Mmode):  LV FS       32.2 %    (>25%)  (Mmode):  Relative     0.28     (<0.42)  Wall  Thickness  Rel. Wall    0.48     (<0.42)  Thickness  Mm  LV Mass    66.9 g/m²  Index:  Mmode    SPECTRAL DOPPLER ANALYSIS:  LVOT Vmax: 0.97 m/s  LVOT VTI:  0.21 m  LVOT Diam: 2.04 cm    Tricuspid Valve and PA/RV Systolic Pressure: TR Max Velocity: 0.97 m/s RA   Pressure:  RVSP/PASP:       [id number] Dejon Barker M.D., Electronically signed on 10/4/2019 at   12:35:49 PM              *** Final ***                    DEJON BARKER MD  This document has been electronically signed. Oct  4 2019 10:02AM             (10-04-19 @ 10:02)  12 Lead ECG:   Ventricular Rate 102 BPM    Atrial Rate 102 BPM    P-R Interval 150 ms    QRS Duration 148 ms    Q-T Interval 404 ms    QTC Calculation(Bezet) 526 ms    P Axis 72 degrees    R Axis -61 degrees    T Axis -2 degrees    Diagnosis Line Sinus tachycardia with Premature atrial complexes  Left axis deviation  Right bundle branch block  Abnormal ECG    Confirmed by Jermaine Escalante ([id number]) on 10/2/2019 10:03:18 AM (10-02-19 @ 09:42)      MEDICATIONS  ALBUTerol/ipratropium for Nebulization 3  chlorhexidine 4% Liquid 1  fat emulsion (Plant Based) 20% Infusion 1.7  fat emulsion (Plant Based) 20% Infusion 1.695  heparin  Injectable 5000  pantoprazole  Injectable 40  Parenteral Nutrition - Adult 1  Parenteral Nutrition - Adult 1  piperacillin/tazobactam IVPB.. 3.375  tiotropium 18 MICROgram(s) Capsule 1  vancomycin  IVPB 1000      ANTIBIOTICS:  piperacillin/tazobactam IVPB.. 3.375 Gram(s) IV Intermittent every 8 hours  vancomycin  IVPB 1000 milliGRAM(s) IV Intermittent every 12 hours      All available historical records have been reviewed ALBANNEIL  69y, Male  Allergy: No Known Allergies      CHIEF COMPLAINT: Gastric Outlet Obstruction (07 Oct 2019 02:28)      INTERVAL EVENTS/HPI  - No acute events overnight, minimal cough  - T(F): , Max: 97.6 (10-06-19 @ 20:15)  - Denies any worsening symptoms  - Tolerating medication  - WBC Count: 15.50 (10-07-19 @ 05:58) <--WBC Count: 17.02 (10-06-19 @ 06:23)      ROS  General: Denies rigors, nightsweats  HEENT: Denies headache, rhinorrhea, sore throat, eye pain  CV: Denies CP, palpitations  PULM: Denies SOB, wheezing  GI: Denies hematemesis, hematochezia, melena  : Denies discharge, hematuria  MSK: Denies arthralgias, myalgias  SKIN: Denies rash, lesions  NEURO: Denies paresthesias, weakness  PSYCH: Denies depression, anxiety    VITALS:  T(F): 97.5, Max: 97.6 (10-06-19 @ 20:15)  HR: 96  BP: 107/65  RR: 18Vital Signs Last 24 Hrs  T(C): 36.4 (07 Oct 2019 04:58), Max: 36.4 (06 Oct 2019 20:15)  T(F): 97.5 (07 Oct 2019 04:58), Max: 97.6 (06 Oct 2019 20:15)  HR: 96 (07 Oct 2019 04:58) (94 - 104)  BP: 107/65 (07 Oct 2019 04:58) (107/65 - 118/59)  BP(mean): --  RR: 18 (07 Oct 2019 04:58) (18 - 18)  SpO2: 95% (06 Oct 2019 19:48) (95% - 95%)    PHYSICAL EXAM:  Gen: NAD, resting in bed  HEENT: Normocephalic, atraumatic  Neck: supple, no lymphadenopathy  CV: Regular rate & regular rhythm  Lungs: decreased BS and crackles at bases, no fremitus  Abdomen: Soft, BS present  Ext: Warm, well perfused  Neuro: non focal, awake  Skin: no rash, no erythema  Lines: no phlebitis    FH: Non-contributory  Social Hx: Non-contributory    TESTS & MEASUREMENTS:                        9.5    15.50 )-----------( 352      ( 07 Oct 2019 05:58 )             29.9     10-07    137  |  101  |  10  ----------------------------<  97  4.2   |  24  |  0.6<L>    Ca    8.0<L>      07 Oct 2019 05:58  Phos  2.4     10-07  Mg     2.1     10-07      eGFR if Non African American: 104 mL/min/1.73M2 (10-07-19 @ 05:58)  eGFR if African American: 121 mL/min/1.73M2 (10-07-19 @ 05:58)          Culture - Blood (collected 10-04-19 @ 12:24)  Source: .Blood None  Preliminary Report (10-05-19 @ 23:01):    No growth to date.        Lactate, Blood: 1.0 mmol/L (10-05-19 @ 18:33)      INFECTIOUS DISEASES TESTING      RADIOLOGY & ADDITIONAL TESTS:  I have personally reviewed the last Chest xray  CXR  Xray Chest 1 View- PORTABLE-Urgent:   EXAM:  XR CHEST PORTABLE URGENT 1V            PROCEDURE DATE:  10/06/2019            INTERPRETATION:  Clinical History / Reason for exam: Abdominal distention.    Comparison : Chest radiograph October 5, 2019.    Technique/Positioning: Single frontal view of the chest..    Findings:    Support devices: Gastric tube courses below the diaphragm.    Cardiac/mediastinum/hilum: Unremarkable    Lung parenchyma/Pleura: Bibasilar opacities are not significantly   changed. No evidence of pleural effusion or pneumothorax.    Skeleton/soft tissues: Unremarkable.    Impression:      Stable bibasilar opacities.                      BETTY JARAMILLO M.D., ATTENDING RADIOLOGIST  This document has been electronically signed. Oct  6 2019 11:32AM             (10-06-19 @ 10:19)  Xray Chest 1 View- PORTABLE-Urgent:   EXAM:  XR CHEST PORTABLE URGENT 1V            PROCEDURE DATE:  10/04/2019            INTERPRETATION:  Clinical History / Reason for exam: Shortness of breath    Comparison : Chest radiograph 10/3/2019.    Technique/Positioning: Frontal view.    Impression:    Support devices: Endotracheal tube with interval distal advancement.   There is a side opening which is identified in the expected region of the   proximal body of the stomach    Cardiac/mediastinum/hilum: Stable    Lung parenchyma/Pleura:No significant interval changes, stable findings    Skeleton/soft tissues: Stable                    NADEEN NAUN M.D., ATTENDING RADIOLOGIST  This document has been electronically signed. Oct  4 2019  3:01PM             (10-04-19 @ 13:30)      CT  CT Abdomen and Pelvis w/ Oral Cont and w/ IV Cont:   EXAM:  CT ABDOMEN AND PELVIS OC IC            PROCEDURE DATE:  10/05/2019            INTERPRETATION:  CLINICAL HISTORY/REASON FOR EXAM: Concern for   obstruction.    TECHNIQUE: Contiguous axial CT images were obtained from the lower chest   to the pubic symphysis following administration of Optiray 320   intravenous contrast. Oral contrast was administered. Reformatted images   in the coronal and sagittal planes were acquired.    COMPARISON: CT abdomen pelvis October 2, 2019.      FINDINGS:    LOWER CHEST: Worsening bibasilar consolidations with pleural effusions.    HEPATOBILIARY: Hyperdensity layering within the gallbladder likely   vicarious excretion of IV contrast from prior scan. Unremarkable CT   appearance of the liver.    SPLEEN: Unremarkable.    PANCREAS: Unremarkable.    ADRENAL GLANDS: Unremarkable.    KIDNEYS: Symmetric pattern of renal enhancement. No hydronephrosis   bilaterally.    ABDOMINOPELVIC NODES: No lymphadenopathy.    PELVIC ORGANS: Coarse calcifications within the prostate.    PERITONEUM/MESENTERY/BOWEL: Interval placement of enteric tube   terminating within the stomach. Previously noted distention of the   stomach is no longer visualized. Oral contrast reaches the level of a   small bowel loop in the mid pelvis (series 5, image 299). No small bowel   loop distention or air-fluid levels suggestive of an obstructive pattern.   Stool and air noted within the colon. No free fluid or pneumoperitoneum.   Colonic diverticulosis without diverticulitis.    BONES/SOFT TISSUES: Degenerative change of the spine.    OTHER: Atherosclerotic disease.      IMPRESSION:  Since October 2, 2019:  1.  No evidence of obstruction.  2.  Enteric tube terminating within the stomach which is no longer   distended.                  ELIJAH TAYLOR M.D., ATTENDING RADIOLOGIST  This document has been electronically signed. Oct  5 2019  7:54PM             (10-05-19 @ 19:30)      CARDIOLOGY TESTING  Transthoracic Echocardiogram:    EXAM:  2-D ECHO (TTE) COMPLETE        PROCEDURE DATE:  10/04/2019      INTERPRETATION:  REPORT:    TRANSTHORACIC ECHOCARDIOGRAM REPORT         Patient Name:   NEIL PHOENIX Accession #: [id number]  Medical Rec #:  [id number]     Height:      70.0 in 177.8 cm  YOB: 1950     Weight:      127.0 lb 57.61 kg  Patient Age:    69 years      BSA:         1.72 m²  Patient Gender: M             BP:          119/64 mmHg       Date of Exam:        10/4/2019 10:02:45 AM  Referring Physician: [id number] ED UNASSIGNED  Sonographer:         Maryann Tolbert  Reading Physician:   Dejon Barker M.D.    Procedure:   2D Echo/Doppler/Color Doppler Complete.  Indications: R94.31 - Abnormal Electrocardiogram [ECG] [EKG]  Diagnosis:   Abnormal electrocardiogram [ECG] [EKG] - R94.31         Summary:   1. Normal global left ventricular systolic function.   2. Moderate mitral annular calcification.   3. Thickening of the anterior mitral valve leaflet.   4. There is mild aortic root calcification.    PHYSICIAN INTERPRETATION:  Left Ventricle: The left ventricular internal cavity size is normal. Left   ventricular wall thickness is normal. Global LV systolic function was   normal. Normal segmental left ventricular systolic function.  Right Ventricle: Normal right ventricular size and function.  Left Atrium: Normal left atrial size.  Right Atrium: Normal right atrial size.  Pericardium: There is no evidence of pericardial effusion.  Mitral Valve: Thickening of the anterior mitral valve leaflet. There is   moderate mitral annular calcification. No evidence of mitral valve   regurgitation is seen.  Tricuspid Valve: The tricuspid valve is normal in structure. Trivial   tricuspid regurgitation is visualized.  Aortic Valve: The aortic valve is trileaflet. No evidence of aortic   stenosis. No evidence of aortic valve regurgitation is seen.  Aorta: The aortic root is normal in size and structure. There is mild   aortic root calcification.       2D AND M-MODE MEASUREMENTS (normal ranges within parentheses):  Left                  Normal   Aorta/Left             Normal  Ventricle:                     Atrium:  IVSd (2D):  0.73 cm  (0.7-1.1) AoV Cusp       1.59  (1.5-2.6)  LVPWd       0.66 cm  (0.7-1.1) Separation:     cm  (2D):  Left Atrium    2.62  (1.9-4.0)  LVIDd       4.67 cm  (3.4-5.7) (Mmode):        cm  (2D):                          Right  LVIDs       2.90 cm            Ventricle:  (2D):                          RVd (2D):      2.27 cm  LV FS       37.9 %    (>25%)  (2D):  IVSd        0.68 cm  (0.7-1.1)  (Mmode):  LVPWd       1.03 cm  (0.7-1.1)  (Mmode):  LVIDd       4.30 cm  (3.4-5.7)  (Mmode):  LVIDs       2.92 cm  (Mmode):  LV FS       32.2 %    (>25%)  (Mmode):  Relative     0.28     (<0.42)  Wall  Thickness  Rel. Wall    0.48     (<0.42)  Thickness  Mm  LV Mass    66.9 g/m²  Index:  Mmode    SPECTRAL DOPPLER ANALYSIS:  LVOT Vmax: 0.97 m/s  LVOT VTI:  0.21 m  LVOT Diam: 2.04 cm    Tricuspid Valve and PA/RV Systolic Pressure: TR Max Velocity: 0.97 m/s RA   Pressure:  RVSP/PASP:       [id number] Dejon Barker M.D., Electronically signed on 10/4/2019 at   12:35:49 PM              *** Final ***                    DEJON BARKER MD  This document has been electronically signed. Oct  4 2019 10:02AM             (10-04-19 @ 10:02)  12 Lead ECG:   Ventricular Rate 102 BPM    Atrial Rate 102 BPM    P-R Interval 150 ms    QRS Duration 148 ms    Q-T Interval 404 ms    QTC Calculation(Bezet) 526 ms    P Axis 72 degrees    R Axis -61 degrees    T Axis -2 degrees    Diagnosis Line Sinus tachycardia with Premature atrial complexes  Left axis deviation  Right bundle branch block  Abnormal ECG    Confirmed by Jermaine Escalante ([id number]) on 10/2/2019 10:03:18 AM (10-02-19 @ 09:42)      MEDICATIONS  ALBUTerol/ipratropium for Nebulization 3  chlorhexidine 4% Liquid 1  fat emulsion (Plant Based) 20% Infusion 1.7  fat emulsion (Plant Based) 20% Infusion 1.695  heparin  Injectable 5000  pantoprazole  Injectable 40  Parenteral Nutrition - Adult 1  Parenteral Nutrition - Adult 1  piperacillin/tazobactam IVPB.. 3.375  tiotropium 18 MICROgram(s) Capsule 1  vancomycin  IVPB 1000      ANTIBIOTICS:  piperacillin/tazobactam IVPB.. 3.375 Gram(s) IV Intermittent every 8 hours  vancomycin  IVPB 1000 milliGRAM(s) IV Intermittent every 12 hours      All available historical records have been reviewed

## 2019-10-07 NOTE — PROGRESS NOTE ADULT - ATTENDING COMMENTS
70yo male with coffee ground emesis and duodenal obstruction. Patient underwent EGD and demonstrated duodenitis, no masses, biopsy pending. CT with PO contrast shows contrast passing through. Leukocytosis likely secondary to steroids. No further UGI bleeding. H/H stable. Tolerating CLD. Patient states that he is feeling much better than at admission. No surgical intervention at this time.

## 2019-10-07 NOTE — PROGRESS NOTE ADULT - ASSESSMENT
70 yo male with PMH of COPD (not on home oxygen), hypothyroidism, gout, RA, polymyositis (on prednisone and methotrexate) presented to the ED after calling 911 for CP and SOB assocated with multiple episodes of coffee ground emesis.    #Shortness of breath possibly aspiration PNA, crackles in RLL and possible opacity on CXR  - Afebrile and left shift Leukocytosis doubled from last week when last seen in hospital and was on steroids 1 week ago  - zosyn  - id recs appreciate  - MRSA negative, will dc vanco  - cxr shows bibasilar opacities   - will d/c on 7 day course of PO Augmentin 875 mg BID    # Gastric Outlet Obstruction with Vomiting and Coffee-Ground Emesis  - NGT was placed and 2L of dark coffee ground emesis was suctioned out in ED.   - CT scan showed large distended stomach with abrupt transition to normal caliber distal stomach without obvious mass or obstruction lesion.  - Seen by surgery in the ED: no intervention on their part   - CBC stable keep type and screen active. two large bore peripheral IVs. transfuse if Hb <7  - EGD 10/4,  showed erosive esphagitis No obstruction  - Speech and swallow performed, pt started on dysphagia 3 diet nectar fluid consistency    # Troponemia - likely secondary to demand ischemia. rule out ACS - stable trops   - elevated at 0.2 - Trending   - EKG showing sinus tachycardia, left axis deviation, and RBBB (on prior EKGs as well).   - 2D echo   - Cardiology indicated tropenemia secondary to polymyocytis    # Polymyositis - pt reports symptoms have been improving somewhat since discharge  - holding all PO meds for now  - outpatient rheum follow up.    # COPD - stable  - dc duonebs due to tachycadia  - no wheezing on exam    # Hypothyroidism   - f/u TSH  - holding all PO meds for now    dvt ppx: lovenox  diet: Dysphagia 3 diet, nectar fluid consistency  out of bed to chair  dispo: from home  FULL CODE

## 2019-10-07 NOTE — PROGRESS NOTE ADULT - ASSESSMENT
69M with RA on prednisone, gout, COPD, and hypothyroidism who presents with coffee ground emesis thought to have duodenal obstruction on admission CT. EGD showed Grade D esophagitis compatible with nonspecific erosive esophagitis, hiatal hernia, non-erosive gastritis, normal duodenal mucosa, and no gastric outlet obstruction. CT with contrast through NGT showed no evidence of obstruction.      Plan:  - Hb stable at 8.9 (From 9, 9)   - no surgical intervention   - medicine management of pneumonia

## 2019-10-07 NOTE — PROGRESS NOTE ADULT - SUBJECTIVE AND OBJECTIVE BOX
SUBJECTIVE:    Patient is a 69y old Male who presents with a chief complaint of Gastric Outlet Obstruction (07 Oct 2019 16:21)    Currently admitted to medicine with the primary diagnosis of Upper GI bleed     Today is hospital day 5d. This morning he is resting comfortably in bed and reports no new issues or overnight events.     PAST MEDICAL & SURGICAL HISTORY  Gout  COPD, mild  Polymyositis  Hypothyroid  S/P tonsillectomy    SOCIAL HISTORY:  Negative for smoking/alcohol/drug use.     ALLERGIES:  No Known Allergies    MEDICATIONS:  STANDING MEDICATIONS  chlorhexidine 4% Liquid 1 Application(s) Topical <User Schedule>  fat emulsion (Plant Based) 20% Infusion 1.695 Gm/kG/Day IV Continuous <Continuous>  heparin  Injectable 5000 Unit(s) SubCutaneous three times a day  pantoprazole  Injectable 40 milliGRAM(s) IV Push two times a day  Parenteral Nutrition - Adult 1 Each TPN Continuous <Continuous>  Parenteral Nutrition - Adult 1 Each TPN Continuous <Continuous>  piperacillin/tazobactam IVPB.. 3.375 Gram(s) IV Intermittent every 8 hours  tiotropium 18 MICROgram(s) Capsule 1 Capsule(s) Inhalation daily    PRN MEDICATIONS  benzocaine 20% Spray 1 Spray(s) Topical every 4 hours PRN    VITALS:   T(F): 98  HR: 108  BP: 112/73  RR: 17  SpO2: 97%    LABS:                        9.5    15.50 )-----------( 352      ( 07 Oct 2019 05:58 )             29.9     10-07    137  |  101  |  10  ----------------------------<  97  4.2   |  24  |  0.6<L>    Ca    8.0<L>      07 Oct 2019 05:58  Phos  2.4     10-07  Mg     2.1     10-07                CARDIAC MARKERS ( 05 Oct 2019 18:33 )  x     / 0.22 ng/mL / x     / x     / x          RADIOLOGY:  < from: Xray Kidney Ureter Bladder (10.06.19 @ 10:20) >  INTERPRETATION:  Clinical History / Reason for exam: Abdominal pain  Single image  Comparison: 419  Contrast is seen within the colon.  The bowel gas pattern is normal.  A feeding tube is seen in the expected position of the stomach.  No free air is seen however it should be noted that free air cannot be   excluded on this supine portable exam.  Impression  Contrast in colon. Normal bowel gas pattern    < end of copied text >  < from: Xray Chest 1 View- PORTABLE-Urgent (10.06.19 @ 10:19) >    Stable bibasilar opacities.    < end of copied text >    PHYSICAL EXAM:  GEN: No acute distress  LUNGS: mild crackles  HEART: S1/S2 present. RRR.   ABD: Soft, non-tender, non-distended. Bowel sounds present  EXT: NC/NC/NE/2+PP/MERCEDES  NEURO: AAOX3

## 2019-10-08 ENCOUNTER — APPOINTMENT (OUTPATIENT)
Dept: RHEUMATOLOGY | Facility: CLINIC | Age: 69
End: 2019-10-08

## 2019-10-08 DIAGNOSIS — M33.90 DERMATOPOLYMYOSITIS, UNSPECIFIED, ORGAN INVOLVEMENT UNSPECIFIED: ICD-10-CM

## 2019-10-08 LAB
ALBUMIN SERPL ELPH-MCNC: 2.8 G/DL — LOW (ref 3.5–5.2)
ALP SERPL-CCNC: 97 U/L — SIGNIFICANT CHANGE UP (ref 30–115)
ALT FLD-CCNC: 41 U/L — SIGNIFICANT CHANGE UP (ref 0–41)
ANION GAP SERPL CALC-SCNC: 13 MMOL/L — SIGNIFICANT CHANGE UP (ref 7–14)
AST SERPL-CCNC: 61 U/L — HIGH (ref 0–41)
BASOPHILS # BLD AUTO: 0.08 K/UL — SIGNIFICANT CHANGE UP (ref 0–0.2)
BASOPHILS NFR BLD AUTO: 0.5 % — SIGNIFICANT CHANGE UP (ref 0–1)
BILIRUB SERPL-MCNC: 0.3 MG/DL — SIGNIFICANT CHANGE UP (ref 0.2–1.2)
BUN SERPL-MCNC: 9 MG/DL — LOW (ref 10–20)
CALCIUM SERPL-MCNC: 8.1 MG/DL — LOW (ref 8.5–10.1)
CHLORIDE SERPL-SCNC: 101 MMOL/L — SIGNIFICANT CHANGE UP (ref 98–110)
CO2 SERPL-SCNC: 23 MMOL/L — SIGNIFICANT CHANGE UP (ref 17–32)
CREAT SERPL-MCNC: 0.6 MG/DL — LOW (ref 0.7–1.5)
EOSINOPHIL # BLD AUTO: 0.38 K/UL — SIGNIFICANT CHANGE UP (ref 0–0.7)
EOSINOPHIL NFR BLD AUTO: 2.4 % — SIGNIFICANT CHANGE UP (ref 0–8)
GLUCOSE SERPL-MCNC: 88 MG/DL — SIGNIFICANT CHANGE UP (ref 70–99)
HCT VFR BLD CALC: 30.2 % — LOW (ref 42–52)
HGB BLD-MCNC: 9.4 G/DL — LOW (ref 14–18)
IMM GRANULOCYTES NFR BLD AUTO: 0.4 % — HIGH (ref 0.1–0.3)
LYMPHOCYTES # BLD AUTO: 1.17 K/UL — LOW (ref 1.2–3.4)
LYMPHOCYTES # BLD AUTO: 7.4 % — LOW (ref 20.5–51.1)
MAGNESIUM SERPL-MCNC: 2.1 MG/DL — SIGNIFICANT CHANGE UP (ref 1.8–2.4)
MCHC RBC-ENTMCNC: 28 PG — SIGNIFICANT CHANGE UP (ref 27–31)
MCHC RBC-ENTMCNC: 31.1 G/DL — LOW (ref 32–37)
MCV RBC AUTO: 89.9 FL — SIGNIFICANT CHANGE UP (ref 80–94)
MONOCYTES # BLD AUTO: 1.03 K/UL — HIGH (ref 0.1–0.6)
MONOCYTES NFR BLD AUTO: 6.5 % — SIGNIFICANT CHANGE UP (ref 1.7–9.3)
NEUTROPHILS # BLD AUTO: 13.15 K/UL — HIGH (ref 1.4–6.5)
NEUTROPHILS NFR BLD AUTO: 82.8 % — HIGH (ref 42.2–75.2)
NRBC # BLD: 0 /100 WBCS — SIGNIFICANT CHANGE UP (ref 0–0)
PHOSPHATE SERPL-MCNC: 2.7 MG/DL — SIGNIFICANT CHANGE UP (ref 2.1–4.9)
PLATELET # BLD AUTO: 415 K/UL — HIGH (ref 130–400)
POTASSIUM SERPL-MCNC: 4.9 MMOL/L — SIGNIFICANT CHANGE UP (ref 3.5–5)
POTASSIUM SERPL-SCNC: 4.9 MMOL/L — SIGNIFICANT CHANGE UP (ref 3.5–5)
PROT SERPL-MCNC: 5.3 G/DL — LOW (ref 6–8)
RBC # BLD: 3.36 M/UL — LOW (ref 4.7–6.1)
RBC # FLD: 16 % — HIGH (ref 11.5–14.5)
SODIUM SERPL-SCNC: 137 MMOL/L — SIGNIFICANT CHANGE UP (ref 135–146)
SURGICAL PATHOLOGY STUDY: SIGNIFICANT CHANGE UP
WBC # BLD: 15.88 K/UL — HIGH (ref 4.8–10.8)
WBC # FLD AUTO: 15.88 K/UL — HIGH (ref 4.8–10.8)

## 2019-10-08 PROCEDURE — 99233 SBSQ HOSP IP/OBS HIGH 50: CPT

## 2019-10-08 RX ORDER — ELECTROLYTE SOLUTION,INJ
1 VIAL (ML) INTRAVENOUS
Refills: 0 | Status: DISCONTINUED | OUTPATIENT
Start: 2019-10-08 | End: 2019-10-08

## 2019-10-08 RX ORDER — I.V. FAT EMULSION 20 G/100ML
1.7 EMULSION INTRAVENOUS
Qty: 100 | Refills: 0 | Status: DISCONTINUED | OUTPATIENT
Start: 2019-10-08 | End: 2019-10-08

## 2019-10-08 RX ADMIN — PIPERACILLIN AND TAZOBACTAM 25 GRAM(S): 4; .5 INJECTION, POWDER, LYOPHILIZED, FOR SOLUTION INTRAVENOUS at 06:15

## 2019-10-08 RX ADMIN — I.V. FAT EMULSION 31.25 GM/KG/DAY: 20 EMULSION INTRAVENOUS at 20:07

## 2019-10-08 RX ADMIN — HEPARIN SODIUM 5000 UNIT(S): 5000 INJECTION INTRAVENOUS; SUBCUTANEOUS at 21:44

## 2019-10-08 RX ADMIN — TIOTROPIUM BROMIDE 1 CAPSULE(S): 18 CAPSULE ORAL; RESPIRATORY (INHALATION) at 08:07

## 2019-10-08 RX ADMIN — PANTOPRAZOLE SODIUM 40 MILLIGRAM(S): 20 TABLET, DELAYED RELEASE ORAL at 17:18

## 2019-10-08 RX ADMIN — Medication 1 EACH: at 20:11

## 2019-10-08 RX ADMIN — PIPERACILLIN AND TAZOBACTAM 25 GRAM(S): 4; .5 INJECTION, POWDER, LYOPHILIZED, FOR SOLUTION INTRAVENOUS at 21:44

## 2019-10-08 RX ADMIN — HEPARIN SODIUM 5000 UNIT(S): 5000 INJECTION INTRAVENOUS; SUBCUTANEOUS at 13:52

## 2019-10-08 RX ADMIN — HEPARIN SODIUM 5000 UNIT(S): 5000 INJECTION INTRAVENOUS; SUBCUTANEOUS at 06:15

## 2019-10-08 RX ADMIN — Medication 5 MILLIGRAM(S): at 13:50

## 2019-10-08 RX ADMIN — PANTOPRAZOLE SODIUM 40 MILLIGRAM(S): 20 TABLET, DELAYED RELEASE ORAL at 06:15

## 2019-10-08 RX ADMIN — PIPERACILLIN AND TAZOBACTAM 25 GRAM(S): 4; .5 INJECTION, POWDER, LYOPHILIZED, FOR SOLUTION INTRAVENOUS at 13:50

## 2019-10-08 NOTE — PROGRESS NOTE ADULT - SUBJECTIVE AND OBJECTIVE BOX
NEIL PHOENIX  69y Male    CHIEF COMPLAINT:    Patient is a 69y old  Male who presents with a chief complaint of Gastric Outlet Obstruction (08 Oct 2019 14:45)      INTERVAL HPI/OVERNIGHT EVENTS:    Patient seen and examined. Having BM and passing gas. No N/V.   ROS: All other systems are negative.    Vital Signs:    T(F): 97.5 (10-08-19 @ 13:29), Max: 98 (10-08-19 @ 05:59)  HR: 104 (10-08-19 @ 13:29) (89 - 104)  BP: 110/68 (10-08-19 @ 13:29) (100/59 - 115/88)  RR: 18 (10-08-19 @ 13:29) (18 - 18)  SpO2: 97% (10-08-19 @ 12:08) (97% - 99%)  I&O's Summary    07 Oct 2019 07:01  -  08 Oct 2019 07:00  --------------------------------------------------------  IN: 700 mL / OUT: 3850 mL / NET: -3150 mL    08 Oct 2019 07:01  -  08 Oct 2019 15:44  --------------------------------------------------------  IN: 625 mL / OUT: 1200 mL / NET: -575 mL      Daily     Daily   CAPILLARY BLOOD GLUCOSE          PHYSICAL EXAM:    GENERAL:  NAD  SKIN: No rashes or lesions  HENT: Atraumatic Normocephalic. PERRL. Moist membranes.  NECK: Supple, No JVD. No lymphadenopathy.  PULMONARY: B/L coarse crackles in the lower chest.   CVS: Normal S1, S2. Rate and Rhythm are regular. No murmurs.  ABDOMEN/GI: Soft, Nontender, Nondistended; BS present  EXTREMITIES: Peripheral pulses intact. No edema B/L LE.  NEUROLOGIC:  No motor or sensory deficit.  PSYCH: Alert & oriented x 3    Consultant(s) Notes Reviewed:  [x ] YES  [ ] NO  Care Discussed with Consultants/Other Providers [ x] YES  [ ] NO    EKG reviewed  Telemetry reviewed    LABS:                        9.4    15.88 )-----------( 415      ( 08 Oct 2019 06:26 )             30.2     10-08    137  |  101  |  9<L>  ----------------------------<  88  4.9   |  23  |  0.6<L>    Ca    8.1<L>      08 Oct 2019 06:26  Phos  2.7     10-08  Mg     2.1     10-08    TPro  5.3<L>  /  Alb  2.8<L>  /  TBili  0.3  /  DBili  x   /  AST  61<H>  /  ALT  41  /  AlkPhos  97  10-08        Trop 0.22, CKMB --, CK --, 10-05-19 @ 18:33        RADIOLOGY & ADDITIONAL TESTS:      Imaging or report Personally Reviewed:  [ ] YES  [ ] NO    Medications:  Standing  chlorhexidine 4% Liquid 1 Application(s) Topical <User Schedule>  fat emulsion (Plant Based) 20% Infusion 1.695 Gm/kG/Day IV Continuous <Continuous>  fat emulsion (Plant Based) 20% Infusion 1.695 Gm/kG/Day IV Continuous <Continuous>  heparin  Injectable 5000 Unit(s) SubCutaneous three times a day  pantoprazole  Injectable 40 milliGRAM(s) IV Push two times a day  Parenteral Nutrition - Adult 1 Each TPN Continuous <Continuous>  Parenteral Nutrition - Adult 1 Each TPN Continuous <Continuous>  piperacillin/tazobactam IVPB.. 3.375 Gram(s) IV Intermittent every 8 hours  predniSONE   Tablet 5 milliGRAM(s) Oral daily  tiotropium 18 MICROgram(s) Capsule 1 Capsule(s) Inhalation daily    PRN Meds  benzocaine 20% Spray 1 Spray(s) Topical every 4 hours PRN      Case discussed with resident    Care discussed with pt/family

## 2019-10-08 NOTE — PROGRESS NOTE ADULT - ASSESSMENT
ASSESSMENT  69y old  Male with PMH of COPD (not on home oxygen), hypothyroidism, gout, RA, polymyositis (on prednisone and methotrexate) presenting with chest pain , nausea and coffee ground vomiting of 3 days duration, admitted with diagnosis of Gastric Outlet obstruction.    IMPRESSION  # B/L Pneumonia, CT Chest 10/2 bibasilar nodular opacities     not on chronic steroids, not at risk of OI  # Leukocytosis , downtrending   # CTAP 10/5 no e/o obstruction   # QTC prolongation    would recommend:    - On zosyn since 10/4, continue while in-house, once d/c can complete 7 day course with PO augmentin 875mg BID end 10/10

## 2019-10-08 NOTE — PHYSICAL THERAPY INITIAL EVALUATION ADULT - PERTINENT HX OF CURRENT PROBLEM, REHAB EVAL
70 yo male with PMH of COPD (not on home oxygen), hypothyroidism, gout, RA, polymyositis (on prednisone and methotrexate) presented to the ED after calling 911 for CP and SOB assocated with multiple episodes of coffee ground emesis.

## 2019-10-08 NOTE — PROGRESS NOTE ADULT - ASSESSMENT
70 yo male with PMH of COPD (not on home oxygen), hypothyroidism, gout, RA, polymyositis (on prednisone and methotrexate) presented to the ED after calling 911 for CP and SOB assocated with multiple episodes of coffee ground emesis.    #Shortness of breath possibly aspiration PNA, crackles in RLL and possible opacity on CXR  - Afebrile and WBC trending down  - MRSA negative, will dc vanco  - CXR shows bibasilar opacities  - on zosyn during in patient stay   - will d/c on 7 day course of PO Augmentin 875 mg BID    # Gastric Outlet Obstruction with Vomiting and Coffee-Ground Emesis  - NGT was placed and 2L of dark coffee ground emesis was suctioned out in ED.   - CT scan showed large distended stomach with abrupt transition to normal caliber distal stomach without obvious mass or obstruction lesion.  - Seen by surgery in the ED: no intervention on their part   - CBC stable keep type and screen active. two large bore peripheral IVs. transfuse if Hb <7  - EGD 10/4,  showed erosive esphagitis No obstruction  - Speech and swallow performed, pt started on dysphagia 3 diet nectar fluid consistency    # Troponemia - likely secondary to demand ischemia. rule out ACS - stable trops   - elevated at 0.2 - Trending   - EKG showing sinus tachycardia, left axis deviation, and RBBB (on prior EKGs as well).   - 2D echo   - Cardiology indicated tropenemia secondary to polymyositis    # Polymyositis - pt reports symptoms have been improving somewhat since discharge  - holding all PO meds for now  - outpatient rheum follow up.    # COPD - stable  - dc duonebs due to tachycadia  - no wheezing on exam    # Hypothyroidism   - f/u TSH  - holding all PO meds for now    dvt ppx: Lovenox  diet: Dysphagia 3 diet, nectar fluid consistency  out of bed to chair  dispo: from home  FULL CODE 68 yo male with PMH of COPD (not on home oxygen), hypothyroidism, gout, RA, polymyositis (on prednisone and methotrexate) presented to the ED after calling 911 for CP and SOB assocated with multiple episodes of coffee ground emesis.    #Shortness of breath possibly aspiration PNA, crackles in RLL and possible opacity on CXR  - Afebrile and WBC trending down  - MRSA negative, will dc vanco  - CXR shows bibasilar opacities  - on zosyn during in patient stay   - will d/c on 7 day course of PO Augmentin 875 mg BID    # Gastric Outlet Obstruction with Vomiting and Coffee-Ground Emesis  - NGT was placed and 2L of dark coffee ground emesis was suctioned out in ED.   - CT scan showed large distended stomach with abrupt transition to normal caliber distal stomach without obvious mass or obstruction lesion.  - Seen by surgery in the ED: no intervention on their part   - CBC stable keep type and screen active. two large bore peripheral IVs. transfuse if Hb <7  - EGD 10/4,  showed erosive esphagitis No obstruction  - Speech and swallow performed, pt started on dysphagia 3 diet nectar fluid consistency    # Troponemia - likely secondary to demand ischemia. rule out ACS - stable trops   - elevated at 0.2 - Trending   - EKG showing sinus tachycardia, left axis deviation, and RBBB (on prior EKGs as well).   - 2D echo   - Cardiology indicated tropenemia secondary to polymyositis    # Polymyositis - pt reports symptoms have been improving somewhat since discharge  - restarted PO prednisone 5mg q24  - will continue holding MTX until infection resolves  - outpatient rheum follow up.    # COPD - stable  - dc duonebs due to tachycardia  - no wheezing on exam    # Hypothyroidism   - f/u TSH  - holding all PO meds for now    dvt ppx: Lovenox  diet: Dysphagia 3 diet, nectar fluid consistency, paged nutrition to wean of O2  out of bed to chair  dispo: from home  FULL CODE

## 2019-10-08 NOTE — CONSULT NOTE ADULT - SUBJECTIVE AND OBJECTIVE BOX
HPI:  70 yo male with PMH of COPD (not on home oxygen), hypothyroidism, gout, RA, polymyositis (on prednisone and methotrexate) presented to the ED after calling 911 for CP and SOB assocated with multiple episodes of coffee ground emesis. He states that he has not been feeling himself since mid-June and reports decreased appetite and an unintentional 30 lb weight loss. He was recently discharged from [facility] on Sept 25th after being admitted for weakness and found to have polymyositis. He endorses intermittent chest pain started approx 4 days ago and N/V 3 days ago. He states his CP was substernal and sharp and was causing him to have trouble breathing. Burping and sneezing made the pain worse. No alleviating factors. He states his last normal BM was 2 weeks ago. Since that time he has had very small, hard BMs every few days. No bright red blood or dark stool noted. Patient denies any prior history of GI bleed. His last colonoscopy was 3 years ago ago by [facility] on [address], he may have had polyps removed at that time but was not sure. Never had EGD before.     Patient had a large volume emesis in transport to the ED and while here an NGT was placed and 2L of dark coffee ground emesis was suctioned out. He states he feels much improved after decompression. He remained hemodynamically stable but mildly tachycardic to the low 100s. CT scan showed large distended stomach with abrupt transition to normal caliber distal stomach without obvious mass or obstruction lesion. Seen by surgery in the ED. Admitted to medicine for further workup and management. (02 Oct 2019 18:16)      PAST MEDICAL & SURGICAL HISTORY:  Gout  COPD, mild  Polymyositis  Hypothyroid  S/P tonsillectomy      Hospital Course:    TODAY'S SUBJECTIVE & REVIEW OF SYMPTOMS:     Constitutional WNL   Cardio WNL   Resp WNL   GI WNL  Heme WNL  Endo WNL  Skin WNL  MSK Weakness  Neuro WNL  Cognitive WNL  Psych WNL      MEDICATIONS  (STANDING):  chlorhexidine 4% Liquid 1 Application(s) Topical <User Schedule>  fat emulsion (Plant Based) 20% Infusion 1.695 Gm/kG/Day (31.252 mL/Hr) IV Continuous <Continuous>  fat emulsion (Plant Based) 20% Infusion 1.695 Gm/kG/Day (31.252 mL/Hr) IV Continuous <Continuous>  heparin  Injectable 5000 Unit(s) SubCutaneous three times a day  pantoprazole  Injectable 40 milliGRAM(s) IV Push two times a day  Parenteral Nutrition - Adult 1 Each (70 mL/Hr) TPN Continuous <Continuous>  Parenteral Nutrition - Adult 1 Each (70 mL/Hr) TPN Continuous <Continuous>  piperacillin/tazobactam IVPB.. 3.375 Gram(s) IV Intermittent every 8 hours  predniSONE   Tablet 5 milliGRAM(s) Oral daily  tiotropium 18 MICROgram(s) Capsule 1 Capsule(s) Inhalation daily    MEDICATIONS  (PRN):  benzocaine 20% Spray 1 Spray(s) Topical every 4 hours PRN NGT irritation      FAMILY HISTORY:  No pertinent family history in first degree relatives      Allergies    No Known Allergies    Intolerances        SOCIAL HISTORY:    [  ] Etoh  [  ] Smoking  [  ] Substance abuse     Home Environment:  [ x ] Home Alone  [  ] Lives with Family  [  ] Home Health Aid    Dwelling:  [  ] Apartment  [x  ] Private House  [  ] Adult Home  [  ] Skilled Nursing Facility      [  ] Short Term  [  ] Long Term  [x  ] Stairs       Elevator [  ]    FUNCTIONAL STATUS PTA: (Check all that apply)  Ambulation: [  x ]Independent    [  ] Dependent     [  ] Non-Ambulatory  Assistive Device: [  ] SA Cane  [  ]  Q Cane  [  ] Walker  [  ]  Wheelchair  ADL : [ x ] Independent  [  ]  Dependent       Vital Signs Last 24 Hrs  T(C): 36.4 (08 Oct 2019 13:29), Max: 36.7 (08 Oct 2019 05:59)  T(F): 97.5 (08 Oct 2019 13:29), Max: 98 (08 Oct 2019 05:59)  HR: 104 (08 Oct 2019 13:29) (89 - 104)  BP: 110/68 (08 Oct 2019 13:29) (100/59 - 115/88)  BP(mean): --  RR: 18 (08 Oct 2019 13:29) (18 - 18)  SpO2: 97% (08 Oct 2019 12:08) (97% - 99%)      PHYSICAL EXAM: Alert & Oriented X3  GENERAL: NAD, well-groomed, well-developed  HEAD:  Atraumatic, Normocephalic  CHEST/LUNG: Clear   HEART: S1S2+  ABDOMEN: Soft, Nontender  EXTREMITIES:  no calf tenderness    NERVOUS SYSTEM:  Cranial Nerves 2-12 intact [  ] Abnormal  [  ]  ROM: WFL all extremities [ x ]  Abnormal [  ]  Motor Strength: WFL all extremities  [ x ]  Abnormal [  ]  Sensation: intact to light touch [ x ] Abnormal [  ]  Reflexes: Symmetric [  ]  Abnormal [  ]    FUNCTIONAL STATUS:  Bed Mobility: Independent [  ]  Supervision [ x ]  Needs Assistance [  ]  N/A [  ]  Transfers: Independent [  ]  Supervision [  ]  Needs Assistance [x  ]  N/A [  ]   Ambulation: Independent [  ]  Supervision [  ]  Needs Assistance [x  ]  N/A [  ]  ADL: Independent [  ] Requires Assistance [  ] N/A [  ]      LABS:                        9.4    15.88 )-----------( 415      ( 08 Oct 2019 06:26 )             30.2     10-08    137  |  101  |  9<L>  ----------------------------<  88  4.9   |  23  |  0.6<L>    Ca    8.1<L>      08 Oct 2019 06:26  Phos  2.7     10-08  Mg     2.1     10-08    TPro  5.3<L>  /  Alb  2.8<L>  /  TBili  0.3  /  DBili  x   /  AST  61<H>  /  ALT  41  /  AlkPhos  97  10-08          RADIOLOGY & ADDITIONAL STUDIES:    Assesment:

## 2019-10-08 NOTE — PROGRESS NOTE ADULT - ASSESSMENT
70 yo male with PMH of COPD (not on home oxygen), hypothyroidism, gout, RA, polymyositis (on prednisone and methotrexate) presented to the ED after calling 911 for CP and SOB assocated with multiple episodes of coffee ground emesis.    1.	Possible Ileus - resolved  2.	No gastric outlet obstruction.   3.	Aspiration PNA  4.	Polymyositis  5.	COPD  6.	RA / Gout  7.	CP likely musculoskeletal   8.	Hypothyroidism  9.	Hyponatremia  10.	Pharyngeal dysphagia         PLAN:    ·	SLP F/U noted. Recommended VFSS/MBS for further evaluation. On dysphagia 3 diet for now  ·	Call Nutrition to start weaning PPN  ·	ID F/U noted. Cont Zosyn. Will switch to PO Augmentin on D/C. last dose of Abx is 10/10   ·	CT abd/pelvis reviewed. No obstruction  ·	Elevated troponin likely due to polymyositis  ·	S/P EGD. Showed esophagitis and gastritis. No gastric outlet obstruction  ·	Cont Protonix 40 mg po q 12h  ·	Cont prednisone 5 mg po daily. Cont to hold Methotrexate for now  ·	Follow CBC & BMP in AM  ·	Rehab eval noted. Home vs SNF    #Progress Note Handoff  Pending (specify):  Consults_________, Tests________, Test Results_______, Other__Dysphagia. On PPN_______  Family discussion:  Disposition: Home___/SNF___/Other________/Unknown at this time________

## 2019-10-08 NOTE — SWALLOW BEDSIDE ASSESSMENT ADULT - PHARYNGEAL PHASE
Throat clear post oral intake/Multiple swallows/Wet vocal quality post oral intake Throat clear post oral intake/Complaints of pharyngeal stasis

## 2019-10-08 NOTE — PROGRESS NOTE ADULT - SUBJECTIVE AND OBJECTIVE BOX
ALBANNEIL  69y, Male  Allergy: No Known Allergies      CHIEF COMPLAINT: Gastric Outlet Obstruction (08 Oct 2019 07:17)      INTERVAL EVENTS/HPI  - No acute events overnight  - T(F): , Max: 98 (10-08-19 @ 05:59)  - Denies any worsening symptoms  - Tolerating medication  - WBC Count: 15.88 (10-08-19 @ 06:26)      ROS  General: Denies rigors, nightsweats  HEENT: Denies headache, rhinorrhea, sore throat, eye pain  CV: Denies CP, palpitations  PULM: Denies SOB, wheezing  GI: Denies hematemesis, hematochezia, melena  : Denies discharge, hematuria  MSK: Denies arthralgias, myalgias  SKIN: Denies rash, lesions  NEURO: Denies paresthesias, weakness  PSYCH: Denies depression, anxiety    VITALS:  T(F): 98, Max: 98 (10-08-19 @ 05:59)  HR: 97  BP: 100/59  RR: 18Vital Signs Last 24 Hrs  T(C): 36.7 (08 Oct 2019 05:59), Max: 36.7 (08 Oct 2019 05:59)  T(F): 98 (08 Oct 2019 05:59), Max: 98 (08 Oct 2019 05:59)  HR: 97 (08 Oct 2019 05:59) (89 - 97)  BP: 100/59 (08 Oct 2019 05:59) (100/59 - 115/88)  BP(mean): --  RR: 18 (08 Oct 2019 05:59) (18 - 18)  SpO2: 97% (08 Oct 2019 12:08) (97% - 99%)    PHYSICAL EXAM:  Gen: NAD, resting in bed  HEENT: Normocephalic, atraumatic  Neck: supple, no lymphadenopathy  CV: Regular rate & regular rhythm  Lungs: decreased BS and crackles at bases, no fremitus  Abdomen: Soft, BS present  Ext: Warm, well perfused  Neuro: non focal, awake  Skin: no rash, no erythema  Lines: no phlebitis      FH: Non-contributory  Social Hx: Non-contributory    TESTS & MEASUREMENTS:                        9.4    15.88 )-----------( 415      ( 08 Oct 2019 06:26 )             30.2     10-08    137  |  101  |  9<L>  ----------------------------<  88  4.9   |  23  |  0.6<L>    Ca    8.1<L>      08 Oct 2019 06:26  Phos  2.7     10-08  Mg     2.1     10-08    TPro  5.3<L>  /  Alb  2.8<L>  /  TBili  0.3  /  DBili  x   /  AST  61<H>  /  ALT  41  /  AlkPhos  97  10-08    eGFR if Non African American: 104 mL/min/1.73M2 (10-08-19 @ 06:26)  eGFR if African American: 121 mL/min/1.73M2 (10-08-19 @ 06:26)    LIVER FUNCTIONS - ( 08 Oct 2019 06:26 )  Alb: 2.8 g/dL / Pro: 5.3 g/dL / ALK PHOS: 97 U/L / ALT: 41 U/L / AST: 61 U/L / GGT: x               Culture - Blood (collected 10-04-19 @ 12:24)  Source: .Blood None  Preliminary Report (10-05-19 @ 23:01):    No growth to date.        Lactate, Blood: 1.0 mmol/L (10-05-19 @ 18:33)      INFECTIOUS DISEASES TESTING  MRSA PCR Result.: Negative (10-07-19 @ 11:10)      RADIOLOGY & ADDITIONAL TESTS:  I have personally reviewed the last Chest xray  CXR  Xray Chest 1 View- PORTABLE-Urgent:   EXAM:  XR CHEST PORTABLE URGENT 1V            PROCEDURE DATE:  10/06/2019            INTERPRETATION:  Clinical History / Reason for exam: Abdominal distention.    Comparison : Chest radiograph October 5, 2019.    Technique/Positioning: Single frontal view of the chest..    Findings:    Support devices: Gastric tube courses below the diaphragm.    Cardiac/mediastinum/hilum: Unremarkable    Lung parenchyma/Pleura: Bibasilar opacities are not significantly   changed. No evidence of pleural effusion or pneumothorax.    Skeleton/soft tissues: Unremarkable.    Impression:      Stable bibasilar opacities.                      BETTY JARAMILLO M.D., ATTENDING RADIOLOGIST  This document has been electronically signed. Oct  6 2019 11:32AM             (10-06-19 @ 10:19)      CT  CT Abdomen and Pelvis w/ Oral Cont and w/ IV Cont:   EXAM:  CT ABDOMEN AND PELVIS OC IC            PROCEDURE DATE:  10/05/2019            INTERPRETATION:  CLINICAL HISTORY/REASON FOR EXAM: Concern for   obstruction.    TECHNIQUE: Contiguous axial CT images were obtained from the lower chest   to the pubic symphysis following administration of Optiray 320   intravenous contrast. Oral contrast was administered. Reformatted images   in the coronal and sagittal planes were acquired.    COMPARISON: CT abdomen pelvis October 2, 2019.      FINDINGS:    LOWER CHEST: Worsening bibasilar consolidations with pleural effusions.    HEPATOBILIARY: Hyperdensity layering within the gallbladder likely   vicarious excretion of IV contrast from prior scan. Unremarkable CT   appearance of the liver.    SPLEEN: Unremarkable.    PANCREAS: Unremarkable.    ADRENAL GLANDS: Unremarkable.    KIDNEYS: Symmetric pattern of renal enhancement. No hydronephrosis   bilaterally.    ABDOMINOPELVIC NODES: No lymphadenopathy.    PELVIC ORGANS: Coarse calcifications within the prostate.    PERITONEUM/MESENTERY/BOWEL: Interval placement of enteric tube   terminating within the stomach. Previously noted distention of the   stomach is no longer visualized. Oral contrast reaches the level of a   small bowel loop in the mid pelvis (series 5, image 299). No small bowel   loop distention or air-fluid levels suggestive of an obstructive pattern.   Stool and air noted within the colon. No free fluid or pneumoperitoneum.   Colonic diverticulosis without diverticulitis.    BONES/SOFT TISSUES: Degenerative change of the spine.    OTHER: Atherosclerotic disease.      IMPRESSION:  Since October 2, 2019:  1.  No evidence of obstruction.  2.  Enteric tube terminating within the stomach which is no longer   distended.                  ELIJAH TAYLOR M.D., ATTENDING RADIOLOGIST  This document has been electronically signed. Oct  5 2019  7:54PM             (10-05-19 @ 19:30)      CARDIOLOGY TESTING  Transthoracic Echocardiogram:    EXAM:  2-D ECHO (TTE) COMPLETE        PROCEDURE DATE:  10/04/2019      INTERPRETATION:  REPORT:    TRANSTHORACIC ECHOCARDIOGRAM REPORT         Patient Name:   NEIL PHOENIX Accession #: [id number]  Medical Rec #:  [id number]     Height:      70.0 in 177.8 cm  YOB: 1950     Weight:      127.0 lb 57.61 kg  Patient Age:    69 years      BSA:         1.72 m²  Patient Gender: M             BP:          119/64 mmHg       Date of Exam:        10/4/2019 10:02:45 AM  Referring Physician: [id number] ED UNASSIGNED  Sonographer:         Maryann Tolbert  Reading Physician:   Dejon Barker M.D.    Procedure:   2D Echo/Doppler/Color Doppler Complete.  Indications: R94.31 - Abnormal Electrocardiogram [ECG] [EKG]  Diagnosis:   Abnormal electrocardiogram [ECG] [EKG] - R94.31         Summary:   1. Normal global left ventricular systolic function.   2. Moderate mitral annular calcification.   3. Thickening of the anterior mitral valve leaflet.   4. There is mild aortic root calcification.    PHYSICIAN INTERPRETATION:  Left Ventricle: The left ventricular internal cavity size is normal. Left   ventricular wall thickness is normal. Global LV systolic function was   normal. Normal segmental left ventricular systolic function.  Right Ventricle: Normal right ventricular size and function.  Left Atrium: Normal left atrial size.  Right Atrium: Normal right atrial size.  Pericardium: There is no evidence of pericardial effusion.  Mitral Valve: Thickening of the anterior mitral valve leaflet. There is   moderate mitral annular calcification. No evidence of mitral valve   regurgitation is seen.  Tricuspid Valve: The tricuspid valve is normal in structure. Trivial   tricuspid regurgitation is visualized.  Aortic Valve: The aortic valve is trileaflet. No evidence of aortic   stenosis. No evidence of aortic valve regurgitation is seen.  Aorta: The aortic root is normal in size and structure. There is mild   aortic root calcification.       2D AND M-MODE MEASUREMENTS (normal ranges within parentheses):  Left                  Normal   Aorta/Left             Normal  Ventricle:                     Atrium:  IVSd (2D):  0.73 cm  (0.7-1.1) AoV Cusp       1.59  (1.5-2.6)  LVPWd       0.66 cm  (0.7-1.1) Separation:     cm  (2D):  Left Atrium    2.62  (1.9-4.0)  LVIDd       4.67 cm  (3.4-5.7) (Mmode):        cm  (2D):                          Right  LVIDs       2.90 cm            Ventricle:  (2D):                          RVd (2D):      2.27 cm  LV FS       37.9 %    (>25%)  (2D):  IVSd        0.68 cm  (0.7-1.1)  (Mmode):  LVPWd       1.03 cm  (0.7-1.1)  (Mmode):  LVIDd       4.30 cm  (3.4-5.7)  (Mmode):  LVIDs       2.92 cm  (Mmode):  LV FS       32.2 %    (>25%)  (Mmode):  Relative     0.28     (<0.42)  Wall  Thickness  Rel. Wall    0.48     (<0.42)  Thickness  Mm  LV Mass    66.9 g/m²  Index:  Mmode    SPECTRAL DOPPLER ANALYSIS:  LVOT Vmax: 0.97 m/s  LVOT VTI:  0.21 m  LVOT Diam: 2.04 cm    Tricuspid Valve and PA/RV Systolic Pressure: TR Max Velocity: 0.97 m/s RA   Pressure:  RVSP/PASP:       [id number] Dejon Barker M.D., Electronically signed on 10/4/2019 at   12:35:49 PM              *** Final ***                    DEJON BARKER MD  This document has been electronically signed. Oct  4 2019 10:02AM             (10-04-19 @ 10:02)  12 Lead ECG:   Ventricular Rate 102 BPM    Atrial Rate 102 BPM    P-R Interval 150 ms    QRS Duration 148 ms    Q-T Interval 404 ms    QTC Calculation(Bezet) 526 ms    P Axis 72 degrees    R Axis -61 degrees    T Axis -2 degrees    Diagnosis Line Sinus tachycardia with Premature atrial complexes  Left axis deviation  Right bundle branch block  Abnormal ECG    Confirmed by Jermaine Escalante ([id number]) on 10/2/2019 10:03:18 AM (10-02-19 @ 09:42)      MEDICATIONS  chlorhexidine 4% Liquid 1  fat emulsion (Plant Based) 20% Infusion 1.695  heparin  Injectable 5000  pantoprazole  Injectable 40  Parenteral Nutrition - Adult 1  piperacillin/tazobactam IVPB.. 3.375  predniSONE   Tablet 5  tiotropium 18 MICROgram(s) Capsule 1      ANTIBIOTICS:  piperacillin/tazobactam IVPB.. 3.375 Gram(s) IV Intermittent every 8 hours      All available historical records have been reviewed

## 2019-10-08 NOTE — PROGRESS NOTE ADULT - SUBJECTIVE AND OBJECTIVE BOX
SUBJECTIVE:    Patient is a 69y old Male who presents with a chief complaint of Gastric Outlet Obstruction (08 Oct 2019 15:43)    Currently admitted to medicine with the primary diagnosis of Upper GI bleed     Today is hospital day 6d. This morning he is resting comfortably in bed and reports no new issues or overnight events. Patient is ambulatory and  eating well, reports 1 BM overnight.     PAST MEDICAL & SURGICAL HISTORY  Gout  COPD, mild  Polymyositis  Hypothyroid  S/P tonsillectomy    SOCIAL HISTORY:  Negative for smoking/alcohol/drug use.     ALLERGIES:  No Known Allergies    MEDICATIONS:  STANDING MEDICATIONS  chlorhexidine 4% Liquid 1 Application(s) Topical <User Schedule>  fat emulsion (Plant Based) 20% Infusion 1.695 Gm/kG/Day IV Continuous <Continuous>  fat emulsion (Plant Based) 20% Infusion 1.695 Gm/kG/Day IV Continuous <Continuous>  heparin  Injectable 5000 Unit(s) SubCutaneous three times a day  pantoprazole  Injectable 40 milliGRAM(s) IV Push two times a day  Parenteral Nutrition - Adult 1 Each TPN Continuous <Continuous>  Parenteral Nutrition - Adult 1 Each TPN Continuous <Continuous>  piperacillin/tazobactam IVPB.. 3.375 Gram(s) IV Intermittent every 8 hours  predniSONE   Tablet 5 milliGRAM(s) Oral daily  tiotropium 18 MICROgram(s) Capsule 1 Capsule(s) Inhalation daily    PRN MEDICATIONS  benzocaine 20% Spray 1 Spray(s) Topical every 4 hours PRN    VITALS:   T(F): 97.5  HR: 104  BP: 110/68  RR: 18  SpO2: 97%    LABS:                        9.4    15.88 )-----------( 415      ( 08 Oct 2019 06:26 )             30.2     10-08    137  |  101  |  9<L>  ----------------------------<  88  4.9   |  23  |  0.6<L>    Ca    8.1<L>      08 Oct 2019 06:26  Phos  2.7     10-08  Mg     2.1     10-08    TPro  5.3<L>  /  Alb  2.8<L>  /  TBili  0.3  /  DBili  x   /  AST  61<H>  /  ALT  41  /  AlkPhos  97  10-08                  RADIOLOGY:  no radiology in past 24 hours.  PHYSICAL EXAM:  GEN: No acute distress  LUNGS: Clear to auscultation bilaterally   HEART: S1/S2 present. RRR.   ABD: Soft, non-tender, non-distended. Bowel sounds present  EXT: NC/NC/NE/2+PP/MERCEDES  NEURO: AAOX3

## 2019-10-08 NOTE — CONSULT NOTE ADULT - REASON FOR ADMISSION
Gastric Outlet Obstruction

## 2019-10-09 ENCOUNTER — TRANSCRIPTION ENCOUNTER (OUTPATIENT)
Age: 69
End: 2019-10-09

## 2019-10-09 VITALS — HEART RATE: 110 BPM | SYSTOLIC BLOOD PRESSURE: 119 MMHG | DIASTOLIC BLOOD PRESSURE: 69 MMHG

## 2019-10-09 LAB
ALBUMIN SERPL ELPH-MCNC: 3 G/DL — LOW (ref 3.5–5.2)
ALP SERPL-CCNC: 115 U/L — SIGNIFICANT CHANGE UP (ref 30–115)
ALT FLD-CCNC: 49 U/L — HIGH (ref 0–41)
ANION GAP SERPL CALC-SCNC: 15 MMOL/L — HIGH (ref 7–14)
AST SERPL-CCNC: 77 U/L — HIGH (ref 0–41)
BASOPHILS # BLD AUTO: 0.1 K/UL — SIGNIFICANT CHANGE UP (ref 0–0.2)
BASOPHILS NFR BLD AUTO: 0.6 % — SIGNIFICANT CHANGE UP (ref 0–1)
BILIRUB SERPL-MCNC: 0.4 MG/DL — SIGNIFICANT CHANGE UP (ref 0.2–1.2)
BUN SERPL-MCNC: 10 MG/DL — SIGNIFICANT CHANGE UP (ref 10–20)
CALCIUM SERPL-MCNC: 8.4 MG/DL — LOW (ref 8.5–10.1)
CHLORIDE SERPL-SCNC: 101 MMOL/L — SIGNIFICANT CHANGE UP (ref 98–110)
CO2 SERPL-SCNC: 21 MMOL/L — SIGNIFICANT CHANGE UP (ref 17–32)
CREAT SERPL-MCNC: 0.7 MG/DL — SIGNIFICANT CHANGE UP (ref 0.7–1.5)
CULTURE RESULTS: SIGNIFICANT CHANGE UP
EOSINOPHIL # BLD AUTO: 0.37 K/UL — SIGNIFICANT CHANGE UP (ref 0–0.7)
EOSINOPHIL NFR BLD AUTO: 2.2 % — SIGNIFICANT CHANGE UP (ref 0–8)
GLUCOSE SERPL-MCNC: 98 MG/DL — SIGNIFICANT CHANGE UP (ref 70–99)
HCT VFR BLD CALC: 31.6 % — LOW (ref 42–52)
HGB BLD-MCNC: 10 G/DL — LOW (ref 14–18)
IMM GRANULOCYTES NFR BLD AUTO: 0.6 % — HIGH (ref 0.1–0.3)
LYMPHOCYTES # BLD AUTO: 0.83 K/UL — LOW (ref 1.2–3.4)
LYMPHOCYTES # BLD AUTO: 5 % — LOW (ref 20.5–51.1)
MAGNESIUM SERPL-MCNC: 1.9 MG/DL — SIGNIFICANT CHANGE UP (ref 1.8–2.4)
MCHC RBC-ENTMCNC: 28.2 PG — SIGNIFICANT CHANGE UP (ref 27–31)
MCHC RBC-ENTMCNC: 31.6 G/DL — LOW (ref 32–37)
MCV RBC AUTO: 89 FL — SIGNIFICANT CHANGE UP (ref 80–94)
MONOCYTES # BLD AUTO: 0.99 K/UL — HIGH (ref 0.1–0.6)
MONOCYTES NFR BLD AUTO: 5.9 % — SIGNIFICANT CHANGE UP (ref 1.7–9.3)
NEUTROPHILS # BLD AUTO: 14.31 K/UL — HIGH (ref 1.4–6.5)
NEUTROPHILS NFR BLD AUTO: 85.7 % — HIGH (ref 42.2–75.2)
NRBC # BLD: 0 /100 WBCS — SIGNIFICANT CHANGE UP (ref 0–0)
PHOSPHATE SERPL-MCNC: 3.1 MG/DL — SIGNIFICANT CHANGE UP (ref 2.1–4.9)
PLATELET # BLD AUTO: 443 K/UL — HIGH (ref 130–400)
POTASSIUM SERPL-MCNC: 4.8 MMOL/L — SIGNIFICANT CHANGE UP (ref 3.5–5)
POTASSIUM SERPL-SCNC: 4.8 MMOL/L — SIGNIFICANT CHANGE UP (ref 3.5–5)
PROT SERPL-MCNC: 5.9 G/DL — LOW (ref 6–8)
RBC # BLD: 3.55 M/UL — LOW (ref 4.7–6.1)
RBC # FLD: 16.1 % — HIGH (ref 11.5–14.5)
SODIUM SERPL-SCNC: 137 MMOL/L — SIGNIFICANT CHANGE UP (ref 135–146)
SPECIMEN SOURCE: SIGNIFICANT CHANGE UP
WBC # BLD: 16.7 K/UL — HIGH (ref 4.8–10.8)
WBC # FLD AUTO: 16.7 K/UL — HIGH (ref 4.8–10.8)

## 2019-10-09 PROCEDURE — 99239 HOSP IP/OBS DSCHRG MGMT >30: CPT

## 2019-10-09 RX ORDER — EZETIMIBE 10 MG/1
1 TABLET ORAL
Qty: 0 | Refills: 0 | DISCHARGE

## 2019-10-09 RX ORDER — TIOTROPIUM BROMIDE 18 UG/1
1 CAPSULE ORAL; RESPIRATORY (INHALATION)
Qty: 0 | Refills: 0 | DISCHARGE
Start: 2019-10-09

## 2019-10-09 RX ORDER — FENOFIBRATE,MICRONIZED 130 MG
1 CAPSULE ORAL
Qty: 0 | Refills: 0 | DISCHARGE

## 2019-10-09 RX ADMIN — HEPARIN SODIUM 5000 UNIT(S): 5000 INJECTION INTRAVENOUS; SUBCUTANEOUS at 13:22

## 2019-10-09 RX ADMIN — TIOTROPIUM BROMIDE 1 CAPSULE(S): 18 CAPSULE ORAL; RESPIRATORY (INHALATION) at 08:16

## 2019-10-09 RX ADMIN — PIPERACILLIN AND TAZOBACTAM 25 GRAM(S): 4; .5 INJECTION, POWDER, LYOPHILIZED, FOR SOLUTION INTRAVENOUS at 06:00

## 2019-10-09 RX ADMIN — PIPERACILLIN AND TAZOBACTAM 25 GRAM(S): 4; .5 INJECTION, POWDER, LYOPHILIZED, FOR SOLUTION INTRAVENOUS at 13:22

## 2019-10-09 RX ADMIN — Medication 5 MILLIGRAM(S): at 06:00

## 2019-10-09 RX ADMIN — PANTOPRAZOLE SODIUM 40 MILLIGRAM(S): 20 TABLET, DELAYED RELEASE ORAL at 06:00

## 2019-10-09 RX ADMIN — HEPARIN SODIUM 5000 UNIT(S): 5000 INJECTION INTRAVENOUS; SUBCUTANEOUS at 06:02

## 2019-10-09 NOTE — SWALLOW BEDSIDE ASSESSMENT ADULT - PHARYNGEAL PHASE
Wet vocal quality post oral intake/Throat clear post oral intake Wet vocal quality post oral intake/Throat clear post oral intake/Multiple swallows

## 2019-10-09 NOTE — PROGRESS NOTE ADULT - SUBJECTIVE AND OBJECTIVE BOX
Reviewed pt chart, not a consult note  Pt had an appointment with me yesterday as an outpatient which was missed as he was still hospitalized for GOO  Had spoken to one of his primary team residents earlier in his hospitalization recommending to continue steroids IV if unable to take PO  Steroids had been decreased to Prednisone 5 mg daily and MTX had been held due to primary team concern for pneumonia in the setting of new bibasilar nodular opacities seen on CT chest, pt asymptomatic without fevers  CT chest findings may be related to dermatomyositis rather than an active infection  Agree with holding MTX only while on antibiotics (may resume after abx are completed 4 tabs weekly (10 mg) x 2 weeks then 8 tabs weekly (20 mg) if well tolerated  Should continue daily FA supplementation  Should increased Prednisone back to 40 mg daily even while on abx therapy  Would recommend repeating CK, aldolase before d/c  Dermatomyositis is well documented to have potential correlation with malignancies - in the setting of mesenteric edema on CT abdomen/pelvis, new GOO, unintentional weight loss recommend follow up with GI even though EGD has been completed  Will see patient Tuesday AM as scheduled for follow up  Discussed recommendations with pt's primary team via phone Reviewed pt chart, not a consult note  Pt had an appointment with me yesterday as an outpatient which was missed as he was still hospitalized for ileus  Had spoken to one of his primary team residents earlier in his hospitalization recommending to continue steroids IV if unable to take PO  Steroids had been decreased to Prednisone 5 mg daily and MTX had been held due to primary team concern for pneumonia in the setting of new bibasilar nodular opacities seen on CT chest, pt asymptomatic without fevers  CT chest findings may be related to dermatomyositis rather than an active infection  Agree with holding MTX only while on antibiotics (may resume after abx are completed 4 tabs weekly (10 mg) x 2 weeks then 8 tabs weekly (20 mg) if well tolerated  Should continue daily FA supplementation  Should increased Prednisone back to 40 mg daily even while on abx therapy  Would recommend repeating CK, aldolase before d/c  Dermatomyositis is well documented to have potential correlation with malignancies - in the setting of mesenteric edema on CT abdomen/pelvis, new ileus, unintentional weight loss recommend follow up with GI even though EGD has been completed  Will see patient Tuesday AM as scheduled for follow up  Discussed recommendations with pt's primary team via phone

## 2019-10-09 NOTE — PROGRESS NOTE ADULT - SUBJECTIVE AND OBJECTIVE BOX
NEIL PHOENIX  69y Male    CHIEF COMPLAINT:    Patient is a 69y old  Male who presents with a chief complaint of Gastric Outlet Obstruction (09 Oct 2019 12:27)      INTERVAL HPI/OVERNIGHT EVENTS:    Patient seen and examined. Feels good. Had BM today. Tolerating regular diet today.     ROS: All other systems are negative.    Vital Signs:    T(F): 98 (10-09-19 @ 05:53), Max: 98 (10-09-19 @ 05:53)  HR: 95 (10-09-19 @ 05:53) (95 - 104)  BP: 109/63 (10-09-19 @ 05:53) (109/63 - 117/72)  RR: 18 (10-09-19 @ 05:53) (18 - 18)  SpO2: 95% (10-09-19 @ 07:54) (95% - 95%)  I&O's Summary    08 Oct 2019 07:01  -  09 Oct 2019 07:00  --------------------------------------------------------  IN: 3105.2 mL / OUT: 3000 mL / NET: 105.2 mL    09 Oct 2019 07:01  -  09 Oct 2019 12:42  --------------------------------------------------------  IN: 981.3 mL / OUT: 500 mL / NET: 481.3 mL      Daily     Daily   CAPILLARY BLOOD GLUCOSE          PHYSICAL EXAM:    GENERAL:  NAD  SKIN: No rashes or lesions  HENT: Atraumatic Normocephalic. PERRL. Moist membranes.  NECK: Supple, No JVD. No lymphadenopathy.  PULMONARY: +ve coarse crackles in the lower lungs B/L  CVS: Normal S1, S2. Rate and Rhythm are regular. No murmurs.  ABDOMEN/GI: Soft, Nontender, Nondistended; BS present  EXTREMITIES: Peripheral pulses intact. No edema B/L LE.  NEUROLOGIC:  No motor or sensory deficit.  PSYCH: Alert & oriented x 3    Consultant(s) Notes Reviewed:  [x ] YES  [ ] NO  Care Discussed with Consultants/Other Providers [ x] YES  [ ] NO    EKG reviewed  Telemetry reviewed    LABS:                        10.0   16.70 )-----------( 443      ( 09 Oct 2019 08:20 )             31.6     10-09    137  |  101  |  10  ----------------------------<  98  4.8   |  21  |  0.7    Ca    8.4<L>      09 Oct 2019 08:20  Phos  3.1     10-09  Mg     1.9     10-09    TPro  5.9<L>  /  Alb  3.0<L>  /  TBili  0.4  /  DBili  x   /  AST  77<H>  /  ALT  49<H>  /  AlkPhos  115  10-09              RADIOLOGY & ADDITIONAL TESTS:      Imaging or report Personally Reviewed:  [ ] YES  [ ] NO    Medications:  Standing  chlorhexidine 4% Liquid 1 Application(s) Topical <User Schedule>  fat emulsion (Plant Based) 20% Infusion 1.695 Gm/kG/Day IV Continuous <Continuous>  heparin  Injectable 5000 Unit(s) SubCutaneous three times a day  pantoprazole  Injectable 40 milliGRAM(s) IV Push two times a day  Parenteral Nutrition - Adult 1 Each TPN Continuous <Continuous>  piperacillin/tazobactam IVPB.. 3.375 Gram(s) IV Intermittent every 8 hours  predniSONE   Tablet 5 milliGRAM(s) Oral daily  tiotropium 18 MICROgram(s) Capsule 1 Capsule(s) Inhalation daily    PRN Meds  benzocaine 20% Spray 1 Spray(s) Topical every 4 hours PRN      Case discussed with resident    Care discussed with pt/family

## 2019-10-09 NOTE — SWALLOW BEDSIDE ASSESSMENT ADULT - SLP GENERAL OBSERVATIONS
Pt awake OOB to chair, alert. +hoarse vocal quality noted. Pt in no apparent pain
Pt received sitting in bed, awake, oriented, and on room air. Pt w/o c/o pain.
Pt received OOB to chair awake and alert on room air

## 2019-10-09 NOTE — PROGRESS NOTE ADULT - PROVIDER SPECIALTY LIST ADULT
Hospitalist
Infectious Disease
Infectious Disease
Internal Medicine
Rheumatology
Surgery

## 2019-10-09 NOTE — DISCHARGE NOTE PROVIDER - HOSPITAL COURSE
68 yo male with PMH of COPD (not on home oxygen), hypothyroidism, gout, RA, polymyositis (on prednisone and methotrexate) presented to the ED after calling 911 for CP and SOB assocated with multiple episodes of coffee ground emesis. He states that he has not been feeling himself since mid-June and reports decreased appetite and an unintentional 30 lb weight loss. He was recently discharged from [facility] on Sept 25th after being admitted for weakness and found to have polymyositis. He endorses intermittent chest pain started approx 4 days ago and N/V 3 days ago. He states his CP was substernal and sharp and was causing him to have trouble breathing. Burping and sneezing made the pain worse. No alleviating factors. He states his last normal BM was 2 weeks ago. Since that time he has had very small, hard BMs every few days. No bright red blood or dark stool noted. Patient denies any prior history of GI bleed. His last colonoscopy was 3 years ago ago by [facility] on [address], he may have had polyps removed at that time but was not sure. Never had EGD before.         Patient had a large volume emesis in transport to the ED and while here an NGT was placed and 2L of dark coffee ground emesis was suctioned out. He states he feels much improved after decompression. He remained hemodynamically stable but mildly tachycardic to the low 100s. CT scan showed large distended stomach with abrupt transition to normal caliber distal stomach without obvious mass or obstruction lesion. Seen by surgery in the ED. Admitted to medicine for further workup and management.         GI followed up on the patient performed endoscopy and found erosive esophagitis and gastritis. Cause of obstruction was possible ileus which is resolved now. Patient was started on TPN, followed by nutritionist and speech and swallow. Diet progressively advanced to nectar consistency. Will d/c on thin liquid and regular diet as per Speech and swallow.     Rheumatologist suggest increasing dose of prednisone to 40mg q daily, with out patient follow up. Will hold MTX for now.    Pt will follow up as out patient with GI, heme onc (for possible underlying malignancy), rheumatologist an PMD.

## 2019-10-09 NOTE — DISCHARGE NOTE PROVIDER - CARE PROVIDERS DIRECT ADDRESSES
[email or web address]

## 2019-10-09 NOTE — SWALLOW BEDSIDE ASSESSMENT ADULT - COMMENTS
Pt scheduled to home. Pt would benefit from instrumental assessment and nutrition support services. min overt s/s of penetration/aspiration

## 2019-10-09 NOTE — SWALLOW BEDSIDE ASSESSMENT ADULT - SWALLOW EVAL: RECOMMENDED DIET
Dysphagia 3 mechanical soft and nectar thick liquids
dys 2 w/ thin
Dysphagia Diet III Mechanical soft consistency with cut up meats and thin liquids

## 2019-10-09 NOTE — PROGRESS NOTE ADULT - SUBJECTIVE AND OBJECTIVE BOX
SUBJECTIVE:    Patient is a 69y old Male who presents with a chief complaint of Gastric Outlet Obstruction (09 Oct 2019 07:13)    Currently admitted to medicine with the primary diagnosis of Upper GI bleed     Today is hospital day 7d. This morning he is resting comfortably in bed and reports no new issues or overnight events. Patient is eating well and reports 2 BM yesterday.    PAST MEDICAL & SURGICAL HISTORY  Gout  COPD, mild  Polymyositis  Hypothyroid  S/P tonsillectomy    SOCIAL HISTORY:  Negative for smoking/alcohol/drug use.     ALLERGIES:  No Known Allergies    MEDICATIONS:  STANDING MEDICATIONS  chlorhexidine 4% Liquid 1 Application(s) Topical <User Schedule>  fat emulsion (Plant Based) 20% Infusion 1.695 Gm/kG/Day IV Continuous <Continuous>  heparin  Injectable 5000 Unit(s) SubCutaneous three times a day  pantoprazole  Injectable 40 milliGRAM(s) IV Push two times a day  Parenteral Nutrition - Adult 1 Each TPN Continuous <Continuous>  piperacillin/tazobactam IVPB.. 3.375 Gram(s) IV Intermittent every 8 hours  predniSONE   Tablet 5 milliGRAM(s) Oral daily  tiotropium 18 MICROgram(s) Capsule 1 Capsule(s) Inhalation daily    PRN MEDICATIONS  benzocaine 20% Spray 1 Spray(s) Topical every 4 hours PRN    VITALS:   T(F): 98  HR: 95  BP: 109/63  RR: 18  SpO2: 95%    LABS:                        9.4    15.88 )-----------( 415      ( 08 Oct 2019 06:26 )             30.2     10-08    137  |  101  |  9<L>  ----------------------------<  88  4.9   |  23  |  0.6<L>    Ca    8.1<L>      08 Oct 2019 06:26  Phos  2.7     10-08  Mg     2.1     10-08    TPro  5.3<L>  /  Alb  2.8<L>  /  TBili  0.3  /  DBili  x   /  AST  61<H>  /  ALT  41  /  AlkPhos  97  10-08                  RADIOLOGY:  no radiology in past 24 hours.  PHYSICAL EXAM:  GEN: No acute distress  LUNGS: Clear to auscultation bilaterally   HEART: S1/S2 present. RRR.   ABD: Soft, non-tender, non-distended. Bowel sounds present  EXT: NC/NC/NE/2+PP/MERCEDES  NEURO: AAOX3

## 2019-10-09 NOTE — DISCHARGE NOTE PROVIDER - CARE PROVIDER_API CALL
Fely Juilen)  Internal Medicine  [address] [place] [address]  Phone: [phone]  Fax: [phone]  Follow Up Time:     Laurie Wadsworth ()  Internal Medicine  [address] [place] [address]  Phone: [phone]  Fax: [phone]  Follow Up Time:     Patricia Moreau)  Internal Medicine  [address] [place] [address]  Phone: [phone]  Fax: [phone]  Follow Up Time:

## 2019-10-09 NOTE — PROGRESS NOTE ADULT - ASSESSMENT
68 yo male with PMH of COPD (not on home oxygen), hypothyroidism, gout, RA, polymyositis (on prednisone and methotrexate) presented to the ED after calling 911 for CP and SOB assocated with multiple episodes of coffee ground emesis.    #Shortness of breath possibly aspiration PNA, crackles in RLL and possible opacity on CXR  - Afebrile and WBC trending down  - MRSA negative, will dc vanco  - CXR shows bibasilar opacities  - on zosyn during in patient stay   - will d/c on 7 day course of PO Augmentin 875 mg BID    # Gastric Outlet Obstruction with Vomiting and Coffee-Ground Emesis  - NGT was placed and 2L of dark coffee ground emesis was suctioned out in ED.   - CT scan showed large distended stomach with abrupt transition to normal caliber distal stomach without obvious mass or obstruction lesion.  - Seen by surgery in the ED: no intervention on their part   - CBC stable keep type and screen active. two large bore peripheral IVs. transfuse if Hb <7  - EGD 10/4,  showed erosive esphagitis No obstruction  - Pt able to eat and pass bowel movement now, will progressively advance the diet.   - Speech and swallow performed, pt on dysphagia 2 diet nectar fluid consistency  - will wean off TPN    # Troponemia - likely secondary to demand ischemia. rule out ACS - stable trops   - elevated at 0.2 - Trending   - EKG showing sinus tachycardia, left axis deviation, and RBBB (on prior EKGs as well).   - 2D echo   - Cardiology indicated tropenemia secondary to polymyositis    # Polymyositis - pt reports symptoms have been improving somewhat since discharge  - restarted PO prednisone 5mg q24  - will continue holding MTX until infection resolves  - outpatient rheum follow up.    # COPD - stable  - dc duonebs due to tachycardia  - no wheezing on exam    # Hypothyroidism   - f/u TSH  - holding all PO meds for now    dvt ppx: Lovenox  diet: Dysphagia 3 diet, nectar fluid consistency, paged nutrition to wean of O2  out of bed to chair  dispo: from home  FULL CODE 70 yo male with PMH of COPD (not on home oxygen), hypothyroidism, gout, RA, polymyositis (on prednisone and methotrexate) presented to the ED after calling 911 for CP and SOB assocated with multiple episodes of coffee ground emesis.    #Shortness of breath possibly aspiration PNA, crackles in RLL and possible opacity on CXR  - Afebrile and WBC trending down  - MRSA negative, will dc vanco  - CXR shows bibasilar opacities  - on zosyn during in patient stay   - will d/c on 7 day course of PO Augmentin 875 mg BID    # Gastric Outlet Obstruction with Vomiting and Coffee-Ground Emesis  - NGT was placed and 2L of dark coffee ground emesis was suctioned out in ED.   - CT scan showed large distended stomach with abrupt transition to normal caliber distal stomach without obvious mass or obstruction lesion.  - Seen by surgery in the ED: no intervention on their part   - CBC stable keep type and screen active. two large bore peripheral IVs. transfuse if Hb <7  - EGD 10/4,  showed erosive esphagitis No obstruction  - Pt able to eat and pass bowel movement now, will progressively advance the diet.   - Speech and swallow performed, pt on dysphagia 2 diet nectar fluid consistency  - will wean off TPN    # Troponemia - likely secondary to demand ischemia. rule out ACS - stable trops   - elevated at 0.2 - Trending   - EKG showing sinus tachycardia, left axis deviation, and RBBB (on prior EKGs as well).   - 2D echo   - Cardiology indicated tropenemia secondary to polymyositis    # Polymyositis - pt reports symptoms have been improving somewhat since discharge  - restarted PO prednisone 5mg q24, will d/c on 40mg PO q24  - will continue holding MTX until infection resolves  - outpatient rheum follow up.    # COPD - stable  - dc duonebs due to tachycardia  - no wheezing on exam    # Hypothyroidism   - f/u TSH  - holding all PO meds for now    dvt ppx: Lovenox  diet:thin liquids regular diet d/c  out of bed to chair  dispo: home with [facility]  FULL CODE

## 2019-10-09 NOTE — SWALLOW BEDSIDE ASSESSMENT ADULT - SWALLOW EVAL: DIAGNOSIS
+min overt s/s of penetration/aspiration persists w/ thin, +overt s/s of pharyngeal dysphagia w/ solids
No s/s aspiration/penetration for nectar thick liquids, puree, mechanical soft. Suspected pharyngeal dysphagia ofr thin liquids
Min overt s/s of penetration/aspiration across all consistencies consistent w/ previous diagnosis of polymyositis

## 2019-10-09 NOTE — SWALLOW BEDSIDE ASSESSMENT ADULT - SWALLOW EVAL: CURRENT DIET
Clear liquids per MD
dys 3 w/ thin
Dysphagia Diet II mechanical soft consistency with ground meat with Thin Liquids

## 2019-10-09 NOTE — CHART NOTE - NSCHARTNOTEFT_GEN_A_CORE
Continues on PO soft diet with nectar thick liquids and tolerating well  +BM's  PPN infusing. Judideb NL    T(F): 98 (10-09-19 @ 05:53), Max: 98 (10-09-19 @ 05:53)  HR: 95 (10-09-19 @ 05:53) (95 - 104)  BP: 109/63 (10-09-19 @ 05:53) (109/63 - 117/72)  RR: 18 (10-09-19 @ 05:53) (18 - 18)  SpO2: 95% (10-09-19 @ 07:54) (95% - 95%)    I&O's Detail    08 Oct 2019 07:01  -  09 Oct 2019 07:00  --------------------------------------------------------  IN:    fat emulsion (Plant Based) 20% Infusion: 125.2 mL    fat emulsion (Plant Based) 20% Infusion: 375 mL    IV PiggyBack: 200 mL    Oral Fluid: 725 mL    PPN (Peripheral Parenteral Nutrition): 1680 mL  Total IN: 3105.2 mL    OUT:    Voided: 3000 mL  Total OUT: 3000 mL    Total NET: 105.2 mL    10-09    137  |  101  |  10  ----------------------------<  98  4.8   |  21  |  0.7    Ca    8.4<L>      09 Oct 2019 08:20  Phos  3.1     10-09  Mg     1.9     10-09    TPro  5.9<L>  /  Alb  3.0<L>  /  TBili  0.4  /  DBili  x   /  AST  77<H>  /  ALT  49<H>  /  AlkPhos  115  10-09                          10.0   16.70 )-----------( 443      ( 09 Oct 2019 08:20 )             31.6     Diet, Dysphagia 3 Soft-Nectar Consistency Fluid (10-07-19 @ 14:50)    ASSESSMENT  68 yo male with PMH of COPD (not on home oxygen), hypothyroidism, gout, RA, polymyositis (on prednisone and methotrexate) presented to the ED after calling 911 for CP and SOB assocated with multiple episodes of coffee ground emesis.    - Coffee ground Emesis  - esophagitis, non- erosive gastritis, hiatal hernia  - Aspiration PNA  - Polymyositis  - COPD  - RA / Gout  - CP likely musculoskeletal  - Hypothyroidism  - unintentional weight loss    PLAN:  - will d/c PPN today   - PO diet as tolerated  - check calorie counts X 3 days

## 2019-10-09 NOTE — SWALLOW BEDSIDE ASSESSMENT ADULT - NS SPL SWALLOW CLINIC TRIAL FT
+min overt s/s of penetration/aspiration w/ thin
Min overt s/s of penetration aspiration w/ thin
Suspected pharyngeal dysphagia

## 2019-10-09 NOTE — SWALLOW BEDSIDE ASSESSMENT ADULT - SLP PERTINENT HISTORY OF CURRENT PROBLEM
Pt admitted with SOB, gastric outlet obstruction. B/L PNA, aspiration PNA? Known to SLP dept recently seen 9/22 recs for soft and thin liquids
Pt admitted with SOB, gastric outlet obstruction. B/L PNA, aspiration PNA? Known to SLP dept recently seen 9/22 recs for soft and thin liquids. PMH: polymyositis, denies h/o PNA, admits to dysphagia since June 2019
Pt admitted with SOB, gastric outlet obstruction. B/L PNA, aspiration PNA? Known to SLP dept recently seen 9/22 recs for soft and thin liquids. PMH: polymyositis, denies h/o PNA, admits to dysphagia since June 2019

## 2019-10-09 NOTE — PROGRESS NOTE ADULT - ASSESSMENT
68 yo male with PMH of COPD (not on home oxygen), hypothyroidism, gout, RA, polymyositis (on prednisone and methotrexate) presented to the ED after calling 911 for CP and SOB assocated with multiple episodes of coffee ground emesis.    1.	Possible Ileus - resolved  2.	No gastric outlet obstruction.   3.	Aspiration PNA  4.	Polymyositis  5.	COPD  6.	RA / Gout  7.	CP likely musculoskeletal   8.	Hypothyroidism  9.	Hyponatremia  10.	Pharyngeal dysphagia         PLAN:    ·	No more further W/U as per speech and swallow. Can have thin liquids and regular diet.  ·	As per nutrition D/C PPN today  ·	D/C Abx  ·	CT abd/pelvis reviewed. No obstruction  ·	Elevated troponin likely due to polymyositis  ·	S/P EGD. Showed esophagitis and gastritis. No gastric outlet obstruction  ·	Cont Protonix 40 mg po q 12h  ·	Care D/W the Rheumatology. Recommended to start him Prednisone 40 mg po daily. Cont to hold Methotrexate   ·	D/C home today  ·	Leukocytosis due to steroids.     * Med rec reviewed. Plan of care D/W the pt. Time spent 36 minutes.

## 2019-10-09 NOTE — DISCHARGE NOTE PROVIDER - PROVIDER TOKENS
PROVIDER:[TOKEN:[[id number]]],PROVIDER:[TOKEN:[[id number]]],PROVIDER:[TOKEN:[[id number]]]

## 2019-10-14 PROBLEM — M33.90 DERMATOMYOSITIS: Status: ACTIVE | Noted: 2019-10-14

## 2019-10-14 RX ORDER — FOLIC ACID 1 MG/1
1 TABLET ORAL DAILY
Qty: 30 | Refills: 2 | Status: ACTIVE | COMMUNITY
Start: 2019-10-14 | End: 1900-01-01

## 2019-10-14 RX ORDER — METHOTREXATE 2.5 MG/1
2.5 TABLET ORAL
Qty: 24 | Refills: 0 | Status: ACTIVE | COMMUNITY
Start: 2019-10-14 | End: 1900-01-01

## 2019-10-15 DIAGNOSIS — K20.8 OTHER ESOPHAGITIS: ICD-10-CM

## 2019-10-15 DIAGNOSIS — M06.9 RHEUMATOID ARTHRITIS, UNSPECIFIED: ICD-10-CM

## 2019-10-15 DIAGNOSIS — T38.0X5A ADVERSE EFFECT OF GLUCOCORTICOIDS AND SYNTHETIC ANALOGUES, INITIAL ENCOUNTER: ICD-10-CM

## 2019-10-15 DIAGNOSIS — J44.9 CHRONIC OBSTRUCTIVE PULMONARY DISEASE, UNSPECIFIED: ICD-10-CM

## 2019-10-15 DIAGNOSIS — D72.829 ELEVATED WHITE BLOOD CELL COUNT, UNSPECIFIED: ICD-10-CM

## 2019-10-15 DIAGNOSIS — E03.9 HYPOTHYROIDISM, UNSPECIFIED: ICD-10-CM

## 2019-10-15 DIAGNOSIS — M10.9 GOUT, UNSPECIFIED: ICD-10-CM

## 2019-10-15 DIAGNOSIS — R63.4 ABNORMAL WEIGHT LOSS: ICD-10-CM

## 2019-10-15 DIAGNOSIS — K92.2 GASTROINTESTINAL HEMORRHAGE, UNSPECIFIED: ICD-10-CM

## 2019-10-15 DIAGNOSIS — J69.0 PNEUMONITIS DUE TO INHALATION OF FOOD AND VOMIT: ICD-10-CM

## 2019-10-15 DIAGNOSIS — R07.89 OTHER CHEST PAIN: ICD-10-CM

## 2019-10-15 DIAGNOSIS — M33.20 POLYMYOSITIS, ORGAN INVOLVEMENT UNSPECIFIED: ICD-10-CM

## 2019-10-15 DIAGNOSIS — K29.70 GASTRITIS, UNSPECIFIED, WITHOUT BLEEDING: ICD-10-CM

## 2019-10-15 DIAGNOSIS — E87.1 HYPO-OSMOLALITY AND HYPONATREMIA: ICD-10-CM

## 2019-10-15 DIAGNOSIS — K56.7 ILEUS, UNSPECIFIED: ICD-10-CM

## 2019-10-15 DIAGNOSIS — K44.9 DIAPHRAGMATIC HERNIA WITHOUT OBSTRUCTION OR GANGRENE: ICD-10-CM

## 2019-10-15 DIAGNOSIS — R13.13 DYSPHAGIA, PHARYNGEAL PHASE: ICD-10-CM

## 2019-10-22 ENCOUNTER — APPOINTMENT (OUTPATIENT)
Dept: RHEUMATOLOGY | Facility: CLINIC | Age: 69
End: 2019-10-22

## 2019-11-21 ENCOUNTER — INPATIENT (INPATIENT)
Facility: HOSPITAL | Age: 69
LOS: 12 days | Discharge: NURSING, REHAB OR LONG-TERM CARE | End: 2019-12-04
Attending: INTERNAL MEDICINE | Admitting: INTERNAL MEDICINE
Payer: MEDICARE

## 2019-11-21 VITALS
HEART RATE: 96 BPM | RESPIRATION RATE: 18 BRPM | SYSTOLIC BLOOD PRESSURE: 108 MMHG | DIASTOLIC BLOOD PRESSURE: 81 MMHG | TEMPERATURE: 95 F | OXYGEN SATURATION: 95 %

## 2019-11-21 DIAGNOSIS — Z90.89 ACQUIRED ABSENCE OF OTHER ORGANS: Chronic | ICD-10-CM

## 2019-11-21 LAB
ALBUMIN SERPL ELPH-MCNC: 3.5 G/DL — SIGNIFICANT CHANGE UP (ref 3.5–5.2)
ALP SERPL-CCNC: 81 U/L — SIGNIFICANT CHANGE UP (ref 30–115)
ALT FLD-CCNC: 43 U/L — HIGH (ref 0–41)
ANION GAP SERPL CALC-SCNC: 16 MMOL/L — HIGH (ref 7–14)
APTT BLD: 24.4 SEC — LOW (ref 27–39.2)
AST SERPL-CCNC: 56 U/L — HIGH (ref 0–41)
BASOPHILS # BLD AUTO: 0.01 K/UL — SIGNIFICANT CHANGE UP (ref 0–0.2)
BASOPHILS NFR BLD AUTO: 0.1 % — SIGNIFICANT CHANGE UP (ref 0–1)
BILIRUB SERPL-MCNC: 0.5 MG/DL — SIGNIFICANT CHANGE UP (ref 0.2–1.2)
BLD GP AB SCN SERPL QL: SIGNIFICANT CHANGE UP
BUN SERPL-MCNC: 31 MG/DL — HIGH (ref 10–20)
CALCIUM SERPL-MCNC: 9 MG/DL — SIGNIFICANT CHANGE UP (ref 8.5–10.1)
CHLORIDE SERPL-SCNC: 97 MMOL/L — LOW (ref 98–110)
CK SERPL-CCNC: 265 U/L — HIGH (ref 0–225)
CO2 SERPL-SCNC: 22 MMOL/L — SIGNIFICANT CHANGE UP (ref 17–32)
CREAT SERPL-MCNC: 0.5 MG/DL — LOW (ref 0.7–1.5)
EOSINOPHIL # BLD AUTO: 0.02 K/UL — SIGNIFICANT CHANGE UP (ref 0–0.7)
EOSINOPHIL NFR BLD AUTO: 0.1 % — SIGNIFICANT CHANGE UP (ref 0–8)
ERYTHROCYTE [SEDIMENTATION RATE] IN BLOOD: 19 MM/HR — HIGH (ref 0–10)
GLUCOSE SERPL-MCNC: 94 MG/DL — SIGNIFICANT CHANGE UP (ref 70–99)
HCT VFR BLD CALC: 37.3 % — LOW (ref 42–52)
HGB BLD-MCNC: 11.9 G/DL — LOW (ref 14–18)
IMM GRANULOCYTES NFR BLD AUTO: 0.8 % — HIGH (ref 0.1–0.3)
INR BLD: 1.05 RATIO — SIGNIFICANT CHANGE UP (ref 0.65–1.3)
IRON SATN MFR SERPL: 16 % — SIGNIFICANT CHANGE UP (ref 15–50)
IRON SATN MFR SERPL: 33 UG/DL — LOW (ref 35–150)
LYMPHOCYTES # BLD AUTO: 0.55 K/UL — LOW (ref 1.2–3.4)
LYMPHOCYTES # BLD AUTO: 3.6 % — LOW (ref 20.5–51.1)
MCHC RBC-ENTMCNC: 30.4 PG — SIGNIFICANT CHANGE UP (ref 27–31)
MCHC RBC-ENTMCNC: 31.9 G/DL — LOW (ref 32–37)
MCV RBC AUTO: 95.2 FL — HIGH (ref 80–94)
MONOCYTES # BLD AUTO: 0.76 K/UL — HIGH (ref 0.1–0.6)
MONOCYTES NFR BLD AUTO: 5 % — SIGNIFICANT CHANGE UP (ref 1.7–9.3)
NEUTROPHILS # BLD AUTO: 13.67 K/UL — HIGH (ref 1.4–6.5)
NEUTROPHILS NFR BLD AUTO: 90.4 % — HIGH (ref 42.2–75.2)
NRBC # BLD: 0 /100 WBCS — SIGNIFICANT CHANGE UP (ref 0–0)
PLATELET # BLD AUTO: 158 K/UL — SIGNIFICANT CHANGE UP (ref 130–400)
POTASSIUM SERPL-MCNC: 4.7 MMOL/L — SIGNIFICANT CHANGE UP (ref 3.5–5)
POTASSIUM SERPL-SCNC: 4.7 MMOL/L — SIGNIFICANT CHANGE UP (ref 3.5–5)
PROT SERPL-MCNC: 5.8 G/DL — LOW (ref 6–8)
PROTHROM AB SERPL-ACNC: 12.1 SEC — SIGNIFICANT CHANGE UP (ref 9.95–12.87)
RBC # BLD: 3.92 M/UL — LOW (ref 4.7–6.1)
RBC # FLD: 22.4 % — HIGH (ref 11.5–14.5)
SODIUM SERPL-SCNC: 135 MMOL/L — SIGNIFICANT CHANGE UP (ref 135–146)
TIBC SERPL-MCNC: 212 UG/DL — LOW (ref 220–430)
TRANSFERRIN SERPL-MCNC: 199 MG/DL — LOW (ref 200–360)
UIBC SERPL-MCNC: 179 UG/DL — SIGNIFICANT CHANGE UP (ref 110–370)
URATE SERPL-MCNC: 3.2 MG/DL — LOW (ref 3.4–8.8)
WBC # BLD: 15.13 K/UL — HIGH (ref 4.8–10.8)
WBC # FLD AUTO: 15.13 K/UL — HIGH (ref 4.8–10.8)

## 2019-11-21 PROCEDURE — 74018 RADEX ABDOMEN 1 VIEW: CPT | Mod: 26

## 2019-11-21 PROCEDURE — 71046 X-RAY EXAM CHEST 2 VIEWS: CPT | Mod: 26

## 2019-11-21 PROCEDURE — 99285 EMERGENCY DEPT VISIT HI MDM: CPT

## 2019-11-21 RX ORDER — ENOXAPARIN SODIUM 100 MG/ML
40 INJECTION SUBCUTANEOUS DAILY
Refills: 0 | Status: DISCONTINUED | OUTPATIENT
Start: 2019-11-21 | End: 2019-11-25

## 2019-11-21 RX ORDER — FOLIC ACID 0.8 MG
1 TABLET ORAL DAILY
Refills: 0 | Status: DISCONTINUED | OUTPATIENT
Start: 2019-11-21 | End: 2019-11-26

## 2019-11-21 RX ORDER — METHOTREXATE 2.5 MG/1
20 TABLET ORAL
Refills: 0 | Status: DISCONTINUED | OUTPATIENT
Start: 2019-11-22 | End: 2019-11-25

## 2019-11-21 RX ORDER — LEVOTHYROXINE SODIUM 125 MCG
25 TABLET ORAL DAILY
Refills: 0 | Status: DISCONTINUED | OUTPATIENT
Start: 2019-11-21 | End: 2019-11-26

## 2019-11-21 RX ORDER — PANTOPRAZOLE SODIUM 20 MG/1
40 TABLET, DELAYED RELEASE ORAL DAILY
Refills: 0 | Status: DISCONTINUED | OUTPATIENT
Start: 2019-11-21 | End: 2019-11-21

## 2019-11-21 RX ORDER — ALLOPURINOL 300 MG
100 TABLET ORAL DAILY
Refills: 0 | Status: DISCONTINUED | OUTPATIENT
Start: 2019-11-21 | End: 2019-11-26

## 2019-11-21 RX ORDER — SODIUM CHLORIDE 9 MG/ML
1000 INJECTION INTRAMUSCULAR; INTRAVENOUS; SUBCUTANEOUS
Refills: 0 | Status: DISCONTINUED | OUTPATIENT
Start: 2019-11-21 | End: 2019-11-22

## 2019-11-21 RX ORDER — PANTOPRAZOLE SODIUM 20 MG/1
40 TABLET, DELAYED RELEASE ORAL EVERY 12 HOURS
Refills: 0 | Status: DISCONTINUED | OUTPATIENT
Start: 2019-11-21 | End: 2019-11-29

## 2019-11-21 RX ORDER — INFLUENZA VIRUS VACCINE 15; 15; 15; 15 UG/.5ML; UG/.5ML; UG/.5ML; UG/.5ML
0.5 SUSPENSION INTRAMUSCULAR ONCE
Refills: 0 | Status: DISCONTINUED | OUTPATIENT
Start: 2019-11-21 | End: 2019-12-04

## 2019-11-21 RX ORDER — TIOTROPIUM BROMIDE 18 UG/1
1 CAPSULE ORAL; RESPIRATORY (INHALATION) DAILY
Refills: 0 | Status: DISCONTINUED | OUTPATIENT
Start: 2019-11-21 | End: 2019-12-04

## 2019-11-21 RX ORDER — ALBUTEROL 90 UG/1
2 AEROSOL, METERED ORAL EVERY 6 HOURS
Refills: 0 | Status: DISCONTINUED | OUTPATIENT
Start: 2019-11-21 | End: 2019-12-04

## 2019-11-21 RX ADMIN — PANTOPRAZOLE SODIUM 40 MILLIGRAM(S): 20 TABLET, DELAYED RELEASE ORAL at 15:35

## 2019-11-21 RX ADMIN — Medication 20 MILLIGRAM(S): at 17:44

## 2019-11-21 RX ADMIN — SODIUM CHLORIDE 75 MILLILITER(S): 9 INJECTION INTRAMUSCULAR; INTRAVENOUS; SUBCUTANEOUS at 14:29

## 2019-11-21 NOTE — H&P ADULT - ASSESSMENT
================= ASSESSMENT/PLAN ==================  Patient is a 69y old Male who presents with a chief complaint of Currently admitted to medicine with the primary diagnosis of Difficulty swallowing    # Generalized weakness and muscle pain   Differential include:   - Polymyositis: History and clinical exam of weakness and tenderness to palpation is in favor   - Deconditioning: In favor are recent admission with severe illness, decreased PO intake   - Hypothyroidism: In favor is history and proximal weakness however no other evidence of hypothyroidism    PLAN  - Check ESR, CRP, CK and aldolase   - Check TSH   - Continue prednisone at 40 mg in AM and 20 mg at night and MTX every friday   - Rheumatology evaluation     # Dysphagia - Attributed to PM   Patient does not endorse worsening but agrees with NPO with speech reeval for now   - Speech evaluation   - Suction as needed   - MIVFs in the meantime     # Normocytic Anemia - Stable   Likely secondary to anemia of inflammation   - Check iron studies     # Elevated WBC - Chronic and stable, secondary to steroid   No signs of infection   - Monitor     # Recent history of UGIB   Grade D esophagitis compatible with nonspecific erosive esophagitis. Hiatal Hernia. Erythema in the stomach compatible with non-erosive gastritis. (Biopsy). Normal mucosa in the whole examined duodenum. No evidence of gastric outlet obstruction, gastric mucosa have evidence of NG tube induced erosions.   Biopsy showed: Mild chronic gastritis, Giemsa stain fails to reveal Helicobacter pylori in this material. No intestinal metaplasia or dysplasia seen. Esophagitis  - Continue IV PPI BID   - Repeat EGD is due on 11/29 (8 weeks after previous) to monitor for healing. Coordinate appointment with GI   - GERD diet     # COPD - stable  - no wheezing on exam  - Duonebs as needed     # Hypothyroidism   - f/u TSH  - Continue current synthroid for now     GI PPX: IV PPI BID   DVT PPX: Lovenox     DIET: NPO for now   ACTIVITY:  () Ad Lizabeth  /  (X) Advance as Tolerated  /  () Bed Rest  /  () Fall Precaution  /  () Seizure precaution    ================= DISPOSITION ==================    Patient to be discharged when condition(s) optimized.            Discharge to: Home when cleared             Home services:              Counseled on/Discussed cessation of:  () Risky behaviors  /  () Smoking cessation  /  () Diet  /  () Exercise  /  () Other    ================= CODE STATUS =================                  (X) FULL CODE     |     () DNR     |     () DNI    () Discussion with patient and/or family regarding goals of care ================= ASSESSMENT/PLAN ==================  Patient is a 69y old Male who presents with a chief complaint of Currently admitted to medicine with the primary diagnosis of Difficulty swallowing    # Generalized weakness and muscle pain   Differential include:   - Polymyositis: History and clinical exam of weakness and tenderness to palpation is in favor   - Deconditioning: In favor are recent admission with severe illness, decreased PO intake   - Hypothyroidism: In favor is history and proximal weakness however no other evidence of hypothyroidism    PLAN  - Check ESR, CRP, CK and aldolase   - Check TSH   - Continue prednisone at 40 mg in AM and 20 mg at night and MTX every friday   - Rheumatology evaluation   - PT/Rehab     # Dysphagia - Attributed to PM   Patient does not endorse worsening but agrees with NPO with speech reeval for now   - Speech evaluation   - Suction as needed   - MIVFs in the meantime     # Possible dilated loops on CXR   Patient passing gas and having bowel movements   - Check KUB   - Monitor stool count     # Normocytic Anemia - Stable   Likely secondary to anemia of inflammation   - Check iron studies     # Elevated WBC - Chronic and stable, secondary to steroid   No signs of infection   - Monitor     # Recent history of UGIB   Grade D esophagitis compatible with nonspecific erosive esophagitis. Hiatal Hernia. Erythema in the stomach compatible with non-erosive gastritis. (Biopsy). Normal mucosa in the whole examined duodenum. No evidence of gastric outlet obstruction, gastric mucosa have evidence of NG tube induced erosions.   Biopsy showed: Mild chronic gastritis, Giemsa stain fails to reveal Helicobacter pylori in this material. No intestinal metaplasia or dysplasia seen. Esophagitis  - Continue IV PPI BID   - Repeat EGD is due on 11/29 (8 weeks after previous) to monitor for healing. Coordinate appointment with GI   - GERD diet     # COPD - stable  - no wheezing on exam  - Duonebs as needed     # Hypothyroidism   - f/u TSH  - Continue current synthroid for now     GI PPX: IV PPI BID   DVT PPX: Lovenox     DIET: NPO for now   ACTIVITY:  () Ad Lizabeth  /  (X) Advance as Tolerated  /  () Bed Rest  /  () Fall Precaution  /  () Seizure precaution    ================= DISPOSITION ==================    Patient to be discharged when condition(s) optimized.            Discharge to: Home when cleared             Home services:              Counseled on/Discussed cessation of:  () Risky behaviors  /  () Smoking cessation  /  () Diet  /  () Exercise  /  () Other    ================= CODE STATUS =================                  (X) FULL CODE     |     () DNR     |     () DNI    () Discussion with patient and/or family regarding goals of care

## 2019-11-21 NOTE — H&P ADULT - HISTORY OF PRESENT ILLNESS
[69 year old  man]    CC: Generalized weakness and muscle pain    PMH: COPD (not on home oxygen), hypothyroidism, gout, RA, 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 He also endorses overall fatigue, decreased appetite and PO intake as well as weight loss of over 40 pounds in the past 6 months. In the past 2 days prior to his presentation, he noticed an acute worsening of his weakness and fatigue as well as muscle pains which were diffuse and constant. He contacted his rheumatologist who referred him to the ED.     He denies rash, fevers or chills. Denies joint swelling or pain, denies worsening of his dysphagia. Denies nausea, vomiting, constipation or diarrhea. He does endorse dark stools that he's had for months he says. Denies shortness of breath or chest pain.     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 He says since then and despite both treatments he has not felt any improvement. However he says he recently saw a rheumatologist in the clinic (Dr. Fontanez) and since "his numbers" were improving the prednisone was increased to 60. The MTX was held is his recent hospitalization and restarted after discharge.     In the ED, vitals were stable. Labs showed chronic elevated WBC at 15, stable hemoglobin at 11.9. CXR showed decreasing bibasilar opacities.

## 2019-11-21 NOTE — H&P ADULT - NSHPREVIEWOFSYSTEMS_GEN_ALL_CORE
REVIEW OF SYSTEMS:    CONSTITUTIONAL: + weakness, no fevers or chills  EYES/ENT: No visual changes;  No vertigo or throat pain   NECK: No pain or stiffness  RESPIRATORY: No cough, wheezing, hemoptysis; No shortness of breath  CARDIOVASCULAR: No chest pain or palpitations  GASTROINTESTINAL: No abdominal or epigastric pain. No nausea, vomiting, or hematemesis; No diarrhea or constipation. + melena no hematochezia.  GENITOURINARY: No dysuria, frequency or hematuria  NEUROLOGICAL: No numbness + weakness  SKIN: No itching, rashes

## 2019-11-21 NOTE — ED PROVIDER NOTE - ATTENDING CONTRIBUTION TO CARE
68 yo M pmh of RA, polymyositis, copd no home oxygen, hypothyroid, gout presents with difficulty eating. Patient was sent in by his rheumatologist Dr. Wadsworth because of his difficulty tolerating po. Has a significant weight loss in the last few months because he is barely able to eat. States that he is only taking in some ensure and yogurt, unable to tolerate solid foods but having more difficulty eating every day. no coughing, no pain with swallowing. Had recent admission for upper GI bleed with endoscopy that showed erosive gastritis, no subsequent episodes of bleeding since. no cp, no sob ,no n/v/d, no abdominal pain, no fevers.     CONSTITUTIONAL: Well-developed; well-nourished; in no acute distress.   SKIN: warm, dry  HEAD: Normocephalic; atraumatic.  EYES: PERRL, EOMI, no conjunctival erythema  ENT: No nasal discharge; airway clear.  NECK: Supple; non tender.  CARD: S1, S2 normal;  Regular rate and rhythm.   RESP: No wheezes, rales or rhonchi.  ABD: soft non tender, non distended, no rebound or guarding  EXT: Normal ROM.    LYMPH: No acute cervical adenopathy.  NEURO: Alert, oriented, grossly unremarkable.

## 2019-11-21 NOTE — H&P ADULT - NSHPPHYSICALEXAM_GEN_ALL_CORE
=================== OBJECTIVE ===================    VITAL SIGNS: Last 24 Hours  T(C): 36.6 (21 Nov 2019 15:52), Max: 36.6 (21 Nov 2019 13:28)  T(F): 97.9 (21 Nov 2019 15:52), Max: 97.9 (21 Nov 2019 15:52)  HR: 76 (21 Nov 2019 15:52) (76 - 96)  BP: 119/73 (21 Nov 2019 15:52) (108/81 - 119/73)  BP(mean): --  RR: 18 (21 Nov 2019 15:52) (18 - 18)  SpO2: 98% (21 Nov 2019 15:52) (95% - 98%)    PHYSICAL EXAM:  GENERAL: NAD, well-developed, pleasant   CHEST/LUNG: Clear to auscultation bilaterally; No wheeze  HEART: Regular rate and rhythm; No murmurs, rubs, or gallops  ABDOMEN: Soft, Nontender, Nondistended; Bowel sounds present  EXTREMITIES:  2+ Peripheral Pulses, No clubbing, cyanosis, or edema. Mild diffuse tenderness to muscle palpation proximal and distal.   PSYCH: AAOx3  NEUROLOGY: Moves all extremities 3/5 in all 4 extremities. UE and LE proximal strength inferior to distal strength   SKIN: No rashes or lesions

## 2019-11-21 NOTE — ED PROVIDER NOTE - CLINICAL SUMMARY MEDICAL DECISION MAKING FREE TEXT BOX
Patient presents with difficulty tolerating po, worsening over last few months with significant weight loss. Sent in by rheumatologist for evaluation. labs, xray done. Patient admitted for further evaluation.

## 2019-11-21 NOTE — ED PROVIDER NOTE - PHYSICAL EXAMINATION
PHYSICAL EXAM  GENERAL: No acute distress, cachexic  HEAD:  Atraumatic, Normocephalic  ENT: PERRL, conjunctiva and sclera clear, neck supple, no JVD, moist mucosa, posterior oropharynx clear  CHEST/LUNG: Clear to auscultation bilaterally; No wheeze, equal breath sounds bilaterally, respirations nonlabored  HEART: Regular rate and rhythm; No murmurs, rubs, or gallops  ABDOMEN: Soft, nontender, nondistended; Bowel sounds present, no organomegaly  BACK: no spinal tenderness  EXTREMITIES:  No clubbing, cyanosis, or edema  PSYCH: Normal behavior, normal affect  NEUROLOGY: AAOx3, non-focal, moves all extremities spontaneously  SKIN: Normal color, No rashes or lesions

## 2019-11-21 NOTE — ED PROVIDER NOTE - NS ED ROS FT
REVIEW OF SYSTEMS:  CONSTITUTIONAL: + generalized weakness, weight loss. No fevers or chills  EYES/ENT: +Difficulty swallowing. No visual changes;  No vertigo or throat pain   NECK: No pain or stiffness  RESPIRATORY: No cough, wheezing, hemoptysis; No shortness of breath  CARDIOVASCULAR: No chest pain or palpitations  GASTROINTESTINAL: No abdominal or epigastric pain. No nausea, vomiting, or hematemesis; No diarrhea or constipation. No melena or hematochezia.  GENITOURINARY: No dysuria, frequency or hematuria  NEUROLOGICAL: No numbness or weakness  SKIN: No itching, rashes

## 2019-11-21 NOTE — ED ADULT TRIAGE NOTE - NS ED NURSE BANDS TYPE
Name band; Stephani: Patient with left heel pain x 1 week after missing step ladder step. c/o pain to heel. will get xray, declined pain medication, reassess

## 2019-11-21 NOTE — H&P ADULT - NSHPLABSRESULTS_GEN_ALL_CORE
===================== LABS =====================                        11.9   15.13 )-----------( 158      ( 21 Nov 2019 10:07 )             37.3     11-21    135  |  97<L>  |  31<H>  ----------------------------<  94  4.7   |  22  |  0.5<L>    Ca    9.0      21 Nov 2019 10:07    TPro  5.8<L>  /  Alb  3.5  /  TBili  0.5  /  DBili  x   /  AST  56<H>  /  ALT  43<H>  /  AlkPhos  81  11-21    ================== IMAGING ==================    < from: Xray Chest 2 Views PA/Lat (11.21.19 @ 09:52) >    Impression:      Decreased bibasilar opacities.    ================== OTHER SIGNIFICANT FINDINGS ==================    Final Diagnosis  Stomach, biopsy:  - Mild chronic gastritis.  - Giemsa stain fails to reveal Helicobacter pylori in this  material.  - No intestinal metaplasia or dysplasia seen.    Postoperative Diagnosis  Esophagitis      ================== PROCEDURES ==================    < from: EGD (10.04.19 @ 11:30) >    Impressions:    Grade D esophagitis compatible with nonspecific erosive esophagitis.    Hiatal Hernia.    Erythema in the stomach compatible with non-erosive gastritis. (Biopsy).    Normal mucosa in the whole examined duodenum.    No evidence of gastric outlet obstruction, gastric mucosa have evidence of NG  tube induced erosions.

## 2019-11-21 NOTE — ED ADULT TRIAGE NOTE - CHIEF COMPLAINT QUOTE
Pt sent by Dr. Wadsworth for evaluation - has RA and is c/o weight loss >40lbs over a period of 2 months.  Pt reports difficulty swallowing that has worsened over the past two months and generalized pain

## 2019-11-21 NOTE — ED PROVIDER NOTE - OBJECTIVE STATEMENT
68yo male with PMH of polymyositis (on prednisone and MTX), COPD, hypothyroidism, gout, RA and erosive esophagitis and gastritis sent from Dr. Wadsworth for increasing difficulty with swallowing and weight loss. Recently discharged from [facility] on 10/9 after upper GI bleed, diagnosed with erosive esophagitis and gastritis on endoscopy. Reports 40lb weight loss over the last two months, and is only able to tolerate PO liquids and yogurt since discharge from hospital. Also reports generalized weakness, denies any fever, chills, SOB, CP, abdominal pain, diarrhea, constipation, or LE pain/swelling.

## 2019-11-21 NOTE — H&P ADULT - ATTENDING COMMENTS
Pt seen and examined independently at bedside. Agree with above with following additions:    Pt seen by speech and swallow team. He is still having difficulty iniating swallowing solid foods. He is scheduled for modified barium swallow on 11/25. Pt seen by GI, possible repeat EGD early next week or as outpatient. Pt's symptoms are likely related to his autoimmune conditions. Doubt other neurologic causes as no focal neurologic deficit. Awaiting rheumatology evaluation.

## 2019-11-21 NOTE — ED ADULT NURSE NOTE - OBJECTIVE STATEMENT
pt came to the ER today with c/o weight loss over 40 lbs and difficulty swallowing. pt able to speak in full sentences no drooling.

## 2019-11-22 DIAGNOSIS — R53.83 OTHER FATIGUE: ICD-10-CM

## 2019-11-22 DIAGNOSIS — R63.4 ABNORMAL WEIGHT LOSS: ICD-10-CM

## 2019-11-22 DIAGNOSIS — M33.90 DERMATOPOLYMYOSITIS, UNSPECIFIED, ORGAN INVOLVEMENT UNSPECIFIED: ICD-10-CM

## 2019-11-22 LAB
ALBUMIN SERPL ELPH-MCNC: 2.7 G/DL — LOW (ref 3.5–5.2)
ALP SERPL-CCNC: 73 U/L — SIGNIFICANT CHANGE UP (ref 30–115)
ALT FLD-CCNC: 31 U/L — SIGNIFICANT CHANGE UP (ref 0–41)
ANION GAP SERPL CALC-SCNC: 10 MMOL/L — SIGNIFICANT CHANGE UP (ref 7–14)
AST SERPL-CCNC: 38 U/L — SIGNIFICANT CHANGE UP (ref 0–41)
BASOPHILS # BLD AUTO: 0.01 K/UL — SIGNIFICANT CHANGE UP (ref 0–0.2)
BASOPHILS NFR BLD AUTO: 0.1 % — SIGNIFICANT CHANGE UP (ref 0–1)
BILIRUB DIRECT SERPL-MCNC: 0.2 MG/DL — SIGNIFICANT CHANGE UP (ref 0–0.2)
BILIRUB INDIRECT FLD-MCNC: 0.3 MG/DL — SIGNIFICANT CHANGE UP (ref 0.2–1.2)
BILIRUB SERPL-MCNC: 0.5 MG/DL — SIGNIFICANT CHANGE UP (ref 0.2–1.2)
BUN SERPL-MCNC: 28 MG/DL — HIGH (ref 10–20)
CALCIUM SERPL-MCNC: 8.1 MG/DL — LOW (ref 8.5–10.1)
CHLORIDE SERPL-SCNC: 102 MMOL/L — SIGNIFICANT CHANGE UP (ref 98–110)
CK SERPL-CCNC: 190 U/L — SIGNIFICANT CHANGE UP (ref 0–225)
CO2 SERPL-SCNC: 25 MMOL/L — SIGNIFICANT CHANGE UP (ref 17–32)
CREAT SERPL-MCNC: 0.6 MG/DL — LOW (ref 0.7–1.5)
CRP SERPL-MCNC: 0.27 MG/DL — SIGNIFICANT CHANGE UP (ref 0–0.4)
EOSINOPHIL # BLD AUTO: 0.01 K/UL — SIGNIFICANT CHANGE UP (ref 0–0.7)
EOSINOPHIL NFR BLD AUTO: 0.1 % — SIGNIFICANT CHANGE UP (ref 0–8)
FERRITIN SERPL-MCNC: 859 NG/ML — HIGH (ref 30–400)
FOLATE SERPL-MCNC: 12.4 NG/ML — SIGNIFICANT CHANGE UP
GLUCOSE SERPL-MCNC: 85 MG/DL — SIGNIFICANT CHANGE UP (ref 70–99)
HCT VFR BLD CALC: 33.7 % — LOW (ref 42–52)
HGB BLD-MCNC: 10.6 G/DL — LOW (ref 14–18)
IMM GRANULOCYTES NFR BLD AUTO: 0.5 % — HIGH (ref 0.1–0.3)
LYMPHOCYTES # BLD AUTO: 0.49 K/UL — LOW (ref 1.2–3.4)
LYMPHOCYTES # BLD AUTO: 4.1 % — LOW (ref 20.5–51.1)
MCHC RBC-ENTMCNC: 29.9 PG — SIGNIFICANT CHANGE UP (ref 27–31)
MCHC RBC-ENTMCNC: 31.5 G/DL — LOW (ref 32–37)
MCV RBC AUTO: 95.2 FL — HIGH (ref 80–94)
MONOCYTES # BLD AUTO: 0.49 K/UL — SIGNIFICANT CHANGE UP (ref 0.1–0.6)
MONOCYTES NFR BLD AUTO: 4.1 % — SIGNIFICANT CHANGE UP (ref 1.7–9.3)
NEUTROPHILS # BLD AUTO: 10.92 K/UL — HIGH (ref 1.4–6.5)
NEUTROPHILS NFR BLD AUTO: 91.1 % — HIGH (ref 42.2–75.2)
NRBC # BLD: 0 /100 WBCS — SIGNIFICANT CHANGE UP (ref 0–0)
PLATELET # BLD AUTO: 139 K/UL — SIGNIFICANT CHANGE UP (ref 130–400)
POTASSIUM SERPL-MCNC: 4.6 MMOL/L — SIGNIFICANT CHANGE UP (ref 3.5–5)
POTASSIUM SERPL-SCNC: 4.6 MMOL/L — SIGNIFICANT CHANGE UP (ref 3.5–5)
PROT SERPL-MCNC: 4.7 G/DL — LOW (ref 6–8)
RBC # BLD: 3.54 M/UL — LOW (ref 4.7–6.1)
RBC # FLD: 22.5 % — HIGH (ref 11.5–14.5)
SODIUM SERPL-SCNC: 137 MMOL/L — SIGNIFICANT CHANGE UP (ref 135–146)
TSH SERPL-MCNC: 4.05 UIU/ML — SIGNIFICANT CHANGE UP (ref 0.27–4.2)
VIT B12 SERPL-MCNC: 592 PG/ML — SIGNIFICANT CHANGE UP (ref 232–1245)
WBC # BLD: 11.98 K/UL — HIGH (ref 4.8–10.8)
WBC # FLD AUTO: 11.98 K/UL — HIGH (ref 4.8–10.8)

## 2019-11-22 PROCEDURE — 74018 RADEX ABDOMEN 1 VIEW: CPT | Mod: 26

## 2019-11-22 PROCEDURE — 99223 1ST HOSP IP/OBS HIGH 75: CPT | Mod: AI

## 2019-11-22 PROCEDURE — 99223 1ST HOSP IP/OBS HIGH 75: CPT

## 2019-11-22 PROCEDURE — 99222 1ST HOSP IP/OBS MODERATE 55: CPT

## 2019-11-22 RX ORDER — SODIUM CHLORIDE 9 MG/ML
1000 INJECTION, SOLUTION INTRAVENOUS
Refills: 0 | Status: DISCONTINUED | OUTPATIENT
Start: 2019-11-22 | End: 2019-11-22

## 2019-11-22 RX ORDER — MYCOPHENOLATE MOFETIL 250 MG/1
500 CAPSULE ORAL
Refills: 0 | Status: DISCONTINUED | OUTPATIENT
Start: 2019-11-22 | End: 2019-11-26

## 2019-11-22 RX ADMIN — PANTOPRAZOLE SODIUM 40 MILLIGRAM(S): 20 TABLET, DELAYED RELEASE ORAL at 05:20

## 2019-11-22 RX ADMIN — SODIUM CHLORIDE 75 MILLILITER(S): 9 INJECTION, SOLUTION INTRAVENOUS at 11:30

## 2019-11-22 RX ADMIN — Medication 25 MICROGRAM(S): at 05:20

## 2019-11-22 RX ADMIN — Medication 1 MILLIGRAM(S): at 11:27

## 2019-11-22 RX ADMIN — METHOTREXATE 20 MILLIGRAM(S): 2.5 TABLET ORAL at 11:28

## 2019-11-22 RX ADMIN — ENOXAPARIN SODIUM 40 MILLIGRAM(S): 100 INJECTION SUBCUTANEOUS at 11:27

## 2019-11-22 RX ADMIN — MYCOPHENOLATE MOFETIL 500 MILLIGRAM(S): 250 CAPSULE ORAL at 17:10

## 2019-11-22 RX ADMIN — PANTOPRAZOLE SODIUM 40 MILLIGRAM(S): 20 TABLET, DELAYED RELEASE ORAL at 17:10

## 2019-11-22 RX ADMIN — Medication 100 MILLIGRAM(S): at 11:27

## 2019-11-22 NOTE — SWALLOW BEDSIDE ASSESSMENT ADULT - SWALLOW EVAL: DIAGNOSIS
suspected pharyngeal dysphagia for thin liquids and puree consistency; decreased overts noted w/ nectar-thick liquids

## 2019-11-22 NOTE — CONSULT NOTE ADULT - ASSESSMENT
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    #Dermatomyositis  -generalized weakness likley related to DM + deconditioning  -though inflammatory markers not elevated: ESR 19.  --->190  -underlying malignancy is still a concern in view of recent diagnosis of DM and significant weight loss  -continue prednisone and methotrexate    ***Case is yet to be discussed with the attending 70 yo male with PMH pmh of copd not on home oxygen, hypothyroidism, recent diagnosis of dermatomyositis on prednisone and methotrexate presents to ed for worsening generalized weakness and muscle pain.    #Dermatomyositis  -generalized weakness likley related to DM + deconditioning  -though inflammatory markers not elevated: ESR 19.  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    #Dermatomyositis  -generalized weakness likley related to DM + deconditioning  -though inflammatory markers not elevated: ESR 19.  --->190  -underlying malignancy is still a concern in view of recent diagnosis of DM and significant weight loss  -continue prednisone 60 mg daily, decrease methotrexate to 15 mg weekly, add  mg BID, continue with folic acid supplementation  -check aldolse/ldh  -PT  -GI workup

## 2019-11-22 NOTE — PROVIDER CONTACT NOTE (OTHER) - SITUATION
pt admitted with small 0.3 x0.5 pressure ulcer on coccyx, ulcer is unstageable and was covered by [facility].

## 2019-11-22 NOTE — SWALLOW BEDSIDE ASSESSMENT ADULT - SWALLOW EVAL: RECOMMENDED FEEDING/EATING TECHNIQUES
crush medication (when feasible)/position upright (90 degrees)/small sips/bites/maintain upright posture during/after eating for 30 mins

## 2019-11-22 NOTE — PROGRESS NOTE ADULT - SUBJECTIVE AND OBJECTIVE BOX
Hospital Day:  1d    Subjective:    Patient is a 69y old  Male who presents with a chief complaint of Generalized weakness and muscle pain (21 Nov 2019 16:18)    Interval: Patient reports losing 40 pounds in past few months due to difficulty eating, not able to fully describe why. At one point saying he had no appetite, at another say it was just to difficult for him, but he could not fully explain. Complaining of diffuse pain in all of his muscles. Denies chest pain, palpitations, or shortness of breath, fevers, or chills.     Past Medical Hx:   Gout  COPD, mild  Polymyositis  Hypothyroid    Past Sx:  S/P tonsillectomy  No significant past surgical history    Allergies:  No Known Allergies    Current Meds:   Standng Meds:  allopurinol 100 milliGRAM(s) Oral daily  enoxaparin Injectable 40 milliGRAM(s) SubCutaneous daily  folic acid 1 milliGRAM(s) Oral daily  influenza   Vaccine 0.5 milliLiter(s) IntraMuscular once  levothyroxine 25 MICROGram(s) Oral daily  methotrexate 20 milliGRAM(s) Oral <User Schedule>  pantoprazole  Injectable 40 milliGRAM(s) IV Push every 12 hours  predniSONE   Tablet 60 milliGRAM(s) Oral daily  predniSONE   Tablet 20 milliGRAM(s) Oral <User Schedule>  sodium chloride 0.9%. 1000 milliLiter(s) (75 mL/Hr) IV Continuous <Continuous>  tiotropium 18 MICROgram(s) Capsule 1 Capsule(s) Inhalation daily    PRN Meds:  ALBUTerol    90 MICROgram(s) HFA Inhaler 2 Puff(s) Inhalation every 6 hours PRN Shortness of Breath and/or Wheezing    HOME MEDICATIONS:  allopurinol 100 mg oral tablet: 1 tab(s) orally once a day  DuoNeb 0.5 mg-2.5 mg/3 mL inhalation solution: 3 milliliter(s) inhaled 3 times a day, As Needed  levothyroxine 25 mcg (0.025 mg) oral tablet: 1 tab(s) orally once a day  pantoprazole 40 mg oral delayed release tablet: 1 tab(s) orally once a day  predniSONE 20 mg oral tablet: 2 tab(s) orally once a day in the morning   predniSONE 20 mg oral tablet: 1 tab(s) orally once a day at night   tiotropium 18 mcg inhalation capsule: 1 cap(s) inhaled once a day      Vital Signs:   T(F): 97.1 (11-22-19 @ 07:30), Max: 97.9 (11-21-19 @ 15:52)  HR: 65 (11-22-19 @ 07:30) (65 - 96)  BP: 98/60 (11-22-19 @ 07:30) (98/60 - 121/64)  RR: 18 (11-22-19 @ 07:30) (18 - 18)  SpO2: 98% (11-21-19 @ 18:03) (95% - 98%)        Physical Exam:   GENERAL: NAD, diffuse pain, cachectic  HEENT: NCAT  CHEST/LUNG: CTAB  HEART: Regular rate and rhythm; s1 s2 appreciated  ABDOMEN: Soft, Nondistended; Bowel sounds present  EXTREMITIES: No LE edema b/l  NERVOUS SYSTEM:  Alert & Oriented X3      Labs:                         10.6   11.98 )-----------( 139      ( 22 Nov 2019 06:13 )             33.7     Neutophil% 91.1, Lymphocyte% 4.1, Monocyte% 4.1, Bands% 0.5 11-22-19 @ 06:13    22 Nov 2019 06:13    137    |  102    |  28     ----------------------------<  85     4.6     |  25     |  0.6      Ca    8.1        22 Nov 2019 06:13    TPro  4.7    /  Alb  2.7    /  TBili  0.5    /  DBili  0.2    /  AST  38     /  ALT  31     /  AlkPhos  73     22 Nov 2019 06:13       pTT    24.4             ----< 1.05 INR  (11-21-19 @ 17:48)    12.10        PT      Troponin --, CKMB --,  11-22-19 @ 06:13  Troponin --, CKMB --,  11-21-19 @ 17:48    Iron 33, TIBC 212, %Sat 16 Ferritin 859 11-21-19 @ 17:48      Radiology:   < from: Xray Kidney Ureter Bladder (11.21.19 @ 20:56) >  Impression    Nonspecific air distended loops of small bowel within the mid abdomen.   Follow-up is recommended    < end of copied text >    < from: Xray Chest 2 Views PA/Lat (11.21.19 @ 09:52) >  Impression:      Decreased bibasilar opacities.    < end of copied text >

## 2019-11-22 NOTE — CONSULT NOTE ADULT - ATTENDING COMMENTS
The patient's chart was reviewed. I saw and examined the patient. Pt is a 70 yo male with a history of esophagitis , with dysphagia.    Pt planned for Modified Barium swallow next week  Hg stable and no signs of active Gi bleed   Rec:   Continue PPI BID  Pt had apple sauce today so can not do EGD today   needs a repeat EGD that can be done as outpatient or next week if patient is staying

## 2019-11-22 NOTE — PROGRESS NOTE ADULT - ASSESSMENT
Patient is a 69y old Male who presents with a chief complaint of Currently admitted to medicine with the primary diagnosis of Difficulty swallowing    # Generalized weakness and muscle pain   Differential include:   - Polymyositis: History and clinical exam of weakness and tenderness to palpation is in favor   - Deconditioning: In favor are recent admission with severe illness, decreased PO intake   - Hypothyroidism: In favor is history and proximal weakness however no other evidence of hypothyroidism, TSH-4.05, lower than previous admission    - Check ESR- 19, CRP-0.27, CK-265-->190 this am, and aldolase   - Check TSH   - Continue prednisone at 40 mg in AM and 20 mg at night and MTX every friday   - Rheumatology evaluation- Dr. Wadsworth  - PT/Rehab     # Dysphagia - Attributed to PM   Patient does not endorse worsening but agrees with NPO with speech reeval for now   - Speech evaluation   - Suction as needed   - MIVFs in the meantime     # Possible dilated loops on CXR   Patient passing gas and having bowel movements   - Check KUB   - Monitor stool count     # Normocytic Anemia - Stable   Likely secondary to anemia of inflammation   - Check iron studies     # Elevated WBC - Chronic and stable, secondary to steroid, downtrending  - No signs of infection   - Monitor     # Recent history of UGIB   -Grade D esophagitis compatible with nonspecific erosive esophagitis. Hiatal Hernia. Erythema in the stomach compatible with non-erosive gastritis. (Biopsy). Normal mucosa in the whole examined duodenum. No evidence of gastric outlet obstruction, gastric mucosa have evidence of NG tube induced erosions. Biopsy showed: Mild chronic gastritis, Giemsa stain fails to reveal Helicobacter pylori in this material. No intestinal metaplasia or dysplasia seen. Esophagitis  - Continue IV PPI BID   - Repeat EGD is due on 11/29 (8 weeks after previous) to monitor for healing. Coordinate appointment with GI   - GERD diet, hwr, npo until speech    # COPD - stable  - no wheezing on exam  - Duonebs as needed     # Hypothyroidism   - TSH- 4.05 (lower than october)  - Continue current synthroid for now     GI PPX: IV PPI BID   DVT PPX: Lovenox   DIet: NPO pending speech  Full Code Patient is a 69y old Male who presents with a chief complaint of Currently admitted to medicine with the primary diagnosis of Difficulty swallowing    # Generalized weakness and muscle pain  Polymyositis: History and clinical exam of weakness and tenderness to palpation is in favor vs Deconditioning: In favor are recent admission with severe illness, decreased PO intake vs Hypothyroidism: In favor is history and proximal weakness however no other evidence of hypothyroidism,   - TSH-4.05, lower than previous admission  - Check ESR- 19, CRP-0.27, CK-265-->190 this am, and aldolase   - Continue prednisone at 40 mg in AM and 20 mg at night and MTX every friday   - folic acid daily  - Rheumatology evaluation- Dr. Wadsworth  - PT/Rehab     # Dysphagia  - Speech evaluation- will go for MBS on Monday, for now- dyphagia 1 w nectar thick, 1:1 feeds  - Suction as needed   - Seen by GI, no EGD today, maybe Tuesday if inpatient or outpt    # Possible dilated loops on CXR, improving  - Patient passing gas and having bowel movements   - Repeat KUB- decreased distention  - Monitor stool count     # Normocytic Anemia - Stable   Likely secondary to anemia of inflammation   - Check iron studies     # Elevated WBC - Chronic and stable, secondary to steroid, downtrending  - No signs of infection   - Monitor     # Recent history of UGIB   -Grade D esophagitis compatible with nonspecific erosive esophagitis. Hiatal Hernia. Erythema in the stomach compatible with non-erosive gastritis. (Biopsy). Normal mucosa in the whole examined duodenum. No evidence of gastric outlet obstruction, gastric mucosa have evidence of NG tube induced erosions. Biopsy showed: Mild chronic gastritis, Giemsa stain fails to reveal Helicobacter pylori in this material. No intestinal metaplasia or dysplasia seen. Esophagitis  - Continue IV PPI BID   - Repeat EGD is due on 11/29 (8 weeks after previous) to monitor for healing  - GI consulted, no EGD today, maybe Tuesday if inpatient or outpt    # COPD - stable  - no wheezing on exam  - Duonebs as needed     # Hypothyroidism   - TSH- 4.05 (lower than october)  - Continue current synthroid for now     GI PPX: IV PPI BID   DVT PPX: Lovenox   DIet: Dysphagia 1, nectar thick  Full Code

## 2019-11-22 NOTE — SWALLOW BEDSIDE ASSESSMENT ADULT - SLP GENERAL OBSERVATIONS
pt received OOB to chair awake alert w/o c/o pain. +cachetic +room air. pt reports he only eats yogurt at home and states he cannot swallow any solid food

## 2019-11-22 NOTE — CONSULT NOTE ADULT - ASSESSMENT
69 y old man with hx of COPD (not on home oxygen), hypothyroidism, gout, RA, dermato vs polymyositis (on prednisone and methotrexate), ?systemic sclerosis, on 10/2019 admitted for severe UGIB with EGD showing esophagitis and erosive gastritis, stay complicated by ileus vs gastric outlet obstruction, pathology at the time ruled out cancer    # Hx pf Esophagitis LA grade D on previous EGD   Pt asymptomatic except for Dysphagia   Pt planned for Modified Barium swallow next week  Hg stable and no signs of active Gi bleed   Rec:   Continue PPI BID  Pt had apple sauce today so can not do EGD today   needs a repeat EGD that can be done as outpatient or next week if patient is staying   Recall GI as needed

## 2019-11-22 NOTE — CONSULT NOTE ADULT - SUBJECTIVE AND OBJECTIVE BOX
NEIL [place]  [id number]  Male  69y  HPI:  [69 year old  man]    CC: Generalized weakness and muscle pain    PMH: COPD (not on home oxygen), hypothyroidism, gout, RA, dermatomyositis (on prednisone and methotrexate), on 10/2019 admitted for severe UGIB with EGD showing esophagitis and erosive gastritis, stay complicated by ileus vs gastric outlet obstruction, pathology at the time ruled out cancer as well as possible aspiration pneumonia seen by speech and swallow and put on dysphagia 2 diet     History of Present Illness goes back to the since his prior discharge when patient endorses progressive generalized weakness to the point where he was unable to lift himself for a seating position, get up flight of stairs and raise his arms above his head. He also endorses overall fatigue, decreased appetite and PO intake as well as weight loss of over 40 pounds in the past 6 months. In the past 2 days prior to his presentation, he noticed an acute worsening of his weakness and fatigue as well as muscle pains which were diffuse and constant. He contacted his rheumatologist who referred him to the ED.     He denies rash, fevers or chills. Denies joint swelling or pain, denies worsening of his dysphagia. Denies nausea, vomiting, constipation or diarrhea. He does endorse dark stools that he's had for months he says. Denies shortness of breath or chest pain.     Patient was recently diagnosed with dermatomyositis in July of 2019 and started on MTX and prednisone. He says since then and despite both treatments he has not felt any improvement. However he says he recently saw a rheumatologist in the clinic (Dr. Fontanez) and since "his numbers" were improving the prednisone was increased to 60. The MTX was held is his recent hospitalization and restarted after discharge.     In the ED, vitals were stable. Labs showed chronic elevated WBC at 15, stable hemoglobin at 11.9. CXR showed decreasing bibasilar opacities. (21 Nov 2019 16:18)    PAST MEDICAL & SURGICAL HISTORY:  Gout  COPD, mild  Polymyositis  Hypothyroid  S/P tonsillectomy    FAMILY HISTORY:  No pertinent family history in first degree relatives    MEDICATIONS  (STANDING):  allopurinol 100 milliGRAM(s) Oral daily  enoxaparin Injectable 40 milliGRAM(s) SubCutaneous daily  folic acid 1 milliGRAM(s) Oral daily  influenza   Vaccine 0.5 milliLiter(s) IntraMuscular once  levothyroxine 25 MICROGram(s) Oral daily  methotrexate 20 milliGRAM(s) Oral <User Schedule>  pantoprazole  Injectable 40 milliGRAM(s) IV Push every 12 hours  predniSONE   Tablet 60 milliGRAM(s) Oral daily  predniSONE   Tablet 20 milliGRAM(s) Oral <User Schedule>  sodium chloride 0.9%. 1000 milliLiter(s) (75 mL/Hr) IV Continuous <Continuous>  tiotropium 18 MICROgram(s) Capsule 1 Capsule(s) Inhalation daily    MEDICATIONS  (PRN):  ALBUTerol    90 MICROgram(s) HFA Inhaler 2 Puff(s) Inhalation every 6 hours PRN Shortness of Breath and/or Wheezing    Allergies    No Known Allergies    Intolerances        PHYSICAL EXAM:    	GENERAL: NAD, well-developed, pleasant   	CHEST/LUNG: Clear to auscultation bilaterally; No wheeze  	HEART: Regular rate and rhythm; No murmurs, rubs, or gallops  	ABDOMEN: Soft, Nontender, Nondistended; Bowel sounds present  	EXTREMITIES:  2+ Peripheral Pulses, No clubbing, cyanosis, or edema. Mild diffuse tenderness to muscle palpation proximal and distal.   	PSYCH: AAOx3  	NEUROLOGY: Moves all extremities 3/5 in all 4 extremities. UE and LE proximal strength inferior to distal strength   SKIN: No rashes or lesions        CBC Full  -  ( 22 Nov 2019 06:13 )  WBC Count : 11.98 K/uL  RBC Count : 3.54 M/uL  Hemoglobin : 10.6 g/dL  Hematocrit : 33.7 %  Platelet Count - Automated : 139 K/uL  Mean Cell Volume : 95.2 fL  Mean Cell Hemoglobin : 29.9 pg  Mean Cell Hemoglobin Concentration : 31.5 g/dL  Auto Neutrophil # : 10.92 K/uL  Auto Lymphocyte # : 0.49 K/uL  Auto Monocyte # : 0.49 K/uL  Auto Eosinophil # : 0.01 K/uL  Auto Basophil # : 0.01 K/uL  Auto Neutrophil % : 91.1 %  Auto Lymphocyte % : 4.1 %  Auto Monocyte % : 4.1 %  Auto Eosinophil % : 0.1 %  Auto Basophil % : 0.1 %    LIVER FUNCTIONS - ( 22 Nov 2019 06:13 )  Alb: 2.7 g/dL / Pro: 4.7 g/dL / ALK PHOS: 73 U/L / ALT: 31 U/L / AST: 38 U/L / GGT: x           11-22    137  |  102  |  28<H>  ----------------------------<  85  4.6   |  25  |  0.6<L>    Ca    8.1<L>      22 Nov 2019 06:13    TPro  4.7<L>  /  Alb  2.7<L>  /  TBili  0.5  /  DBili  0.2  /  AST  38  /  ALT  31  /  AlkPhos  73  11-22    PT/INR - ( 21 Nov 2019 17:48 )   PT: 12.10 sec;   INR: 1.05 ratio         PTT - ( 21 Nov 2019 17:48 )  PTT:24.4 sec      	< from: Xray Chest 2 Views PA/Lat (11.21.19 @ 09:52) >    	Impression:      	Decreased bibasilar opacities.    	================== OTHER SIGNIFICANT FINDINGS ==================    	Final Diagnosis  	Stomach, biopsy:  	- Mild chronic gastritis.  	- Giemsa stain fails to reveal Helicobacter pylori in this  	material.  	- No intestinal metaplasia or dysplasia seen.    	Postoperative Diagnosis  	Esophagitis      	================== PROCEDURES ==================    	< from: EGD (10.04.19 @ 11:30) >    	Impressions:   	 Grade D esophagitis compatible with nonspecific erosive esophagitis.   	 Hiatal Hernia.   	 Erythema in the stomach compatible with non-erosive gastritis. (Biopsy).   	 Normal mucosa in the whole examined duodenum.   	 No evidence of gastric outlet obstruction, gastric mucosa have evidence of NG  tube induced erosions.

## 2019-11-22 NOTE — CONSULT NOTE ADULT - ATTENDING COMMENTS
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 Steroids recently increased from prednisone 40 mg daily to 60 mg daily 2 weeks ago by outpatient rheumatologist Dr. Fontanez due to ongoing symptoms although CKs have improved and now normalized. Has continued on MTX 20 mg weekly with FA supplementation. Ab workup was notably unremarkable. My exam demonstrates weakness of proximal muscles hip flexors>triceps>biceps/deltoids, abnormal nailfold capillaroscopy, perioral furrowing, digital skin thickening, dusky L 2nd digit distally with coolness to palpation, gottron's papules, gottron's sign somewhat mottled over knees, patient appears older than stated age and frail. Recommend continuation of prednisone 60 mg daily (would not continue extra 20 mg at night), continue MTX/FA. Check aldolase, LDH. Agree with GI workup. Called CT to discuss prior mesenteric edema noted on 9/25 CT which wasn't described again on repeat CT but wanted to confirm resolution of this finding (awaiting call back). Will follow along. 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 Steroids recently increased from prednisone 40 mg daily to 60 mg daily 2 weeks ago by outpatient rheumatologist Dr. Fontanez due to ongoing symptoms although CKs have improved and now normalized. Has continued on MTX 20 mg weekly with FA supplementation. Ab workup was notably unremarkable. My exam demonstrates weakness of proximal muscles hip flexors>triceps>biceps/deltoids, abnormal nailfold capillaroscopy, perioral furrowing, digital skin thickening, dusky L 2nd digit distally with coolness to palpation, gottron's papules, gottron's sign somewhat mottled over knees, patient appears older than stated age and frail. Recommend continuation of prednisone 60 mg daily (would not continue extra 20 mg at night - dose just escalated 2 weeks ago), continue MTX/FA. Check aldolase, LDH. If Raynaud's type symptoms persist may consider addition of low dose CCB such as amlodipine 2.5 mg daily as BP tolerates. Agree with GI workup. Called CT to discuss prior mesenteric edema noted on 9/25 CT which wasn't described again on repeat CT but wanted to confirm resolution of this finding (awaiting call back). Will follow along. 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 Steroids recently increased from prednisone 40 mg daily to 60 mg daily 2 weeks ago by outpatient rheumatologist Dr. Fontanez due to ongoing symptoms although CKs have improved and now normalized. Has continued on MTX 20 mg weekly with FA supplementation. Ab workup was notably unremarkable. My exam demonstrates weakness of proximal muscles hip flexors>triceps>biceps/deltoids, abnormal nailfold capillaroscopy, perioral furrowing, digital skin thickening, dusky L 2nd digit distally with coolness to palpation, gottron's papules, gottron's sign somewhat mottled over knees, patient appears older than stated age and frail. Recommend continuation of prednisone 60 mg daily (would not continue extra 20 mg at night - dose just escalated 2 weeks ago), continue MTX/FA. Check aldolase, LDH. Muscle strength improvement can lag behind CK normalization - recommend PT. Concerned that he still has skin activity with both continued gottron's as well as progressive skin thickening - will add low dose  mg daily this weekend and reassess Monday. If Raynaud's type symptoms persist may consider addition of low dose CCB such as amlodipine 2.5 mg daily as BP tolerates. Agree with GI workup. Called CT to discuss prior mesenteric edema noted on 9/25 CT which wasn't described again on repeat CT but wanted to confirm resolution of this finding (awaiting call back). Will follow along. 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 Steroids recently increased from prednisone 40 mg daily to 60 mg daily 2 weeks ago by outpatient rheumatologist Dr. Fontanez due to ongoing symptoms although CKs have improved and now normalized. Has continued on MTX 20 mg weekly with FA supplementation. Ab workup was notably unremarkable. My exam demonstrates weakness of proximal muscles hip flexors>triceps>biceps/deltoids, abnormal nailfold capillaroscopy, perioral furrowing, digital skin thickening, dusky L 2nd digit distally with coolness to palpation, gottron's papules, gottron's sign somewhat mottled over knees, patient appears older than stated age and frail. Recommend continuation of prednisone 60 mg daily (would not continue extra 20 mg at night - dose just escalated 2 weeks ago), continue MTX/FA. Check aldolase, LDH. Muscle strength improvement can lag behind CK normalization - recommend PT. Concerned that he still has skin activity with both continued gottron's as well as progressive skin thickening - will add low dose  mg BID and reassess Monday. If Raynaud's type symptoms persist may consider addition of low dose CCB such as amlodipine 2.5 mg daily as BP tolerates. Agree with GI workup. Called CT to discuss prior mesenteric edema noted on 9/25 CT which wasn't described again on repeat CT but wanted to confirm resolution of this finding (awaiting call back). Will follow along. 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 Steroids recently increased from prednisone 40 mg daily to 60 mg daily 2 weeks ago by outpatient rheumatologist Dr. Fontanez due to ongoing symptoms although CKs have improved and now normalized. Has continued on MTX 20 mg weekly with FA supplementation. Ab workup was notably unremarkable. My exam demonstrates weakness of proximal muscles hip flexors>triceps>biceps/deltoids, abnormal nailfold capillaroscopy, perioral furrowing, digital skin thickening, dusky L 2nd digit distally with coolness to palpation, gottron's papules, gottron's sign somewhat mottled over knees, patient appears older than stated age and frail. Recommend continuation of prednisone 60 mg daily (would not continue extra 20 mg at night - dose just escalated 2 weeks ago), continue MTX but decrease to 15 mg weekly (will add MMF), continue FA. Check aldolase, LDH. Muscle strength improvement can lag behind CK normalization - recommend PT which is very important in management of DM. Concerned that he still has skin activity with both continued gottron's as well as progressive skin thickening - will add low dose  mg BID and reassess Monday. If Raynaud's type symptoms persist may consider addition of low dose CCB such as amlodipine 2.5 mg daily as BP tolerates. Agree with GI workup. Called CT to discuss prior mesenteric edema noted on 9/25 CT which wasn't described again on repeat CT but wanted to confirm resolution of this finding (awaiting call back). Will follow along.

## 2019-11-22 NOTE — CONSULT NOTE ADULT - SUBJECTIVE AND OBJECTIVE BOX
HPI:  [69 year old  man]    CC: Generalized weakness and muscle pain    PMH: COPD (not on home oxygen), hypothyroidism, gout, RA, 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 He also endorses overall fatigue, decreased appetite and PO intake as well as weight loss of over 40 pounds in the past 6 months. In the past 2 days prior to his presentation, he noticed an acute worsening of his weakness and fatigue as well as muscle pains which were diffuse and constant. He contacted his rheumatologist who referred him to the ED.     He denies rash, fevers or chills. Denies joint swelling or pain, denies worsening of his dysphagia. Denies nausea, vomiting, constipation or diarrhea. He does endorse dark stools that he's had for months he says. Denies shortness of breath or chest pain.     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 He says since then and despite both treatments he has not felt any improvement. However he says he recently saw a rheumatologist in the clinic (Dr. Fontanez) and since "his numbers" were improving the prednisone was increased to 60. The MTX was held is his recent hospitalization and restarted after discharge.     In the ED, vitals were stable. Labs showed chronic elevated WBC at 15, stable hemoglobin at 11.9. CXR showed decreasing bibasilar opacities. (21 Nov 2019 16:18)      PAST MEDICAL & SURGICAL HISTORY:  Gout  COPD, mild  Polymyositis  Hypothyroid  S/P tonsillectomy      HOSPITAL COURSE:  Receiving high-dose prednisone    TODAY'S SUBJECTIVE & REVIEW OF SYMPTOMS:    Constitutional WNL  Cardiac WNL   Respiratory WNL  GI WNL   WNL  Endocrine WNL  Skin WNL  Musculoskeletal WNL  Neuro WNL  Cognitive WNL  Psych WNL      MEDICATIONS  (STANDING):  allopurinol 100 milliGRAM(s) Oral daily  enoxaparin Injectable 40 milliGRAM(s) SubCutaneous daily  folic acid 1 milliGRAM(s) Oral daily  influenza   Vaccine 0.5 milliLiter(s) IntraMuscular once  levothyroxine 25 MICROGram(s) Oral daily  methotrexate 20 milliGRAM(s) Oral <User Schedule>  pantoprazole  Injectable 40 milliGRAM(s) IV Push every 12 hours  predniSONE   Tablet 60 milliGRAM(s) Oral daily  predniSONE   Tablet 20 milliGRAM(s) Oral <User Schedule>  sodium chloride 0.9%. 1000 milliLiter(s) (75 mL/Hr) IV Continuous <Continuous>  tiotropium 18 MICROgram(s) Capsule 1 Capsule(s) Inhalation daily    MEDICATIONS  (PRN):  ALBUTerol    90 MICROgram(s) HFA Inhaler 2 Puff(s) Inhalation every 6 hours PRN Shortness of Breath and/or Wheezing      FAMILY HISTORY:  No pertinent family history in first degree relatives      Allergies    No Known Allergies    Intolerances        SOCIAL HISTORY:    [  ] Smoking  [  ] EtOH  [  ] Substance abuse     Home Environment:  [  ] Alone  [  ] Lives with Family  [  ] Home Health Aide    Dwelling:  [  ] Private House  [  ] Apartment  [  ] Adult Home/Assisted Living Facility  [  ] Skilled Nursing Facility      [  ] Short Term  [  ] Long Term  [  ] Stairs       Elevator [  ]    FUNCTIONAL STATUS PTA: (Check all that apply)  Ambulation: [   ]Independent    [  ] Dependent     [  ] Non-Ambulatory  Assistive Device: [  ] Straight Cane  [  ]  Quad Cane  [  ] Walker  [  ]  Wheelchair  ADL: [  ] Independent  [  ]  Dependent       Vital Signs Last 24 Hrs  T(C): 36.2 (22 Nov 2019 07:30), Max: 36.6 (21 Nov 2019 13:28)  T(F): 97.1 (22 Nov 2019 07:30), Max: 97.9 (21 Nov 2019 15:52)  HR: 65 (22 Nov 2019 07:30) (65 - 82)  BP: 98/60 (22 Nov 2019 07:30) (98/60 - 121/64)  BP(mean): --  RR: 18 (22 Nov 2019 07:30) (18 - 18)  SpO2: 98% (21 Nov 2019 18:03) (97% - 98%)      PHYSICAL EXAM: Alert and oriented X 4  GENERAL: Well-developed, well-nourished, in NAD  HEAD:  Normocephalic, atraumatic  EYES: EOMI, PERRLA, sclerae anicteric, conjunctivae not injected  NECK: Supple, No JVD, Normal thyroid  CHEST/LUNG: Clear to auscultation bilaterally; No rales, rhonchi, wheezing  HEART: Regular rate and rhythm; No murmurs, gallops, or rubs  ABDOMEN: Soft, nontender, nondistended; Bowel sounds present  EXTREMITIES:  2+ Peripheral pulses; No clubbing, cyanosis, or edema    NERVOUS SYSTEM:  Cranial Nerves II-XII intact [  ] Abnormal  [  ]  ROM: WFL all extremities [  ]  Abnormal [  ]  Motor Strength: WFL all extremities  [  ]  Abnormal [  ]  Sensation: intact to light touch [  ] Abnormal [  ]  Reflexes: Symmetric [  ]  Abnormal [  ]    FUNCTIONAL STATUS:  Bed Mobility: Independent [  ]  Supervision [  ]  Needs Assistance [  ]  N/A [  ]  Transfers: Independent [  ]  Supervision [  ]  Needs Assistance [  ]  N/A [  ]   Ambulation: Independent [  ]  Supervision [  ]  Needs Assistance [  ]  N/A [  ]  ADLs: Independent [  ] Requires Assistance [  ] N/A [  ]      LABS:                        10.6   11.98 )-----------( 139      ( 22 Nov 2019 06:13 )             33.7     11-22    137  |  102  |  28<H>  ----------------------------<  85  4.6   |  25  |  0.6<L>    Ca    8.1<L>      22 Nov 2019 06:13    TPro  4.7<L>  /  Alb  2.7<L>  /  TBili  0.5  /  DBili  0.2  /  AST  38  /  ALT  31  /  AlkPhos  73  11-22    PT/INR - ( 21 Nov 2019 17:48 )   PT: 12.10 sec;   INR: 1.05 ratio         PTT - ( 21 Nov 2019 17:48 )  PTT:24.4 sec      RADIOLOGY & ADDITIONAL STUDIES:      EXAM:  XR KUB 1 VIEW            PROCEDURE DATE:  11/22/2019            INTERPRETATION:  CLINICAL HISTORY/REASON FOR EXAM: Follow-up dilated   bowel loops.    TECHNIQUE: Single supine view of the abdomen.      COMPARISON: X-ray abdomen 11/21/2019.    FINDINGS:      Decreased distention of the previously noted prominent small bowel loops   since 11/21/2019. No evidence of bowel obstruction.    Stable osseous structures.    IMPRESSION:    Decreased distention of the previously noted prominent small bowel loops   since 11/21/2019.        EXAM:  XR CHEST PA LAT 2V            PROCEDURE DATE:  11/21/2019            INTERPRETATION:  Clinical History / Reason for exam: Dysphagia.    Comparison : Chest radiograph October 6, 2019.    Technique/Positioning: PA and lateral chest x-rays obtained.    Findings:    Support devices: None.    Cardiac/mediastinum/hilum: Unremarkable.    Lung parenchyma/Pleura: Decreased bibasilar opacities.    Skeleton/soft tissues: Degenerative change, thoracic spine.    Impression:      Decreased bibasilar opacities. HPI:  [69 year old  man]    CC: Generalized weakness and muscle pain    PMH: COPD (not on home oxygen), hypothyroidism, gout, RA, 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 He also endorses overall fatigue, decreased appetite and PO intake as well as weight loss of over 40 pounds in the past 6 months. In the past 2 days prior to his presentation, he noticed an acute worsening of his weakness and fatigue as well as muscle pains which were diffuse and constant. He contacted his rheumatologist who referred him to the ED.     He denies rash, fevers or chills. Denies joint swelling or pain, denies worsening of his dysphagia. Denies nausea, vomiting, constipation or diarrhea. He does endorse dark stools that he's had for months he says. Denies shortness of breath or chest pain.     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 He says since then and despite both treatments he has not felt any improvement. However he says he recently saw a rheumatologist in the clinic (Dr. Fontanez) and since "his numbers" were improving the prednisone was increased to 60. The MTX was held is his recent hospitalization and restarted after discharge.     In the ED, vitals were stable. Labs showed chronic elevated WBC at 15, stable hemoglobin at 11.9. CXR showed decreasing bibasilar opacities. (21 Nov 2019 16:18)      PAST MEDICAL & SURGICAL HISTORY:  Gout  COPD, mild  Polymyositis  Hypothyroid  S/P tonsillectomy      HOSPITAL COURSE:  Receiving high-dose prednisone    TODAY'S SUBJECTIVE & REVIEW OF SYMPTOMS:    Constitutional WNL  Cardiac WNL   Respiratory WNL  GI WNL   WNL  Endocrine WNL  Skin WNL  Musculoskeletal WNL  Neuro WNL  Cognitive WNL  Psych WNL      MEDICATIONS  (STANDING):  allopurinol 100 milliGRAM(s) Oral daily  enoxaparin Injectable 40 milliGRAM(s) SubCutaneous daily  folic acid 1 milliGRAM(s) Oral daily  influenza   Vaccine 0.5 milliLiter(s) IntraMuscular once  levothyroxine 25 MICROGram(s) Oral daily  methotrexate 20 milliGRAM(s) Oral <User Schedule>  pantoprazole  Injectable 40 milliGRAM(s) IV Push every 12 hours  predniSONE   Tablet 60 milliGRAM(s) Oral daily  predniSONE   Tablet 20 milliGRAM(s) Oral <User Schedule>  sodium chloride 0.9%. 1000 milliLiter(s) (75 mL/Hr) IV Continuous <Continuous>  tiotropium 18 MICROgram(s) Capsule 1 Capsule(s) Inhalation daily    MEDICATIONS  (PRN):  ALBUTerol    90 MICROgram(s) HFA Inhaler 2 Puff(s) Inhalation every 6 hours PRN Shortness of Breath and/or Wheezing      FAMILY HISTORY:  No pertinent family history in first degree relatives      Allergies    No Known Allergies    Intolerances        SOCIAL HISTORY:    [  ] Smoking  [  ] EtOH  [  ] Substance abuse     Home Environment:  [ X ] Alone  [  ] Lives with Family  [  ] Home Health Aide    Dwelling:  [  ] Private House  [  ] Apartment  [  ] Adult Home/Assisted Living Facility  [  ] Skilled Nursing Facility      [  ] Short Term  [  ] Long Term  [  X] Stairs 3-step entry      Elevator [  ]    FUNCTIONAL STATUS PTA: (Check all that apply)  Ambulation: [ X  ]Independent    [  ] Dependent     [  ] Non-Ambulatory  Assistive Device: [  ] Straight Cane  [  ]  Quad Cane  [  ] Walker  [  ]  Wheelchair  ADL: [X  ] Independent  [  ]  Dependent       Vital Signs Last 24 Hrs  T(C): 36.2 (22 Nov 2019 07:30), Max: 36.6 (21 Nov 2019 13:28)  T(F): 97.1 (22 Nov 2019 07:30), Max: 97.9 (21 Nov 2019 15:52)  HR: 65 (22 Nov 2019 07:30) (65 - 82)  BP: 98/60 (22 Nov 2019 07:30) (98/60 - 121/64)  BP(mean): --  RR: 18 (22 Nov 2019 07:30) (18 - 18)  SpO2: 98% (21 Nov 2019 18:03) (97% - 98%)      PHYSICAL EXAM: Alert and oriented X 4  GENERAL: Well-developed, well-nourished, in NAD  HEAD:  Normocephalic, atraumatic  EYES: EOMI, PERRLA, sclerae anicteric, conjunctivae not injected  NECK: Supple, No JVD, Normal thyroid  CHEST/LUNG: Clear to auscultation bilaterally; No rales, rhonchi, wheezing  HEART: Regular rate and rhythm; No murmurs, gallops, or rubs  ABDOMEN: Soft, nontender, nondistended; Bowel sounds present  EXTREMITIES:  2+ Peripheral pulses; No clubbing, cyanosis, or edema    NERVOUS SYSTEM:  Cranial Nerves II-XII intact [  ] Abnormal  [  ]  ROM: WFL all extremities [  ]  Abnormal [  ]  Motor Strength: WFL all extremities  [  ]  Abnormal [  ]  Sensation: intact to light touch [  ] Abnormal [  ]  Reflexes: Symmetric [  ]  Abnormal [  ]    FUNCTIONAL STATUS:  Bed Mobility: Independent [  ]  Supervision [  ]  Needs Assistance [  ]  N/A [  ]  Transfers: Independent [  ]  Supervision [  ]  Needs Assistance [  ]  N/A [  ]   Ambulation: Independent [  ]  Supervision [  ]  Needs Assistance [  ]  N/A [  ]  ADLs: Independent [  ] Requires Assistance [  ] N/A [  ]      LABS:                        10.6   11.98 )-----------( 139      ( 22 Nov 2019 06:13 )             33.7     11-22    137  |  102  |  28<H>  ----------------------------<  85  4.6   |  25  |  0.6<L>    Ca    8.1<L>      22 Nov 2019 06:13    TPro  4.7<L>  /  Alb  2.7<L>  /  TBili  0.5  /  DBili  0.2  /  AST  38  /  ALT  31  /  AlkPhos  73  11-22    PT/INR - ( 21 Nov 2019 17:48 )   PT: 12.10 sec;   INR: 1.05 ratio         PTT - ( 21 Nov 2019 17:48 )  PTT:24.4 sec      RADIOLOGY & ADDITIONAL STUDIES:      EXAM:  XR KUB 1 VIEW            PROCEDURE DATE:  11/22/2019            INTERPRETATION:  CLINICAL HISTORY/REASON FOR EXAM: Follow-up dilated   bowel loops.    TECHNIQUE: Single supine view of the abdomen.      COMPARISON: X-ray abdomen 11/21/2019.    FINDINGS:      Decreased distention of the previously noted prominent small bowel loops   since 11/21/2019. No evidence of bowel obstruction.    Stable osseous structures.    IMPRESSION:    Decreased distention of the previously noted prominent small bowel loops   since 11/21/2019.        EXAM:  XR CHEST PA LAT 2V            PROCEDURE DATE:  11/21/2019            INTERPRETATION:  Clinical History / Reason for exam: Dysphagia.    Comparison : Chest radiograph October 6, 2019.    Technique/Positioning: PA and lateral chest x-rays obtained.    Findings:    Support devices: None.    Cardiac/mediastinum/hilum: Unremarkable.    Lung parenchyma/Pleura: Decreased bibasilar opacities.    Skeleton/soft tissues: Degenerative change, thoracic spine.    Impression:      Decreased bibasilar opacities. 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 He also endorses overall fatigue, decreased appetite and PO intake as well as weight loss of over 40 pounds in the past 6 months. In the past 2 days prior to his presentation, he noticed an acute worsening of his weakness and fatigue as well as muscle pains which were diffuse and constant. He contacted his rheumatologist who referred him to the ED.     He denies rash, fevers or chills. Denies joint swelling or pain, denies worsening of his dysphagia. Denies nausea, vomiting, constipation or diarrhea. He does endorse dark stools that he's had for months he says. Denies shortness of breath or chest pain.     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 He says since then and despite both treatments he has not felt any improvement. However he says he recently saw a rheumatologist in the clinic (Dr. Fontanez) and since "his numbers" were improving the prednisone was increased to 60. The MTX was held is his recent hospitalization and restarted after discharge.     In the ED, vitals were stable. Labs showed chronic elevated WBC at 15, stable hemoglobin at 11.9. CXR showed decreasing bibasilar opacities. (21 Nov 2019 16:18)      PAST MEDICAL & SURGICAL HISTORY:  Gout  COPD, mild  Polymyositis  Hypothyroid  S/P tonsillectomy      HOSPITAL COURSE:  Receiving high-dose prednisone    TODAY'S SUBJECTIVE & REVIEW OF SYMPTOMS:    Constitutional WNL  Cardiac WNL   Respiratory WNL  GI WNL   WNL  Endocrine WNL  Skin WNL  Musculoskeletal + pain with movement  Neuro WNL  Cognitive WNL  Psych WNL      MEDICATIONS  (STANDING):  allopurinol 100 milliGRAM(s) Oral daily  enoxaparin Injectable 40 milliGRAM(s) SubCutaneous daily  folic acid 1 milliGRAM(s) Oral daily  influenza   Vaccine 0.5 milliLiter(s) IntraMuscular once  levothyroxine 25 MICROGram(s) Oral daily  methotrexate 20 milliGRAM(s) Oral <User Schedule>  pantoprazole  Injectable 40 milliGRAM(s) IV Push every 12 hours  predniSONE   Tablet 60 milliGRAM(s) Oral daily  predniSONE   Tablet 20 milliGRAM(s) Oral <User Schedule>  sodium chloride 0.9%. 1000 milliLiter(s) (75 mL/Hr) IV Continuous <Continuous>  tiotropium 18 MICROgram(s) Capsule 1 Capsule(s) Inhalation daily    MEDICATIONS  (PRN):  ALBUTerol    90 MICROgram(s) HFA Inhaler 2 Puff(s) Inhalation every 6 hours PRN Shortness of Breath and/or Wheezing      FAMILY HISTORY:  No pertinent family history in first degree relatives      Allergies    No Known Allergies    Intolerances        SOCIAL HISTORY:    [ X ] Smoking--quit 3-4 years ago  [ X ] EtOH--quit 3-4 years ago  [  ] Substance abuse     Home Environment:  [ X ] Alone  [  ] Lives with Family  [  ] Home Health Aide    Dwelling:  [  ] Private House  [ X ] Apartment  [  ] Adult Home/Assisted Living Facility  [  ] Skilled Nursing Facility      [  ] Short Term  [  ] Long Term  [  X] Stairs 3-step entry      Elevator [  ]    FUNCTIONAL STATUS PTA: (Check all that apply)  Ambulation: [ X  ]Independent    [  ] Dependent     [  ] Non-Ambulatory  Assistive Device: [  ] Straight Cane  [  ]  Quad Cane  [  ] Walker  [  ]  Wheelchair  ADL: [X  ] Independent  [  ]  Dependent       Vital Signs Last 24 Hrs  T(C): 36.2 (22 Nov 2019 07:30), Max: 36.6 (21 Nov 2019 13:28)  T(F): 97.1 (22 Nov 2019 07:30), Max: 97.9 (21 Nov 2019 15:52)  HR: 65 (22 Nov 2019 07:30) (65 - 82)  BP: 98/60 (22 Nov 2019 07:30) (98/60 - 121/64)  BP(mean): --  RR: 18 (22 Nov 2019 07:30) (18 - 18)  SpO2: 98% (21 Nov 2019 18:03) (97% - 98%)      PHYSICAL EXAM: Alert and oriented X 4  GENERAL: Well-developed, well-nourished, in NAD  HEAD:  Normocephalic, atraumatic  EYES: EOMI, PERRLA, sclerae anicteric, conjunctivae not injected  NECK: Supple, No JVD, Normal thyroid  CHEST/LUNG: Clear to auscultation bilaterally; No rales, rhonchi, wheezing  HEART: Regular rate and rhythm; No murmurs, gallops, or rubs  ABDOMEN: Soft, nontender, nondistended; Bowel sounds present  EXTREMITIES:  2+ Peripheral pulses; No clubbing, cyanosis, or edema    NERVOUS SYSTEM:  Cranial Nerves II-XII intact [  ] Abnormal  [  ]  ROM: WFL all extremities [  ]  Abnormal [  ]  Motor Strength: WFL all extremities  [  ]  Abnormal [  ]  Sensation: intact to light touch [  ] Abnormal [  ]  Reflexes: Symmetric [  ]  Abnormal [  ]    FUNCTIONAL STATUS:  Bed Mobility: Independent [ X ]  Supervision [  ]  Needs Assistance [  ]  N/A [  ]  Transfers: Independent [  ]  Supervision [X  ]  Needs Assistance [  ]  N/A [  ]   Ambulation: Independent [  ]  Supervision [  ]  Needs Assistance [ X ]  N/A [  ]  ADLs: Independent [ X ] Requires Assistance [  ] N/A [  ]      LABS:                        10.6   11.98 )-----------( 139      ( 22 Nov 2019 06:13 )             33.7     11-22    137  |  102  |  28<H>  ----------------------------<  85  4.6   |  25  |  0.6<L>    Ca    8.1<L>      22 Nov 2019 06:13    TPro  4.7<L>  /  Alb  2.7<L>  /  TBili  0.5  /  DBili  0.2  /  AST  38  /  ALT  31  /  AlkPhos  73  11-22    PT/INR - ( 21 Nov 2019 17:48 )   PT: 12.10 sec;   INR: 1.05 ratio         PTT - ( 21 Nov 2019 17:48 )  PTT:24.4 sec      RADIOLOGY & ADDITIONAL STUDIES:      EXAM:  XR KUB 1 VIEW            PROCEDURE DATE:  11/22/2019            INTERPRETATION:  CLINICAL HISTORY/REASON FOR EXAM: Follow-up dilated   bowel loops.    TECHNIQUE: Single supine view of the abdomen.      COMPARISON: X-ray abdomen 11/21/2019.    FINDINGS:      Decreased distention of the previously noted prominent small bowel loops   since 11/21/2019. No evidence of bowel obstruction.    Stable osseous structures.    IMPRESSION:    Decreased distention of the previously noted prominent small bowel loops   since 11/21/2019.        EXAM:  XR CHEST PA LAT 2V            PROCEDURE DATE:  11/21/2019            INTERPRETATION:  Clinical History / Reason for exam: Dysphagia.    Comparison : Chest radiograph October 6, 2019.    Technique/Positioning: PA and lateral chest x-rays obtained.    Findings:    Support devices: None.    Cardiac/mediastinum/hilum: Unremarkable.    Lung parenchyma/Pleura: Decreased bibasilar opacities.    Skeleton/soft tissues: Degenerative change, thoracic spine.    Impression:      Decreased bibasilar opacities.

## 2019-11-22 NOTE — SWALLOW BEDSIDE ASSESSMENT ADULT - PHARYNGEAL PHASE
Wet vocal quality post oral intake/Multiple swallows/eyes tearing/Complaints of pharyngeal stasis Multiple swallows/Complaints of pharyngeal stasis

## 2019-11-22 NOTE — CONSULT NOTE ADULT - ASSESSMENT
IMPRESSION: Rehabilitation for deconditioning    PRECAUTIONS: [  ] Cardiac  [  ] Respiratory  [  ] Seizures [  ] Contact Precautions  [  ] Droplet Isolation  [  ] Other:      Weight Bearing Status:  WBAT    RECOMMENDATION:    Out of bed to chair     DVT/decubitus ulcer prophylaxis    REHABILITATION PLAN:     [ X  ] Bedside PT 3-5 times a week   [   ]   Bedside OT  2-3 times a week             [   ] No Rehab Therapy Indicated                   [   ]  Speech Therapy   Conditioning/ROM                                    ADL  Bed Mobility                                               Conditioning/ROM  Transfers                                                     Bed Mobility  Sitting /Standing Balance                         Transfers                                        Gait Training                                               Sitting/Standing Balance  Stair Training  [   ] Applicable                    Home Equipment Evaluation                                                                        Splinting  [   ] Only      GOALS:   ADL   [   ]   Independent                    Transfers  [   ] Independent                          Ambulation  [   ] Independent     [    ] With device                            [   ]  CG                                                         [   ]  CG                                                                  [   ] CG                            [    ] Min A                                                   [   ] Min A                                                              [   ] Min  A          DISCHARGE PLAN:  [    ]  Good candidate for Intensive Rehabilitation/Hospital-based 4A [facility]                                             Will tolerate 3 hours of Intensive Rehab Daily                                       [    ]  Short Term Rehabilitation in Skilled Nursing Facility                                       [    ]  Home with Outpatient or VN services                                         [    ]  Possible Candidate for Intensive Hospital-Based Rehabilitation      Thank you. IMPRESSION: Rehabilitation for deconditioning    PRECAUTIONS: [  ] Cardiac  [  ] Respiratory  [  ] Seizures [  ] Contact Precautions  [  ] Droplet Isolation  [  ] Other:      Weight Bearing Status:  WBAT    RECOMMENDATION:    Out of bed to chair     DVT/decubitus ulcer prophylaxis    REHABILITATION PLAN:     [ X  ] Bedside PT 3-5 times a week   [   ]   Bedside OT  2-3 times a week             [   ] No Rehab Therapy Indicated                   [   ]  Speech Therapy   Conditioning/ROM                                    ADL  Bed Mobility                                               Conditioning/ROM  Transfers                                                     Bed Mobility  Sitting /Standing Balance                         Transfers                                        Gait Training                                               Sitting/Standing Balance  Stair Training  [  X ] Applicable                    Home Equipment Evaluation                                                                        Splinting  [   ] Only      GOALS:   ADL   [   ]   Independent                    Transfers  [  X ] Independent                          Ambulation  [ X  ] Independent     [  X  ] With device                            [   ]  CG                                                         [   ]  CG                                                                  [   ] CG                            [    ] Min A                                                   [   ] Min A                                                              [   ] Min  A          DISCHARGE PLAN:  [    ]  Good candidate for Intensive Rehabilitation/Hospital-based 4A [facility]                                             Will tolerate 3 hours of Intensive Rehab Daily                                       [  X  ]  Short Term Rehabilitation in Skilled Nursing Facility                                       [  X  ]  Home with Outpatient or VN services                                         [    ]  Possible Candidate for Intensive Hospital-Based Rehabilitation      Thank you.

## 2019-11-22 NOTE — PHYSICAL THERAPY INITIAL EVALUATION ADULT - LEVEL OF INDEPENDENCE: GAIT, REHAB EVAL
pt ambulated with a rapid gait pattern - cues to sow down - pt requires a RW to ambulate with. DC pt with a RW/supervision

## 2019-11-22 NOTE — CONSULT NOTE ADULT - SUBJECTIVE AND OBJECTIVE BOX
GI HPI:  69 y old man with hx of COPD (not on home oxygen), hypothyroidism, gout, RA, dermato vs polymyositis (on prednisone and methotrexate), ?systemic sclerosis, on 10/2019 admitted for severe UGIB with EGD showing esophagitis and erosive gastritis, stay complicated by ileus vs gastric outlet obstruction, pathology at the time ruled out cancer  HPI goes back to the since his prior discharge when patient endorses progressive generalized weakness to the point where he was unable to lift himself for a seating position, get up flight of stairs and raise his arms above his head. He also endorses overall fatigue, decreased appetite and PO intake as well as weight loss of over 40 pounds in the past 6 months.  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 He says since then and despite both treatments he has not felt any improvement. We were called for repeat EGD to document Esophagitis healing. Pt was on PPI BID since oct 4. Denies any reflux, abd pain, vomiting, rectal bleed.   He reports still having problem getting the food down but denies any food stuck in his chest       Previous EGD: 10/04/2019  Grade D esophagitis compatible with nonspecific erosive esophagitis.    Hiatal Hernia.    Erythema in the stomach compatible with non-erosive gastritis. (Biopsy).    Normal mucosa in the whole examined duodenum.    No evidence of gastric outlet obstruction, gastric mucosa have evidence of NG  tube induced erosions.         PAST MEDICAL & SURGICAL HISTORY  Gout  COPD, mild  Polymyositis  Hypothyroid  S/P tonsillectomy        SOCIAL HISTORY:  smoker: non smoker  Alcohol: Non alcoholic  Drug: Denies use of drugs   FAMILY HISTORY:  FAMILY HISTORY:  No pertinent family history in first degree relatives      ALLERGIES:  No Known Allergies      MEDICATIONS:  MEDICATIONS  (STANDING):  allopurinol 100 milliGRAM(s) Oral daily  dextrose 5% + sodium chloride 0.45%. 1000 milliLiter(s) (75 mL/Hr) IV Continuous <Continuous>  enoxaparin Injectable 40 milliGRAM(s) SubCutaneous daily  folic acid 1 milliGRAM(s) Oral daily  influenza   Vaccine 0.5 milliLiter(s) IntraMuscular once  levothyroxine 25 MICROGram(s) Oral daily  methotrexate 20 milliGRAM(s) Oral <User Schedule>  pantoprazole  Injectable 40 milliGRAM(s) IV Push every 12 hours  predniSONE   Tablet 60 milliGRAM(s) Oral daily  predniSONE   Tablet 20 milliGRAM(s) Oral <User Schedule>  tiotropium 18 MICROgram(s) Capsule 1 Capsule(s) Inhalation daily    MEDICATIONS  (PRN):  ALBUTerol    90 MICROgram(s) HFA Inhaler 2 Puff(s) Inhalation every 6 hours PRN Shortness of Breath and/or Wheezing      HOME MEDICATIONS:  Home Medications:  allopurinol 100 mg oral tablet: 1 tab(s) orally once a day (02 Oct 2019 18:28)  DuoNeb 0.5 mg-2.5 mg/3 mL inhalation solution: 3 milliliter(s) inhaled 3 times a day, As Needed (02 Oct 2019 18:28)  levothyroxine 25 mcg (0.025 mg) oral tablet: 1 tab(s) orally once a day (02 Oct 2019 18:28)  pantoprazole 40 mg oral delayed release tablet: 1 tab(s) orally once a day (02 Oct 2019 18:28)  predniSONE 20 mg oral tablet: 2 tab(s) orally once a day in the morning  (21 Nov 2019 16:31)  predniSONE 20 mg oral tablet: 1 tab(s) orally once a day at night  (21 Nov 2019 16:32)  tiotropium 18 mcg inhalation capsule: 1 cap(s) inhaled once a day (09 Oct 2019 12:50)      ROS:     REVIEW OF SYSTEMS  General:  No fevers  Eyes:  No reported pain   ENT:  No sore throat   NECK: No stiffness   CV:  No chest pain   Resp:  No shortness of breath  GI:  See HPI  :  No dysuria  Muscle:  ++++ weakness  Neuro:  No tingling  Endocrine:  No polyuria  Heme:  No ecchymosis          VITALS:   T(F): 97.1 (11-22 @ 07:30), Max: 97.9 (11-21 @ 15:52)  HR: 65 (11-22 @ 07:30) (65 - 96)  BP: 98/60 (11-22 @ 07:30) (98/60 - 121/64)  BP(mean): --  RR: 18 (11-22 @ 07:30) (18 - 18)  SpO2: 98% (11-21 @ 18:03) (95% - 98%)    I&O's Summary      PHYSICAL EXAM:  GENERAL:  Appears in no distress  HEENT:  Conjunctivae  anicteric  CHEST:  Full & symmetric excursion  HEART:  N S1, S2  ABDOMEN:  Soft, non-tender, non-distended, no masses   EXTEREMITIES:  no  edema  SKIN:  No rash  NEURO:  Alert, No asterixis         LABS:                        10.6   11.98 )-----------( 139      ( 22 Nov 2019 06:13 )             33.7     PT/INR - ( 21 Nov 2019 17:48 )  INR: 1.05          PTT - ( 21 Nov 2019 17:48 )  PTT:24.4<L>  LIVER FUNCTIONS - ( 22 Nov 2019 06:13 )  Alb: 2.7 g/dL / Pro: 4.7 g/dL / ALK PHOS: 73 U/L / ALT: 31 U/L / AST: 38 U/L / GGT: x           11-22    137  |  102  |  28<H>  ----------------------------<  85  4.6   |  25  |  0.6<L>    Ca    8.1<L>      22 Nov 2019 06:13      CARDIAC MARKERS ( 22 Nov 2019 06:13 )  x     / x     / 190 U/L / x     / x      CARDIAC MARKERS ( 21 Nov 2019 17:48 )  x     / x     / 265 U/L / x     / x          10-05 Chol 88<L> LDL 38 HDL 36<L> Trig 119, 10-04 Chol -- LDL -- HDL -- Trig 92          RADIOLOGY:

## 2019-11-23 LAB
ALBUMIN SERPL ELPH-MCNC: 2.8 G/DL — LOW (ref 3.5–5.2)
ALP SERPL-CCNC: 71 U/L — SIGNIFICANT CHANGE UP (ref 30–115)
ALT FLD-CCNC: 32 U/L — SIGNIFICANT CHANGE UP (ref 0–41)
ANION GAP SERPL CALC-SCNC: 17 MMOL/L — HIGH (ref 7–14)
AST SERPL-CCNC: 53 U/L — HIGH (ref 0–41)
BASOPHILS # BLD AUTO: 0.01 K/UL — SIGNIFICANT CHANGE UP (ref 0–0.2)
BASOPHILS NFR BLD AUTO: 0.1 % — SIGNIFICANT CHANGE UP (ref 0–1)
BILIRUB SERPL-MCNC: 0.6 MG/DL — SIGNIFICANT CHANGE UP (ref 0.2–1.2)
BUN SERPL-MCNC: 27 MG/DL — HIGH (ref 10–20)
CALCIUM SERPL-MCNC: 7.7 MG/DL — LOW (ref 8.5–10.1)
CHLORIDE SERPL-SCNC: 95 MMOL/L — LOW (ref 98–110)
CK SERPL-CCNC: 309 U/L — HIGH (ref 0–225)
CO2 SERPL-SCNC: 20 MMOL/L — SIGNIFICANT CHANGE UP (ref 17–32)
CREAT SERPL-MCNC: 0.8 MG/DL — SIGNIFICANT CHANGE UP (ref 0.7–1.5)
EOSINOPHIL # BLD AUTO: 0.1 K/UL — SIGNIFICANT CHANGE UP (ref 0–0.7)
EOSINOPHIL NFR BLD AUTO: 0.7 % — SIGNIFICANT CHANGE UP (ref 0–8)
GLUCOSE BLDC GLUCOMTR-MCNC: 188 MG/DL — HIGH (ref 70–99)
GLUCOSE BLDC GLUCOMTR-MCNC: 44 MG/DL — CRITICAL LOW (ref 70–99)
GLUCOSE BLDC GLUCOMTR-MCNC: 78 MG/DL — SIGNIFICANT CHANGE UP (ref 70–99)
GLUCOSE SERPL-MCNC: 32 MG/DL — CRITICAL LOW (ref 70–99)
HCT VFR BLD CALC: 33.6 % — LOW (ref 42–52)
HGB BLD-MCNC: 10.8 G/DL — LOW (ref 14–18)
IMM GRANULOCYTES NFR BLD AUTO: 0.4 % — HIGH (ref 0.1–0.3)
LDH SERPL L TO P-CCNC: 419 U/L — HIGH (ref 50–242)
LYMPHOCYTES # BLD AUTO: 0.88 K/UL — LOW (ref 1.2–3.4)
LYMPHOCYTES # BLD AUTO: 6.3 % — LOW (ref 20.5–51.1)
MAGNESIUM SERPL-MCNC: 1.5 MG/DL — LOW (ref 1.8–2.4)
MCHC RBC-ENTMCNC: 30.4 PG — SIGNIFICANT CHANGE UP (ref 27–31)
MCHC RBC-ENTMCNC: 32.1 G/DL — SIGNIFICANT CHANGE UP (ref 32–37)
MCV RBC AUTO: 94.6 FL — HIGH (ref 80–94)
MONOCYTES # BLD AUTO: 0.24 K/UL — SIGNIFICANT CHANGE UP (ref 0.1–0.6)
MONOCYTES NFR BLD AUTO: 1.7 % — SIGNIFICANT CHANGE UP (ref 1.7–9.3)
NEUTROPHILS # BLD AUTO: 12.76 K/UL — HIGH (ref 1.4–6.5)
NEUTROPHILS NFR BLD AUTO: 90.8 % — HIGH (ref 42.2–75.2)
NRBC # BLD: 0 /100 WBCS — SIGNIFICANT CHANGE UP (ref 0–0)
PLATELET # BLD AUTO: 145 K/UL — SIGNIFICANT CHANGE UP (ref 130–400)
POTASSIUM SERPL-MCNC: 3.2 MMOL/L — LOW (ref 3.5–5)
POTASSIUM SERPL-SCNC: 3.2 MMOL/L — LOW (ref 3.5–5)
PROT SERPL-MCNC: 4.5 G/DL — LOW (ref 6–8)
RBC # BLD: 3.55 M/UL — LOW (ref 4.7–6.1)
RBC # FLD: 22.7 % — HIGH (ref 11.5–14.5)
SODIUM SERPL-SCNC: 132 MMOL/L — LOW (ref 135–146)
WBC # BLD: 14.05 K/UL — HIGH (ref 4.8–10.8)
WBC # FLD AUTO: 14.05 K/UL — HIGH (ref 4.8–10.8)

## 2019-11-23 PROCEDURE — 71045 X-RAY EXAM CHEST 1 VIEW: CPT | Mod: 26

## 2019-11-23 PROCEDURE — 93010 ELECTROCARDIOGRAM REPORT: CPT

## 2019-11-23 PROCEDURE — 99233 SBSQ HOSP IP/OBS HIGH 50: CPT

## 2019-11-23 RX ORDER — AMLODIPINE BESYLATE 2.5 MG/1
2.5 TABLET ORAL DAILY
Refills: 0 | Status: DISCONTINUED | OUTPATIENT
Start: 2019-11-23 | End: 2019-11-26

## 2019-11-23 RX ORDER — MAGNESIUM SULFATE 500 MG/ML
2 VIAL (ML) INJECTION ONCE
Refills: 0 | Status: COMPLETED | OUTPATIENT
Start: 2019-11-23 | End: 2019-11-23

## 2019-11-23 RX ORDER — POTASSIUM CHLORIDE 20 MEQ
40 PACKET (EA) ORAL EVERY 4 HOURS
Refills: 0 | Status: DISCONTINUED | OUTPATIENT
Start: 2019-11-23 | End: 2019-11-23

## 2019-11-23 RX ORDER — IPRATROPIUM/ALBUTEROL SULFATE 18-103MCG
3 AEROSOL WITH ADAPTER (GRAM) INHALATION EVERY 6 HOURS
Refills: 0 | Status: DISCONTINUED | OUTPATIENT
Start: 2019-11-23 | End: 2019-12-04

## 2019-11-23 RX ORDER — POTASSIUM CHLORIDE 20 MEQ
40 PACKET (EA) ORAL ONCE
Refills: 0 | Status: COMPLETED | OUTPATIENT
Start: 2019-11-23 | End: 2019-11-23

## 2019-11-23 RX ORDER — DEXTROSE 50 % IN WATER 50 %
50 SYRINGE (ML) INTRAVENOUS ONCE
Refills: 0 | Status: COMPLETED | OUTPATIENT
Start: 2019-11-23 | End: 2019-11-23

## 2019-11-23 RX ADMIN — Medication 60 MILLIGRAM(S): at 05:24

## 2019-11-23 RX ADMIN — PANTOPRAZOLE SODIUM 40 MILLIGRAM(S): 20 TABLET, DELAYED RELEASE ORAL at 05:24

## 2019-11-23 RX ADMIN — Medication 100 MILLIGRAM(S): at 11:17

## 2019-11-23 RX ADMIN — PANTOPRAZOLE SODIUM 40 MILLIGRAM(S): 20 TABLET, DELAYED RELEASE ORAL at 17:18

## 2019-11-23 RX ADMIN — TIOTROPIUM BROMIDE 1 CAPSULE(S): 18 CAPSULE ORAL; RESPIRATORY (INHALATION) at 08:29

## 2019-11-23 RX ADMIN — MYCOPHENOLATE MOFETIL 500 MILLIGRAM(S): 250 CAPSULE ORAL at 17:18

## 2019-11-23 RX ADMIN — Medication 40 MILLIEQUIVALENT(S): at 13:41

## 2019-11-23 RX ADMIN — AMLODIPINE BESYLATE 2.5 MILLIGRAM(S): 2.5 TABLET ORAL at 09:39

## 2019-11-23 RX ADMIN — Medication 25 MICROGRAM(S): at 05:24

## 2019-11-23 RX ADMIN — Medication 50 MILLILITER(S): at 09:38

## 2019-11-23 RX ADMIN — Medication 1 MILLIGRAM(S): at 11:17

## 2019-11-23 RX ADMIN — Medication 50 GRAM(S): at 13:41

## 2019-11-23 RX ADMIN — ENOXAPARIN SODIUM 40 MILLIGRAM(S): 100 INJECTION SUBCUTANEOUS at 11:17

## 2019-11-23 RX ADMIN — MYCOPHENOLATE MOFETIL 500 MILLIGRAM(S): 250 CAPSULE ORAL at 05:24

## 2019-11-23 NOTE — DIETITIAN INITIAL EVALUATION ADULT. - PERTINENT MEDS FT
Lovenox, Cellcept, Methotrexate, Proventil HFA, Allopurinol, Folic acid, Synthroid, Protonix, Prednisone

## 2019-11-23 NOTE — PROGRESS NOTE ADULT - ASSESSMENT
68 yo M PMHx of hypothyroidism, dermatomyositis scleroderma overlap syndrome (diagnosed 9/2019), recent upper GI bleed presents for evaluation of difficulty swallowing.    Dermatomyositis scleroderma overlap syndrome  - pt's prednisone incrased from 40 mg daily to 60 mg two weeks ago as outpatient due to persisting symtpoms  - CK levels normalized  - c/w metotrexate with folate  - pt evaluted by rheumotolog, cellcept started yesterday  - amlodipine started for Raynaud symptoms    Dysphagia  - suspected it is related to scleroderma  - pt evaluated by GI, no in patient EGD/manometry  - modified barium swallow planned for 11/25  - discussed possible PEG tube with patient if dysphagia progresses to the point of not being able to tolerate liquids- thinks he will decline PEG but will consider if it comes to that point    Normocytic Anemia   - Stable   - Likely secondary to anemia of inflammation   - iron studies pending     Leukocytosis  - likely due to steroids  - no obvious signs of infection  - continue to monitor    Recent history of UGIB   - had EGD 1 month ago  - found to have non-erosive gastrisitis, nonspecific ersoive esophagitis, hiatal hernia  - hgb stable  - Repeat EGD is due on 11/29 (8 weeks after previous) to monitor for healing  - GI consulted, no EGD today, maybe Tuesday if inpatient or outpt    COPD   - stable  - no wheezing on exam  - Duonebs as needed     Hypothyroidism   - c/w synthroid    Hypercalcemia  - corrected calcium to albumin is normal at 9.7    Hypoalbumenia  - likely related to chronic disease and malnutrition    Cachexia  - suspect severe protein malnutrition  - pt never had strong appetite  - has poor PO intake due to dysphagia  - hold off appetite stimulants for now   - RD consult    GI PPX:   DVT PPX: Lovenox   DIet: Dysphagia 1, nectar thick  Full Code    #Progress Note Handoff:  Pending (specify):  Modified barium swallow  Family discussion: discussed modified barium, cellcept with pt  Disposition: Home___/SNF___/Other________/Unknown at this time__x______ 68 yo M PMHx of hypothyroidism, dermatomyositis scleroderma overlap syndrome (diagnosed 9/2019), recent upper GI bleed presents for evaluation of difficulty swallowing.    Dermatomyositis scleroderma overlap syndrome  - pt's prednisone incrased from 40 mg daily to 60 mg two weeks ago as outpatient due to persisting symtpoms  - CK levels normalized  - c/w metotrexate with folate  - pt evaluted by rheumotolog, cellcept started yesterday  - amlodipine started for Raynaud symptoms    Dysphagia  - suspected it is related to scleroderma  - pt evaluated by GI, no in patient EGD/manometry  - modified barium swallow planned for 11/25  - discussed possible PEG tube with patient if dysphagia progresses to the point of not being able to tolerate liquids- thinks he will decline PEG but will consider if it comes to that point    Normocytic Anemia   - Stable   - Likely secondary to anemia of inflammation   - iron studies pending     Leukocytosis  - likely due to steroids  - no obvious signs of infection  - continue to monitor    Recent history of UGIB   - had EGD 1 month ago  - found to have non-erosive gastrisitis, nonspecific ersoive esophagitis, hiatal hernia  - hgb stable  - Repeat EGD is due on 11/29 (8 weeks after previous) to monitor for healing  - GI consulted, no EGD today, maybe Tuesday if inpatient or outpt    COPD   - stable  - no wheezing on exam  - Duonebs as needed     Hypothyroidism   - c/w synthroid    Hypercalcemia  - corrected calcium to albumin is normal at 9.7    Hypomagnesemia/hypokalemia  - replete magnesium and potassium  - recheck levels in AM    Hypoalbumenia  - likely related to chronic disease and malnutrition    Cachexia  - suspect severe protein malnutrition  - pt never had strong appetite  - has poor PO intake due to dysphagia  - hold off appetite stimulants for now   - RD consult    GI PPX:   DVT PPX: Lovenox   DIet: Dysphagia 1, nectar thick  Full Code    #Progress Note Handoff:  Pending (specify):  Modified barium swallow  Family discussion: discussed modified barium, cellcept with pt  Disposition: Home___/SNF___/Other________/Unknown at this time__x______

## 2019-11-23 NOTE — DIETITIAN INITIAL EVALUATION ADULT. - ENERGY NEEDS
Estimated Calorie Needs: [id number] x AF 1.4-1.1=1559-5667ndmi/day -Due to PCM and Pressure Injury  Estimated Protein Needs: 71-82grams/day (1.4-1.6grams/kg of admit weight) -Due to PCM and Pressure Injury  Estimated Fluids Needs: 1837-2099mL/day (1mL/kcal)

## 2019-11-23 NOTE — DIETITIAN INITIAL EVALUATION ADULT. - FACTORS AFF FOOD INTAKE
difficulty swallowing/The patient reports consuming <50% of meals secondary to swallowing difficulty; agreed to receive oral supplements. The patient reports having a small bowel movement (11/22).

## 2019-11-23 NOTE — DIETITIAN INITIAL EVALUATION ADULT. - MALNUTRITION
Severe protein calorie malnutrition in the context of chronic illness related to dysphagia as evidenced by <50% PO intake >1month, >10% wt loss in 6 months, severe muscle wasting (bitemporal, deltoid, clavicular, pectoralis, anterior thigh, patellar, posterior regions) and severe fat loss (periorbital, cheek, upper arm regions). Severe protein calorie malnutrition in context of chronic illness

## 2019-11-23 NOTE — PROGRESS NOTE ADULT - SUBJECTIVE AND OBJECTIVE BOX
CHIEF COMPLAINT:    Patient is a 69y old  Male who presents with a chief complaint of Generalized weakness and muscle pain     INTERVAL HPI/OVERNIGHT EVENTS:    Patient seen and examined at bedside. No acute overnight events occurred.    ROS: Reports feeling weak. All other systems are negative.    Vital Signs:    T(F): 96.4 (11-23-19 @ 07:41), Max: 99.4 (11-23-19 @ 00:16)  HR: 95 (11-23-19 @ 07:41) (95 - 107)  BP: 101/75 (11-23-19 @ 07:41) (101/75 - 153/87)  RR: 18 (11-23-19 @ 07:41) (18 - 18)    23 Nov 2019 07:01  -  23 Nov 2019 13:18  --------------------------------------------------------  IN: 480 mL / OUT: 200 mL / NET: 280 mL      POCT Blood Glucose.: 188 mg/dL (23 Nov 2019 11:38)  POCT Blood Glucose.: 78 mg/dL (23 Nov 2019 09:34)  POCT Blood Glucose.: 44 mg/dL (23 Nov 2019 08:38)      PHYSICAL EXAM:  GENERAL:  NAD, cachetic  SKIN: No rashes or lesions  HEENT: Atraumatic. Normocephalic. Anicteric  NECK:  No JVD.   PULMONARY: Clear to ausculation bilaterally. No wheezing. No rales  CVS: Normal S1, S2. Regular rate and rhythm. No murmurs.  ABDOMEN/GI: Soft, Nontender, Nondistended; Bowel sounds are present  EXTREMITIES:  No edema B/L LE.  NEUROLOGIC:  No motor deficit, proximal muscle weakness  PSYCH: Alert & oriented x 3, normal affect    Consultant(s) Notes Reviewed:  [x ] YES  [ ] NO      LABS:                        10.8   14.05 )-----------( 145      ( 23 Nov 2019 05:18 )             33.6     11-23    132<L>  |  95<L>  |  27<H>  ----------------------------<  32<LL>  3.2<L>   |  20  |  0.8    Ca    7.7<L>      23 Nov 2019 05:18  Mg     1.5     11-23    TPro  4.5<L>  /  Alb  2.8<L>  /  TBili  0.6  /  DBili  x   /  AST  53<H>  /  ALT  32  /  AlkPhos  71  11-23    PT/INR - ( 21 Nov 2019 17:48 )   PT: 12.10 sec;   INR: 1.05 ratio         PTT - ( 21 Nov 2019 17:48 )  PTT:24.4 sec    Trop --, CKMB --, , 11-23-19 @ 05:18  Trop --, CKMB --, , 11-22-19 @ 06:13  Trop --, CKMB --, , 11-21-19 @ 17:48        RADIOLOGY & ADDITIONAL TESTS:  No new images    Medications:  Standing  allopurinol 100 milliGRAM(s) Oral daily  amLODIPine   Tablet 2.5 milliGRAM(s) Oral daily  enoxaparin Injectable 40 milliGRAM(s) SubCutaneous daily  folic acid 1 milliGRAM(s) Oral daily  influenza   Vaccine 0.5 milliLiter(s) IntraMuscular once  levothyroxine 25 MICROGram(s) Oral daily  magnesium sulfate  IVPB 2 Gram(s) IV Intermittent once  methotrexate 20 milliGRAM(s) Oral <User Schedule>  mycophenolate mofetil 500 milliGRAM(s) Oral two times a day  pantoprazole  Injectable 40 milliGRAM(s) IV Push every 12 hours  potassium chloride   Powder 40 milliEquivalent(s) Oral once  predniSONE   Tablet 60 milliGRAM(s) Oral daily  tiotropium 18 MICROgram(s) Capsule 1 Capsule(s) Inhalation daily    PRN Meds  ALBUTerol    90 MICROgram(s) HFA Inhaler 2 Puff(s) Inhalation every 6 hours PRN      Case discussed with resident  Care discussed with pt

## 2019-11-23 NOTE — CHART NOTE - NSCHARTNOTEFT_GEN_A_CORE
Upon Nutritional Assessment by the Registered Dietitian your patient was determined to meet criteria / has evidence of the following diagnosis/diagnoses:          [ ]  Mild Protein Calorie Malnutrition        [ ]  Moderate Protein Calorie Malnutrition        [x] Severe Protein Calorie Malnutrition        [ ] Unspecified Protein Calorie Malnutrition        [x] Underweight / BMI <19        [ ] Morbid Obesity / BMI > 40    Severe protein calorie malnutrition in the context of chronic illness related to dysphagia as evidenced by <50% PO intake >1month, >10% wt loss in 6 months, severe muscle wasting (bitemporal, deltoid, clavicular, pectoralis, anterior thigh, patellar, posterior regions) and severe fat loss (periorbital, cheek, upper arm regions).    Findings as based on:  •  Comprehensive nutrition assessment and consultation    Treatment:    The following diet has been recommended:  1.Recommend continue to provide a Dysphagia 1: Pureed diet with Nectar thick liquids  2.Recommend provide Ensure Enlive Q8hrs   3.Recommend provide Ensure Pudding Q8hrs   4.Recommend provide a MVI Q24hrs   5.Recommend provide Vitamin C (500mg) Q12hrs   6.Recommend provide Zinc Sulfate (220mg) Q24hrs (14 days)    PROVIDER Section:     By signing this assessment you are acknowledging and agree with the diagnosis/diagnoses assigned by the Registered Dietitian    Comments:

## 2019-11-23 NOTE — DIETITIAN INITIAL EVALUATION ADULT. - PERTINENT LABORATORY DATA
(11/22) Na-137, K-4.6, CL-102, BUN-28, Cr-0.6, Glucose-85mg/dL, Corrected Ca-9.1 (WNL), H/H-10.6/33.7, MCV-95.2, WBC-11.98

## 2019-11-23 NOTE — DIETITIAN INITIAL EVALUATION ADULT. - PHYSICAL APPEARANCE
emaciated/BMI-16; Severe muscle wasting (Bitemporal, Deltoid, Clavicular, Pectoralis, Anterior thigh, Patellar, Posterior regions); Severe fat loss (Periorbital, Cheek, Upper arm regions)

## 2019-11-23 NOTE — DIETITIAN INITIAL EVALUATION ADULT. - ADD RECOMMEND
1.Recommend provide Ensure Enlive Q8hrs 2.Recommend provide Ensure Pudding Q8hrs 3.Recommend provide a MVI Q24hrs 4.Recommend provide Vitamin C (500mg) Q12hrs 5.Recommend provide Zinc Sulfate (220mg) Q24hrs (14 days)

## 2019-11-23 NOTE — DIETITIAN INITIAL EVALUATION ADULT. - RD TO REMAIN AVAILABLE
yes/Intervention: 1.Meals and Snacks 2.Medical Food Supplement 3.Vitamin and Mineral Supplement  Monitor/Evaluate: Diet order, energy intake, nutrition focused physical findings

## 2019-11-23 NOTE — DIETITIAN INITIAL EVALUATION ADULT. - OTHER INFO
Dx: 70y/o male with pmhx noted above presented with a chief complaint of Generalized weakness and muscle pain. Admitted for swallowing difficulty likely due to dermatomyositis scleroderma overlap syndrome. S/p swallow evaluation (11/22), recs include provide a dysphagia 1: pureed diet with nectar thick liquids which was taken. Noted to have an unstageable pressure injury located at the coccyx (Gama Score-17).      Subjective Data: The patient reports consuming only two cups of yogurt daily at home for the past six months secondary to swallowing difficulty. The patient also reports consuming three to four bottles of ensure and boost at 100%. The patient reports alternating between the two supplements. Denies MVI use.

## 2019-11-24 LAB
ALBUMIN SERPL ELPH-MCNC: 3.3 G/DL — LOW (ref 3.5–5.2)
ALP SERPL-CCNC: 88 U/L — SIGNIFICANT CHANGE UP (ref 30–115)
ALT FLD-CCNC: 38 U/L — SIGNIFICANT CHANGE UP (ref 0–41)
ANION GAP SERPL CALC-SCNC: 16 MMOL/L — HIGH (ref 7–14)
AST SERPL-CCNC: 52 U/L — HIGH (ref 0–41)
BASOPHILS # BLD AUTO: 0.01 K/UL — SIGNIFICANT CHANGE UP (ref 0–0.2)
BASOPHILS NFR BLD AUTO: 0.1 % — SIGNIFICANT CHANGE UP (ref 0–1)
BILIRUB SERPL-MCNC: 0.7 MG/DL — SIGNIFICANT CHANGE UP (ref 0.2–1.2)
BUN SERPL-MCNC: 27 MG/DL — HIGH (ref 10–20)
CALCIUM SERPL-MCNC: 8.6 MG/DL — SIGNIFICANT CHANGE UP (ref 8.5–10.1)
CHLORIDE SERPL-SCNC: 102 MMOL/L — SIGNIFICANT CHANGE UP (ref 98–110)
CO2 SERPL-SCNC: 22 MMOL/L — SIGNIFICANT CHANGE UP (ref 17–32)
CREAT SERPL-MCNC: 0.8 MG/DL — SIGNIFICANT CHANGE UP (ref 0.7–1.5)
EOSINOPHIL # BLD AUTO: 0.02 K/UL — SIGNIFICANT CHANGE UP (ref 0–0.7)
EOSINOPHIL NFR BLD AUTO: 0.1 % — SIGNIFICANT CHANGE UP (ref 0–8)
GLUCOSE BLDC GLUCOMTR-MCNC: 109 MG/DL — HIGH (ref 70–99)
GLUCOSE BLDC GLUCOMTR-MCNC: 146 MG/DL — HIGH (ref 70–99)
GLUCOSE BLDC GLUCOMTR-MCNC: 159 MG/DL — HIGH (ref 70–99)
GLUCOSE BLDC GLUCOMTR-MCNC: 182 MG/DL — HIGH (ref 70–99)
GLUCOSE SERPL-MCNC: 123 MG/DL — HIGH (ref 70–99)
HCT VFR BLD CALC: 36.6 % — LOW (ref 42–52)
HGB BLD-MCNC: 11.5 G/DL — LOW (ref 14–18)
IMM GRANULOCYTES NFR BLD AUTO: 0.6 % — HIGH (ref 0.1–0.3)
LYMPHOCYTES # BLD AUTO: 0.72 K/UL — LOW (ref 1.2–3.4)
LYMPHOCYTES # BLD AUTO: 5.1 % — LOW (ref 20.5–51.1)
MAGNESIUM SERPL-MCNC: 2 MG/DL — SIGNIFICANT CHANGE UP (ref 1.8–2.4)
MCHC RBC-ENTMCNC: 29.9 PG — SIGNIFICANT CHANGE UP (ref 27–31)
MCHC RBC-ENTMCNC: 31.4 G/DL — LOW (ref 32–37)
MCV RBC AUTO: 95.1 FL — HIGH (ref 80–94)
MONOCYTES # BLD AUTO: 0.24 K/UL — SIGNIFICANT CHANGE UP (ref 0.1–0.6)
MONOCYTES NFR BLD AUTO: 1.7 % — SIGNIFICANT CHANGE UP (ref 1.7–9.3)
NEUTROPHILS # BLD AUTO: 13 K/UL — HIGH (ref 1.4–6.5)
NEUTROPHILS NFR BLD AUTO: 92.4 % — HIGH (ref 42.2–75.2)
NRBC # BLD: 0 /100 WBCS — SIGNIFICANT CHANGE UP (ref 0–0)
PLATELET # BLD AUTO: 175 K/UL — SIGNIFICANT CHANGE UP (ref 130–400)
POTASSIUM SERPL-MCNC: 4.3 MMOL/L — SIGNIFICANT CHANGE UP (ref 3.5–5)
POTASSIUM SERPL-SCNC: 4.3 MMOL/L — SIGNIFICANT CHANGE UP (ref 3.5–5)
PROT SERPL-MCNC: 5.5 G/DL — LOW (ref 6–8)
RBC # BLD: 3.85 M/UL — LOW (ref 4.7–6.1)
RBC # FLD: 22.8 % — HIGH (ref 11.5–14.5)
SODIUM SERPL-SCNC: 140 MMOL/L — SIGNIFICANT CHANGE UP (ref 135–146)
WBC # BLD: 14.07 K/UL — HIGH (ref 4.8–10.8)
WBC # FLD AUTO: 14.07 K/UL — HIGH (ref 4.8–10.8)

## 2019-11-24 PROCEDURE — 99232 SBSQ HOSP IP/OBS MODERATE 35: CPT

## 2019-11-24 RX ORDER — POLYETHYLENE GLYCOL 3350 17 G/17G
17 POWDER, FOR SOLUTION ORAL
Refills: 0 | Status: DISCONTINUED | OUTPATIENT
Start: 2019-11-24 | End: 2019-11-27

## 2019-11-24 RX ADMIN — AMLODIPINE BESYLATE 2.5 MILLIGRAM(S): 2.5 TABLET ORAL at 05:44

## 2019-11-24 RX ADMIN — ENOXAPARIN SODIUM 40 MILLIGRAM(S): 100 INJECTION SUBCUTANEOUS at 11:53

## 2019-11-24 RX ADMIN — PANTOPRAZOLE SODIUM 40 MILLIGRAM(S): 20 TABLET, DELAYED RELEASE ORAL at 18:02

## 2019-11-24 RX ADMIN — Medication 100 MILLIGRAM(S): at 11:53

## 2019-11-24 RX ADMIN — MYCOPHENOLATE MOFETIL 500 MILLIGRAM(S): 250 CAPSULE ORAL at 18:27

## 2019-11-24 RX ADMIN — MYCOPHENOLATE MOFETIL 500 MILLIGRAM(S): 250 CAPSULE ORAL at 05:44

## 2019-11-24 RX ADMIN — PANTOPRAZOLE SODIUM 40 MILLIGRAM(S): 20 TABLET, DELAYED RELEASE ORAL at 05:44

## 2019-11-24 RX ADMIN — Medication 3 MILLILITER(S): at 07:32

## 2019-11-24 RX ADMIN — Medication 1 MILLIGRAM(S): at 11:53

## 2019-11-24 RX ADMIN — Medication 3 MILLILITER(S): at 15:03

## 2019-11-24 RX ADMIN — Medication 25 MICROGRAM(S): at 05:44

## 2019-11-24 RX ADMIN — Medication 60 MILLIGRAM(S): at 05:44

## 2019-11-24 NOTE — PROGRESS NOTE ADULT - ASSESSMENT
Patient is a 69y old Male who presents with a chief complaint of Currently admitted to medicine with the primary diagnosis of Difficulty swallowing    # Dermatomyosits/Scleroderma, mixed overlap syndromes  - TSH-4.05, lower than previous admission  - Check ESR- 19, CRP-0.27, CK-265-->190 this am, and aldolase   - Ck normalized  - Continue prednisone at 40 mg in AM and 20 mg at night and MTX every friday   - folic acid daily  - Rheumatology evaluation- Dr. Wadsworth, appreciated  - low dose  mg BID was added to regimen  - amlodipine for raynauds  - PT/Rehab     # Dysphagia  - Speech evaluation- will go for MBS tomorrow   - for now- dyphagia 1 w nectar thick, 1:1 feeds  - Seen by GI, no EGD today, maybe Tuesday if inpatient or outpt  - RD following, appreciate supplements recommended    # Possible dilated loops on CXR, resolved  - Patient passing gas and having bowel movements   - Repeat KUB- decreased distention    # Normocytic Anemia - Stable   - Likely secondary to anemia of inflammation   - iron studies- high ferritin, normalish iron saturation- c/w 2/2 inflammatory disease    # Elevated WBC - Chronic and stable, secondary to steroid  - No signs of infection   - will continue to monitor     # Recent history of UGIB   - seen by GI  - Continue IV PPI BID   - Repeat EGD is due on 11/29 (8 weeks after previous) to monitor for healing  - GI consulted- maybe EGD Tuesday/this weeks if inpatient, possibly outpt    # COPD - stable  - no wheezing on exam  - Duonebs as needed     # Hypothyroidism   - TSH- 4.05 (lower than october)  - Continue current synthroid for now     #Hypomagnesemia  - 2.0 this am  - repeat ran in am    Pending: MBS tomorrow, rheum follow up    GI PPX: IV PPI BID   DVT PPX: Lovenox   DIet: Dysphagia 1, nectar thick  Full Code

## 2019-11-24 NOTE — PROGRESS NOTE ADULT - SUBJECTIVE AND OBJECTIVE BOX
Hospital Day:  3d    Subjective:    Patient is a 69y old  Male who presents with a chief complaint of Generalized weakness and muscle pain (23 Nov 2019 13:17)    Interval: No acute events overnight. Only complaint this morning is some constipation. Reports feeling a little stronger today, was seen eating breakfast well this morning. Discussed plan for modified barium swallow tomorrow.    Past Medical Hx:   Gout  COPD, mild  Polymyositis  Hypothyroid    Past Sx:  S/P tonsillectomy  No significant past surgical history    Allergies:  No Known Allergies    Current Meds:   Standng Meds:  allopurinol 100 milliGRAM(s) Oral daily  amLODIPine   Tablet 2.5 milliGRAM(s) Oral daily  enoxaparin Injectable 40 milliGRAM(s) SubCutaneous daily  folic acid 1 milliGRAM(s) Oral daily  influenza   Vaccine 0.5 milliLiter(s) IntraMuscular once  levothyroxine 25 MICROGram(s) Oral daily  methotrexate 20 milliGRAM(s) Oral <User Schedule>  mycophenolate mofetil 500 milliGRAM(s) Oral two times a day  pantoprazole  Injectable 40 milliGRAM(s) IV Push every 12 hours  predniSONE   Tablet 60 milliGRAM(s) Oral daily  tiotropium 18 MICROgram(s) Capsule 1 Capsule(s) Inhalation daily    PRN Meds:  ALBUTerol    90 MICROgram(s) HFA Inhaler 2 Puff(s) Inhalation every 6 hours PRN Shortness of Breath and/or Wheezing  albuterol/ipratropium for Nebulization 3 milliLiter(s) Nebulizer every 6 hours PRN Shortness of Breath and/or Wheezing    HOME MEDICATIONS:  allopurinol 100 mg oral tablet: 1 tab(s) orally once a day  DuoNeb 0.5 mg-2.5 mg/3 mL inhalation solution: 3 milliliter(s) inhaled 3 times a day, As Needed  levothyroxine 25 mcg (0.025 mg) oral tablet: 1 tab(s) orally once a day  pantoprazole 40 mg oral delayed release tablet: 1 tab(s) orally once a day  predniSONE 20 mg oral tablet: 2 tab(s) orally once a day in the morning   predniSONE 20 mg oral tablet: 1 tab(s) orally once a day at night   tiotropium 18 mcg inhalation capsule: 1 cap(s) inhaled once a day      Vital Signs:   T(F): 97.9 (11-24-19 @ 07:46), Max: 98 (11-23-19 @ 15:38)  HR: 92 (11-24-19 @ 07:46) (84 - 101)  BP: 128/71 (11-24-19 @ 07:46) (108/57 - 128/71)  RR: 18 (11-24-19 @ 07:46) (18 - 18)  SpO2: --      11-23-19 @ 07:01  -  11-24-19 @ 07:00  --------------------------------------------------------  IN: 960 mL / OUT: 400 mL / NET: 560 mL        Physical Exam:   GENERAL: NAD, cachectic  HEENT: NCAT  CHEST/LUNG: CTAB  HEART: Regular rate and rhythm; s1 s2 appreciated  ABDOMEN: Soft, Nontender, Nondistended; Bowel sounds present  EXTREMITIES: No LE edema b/l  NERVOUS SYSTEM:  Alert & Oriented X3      Labs:                         11.5   14.07 )-----------( 175      ( 24 Nov 2019 04:59 )             36.6     Neutophil% 92.4, Lymphocyte% 5.1, Monocyte% 1.7, Bands% 0.6 11-24-19 @ 04:59    24 Nov 2019 04:59    140    |  102    |  27     ----------------------------<  123    4.3     |  22     |  0.8      Ca    8.6        24 Nov 2019 04:59  Mg     2.0       24 Nov 2019 04:59    TPro  5.5    /  Alb  3.3    /  TBili  0.7    /  DBili  x      /  AST  52     /  ALT  38     /  AlkPhos  88     24 Nov 2019 04:59      Troponin --, CKMB --,  11-23-19 @ 05:18  Troponin --, CKMB --,  11-22-19 @ 06:13  Troponin --, CKMB --,  11-21-19 @ 17:48    Iron 33, TIBC 212, %Sat 16 Ferritin 859 11-21-19 @ 17:48      Radiology:   < from: Xray Kidney Ureter Bladder (11.22.19 @ 09:44) >    IMPRESSION:    Decreased distention of the previously noted prominent small bowel loops   since 11/21/2019.    < end of copied text >

## 2019-11-24 NOTE — PROGRESS NOTE ADULT - ATTENDING COMMENTS
70 yo M PMHx of hypothyroidism, dermatomyositis scleroderma overlap syndrome (diagnosed 9/2019), recent upper GI bleed presents for evaluation of difficulty swallowing.    PHYSICAL EXAM:  GENERAL: NAD, speaks in full sentences, no signs of respiratory distress, cachetic  HEAD:  Atraumatic, Normocephalic  EYES: Anicteric  NECK: Supple, No JVD  CHEST/LUNG: Clear to auscultation bilaterally; No wheeze; No crackles; No accessory muscles used  HEART: Regular rate and rhythm; No murmurs;   ABDOMEN: Soft, Nontender, Nondistended; Bowel sounds present; No guarding  EXTREMITIES:  2+ Peripheral Pulses, No cyanosis or edema  PSYCH: AAOx3  NEUROLOGY: non-focal  SKIN: No rashes or lesions    Dermatomyositis scleroderma overlap syndrome  - pt's prednisone increased from 40 mg daily to 60 mg two weeks ago as outpatient due to persisting symptoms  - CK levels normalized  - c/w Methotrexate with folate  - pt evaluated by rheumatology, Cellcept started yesterday  - amlodipine started for Raynaud symptoms    Dysphagia  - suspected it is related to scleroderma  - pt evaluated by GI, no in patient EGD/manometry  - modified barium swallow planned for 11/25  - discussed possible PEG tube with patient if dysphagia progresses to the point of not being able to tolerate liquids- thinks he will decline PEG but will consider if it comes to that point    Normocytic Anemia   - Stable   - Likely secondary to anemia of inflammation   - iron studies pending     Leukocytosis  - likely due to steroids  - no obvious signs of infection  - continue to monitor    Recent history of UGIB   - had EGD 1 month ago  - found to have non-erosive gastritis, nonspecific erosive esophagitis, hiatal hernia  - hgb stable  - Repeat EGD is due on 11/29 (8 weeks after previous) to monitor for healing  - GI consulted, no EGD today, maybe Tuesday if inpatient or outpt    COPD   - stable  - no wheezing on exam  - Duonebs as needed     Hypothyroidism   - c/w synthroid    Hypercalcemia  - corrected calcium to albumin is normal at 9.7    Hypomagnesemia/hypokalemia  - replete magnesium and potassium  - recheck levels in AM    Hypoalbumenia  - likely related to chronic disease and malnutrition    Cachexia  - severe protein malnutrition, underweight BMI 15  - pt never had strong appetite  - has poor PO intake due to dysphagia  - hold off appetite stimulants for now   - RD consult    GI PPX:   DVT PPX: Lovenox   DIet: Dysphagia 1, nectar thick  Full Code    #Progress Note Handoff:  Pending (specify):  Modified barium swallow  Family discussion: discussed modified barium, cellcept with pt  Disposition: Home___/SNF___/Other________/Unknown at this time__x______

## 2019-11-25 ENCOUNTER — TRANSCRIPTION ENCOUNTER (OUTPATIENT)
Age: 69
End: 2019-11-25

## 2019-11-25 LAB
ALBUMIN SERPL ELPH-MCNC: 3 G/DL — LOW (ref 3.5–5.2)
ALDOLASE SERPL-CCNC: 11 U/L — HIGH (ref 3.3–10.3)
ALDOLASE SERPL-CCNC: 9.7 U/L — SIGNIFICANT CHANGE UP (ref 3.3–10.3)
ALP SERPL-CCNC: 85 U/L — SIGNIFICANT CHANGE UP (ref 30–115)
ALT FLD-CCNC: 32 U/L — SIGNIFICANT CHANGE UP (ref 0–41)
ANION GAP SERPL CALC-SCNC: 16 MMOL/L — HIGH (ref 7–14)
AST SERPL-CCNC: 33 U/L — SIGNIFICANT CHANGE UP (ref 0–41)
BASOPHILS # BLD AUTO: 0.01 K/UL — SIGNIFICANT CHANGE UP (ref 0–0.2)
BASOPHILS NFR BLD AUTO: 0.1 % — SIGNIFICANT CHANGE UP (ref 0–1)
BILIRUB SERPL-MCNC: 0.5 MG/DL — SIGNIFICANT CHANGE UP (ref 0.2–1.2)
BUN SERPL-MCNC: 25 MG/DL — HIGH (ref 10–20)
CALCIUM SERPL-MCNC: 8.5 MG/DL — SIGNIFICANT CHANGE UP (ref 8.5–10.1)
CHLORIDE SERPL-SCNC: 102 MMOL/L — SIGNIFICANT CHANGE UP (ref 98–110)
CO2 SERPL-SCNC: 23 MMOL/L — SIGNIFICANT CHANGE UP (ref 17–32)
CREAT SERPL-MCNC: 0.6 MG/DL — LOW (ref 0.7–1.5)
EOSINOPHIL # BLD AUTO: 0.01 K/UL — SIGNIFICANT CHANGE UP (ref 0–0.7)
EOSINOPHIL NFR BLD AUTO: 0.1 % — SIGNIFICANT CHANGE UP (ref 0–8)
GLUCOSE BLDC GLUCOMTR-MCNC: 112 MG/DL — HIGH (ref 70–99)
GLUCOSE BLDC GLUCOMTR-MCNC: 125 MG/DL — HIGH (ref 70–99)
GLUCOSE BLDC GLUCOMTR-MCNC: 134 MG/DL — HIGH (ref 70–99)
GLUCOSE SERPL-MCNC: 114 MG/DL — HIGH (ref 70–99)
HCT VFR BLD CALC: 32.9 % — LOW (ref 42–52)
HGB BLD-MCNC: 10.5 G/DL — LOW (ref 14–18)
IMM GRANULOCYTES NFR BLD AUTO: 0.8 % — HIGH (ref 0.1–0.3)
LYMPHOCYTES # BLD AUTO: 0.57 K/UL — LOW (ref 1.2–3.4)
LYMPHOCYTES # BLD AUTO: 5.1 % — LOW (ref 20.5–51.1)
MAGNESIUM SERPL-MCNC: 2 MG/DL — SIGNIFICANT CHANGE UP (ref 1.8–2.4)
MCHC RBC-ENTMCNC: 30.4 PG — SIGNIFICANT CHANGE UP (ref 27–31)
MCHC RBC-ENTMCNC: 31.9 G/DL — LOW (ref 32–37)
MCV RBC AUTO: 95.4 FL — HIGH (ref 80–94)
MONOCYTES # BLD AUTO: 0.25 K/UL — SIGNIFICANT CHANGE UP (ref 0.1–0.6)
MONOCYTES NFR BLD AUTO: 2.2 % — SIGNIFICANT CHANGE UP (ref 1.7–9.3)
NEUTROPHILS # BLD AUTO: 10.21 K/UL — HIGH (ref 1.4–6.5)
NEUTROPHILS NFR BLD AUTO: 91.7 % — HIGH (ref 42.2–75.2)
NRBC # BLD: 0 /100 WBCS — SIGNIFICANT CHANGE UP (ref 0–0)
PLATELET # BLD AUTO: 159 K/UL — SIGNIFICANT CHANGE UP (ref 130–400)
POTASSIUM SERPL-MCNC: 3.9 MMOL/L — SIGNIFICANT CHANGE UP (ref 3.5–5)
POTASSIUM SERPL-SCNC: 3.9 MMOL/L — SIGNIFICANT CHANGE UP (ref 3.5–5)
PROT SERPL-MCNC: 4.9 G/DL — LOW (ref 6–8)
RBC # BLD: 3.45 M/UL — LOW (ref 4.7–6.1)
RBC # FLD: 22.3 % — HIGH (ref 11.5–14.5)
SODIUM SERPL-SCNC: 141 MMOL/L — SIGNIFICANT CHANGE UP (ref 135–146)
WBC # BLD: 11.14 K/UL — HIGH (ref 4.8–10.8)
WBC # FLD AUTO: 11.14 K/UL — HIGH (ref 4.8–10.8)

## 2019-11-25 PROCEDURE — 99232 SBSQ HOSP IP/OBS MODERATE 35: CPT

## 2019-11-25 PROCEDURE — 99233 SBSQ HOSP IP/OBS HIGH 50: CPT

## 2019-11-25 PROCEDURE — 74230 X-RAY XM SWLNG FUNCJ C+: CPT | Mod: 26

## 2019-11-25 RX ORDER — SODIUM CHLORIDE 9 MG/ML
1000 INJECTION, SOLUTION INTRAVENOUS
Refills: 0 | Status: DISCONTINUED | OUTPATIENT
Start: 2019-11-25 | End: 2019-11-27

## 2019-11-25 RX ORDER — METHOTREXATE 2.5 MG/1
15 TABLET ORAL
Refills: 0 | Status: DISCONTINUED | OUTPATIENT
Start: 2019-11-25 | End: 2019-12-04

## 2019-11-25 RX ADMIN — SODIUM CHLORIDE 75 MILLILITER(S): 9 INJECTION, SOLUTION INTRAVENOUS at 18:19

## 2019-11-25 RX ADMIN — AMLODIPINE BESYLATE 2.5 MILLIGRAM(S): 2.5 TABLET ORAL at 05:27

## 2019-11-25 RX ADMIN — PANTOPRAZOLE SODIUM 40 MILLIGRAM(S): 20 TABLET, DELAYED RELEASE ORAL at 05:27

## 2019-11-25 RX ADMIN — Medication 60 MILLIGRAM(S): at 05:27

## 2019-11-25 RX ADMIN — Medication 3 MILLILITER(S): at 13:43

## 2019-11-25 RX ADMIN — Medication 25 MICROGRAM(S): at 05:27

## 2019-11-25 RX ADMIN — POLYETHYLENE GLYCOL 3350 17 GRAM(S): 17 POWDER, FOR SOLUTION ORAL at 05:26

## 2019-11-25 RX ADMIN — ENOXAPARIN SODIUM 40 MILLIGRAM(S): 100 INJECTION SUBCUTANEOUS at 11:36

## 2019-11-25 RX ADMIN — MYCOPHENOLATE MOFETIL 500 MILLIGRAM(S): 250 CAPSULE ORAL at 17:44

## 2019-11-25 RX ADMIN — PANTOPRAZOLE SODIUM 40 MILLIGRAM(S): 20 TABLET, DELAYED RELEASE ORAL at 17:45

## 2019-11-25 RX ADMIN — Medication 60 MILLIGRAM(S): at 16:16

## 2019-11-25 RX ADMIN — Medication 100 MILLIGRAM(S): at 11:36

## 2019-11-25 RX ADMIN — Medication 3 MILLILITER(S): at 07:35

## 2019-11-25 RX ADMIN — Medication 1 MILLIGRAM(S): at 11:36

## 2019-11-25 RX ADMIN — MYCOPHENOLATE MOFETIL 500 MILLIGRAM(S): 250 CAPSULE ORAL at 05:27

## 2019-11-25 RX ADMIN — TIOTROPIUM BROMIDE 1 CAPSULE(S): 18 CAPSULE ORAL; RESPIRATORY (INHALATION) at 07:35

## 2019-11-25 NOTE — PROGRESS NOTE ADULT - SUBJECTIVE AND OBJECTIVE BOX
NEIL PHOENIX 69y Male  MRN#: [id number]     SUBJECTIVE  Patient is a 69y old Male who presents with a chief complaint of Generalized weakness and muscle pain (25 Nov 2019 13:08)  Today is hospital day 4d, and this morning he is lying in bed without distress.   No acute overnight events.   feels well  no chest pain  no sob  no f/c/n/v  no abdominal pain  OBJECTIVE  PAST MEDICAL & SURGICAL HISTORY  Gout  COPD, mild  Hypothyroid  S/P tonsillectomy    ALLERGIES:  No Known Allergies    MEDICATIONS:  STANDING MEDICATIONS  allopurinol 100 milliGRAM(s) Oral daily  amLODIPine   Tablet 2.5 milliGRAM(s) Oral daily  enoxaparin Injectable 40 milliGRAM(s) SubCutaneous daily  folic acid 1 milliGRAM(s) Oral daily  influenza   Vaccine 0.5 milliLiter(s) IntraMuscular once  lactated ringers. 1000 milliLiter(s) IV Continuous <Continuous>  levothyroxine 25 MICROGram(s) Oral daily  methotrexate 15 milliGRAM(s) Oral <User Schedule>  mycophenolate mofetil 500 milliGRAM(s) Oral two times a day  pantoprazole  Injectable 40 milliGRAM(s) IV Push every 12 hours  polyethylene glycol 3350 17 Gram(s) Oral two times a day  predniSONE   Tablet 60 milliGRAM(s) Oral daily  tiotropium 18 MICROgram(s) Capsule 1 Capsule(s) Inhalation daily    PRN MEDICATIONS  ALBUTerol    90 MICROgram(s) HFA Inhaler 2 Puff(s) Inhalation every 6 hours PRN  albuterol/ipratropium for Nebulization 3 milliLiter(s) Nebulizer every 6 hours PRN    HOME MEDICATIONS  Home Medications:  allopurinol 100 mg oral tablet: 1 tab(s) orally once a day (02 Oct 2019 18:28)  DuoNeb 0.5 mg-2.5 mg/3 mL inhalation solution: 3 milliliter(s) inhaled 3 times a day, As Needed (02 Oct 2019 18:28)  levothyroxine 25 mcg (0.025 mg) oral tablet: 1 tab(s) orally once a day (02 Oct 2019 18:28)  pantoprazole 40 mg oral delayed release tablet: 1 tab(s) orally once a day (02 Oct 2019 18:28)  predniSONE 20 mg oral tablet: 2 tab(s) orally once a day in the morning  (21 Nov 2019 16:31)  predniSONE 20 mg oral tablet: 1 tab(s) orally once a day at night  (21 Nov 2019 16:32)  tiotropium 18 mcg inhalation capsule: 1 cap(s) inhaled once a day (09 Oct 2019 12:50)      VITAL SIGNS: Last 24 Hours  T(C): 36 (25 Nov 2019 07:38), Max: 36 (25 Nov 2019 07:38)  T(F): 96.8 (25 Nov 2019 07:38), Max: 96.8 (25 Nov 2019 07:38)  HR: 88 (25 Nov 2019 07:38) (85 - 97)  BP: 114/65 (25 Nov 2019 07:38) (89/63 - 115/78)  BP(mean): 72 (24 Nov 2019 15:44) (72 - 72)  RR: 17 (25 Nov 2019 07:38) (17 - 18)  SpO2: --    11-24-19 @ 07:01  -  11-25-19 @ 07:00  --------------------------------------------------------  IN: 960 mL / OUT: 400 mL / NET: 560 mL        LABS:                        10.5   11.14 )-----------( 159      ( 25 Nov 2019 04:59 )             32.9     11-25    141  |  102  |  25<H>  ----------------------------<  114<H>  3.9   |  23  |  0.6<L>    Ca    8.5      25 Nov 2019 04:59  Mg     2.0     11-25    TPro  4.9<L>  /  Alb  3.0<L>  /  TBili  0.5  /  DBili  x   /  AST  33  /  ALT  32  /  AlkPhos  85  11-25    LIVER FUNCTIONS - ( 25 Nov 2019 04:59 )  Alb: 3.0 g/dL / Pro: 4.9 g/dL / ALK PHOS: 85 U/L / ALT: 32 U/L / AST: 33 U/L / GGT: x                         CAPILLARY BLOOD GLUCOSE      POCT Blood Glucose.: 112 mg/dL (25 Nov 2019 11:15)      RADIOLOGY:    PHYSICAL EXAM:  PHYSICAL EXAM:  GENERAL: NAD, AAO x 4, 69y M  CHEST/LUNG: Clear to auscultation bilaterally; No wheeze; No crackles; No accessory muscles used  HEART: Regular rate and rhythm; No murmurs;   ABDOMEN: Soft, Nontender, Nondistended; Bowel sounds present; No guarding  EXTREMITIES:  No cyanosis or edema    ADMISSION SUMMARY  Patient is a 69y old Male who presents with a chief complaint of Generalized weakness and muscle pain (25 Nov 2019 13:08)

## 2019-11-25 NOTE — PROGRESS NOTE ADULT - SUBJECTIVE AND OBJECTIVE BOX
NEIL PHOENIX  [id number]  Male  69y  HPI:  [69 year old  man]    CC: Generalized weakness and muscle pain    PMH: COPD (not on home oxygen), hypothyroidism, gout, RA, dermatomyositis (on prednisone and methotrexate), on 10/2019 admitted for severe UGIB with EGD showing esophagitis and erosive gastritis, stay complicated by ileus vs gastric outlet obstruction, pathology at the time ruled out cancer as well as possible aspiration pneumonia seen by speech and swallow and put on dysphagia 2 diet     History of Present Illness goes back to the since his prior discharge when patient endorses progressive generalized weakness to the point where he was unable to lift himself for a seating position, get up flight of stairs and raise his arms above his head. He also endorses overall fatigue, decreased appetite and PO intake as well as weight loss of over 40 pounds in the past 6 months. In the past 2 days prior to his presentation, he noticed an acute worsening of his weakness and fatigue as well as muscle pains which were diffuse and constant. He contacted his rheumatologist who referred him to the ED.     He denies rash, fevers or chills. Denies joint swelling or pain, denies worsening of his dysphagia. Denies nausea, vomiting, constipation or diarrhea. He does endorse dark stools that he's had for months he says. Denies shortness of breath or chest pain.     Patient was recently diagnosed with dermatomyositis in July of 2019 and started on MTX and prednisone. He says since then and despite both treatments he has not felt any improvement. However he says he recently saw a rheumatologist in the clinic (Dr. Fontanez) and since "his numbers" were improving the prednisone was increased to 60. The MTX was held is his recent hospitalization and restarted after discharge.     In the ED, vitals were stable. Labs showed chronic elevated WBC at 15, stable hemoglobin at 11.9. CXR showed decreasing bibasilar opacities. (21 Nov 2019 16:18)    PAST MEDICAL & SURGICAL HISTORY:  Gout  COPD, mild  Polymyositis  Hypothyroid  S/P tonsillectomy    FAMILY HISTORY:  No pertinent family history in first degree relatives    MEDICATIONS  (STANDING):  allopurinol 100 milliGRAM(s) Oral daily  enoxaparin Injectable 40 milliGRAM(s) SubCutaneous daily  folic acid 1 milliGRAM(s) Oral daily  influenza   Vaccine 0.5 milliLiter(s) IntraMuscular once  levothyroxine 25 MICROGram(s) Oral daily  methotrexate 20 milliGRAM(s) Oral <User Schedule>  pantoprazole  Injectable 40 milliGRAM(s) IV Push every 12 hours  predniSONE   Tablet 60 milliGRAM(s) Oral daily  predniSONE   Tablet 20 milliGRAM(s) Oral <User Schedule>  sodium chloride 0.9%. 1000 milliLiter(s) (75 mL/Hr) IV Continuous <Continuous>  tiotropium 18 MICROgram(s) Capsule 1 Capsule(s) Inhalation daily    MEDICATIONS  (PRN):  ALBUTerol    90 MICROgram(s) HFA Inhaler 2 Puff(s) Inhalation every 6 hours PRN Shortness of Breath and/or Wheezing    Allergies    No Known Allergies    Intolerances        PHYSICAL EXAM:    	GENERAL: NAD, well-developed, pleasant   	CHEST/LUNG: Clear to auscultation bilaterally; No wheeze  	HEART: Regular rate and rhythm; No murmurs, rubs, or gallops  	ABDOMEN: Soft, Nontender, Nondistended; Bowel sounds present  	EXTREMITIES:  2+ Peripheral Pulses, No clubbing, cyanosis, or edema. Mild diffuse tenderness to muscle palpation proximal and distal.   	PSYCH: AAOx3  	NEUROLOGY: Moves all extremities 3/5 in all 4 extremities. UE and LE proximal strength inferior to distal strength   SKIN: No rashes or lesions        CBC Full  -  ( 22 Nov 2019 06:13 )  WBC Count : 11.98 K/uL  RBC Count : 3.54 M/uL  Hemoglobin : 10.6 g/dL  Hematocrit : 33.7 %  Platelet Count - Automated : 139 K/uL  Mean Cell Volume : 95.2 fL  Mean Cell Hemoglobin : 29.9 pg  Mean Cell Hemoglobin Concentration : 31.5 g/dL  Auto Neutrophil # : 10.92 K/uL  Auto Lymphocyte # : 0.49 K/uL  Auto Monocyte # : 0.49 K/uL  Auto Eosinophil # : 0.01 K/uL  Auto Basophil # : 0.01 K/uL  Auto Neutrophil % : 91.1 %  Auto Lymphocyte % : 4.1 %  Auto Monocyte % : 4.1 %  Auto Eosinophil % : 0.1 %  Auto Basophil % : 0.1 %    LIVER FUNCTIONS - ( 22 Nov 2019 06:13 )  Alb: 2.7 g/dL / Pro: 4.7 g/dL / ALK PHOS: 73 U/L / ALT: 31 U/L / AST: 38 U/L / GGT: x           11-22    137  |  102  |  28<H>  ----------------------------<  85  4.6   |  25  |  0.6<L>    Ca    8.1<L>      22 Nov 2019 06:13    TPro  4.7<L>  /  Alb  2.7<L>  /  TBili  0.5  /  DBili  0.2  /  AST  38  /  ALT  31  /  AlkPhos  73  11-22    PT/INR - ( 21 Nov 2019 17:48 )   PT: 12.10 sec;   INR: 1.05 ratio         PTT - ( 21 Nov 2019 17:48 )  PTT:24.4 sec      	< from: Xray Chest 2 Views PA/Lat (11.21.19 @ 09:52) >    	Impression:      	Decreased bibasilar opacities.    	================== OTHER SIGNIFICANT FINDINGS ==================    	Final Diagnosis  	Stomach, biopsy:  	- Mild chronic gastritis.  	- Giemsa stain fails to reveal Helicobacter pylori in this  	material.  	- No intestinal metaplasia or dysplasia seen.    	Postoperative Diagnosis  	Esophagitis      	================== PROCEDURES ==================    	< from: EGD (10.04.19 @ 11:30) >    	Impressions:   	 Grade D esophagitis compatible with nonspecific erosive esophagitis.   	 Hiatal Hernia.   	 Erythema in the stomach compatible with non-erosive gastritis. (Biopsy).   	 Normal mucosa in the whole examined duodenum.   	 No evidence of gastric outlet obstruction, gastric mucosa have evidence of NG  tube induced erosions. NEIL PHOENIX  [id number]  Male  69y  HPI:  [69 year old  man]    CC: Generalized weakness and muscle pain    PMH: COPD (not on home oxygen), hypothyroidism, gout, RA, dermatomyositis (on prednisone and methotrexate), on 10/2019 admitted for severe UGIB with EGD showing esophagitis and erosive gastritis, stay complicated by ileus vs gastric outlet obstruction, pathology at the time ruled out cancer as well as possible aspiration pneumonia seen by speech and swallow and put on dysphagia 2 diet     History of Present Illness goes back to the since his prior discharge when patient endorses progressive generalized weakness to the point where he was unable to lift himself for a seating position, get up flight of stairs and raise his arms above his head. He also endorses overall fatigue, decreased appetite and PO intake as well as weight loss of over 40 pounds in the past 6 months. In the past 2 days prior to his presentation, he noticed an acute worsening of his weakness and fatigue as well as muscle pains which were diffuse and constant. He contacted his rheumatologist who referred him to the ED.     He denies rash, fevers or chills. Denies joint swelling or pain, denies worsening of his dysphagia. Denies nausea, vomiting, constipation or diarrhea. He does endorse dark stools that he's had for months he says. Denies shortness of breath or chest pain.     Patient was recently diagnosed with dermatomyositis in July of 2019 and started on MTX and prednisone. He says since then and despite both treatments he has not felt any improvement. However he says he recently saw a rheumatologist in the clinic (Dr. Fontanez) and since "his numbers" were improving the prednisone was increased to 60. The MTX was held is his recent hospitalization and restarted after discharge.     In the ED, vitals were stable. Labs showed chronic elevated WBC at 15, stable hemoglobin at 11.9. CXR showed decreasing bibasilar opacities. (21 Nov 2019 16:18)    PAST MEDICAL & SURGICAL HISTORY:  Gout  COPD, mild  Polymyositis  Hypothyroid  S/P tonsillectomy    FAMILY HISTORY:  No pertinent family history in first degree relatives    MEDICATIONS  (STANDING):  allopurinol 100 milliGRAM(s) Oral daily  enoxaparin Injectable 40 milliGRAM(s) SubCutaneous daily  folic acid 1 milliGRAM(s) Oral daily  influenza   Vaccine 0.5 milliLiter(s) IntraMuscular once  levothyroxine 25 MICROGram(s) Oral daily  methotrexate 20 milliGRAM(s) Oral <User Schedule>  pantoprazole  Injectable 40 milliGRAM(s) IV Push every 12 hours  predniSONE   Tablet 60 milliGRAM(s) Oral daily  predniSONE   Tablet 20 milliGRAM(s) Oral <User Schedule>  sodium chloride 0.9%. 1000 milliLiter(s) (75 mL/Hr) IV Continuous <Continuous>  tiotropium 18 MICROgram(s) Capsule 1 Capsule(s) Inhalation daily    MEDICATIONS  (PRN):  ALBUTerol    90 MICROgram(s) HFA Inhaler 2 Puff(s) Inhalation every 6 hours PRN Shortness of Breath and/or Wheezing    Allergies    No Known Allergies    Intolerances        PHYSICAL EXAM:    	GENERAL: NAD, thin appearing, appears older than stated age  	CHEST/LUNG: Clear to auscultation bilaterally; No wheeze  	HEART: Regular rate and rhythm  	ABDOMEN: Soft, nontender  	EXTREMITIES: No active synovitis or joint effusions, no joint tenderness   	PSYCH: tearful at times when discussing worries about his health  	NEURO: 4+ triceps/biceps/deltoid muscle strength bilaterally, 4 hip flexor muscle strength bilaterally    SKIN: +2 skin thickening at bilateral digits, perioral furrowing, facial and chest telangiectasias, minimal gottron's sign over knees, periungual erythema with abnormal nailfold capillaroscopy, dressing on back C/D/I    CBC Full  -  ( 22 Nov 2019 06:13 )  WBC Count : 11.98 K/uL  RBC Count : 3.54 M/uL  Hemoglobin : 10.6 g/dL  Hematocrit : 33.7 %  Platelet Count - Automated : 139 K/uL  Mean Cell Volume : 95.2 fL  Mean Cell Hemoglobin : 29.9 pg  Mean Cell Hemoglobin Concentration : 31.5 g/dL  Auto Neutrophil # : 10.92 K/uL  Auto Lymphocyte # : 0.49 K/uL  Auto Monocyte # : 0.49 K/uL  Auto Eosinophil # : 0.01 K/uL  Auto Basophil # : 0.01 K/uL  Auto Neutrophil % : 91.1 %  Auto Lymphocyte % : 4.1 %  Auto Monocyte % : 4.1 %  Auto Eosinophil % : 0.1 %  Auto Basophil % : 0.1 %    LIVER FUNCTIONS - ( 22 Nov 2019 06:13 )  Alb: 2.7 g/dL / Pro: 4.7 g/dL / ALK PHOS: 73 U/L / ALT: 31 U/L / AST: 38 U/L / GGT: x           11-22    137  |  102  |  28<H>  ----------------------------<  85  4.6   |  25  |  0.6<L>    Ca    8.1<L>      22 Nov 2019 06:13    TPro  4.7<L>  /  Alb  2.7<L>  /  TBili  0.5  /  DBili  0.2  /  AST  38  /  ALT  31  /  AlkPhos  73  11-22    PT/INR - ( 21 Nov 2019 17:48 )   PT: 12.10 sec;   INR: 1.05 ratio         PTT - ( 21 Nov 2019 17:48 )  PTT:24.4 sec      	< from: Xray Chest 2 Views PA/Lat (11.21.19 @ 09:52) >    	Impression:      	Decreased bibasilar opacities.    	================== OTHER SIGNIFICANT FINDINGS ==================    	Final Diagnosis  	Stomach, biopsy:  	- Mild chronic gastritis.  	- Giemsa stain fails to reveal Helicobacter pylori in this  	material.  	- No intestinal metaplasia or dysplasia seen.    	Postoperative Diagnosis  	Esophagitis      	================== PROCEDURES ==================    	< from: EGD (10.04.19 @ 11:30) >    	Impressions:   	 Grade D esophagitis compatible with nonspecific erosive esophagitis.   	 Hiatal Hernia.   	 Erythema in the stomach compatible with non-erosive gastritis. (Biopsy).   	 Normal mucosa in the whole examined duodenum.   	 No evidence of gastric outlet obstruction, gastric mucosa have evidence of NG  tube induced erosions.

## 2019-11-25 NOTE — PROGRESS NOTE ADULT - ASSESSMENT
Dermatomyositis scleroderma overlap syndrome  - pt's prednisone increased from 40 mg daily to 60 mg two weeks ago as outpatient due to persisting symptoms  - CK levels normalized  - c/w Methotrexate with folate  - pt evaluated by rheumatology, Cellcept started yesterday  - amlodipine started for Raynaud symptoms    Dysphagia  - suspected it is related to scleroderma  - MBS shows severe dysphagia, recommend alternative means to nutrition  - pt is agreeable to PEG, planned for PEG in AM    Normocytic Anemia   - Stable   - Likely secondary to anemia of inflammation   - iron studies pending     Leukocytosis  - likely due to steroids  - no obvious signs of infection  - resolving    Recent history of UGIB   - had EGD 1 month ago  - found to have non-erosive gastritis, nonspecific erosive esophagitis, hiatal hernia  - hgb stable    COPD   - stable  - no wheezing on exam  - Duonebs as needed     Hypothyroidism   - c/w synthroid    Hypomagnesemia/hypokalemia  - resolved    Hypoalbumenia  - likely related to chronic disease and malnutrition    Cachexia  - severe protein malnutrition, underweight BMI 15  - pt never had strong appetite  - has poor PO intake due to dysphagia  - hold off appetite stimulants for now   - RD consult apprepiated    GI PPX:   DVT PPX: Lovenox   DIet: Dysphagia 1, nectar thick  Full Code    #Progress Note Handoff:  Pending (specify):  PEG  Family discussion: discussed PEG  Disposition: Home___/SNF___/Other________/Unknown at this time__x______ .

## 2019-11-25 NOTE — PROGRESS NOTE ADULT - ASSESSMENT
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 Patient  complaining of dysphagia and weight loss     # Pharyngeal dysphagia with high risk for aspiration   Pt lost 40 pounds   We were called for PEG tube plct   Pt not on AC   ASA 4   No fever  Leukocytosis is secondary to steroids   Hold morning dose of lovenox and NPO after midnight for PEG tube plct tomorrow 69 y old man with hx of COPD (not on home oxygen), hypothyroidism, gout, RA, dermato vs polymyositis (on prednisone and methotrexate), ?systemic sclerosis, on 10/2019 admitted for severe UGIB with EGD showing esophagitis and erosive gastritis, stay complicated by ileus vs gastric outlet obstruction, pathology at the time ruled out cancer. Patient  complaining of dysphagia and weight loss     # Pharyngeal dysphagia   Pt lost 40 pounds   We were called for PEG tube plct   R/B/I/A discussed with patient who is amenable  Pt not on AC   ASA 4   No fever  ancef 1 gram on call for endoscopy  Leukocytosis is secondary to steroids   Hold morning dose of lovenox and NPO after midnight for PEG tube plct tomorrow

## 2019-11-25 NOTE — SWALLOW VFSS/MBS ASSESSMENT ADULT - ORAL PHASE
Uncontrolled bolus / spillover in hypopharynx/Uncontrolled bolus / spillover in rod-pharynx pooling into the valleculae with spillage into the pyriform sinus prior to the swallow pooling into the valleculae

## 2019-11-25 NOTE — PROGRESS NOTE ADULT - SUBJECTIVE AND OBJECTIVE BOX
CHIEF COMPLAINT:    70 yo  Male who presents with a chief complaint of Generalized weakness and muscle pain (25 Nov 2019 11:34)      INTERVAL HPI/OVERNIGHT EVENTS:    Patient seen and examined at bedside. No acute overnight events occurred.    ROS: Reports persistent weakness, unchanged. All other systems are negative.    Vital Signs:    T(F): 96.8 (11-25-19 @ 07:38), Max: 96.8 (11-25-19 @ 07:38)  HR: 88 (11-25-19 @ 07:38) (85 - 97)  BP: 114/65 (11-25-19 @ 07:38) (89/63 - 115/78)  RR: 17 (11-25-19 @ 07:38) (17 - 18)  SpO2: --  I&O's Summary    24 Nov 2019 07:01  -  25 Nov 2019 07:00  --------------------------------------------------------  IN: 960 mL / OUT: 400 mL / NET: 560 mL    POCT Blood Glucose.: 112 mg/dL (25 Nov 2019 11:15)  POCT Blood Glucose.: 146 mg/dL (24 Nov 2019 22:12)  POCT Blood Glucose.: 182 mg/dL (24 Nov 2019 16:39)      PHYSICAL EXAM:  GENERAL:  NAD, cachetic   SKIN: No rashes or lesions  HEENT: Atraumatic. Normocephalic. Anicteric  NECK:  No JVD.   PULMONARY: Clear to ausculation bilaterally. No wheezing. No rales  CVS: Normal S1, S2. Regular rate and rhythm. No murmurs.  ABDOMEN/GI: Soft, Nontender, Nondistended; Bowel sounds are present  EXTREMITIES:  No edema B/L LE.  NEUROLOGIC:  No motor deficit.  PSYCH: Alert & oriented x 3, normal affect    LABS:                        10.5   11.14 )-----------( 159      ( 25 Nov 2019 04:59 )             32.9     11-25    141  |  102  |  25<H>  ----------------------------<  114<H>  3.9   |  23  |  0.6<L>    Ca    8.5      25 Nov 2019 04:59  Mg     2.0     11-25    TPro  4.9<L>  /  Alb  3.0<L>  /  TBili  0.5  /  DBili  x   /  AST  33  /  ALT  32  /  AlkPhos  85  11-25    RADIOLOGY & ADDITIONAL TESTS:  Barium swallow shows severe dysphagia    Medications:  Standing  allopurinol 100 milliGRAM(s) Oral daily  amLODIPine   Tablet 2.5 milliGRAM(s) Oral daily  enoxaparin Injectable 40 milliGRAM(s) SubCutaneous daily  folic acid 1 milliGRAM(s) Oral daily  influenza   Vaccine 0.5 milliLiter(s) IntraMuscular once  lactated ringers. 1000 milliLiter(s) IV Continuous <Continuous>  levothyroxine 25 MICROGram(s) Oral daily  methotrexate 20 milliGRAM(s) Oral <User Schedule>  mycophenolate mofetil 500 milliGRAM(s) Oral two times a day  pantoprazole  Injectable 40 milliGRAM(s) IV Push every 12 hours  polyethylene glycol 3350 17 Gram(s) Oral two times a day  predniSONE   Tablet 60 milliGRAM(s) Oral daily  tiotropium 18 MICROgram(s) Capsule 1 Capsule(s) Inhalation daily    PRN Meds  ALBUTerol    90 MICROgram(s) HFA Inhaler 2 Puff(s) Inhalation every 6 hours PRN  albuterol/ipratropium for Nebulization 3 milliLiter(s) Nebulizer every 6 hours PRN      Case discussed with resident  Care discussed with pt

## 2019-11-25 NOTE — PROGRESS NOTE ADULT - ATTENDING COMMENTS
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 Steroids recently increased from prednisone 40 mg daily to 60 mg daily 2 weeks ago by outpatient rheumatologist Dr. Fontanez due to ongoing symptoms although CKs have improved and now normalized. Has continued on MTX 20 mg weekly with FA supplementation. Ab workup was notably unremarkable. My exam demonstrates weakness of proximal muscles hip flexors>triceps>biceps/deltoids, abnormal nailfold capillaroscopy, perioral furrowing, digital skin thickening, dusky L 2nd digit distally with coolness to palpation, gottron's papules, gottron's sign somewhat mottled over knees, patient appears older than stated age and frail. Recommend continuation of prednisone 60 mg daily (would not continue extra 20 mg at night - dose just escalated 2 weeks ago), continue MTX but decrease to 15 mg weekly (will add MMF), continue FA. Check aldolase, LDH. Muscle strength improvement can lag behind CK normalization - recommend PT which is very important in management of DM. Concerned that he still has skin activity with both continued gottron's as well as progressive skin thickening - will add low dose  mg BID and reassess Monday. If Raynaud's type symptoms persist may consider addition of low dose CCB such as amlodipine 2.5 mg daily as BP tolerates. Agree with GI workup. Called CT to discuss prior mesenteric edema noted on 9/25 CT which wasn't described again on repeat CT but wanted to confirm resolution of this finding (awaiting call back). Will follow along. 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 Steroids recently increased from prednisone 40 mg daily to 60 mg daily 2 weeks ago by outpatient rheumatologist Dr. Fontanez due to ongoing symptoms with improvement noted of CKs. Ab workup was notably unremarkable. Tolerated addition of  mg BID well so far since Friday night - would continue (may consider gradual increase of dose as an outpatient, but would give max 2g daily given MTX dosing as well). Recommend continuation of prednisone 60 mg daily given in the AM, continue MTX 15 mg weekly, continue FA supplementation. Will likely need addition of PJP ppx given steroids requirements. Should have calcium/vit D supplementation for bone health. Muscle strength improvement can lag behind CK improvement/normalization - highly recommend PT which is exceptionally important in the management of patients with inflammatory myopathies. Raynaud's symptoms improved on low dose CCB - may continue amlodipine 2.5 mg daily. Plan for peg per GI given weight loss and dysphagia. Requested primary team's assistance in discussion mesenteric edema with radiologist reading CTs (seen on 9/25 CT but not addressed on subsequent films - would clarify if resolved). Otherwise UTD with malignancy screenings. Will follow along. 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 Steroids recently increased from prednisone 40 mg daily to 60 mg daily 2 weeks ago by outpatient rheumatologist Dr. Fontanez due to ongoing symptoms with improvement noted of CKs. Ab workup was notably unremarkable. Tolerated addition of  mg BID well so far since Friday night - would continue (may consider gradual increase of dose as an outpatient, but would give max 2g daily given MTX dosing as well). Recommend continuation of prednisone 60 mg daily given in the AM, continue MTX 15 mg weekly, continue FA supplementation. Will likely need addition of PJP ppx given steroids requirements. Should have calcium/vit D supplementation for bone health. Muscle strength improvement can lag behind CK improvement/normalization - highly recommend PT which is exceptionally important in the management of patients with inflammatory myopathies. Raynaud's symptoms improved on low dose CCB - may continue amlodipine 2.5 mg daily. Plan for peg per GI given weight loss and dysphagia. Requested primary team's assistance in discussion mesenteric edema with radiologist reading CTs (seen on 9/25 CT but not addressed on subsequent films - would clarify if resolved). Otherwise UTD with malignancy screenings. Check SPEP. Will follow along.

## 2019-11-25 NOTE — PROGRESS NOTE ADULT - SUBJECTIVE AND OBJECTIVE BOX
We are following the patient for PEG tube placement      GI HPI Today:  Pt still having dysphagia to liquids and solids and bilateral LE weakness   No other GI symptoms   PAST MEDICAL & SURGICAL HISTORY  Gout  COPD, mild  Hypothyroid  S/P tonsillectomy      ALLERGIES:  No Known Allergies      MEDICATIONS:  MEDICATIONS  (STANDING):  allopurinol 100 milliGRAM(s) Oral daily  amLODIPine   Tablet 2.5 milliGRAM(s) Oral daily  enoxaparin Injectable 40 milliGRAM(s) SubCutaneous daily  folic acid 1 milliGRAM(s) Oral daily  influenza   Vaccine 0.5 milliLiter(s) IntraMuscular once  lactated ringers. 1000 milliLiter(s) (75 mL/Hr) IV Continuous <Continuous>  levothyroxine 25 MICROGram(s) Oral daily  methotrexate 20 milliGRAM(s) Oral <User Schedule>  mycophenolate mofetil 500 milliGRAM(s) Oral two times a day  pantoprazole  Injectable 40 milliGRAM(s) IV Push every 12 hours  polyethylene glycol 3350 17 Gram(s) Oral two times a day  predniSONE   Tablet 60 milliGRAM(s) Oral daily  tiotropium 18 MICROgram(s) Capsule 1 Capsule(s) Inhalation daily    MEDICATIONS  (PRN):  ALBUTerol    90 MICROgram(s) HFA Inhaler 2 Puff(s) Inhalation every 6 hours PRN Shortness of Breath and/or Wheezing  albuterol/ipratropium for Nebulization 3 milliLiter(s) Nebulizer every 6 hours PRN Shortness of Breath and/or Wheezing      REVIEW OF SYSTEMS  General:  No fevers  Eyes:  No reported pain   ENT:  No sore throat   NECK: No stiffness   CV:  No chest pain   Resp:  No shortness of breath  GI:  See HPI  :  No dysuria  Muscle:  +++ weakness  Neuro:  No tingling  Endocrine:  No polyuria  Heme:  No ecchymosis        VITALS:   T(F): 96.8 (11-25 @ 07:38), Max: 99.4 (11-23 @ 00:16)  HR: 88 (11-25 @ 07:38) (65 - 107)  BP: 114/65 (11-25 @ 07:38) (89/63 - 153/87)  BP(mean): 72 (11-24 @ 15:44) (72 - 72)  RR: 17 (11-25 @ 07:38) (17 - 18)  SpO2: --        PHYSICAL EXAM:  GENERAL:  Appears in no distress  HEENT:  Conjunctivae Anicteric   CHEST:  Full & symmetric excursion  HEART:  NS1, S2,   ABDOMEN:  Soft, non-tender, non-distended  EXTEREMITIES:  no edema  SKIN:  No rash  NEURO:  Alert         Blood Work :                        10.5   11.14 )-----------( 159      ( 25 Nov 2019 04:59 )             32.9     PT/INR - ( 21 Nov 2019 17:48 )  INR: 1.05          PTT - ( 21 Nov 2019 17:48 )  PTT:24.4<L>  11-25    141  |  102  |  25<H>  ----------------------------<  114<H>  3.9   |  23  |  0.6<L>    Ca    8.5      25 Nov 2019 04:59  Mg     2.0     11-25      CBC -  ( 25 Nov 2019 04:59 )  Hemoglobin : 10.5    CBC -  ( 24 Nov 2019 04:59 )  Hemoglobin : 11.5    CBC -  ( 23 Nov 2019 05:18 )  Hemoglobin : 10.8    CBC -  ( 22 Nov 2019 06:13 )  Hemoglobin : 10.6      LIVER FUNCTIONS - ( 25 Nov 2019 04:59 )  Alb: 3.0 [3.5 - 5.2] / Pro: 4.9 [6.0 - 8.0] / ALK PHOS: 85 [30 - 115] / ALT: 32 [0 - 41] / AST: 33 [0 - 41] / GGT: x     LIVER FUNCTIONS - ( 24 Nov 2019 04:59 )  Alb: 3.3 [3.5 - 5.2] / Pro: 5.5 [6.0 - 8.0] / ALK PHOS: 88 [30 - 115] / ALT: 38 [0 - 41] / AST: 52 [0 - 41] / GGT: x     LIVER FUNCTIONS - ( 23 Nov 2019 05:18 )  Alb: 2.8 [3.5 - 5.2] / Pro: 4.5 [6.0 - 8.0] / ALK PHOS: 71 [30 - 115] / ALT: 32 [0 - 41] / AST: 53 [0 - 41] / GGT: x     LIVER FUNCTIONS - ( 22 Nov 2019 06:13 )  Alb: 2.7 [3.5 - 5.2] / Pro: 4.7 [6.0 - 8.0] / ALK PHOS: 73 [30 - 115] / ALT: 31 [0 - 41] / AST: 38 [0 - 41] / GGT: x     LIVER FUNCTIONS - ( 21 Nov 2019 10:07 )  Alb: 3.5 [3.5 - 5.2] / Pro: 5.8 [6.0 - 8.0] / ALK PHOS: 81 [30 - 115] / ALT: 43 [0 - 41] / AST: 56 [0 - 41] / GGT: x We are following the patient for PEG tube placement      GI HPI Today:  Pt still having dysphagia to liquids and solids and bilateral LE weakness   No other GI symptoms     PAST MEDICAL & SURGICAL HISTORY  Gout  COPD, mild  Hypothyroid  S/P tonsillectomy      ALLERGIES:  No Known Allergies      MEDICATIONS:  MEDICATIONS  (STANDING):  allopurinol 100 milliGRAM(s) Oral daily  amLODIPine   Tablet 2.5 milliGRAM(s) Oral daily  enoxaparin Injectable 40 milliGRAM(s) SubCutaneous daily  folic acid 1 milliGRAM(s) Oral daily  influenza   Vaccine 0.5 milliLiter(s) IntraMuscular once  lactated ringers. 1000 milliLiter(s) (75 mL/Hr) IV Continuous <Continuous>  levothyroxine 25 MICROGram(s) Oral daily  methotrexate 20 milliGRAM(s) Oral <User Schedule>  mycophenolate mofetil 500 milliGRAM(s) Oral two times a day  pantoprazole  Injectable 40 milliGRAM(s) IV Push every 12 hours  polyethylene glycol 3350 17 Gram(s) Oral two times a day  predniSONE   Tablet 60 milliGRAM(s) Oral daily  tiotropium 18 MICROgram(s) Capsule 1 Capsule(s) Inhalation daily    MEDICATIONS  (PRN):  ALBUTerol    90 MICROgram(s) HFA Inhaler 2 Puff(s) Inhalation every 6 hours PRN Shortness of Breath and/or Wheezing  albuterol/ipratropium for Nebulization 3 milliLiter(s) Nebulizer every 6 hours PRN Shortness of Breath and/or Wheezing      REVIEW OF SYSTEMS  General:  No fevers  Eyes:  No reported pain   ENT:  No sore throat   NECK: No stiffness   CV:  No chest pain   Resp:  No shortness of breath  GI:  See HPI  :  No dysuria  Muscle:  +++ weakness  Neuro:  No tingling  Endocrine:  No polyuria  Heme:  No ecchymosis        VITALS:   T(F): 96.8 (11-25 @ 07:38), Max: 99.4 (11-23 @ 00:16)  HR: 88 (11-25 @ 07:38) (65 - 107)  BP: 114/65 (11-25 @ 07:38) (89/63 - 153/87)  BP(mean): 72 (11-24 @ 15:44) (72 - 72)  RR: 17 (11-25 @ 07:38) (17 - 18)  SpO2: --        PHYSICAL EXAM:  GENERAL:  Appears in no distress, thin  HEENT:  Conjunctivae Anicteric   CHEST:  Full & symmetric excursion  HEART:  NS1, S2,   ABDOMEN:  Soft, non-tender, non-distended  EXTEREMITIES:  no edema  SKIN:  No rash  NEURO:  Alert         Blood Work :                        10.5   11.14 )-----------( 159      ( 25 Nov 2019 04:59 )             32.9     PT/INR - ( 21 Nov 2019 17:48 )  INR: 1.05          PTT - ( 21 Nov 2019 17:48 )  PTT:24.4<L>  11-25    141  |  102  |  25<H>  ----------------------------<  114<H>  3.9   |  23  |  0.6<L>    Ca    8.5      25 Nov 2019 04:59  Mg     2.0     11-25      CBC -  ( 25 Nov 2019 04:59 )  Hemoglobin : 10.5    CBC -  ( 24 Nov 2019 04:59 )  Hemoglobin : 11.5    CBC -  ( 23 Nov 2019 05:18 )  Hemoglobin : 10.8    CBC -  ( 22 Nov 2019 06:13 )  Hemoglobin : 10.6      LIVER FUNCTIONS - ( 25 Nov 2019 04:59 )  Alb: 3.0 [3.5 - 5.2] / Pro: 4.9 [6.0 - 8.0] / ALK PHOS: 85 [30 - 115] / ALT: 32 [0 - 41] / AST: 33 [0 - 41] / GGT: x     LIVER FUNCTIONS - ( 24 Nov 2019 04:59 )  Alb: 3.3 [3.5 - 5.2] / Pro: 5.5 [6.0 - 8.0] / ALK PHOS: 88 [30 - 115] / ALT: 38 [0 - 41] / AST: 52 [0 - 41] / GGT: x     LIVER FUNCTIONS - ( 23 Nov 2019 05:18 )  Alb: 2.8 [3.5 - 5.2] / Pro: 4.5 [6.0 - 8.0] / ALK PHOS: 71 [30 - 115] / ALT: 32 [0 - 41] / AST: 53 [0 - 41] / GGT: x     LIVER FUNCTIONS - ( 22 Nov 2019 06:13 )  Alb: 2.7 [3.5 - 5.2] / Pro: 4.7 [6.0 - 8.0] / ALK PHOS: 73 [30 - 115] / ALT: 31 [0 - 41] / AST: 38 [0 - 41] / GGT: x     LIVER FUNCTIONS - ( 21 Nov 2019 10:07 )  Alb: 3.5 [3.5 - 5.2] / Pro: 5.8 [6.0 - 8.0] / ALK PHOS: 81 [30 - 115] / ALT: 43 [0 - 41] / AST: 56 [0 - 41] / GGT: x

## 2019-11-25 NOTE — PROGRESS NOTE ADULT - ASSESSMENT
69M admitted for dysphagia    #Dermatomyositis scleroderma overlap syndrome  - pt's prednisone increased from 40 mg daily to 60 mg two weeks ago as outpatient due to persisting symptoms  - CK levels normalized  -  Methotrexate 15mg weekly  with folate  - pt evaluated by rheumatology, Cellcept started   - amlodipine started for Raynaud symptoms    #Dysphagia  - suspected it is related to scleroderma  - pt evaluated by GI, no in patient EGD/manometry  - modified barium swallow 11/25, RECS npo with PEG placement  - discussed possible PEG tube with patient and patient is amenable    #Normocytic Anemia   - Stable   - Likely secondary to anemia of inflammation   - iron studies pending     #Leukocytosis  - likely due to steroids  - no obvious signs of infection  - continue to monitor    #Recent history of UGIB   - had EGD 1 month ago  - found to have non-erosive gastritis, nonspecific erosive esophagitis, hiatal hernia  - hgb stable  - Repeat EGD is due on 11/29 (8 weeks after previous) to monitor for healing  - GI consulted, no EGD today, maybe 11/26 if inpatient or outpt    #COPD   - stable  - no wheezing on exam  - Duonebs as needed     #Hypothyroidism   - c/w synthroid    #Hypercalcemia  - corrected calcium to albumin is normal at 9.7    #Hypomagnesemia/hypokalemia  - replete magnesium and potassium  - recheck levels in AM    #Hypoalbumenia  - likely related to malnutrition    #Cachexia  - severe protein malnutrition, underweight BMI 15  - pt never had strong appetite  - has poor PO intake due to dysphagia  - hold off appetite stimulants for now   - RD consult    DVT PPX: Lovenox   DIet: npo  Full Code  pending PEG

## 2019-11-25 NOTE — SWALLOW VFSS/MBS ASSESSMENT ADULT - PHARYNGEAL PHASE COMMENTS
Severe pharyngeal dysphagia for thin Severe pharyngeal dysphagia for nectar Profound pharyngeal dysphagia for puree, unable to clear residue w/ multiple swallow attempts

## 2019-11-25 NOTE — SWALLOW VFSS/MBS ASSESSMENT ADULT - SLP PERTINENT HISTORY OF CURRENT PROBLEM
pt admitted from home for generalized weakness and muscle pain. PMHx: COPD (not on home oxygen), hypothyroidism, gout, RA, dermato vs polymyositis, ?systemic sclerosis. pt recently admitted in 10/2019 admitted for severe UGIB with EGD showing esophagitis and erosive gastritis, stay c/b ileus vs gastric outlet obstruction; pathology at the time r/o cancer. +40lb weight loss in the past 6 months. +elevated WBC likely 2' steroid use as per chart. no active PNA. pt known to acute SLP dept during previous admissions w/ recs for dysphagia 2 w/ thin liquids and a Modified Barium Swallow to further investigate pharyngeal function. pt discharged prior to [facility] [place] and was recommended to f/u as outpatient but never did.

## 2019-11-25 NOTE — SWALLOW VFSS/MBS ASSESSMENT ADULT - SUCCESSFUL STRATEGIES TRIALED DURING PROCEDURE
head turn to the left head turn to the left/multiple swallows liquid wash helps clear residue yet results in aspiration

## 2019-11-26 ENCOUNTER — TRANSCRIPTION ENCOUNTER (OUTPATIENT)
Age: 69
End: 2019-11-26

## 2019-11-26 LAB
ANION GAP SERPL CALC-SCNC: 14 MMOL/L — SIGNIFICANT CHANGE UP (ref 7–14)
BASOPHILS # BLD AUTO: 0.01 K/UL — SIGNIFICANT CHANGE UP (ref 0–0.2)
BASOPHILS NFR BLD AUTO: 0.1 % — SIGNIFICANT CHANGE UP (ref 0–1)
BUN SERPL-MCNC: 23 MG/DL — HIGH (ref 10–20)
CALCIUM SERPL-MCNC: 8.3 MG/DL — LOW (ref 8.5–10.1)
CHLORIDE SERPL-SCNC: 103 MMOL/L — SIGNIFICANT CHANGE UP (ref 98–110)
CO2 SERPL-SCNC: 23 MMOL/L — SIGNIFICANT CHANGE UP (ref 17–32)
CREAT SERPL-MCNC: 0.6 MG/DL — LOW (ref 0.7–1.5)
EOSINOPHIL # BLD AUTO: 0 K/UL — SIGNIFICANT CHANGE UP (ref 0–0.7)
EOSINOPHIL NFR BLD AUTO: 0 % — SIGNIFICANT CHANGE UP (ref 0–8)
GLUCOSE BLDC GLUCOMTR-MCNC: 119 MG/DL — HIGH (ref 70–99)
GLUCOSE BLDC GLUCOMTR-MCNC: 146 MG/DL — HIGH (ref 70–99)
GLUCOSE BLDC GLUCOMTR-MCNC: 66 MG/DL — LOW (ref 70–99)
GLUCOSE BLDC GLUCOMTR-MCNC: 94 MG/DL — SIGNIFICANT CHANGE UP (ref 70–99)
GLUCOSE BLDC GLUCOMTR-MCNC: 97 MG/DL — SIGNIFICANT CHANGE UP (ref 70–99)
GLUCOSE SERPL-MCNC: 108 MG/DL — HIGH (ref 70–99)
HCT VFR BLD CALC: 34.5 % — LOW (ref 42–52)
HGB BLD-MCNC: 10.5 G/DL — LOW (ref 14–18)
IMM GRANULOCYTES NFR BLD AUTO: 0.7 % — HIGH (ref 0.1–0.3)
LYMPHOCYTES # BLD AUTO: 0.42 K/UL — LOW (ref 1.2–3.4)
LYMPHOCYTES # BLD AUTO: 4.8 % — LOW (ref 20.5–51.1)
MAGNESIUM SERPL-MCNC: 1.7 MG/DL — LOW (ref 1.8–2.4)
MCHC RBC-ENTMCNC: 29.6 PG — SIGNIFICANT CHANGE UP (ref 27–31)
MCHC RBC-ENTMCNC: 30.4 G/DL — LOW (ref 32–37)
MCV RBC AUTO: 97.2 FL — HIGH (ref 80–94)
MONOCYTES # BLD AUTO: 0.33 K/UL — SIGNIFICANT CHANGE UP (ref 0.1–0.6)
MONOCYTES NFR BLD AUTO: 3.8 % — SIGNIFICANT CHANGE UP (ref 1.7–9.3)
NEUTROPHILS # BLD AUTO: 7.9 K/UL — HIGH (ref 1.4–6.5)
NEUTROPHILS NFR BLD AUTO: 90.6 % — HIGH (ref 42.2–75.2)
NRBC # BLD: 0 /100 WBCS — SIGNIFICANT CHANGE UP (ref 0–0)
PLATELET # BLD AUTO: 147 K/UL — SIGNIFICANT CHANGE UP (ref 130–400)
POTASSIUM SERPL-MCNC: 4.1 MMOL/L — SIGNIFICANT CHANGE UP (ref 3.5–5)
POTASSIUM SERPL-SCNC: 4.1 MMOL/L — SIGNIFICANT CHANGE UP (ref 3.5–5)
RBC # BLD: 3.55 M/UL — LOW (ref 4.7–6.1)
RBC # FLD: 22.4 % — HIGH (ref 11.5–14.5)
SODIUM SERPL-SCNC: 140 MMOL/L — SIGNIFICANT CHANGE UP (ref 135–146)
WBC # BLD: 8.72 K/UL — SIGNIFICANT CHANGE UP (ref 4.8–10.8)
WBC # FLD AUTO: 8.72 K/UL — SIGNIFICANT CHANGE UP (ref 4.8–10.8)

## 2019-11-26 PROCEDURE — 43246 EGD PLACE GASTROSTOMY TUBE: CPT

## 2019-11-26 PROCEDURE — 99233 SBSQ HOSP IP/OBS HIGH 50: CPT

## 2019-11-26 RX ORDER — LEVOTHYROXINE SODIUM 125 MCG
25 TABLET ORAL DAILY
Refills: 0 | Status: DISCONTINUED | OUTPATIENT
Start: 2019-11-26 | End: 2019-12-04

## 2019-11-26 RX ORDER — DEXTROSE 10 % IN WATER 10 %
1000 INTRAVENOUS SOLUTION INTRAVENOUS
Refills: 0 | Status: DISCONTINUED | OUTPATIENT
Start: 2019-11-26 | End: 2019-11-26

## 2019-11-26 RX ORDER — MAGNESIUM OXIDE 400 MG ORAL TABLET 241.3 MG
400 TABLET ORAL
Refills: 0 | Status: DISCONTINUED | OUTPATIENT
Start: 2019-11-26 | End: 2019-11-26

## 2019-11-26 RX ORDER — FOLIC ACID 0.8 MG
1 TABLET ORAL DAILY
Refills: 0 | Status: DISCONTINUED | OUTPATIENT
Start: 2019-11-26 | End: 2019-12-04

## 2019-11-26 RX ORDER — ALLOPURINOL 300 MG
100 TABLET ORAL DAILY
Refills: 0 | Status: DISCONTINUED | OUTPATIENT
Start: 2019-11-26 | End: 2019-12-04

## 2019-11-26 RX ORDER — DEXTROSE 10 % IN WATER 10 %
250 INTRAVENOUS SOLUTION INTRAVENOUS ONCE
Refills: 0 | Status: COMPLETED | OUTPATIENT
Start: 2019-11-26 | End: 2019-11-26

## 2019-11-26 RX ORDER — ENOXAPARIN SODIUM 100 MG/ML
40 INJECTION SUBCUTANEOUS DAILY
Refills: 0 | Status: DISCONTINUED | OUTPATIENT
Start: 2019-11-26 | End: 2019-12-04

## 2019-11-26 RX ORDER — MYCOPHENOLATE MOFETIL 250 MG/1
500 CAPSULE ORAL
Refills: 0 | Status: DISCONTINUED | OUTPATIENT
Start: 2019-11-26 | End: 2019-11-26

## 2019-11-26 RX ORDER — MYCOPHENOLATE MOFETIL 250 MG/1
500 CAPSULE ORAL
Refills: 0 | Status: DISCONTINUED | OUTPATIENT
Start: 2019-11-26 | End: 2019-12-04

## 2019-11-26 RX ORDER — OXYCODONE AND ACETAMINOPHEN 5; 325 MG/1; MG/1
1 TABLET ORAL EVERY 6 HOURS
Refills: 0 | Status: DISCONTINUED | OUTPATIENT
Start: 2019-11-26 | End: 2019-11-27

## 2019-11-26 RX ORDER — MAGNESIUM OXIDE 400 MG ORAL TABLET 241.3 MG
400 TABLET ORAL
Refills: 0 | Status: DISCONTINUED | OUTPATIENT
Start: 2019-11-26 | End: 2019-12-04

## 2019-11-26 RX ORDER — CEFAZOLIN SODIUM 1 G
2000 VIAL (EA) INJECTION ONCE
Refills: 0 | Status: COMPLETED | OUTPATIENT
Start: 2019-11-26 | End: 2019-11-27

## 2019-11-26 RX ORDER — AMLODIPINE BESYLATE 2.5 MG/1
2.5 TABLET ORAL DAILY
Refills: 0 | Status: DISCONTINUED | OUTPATIENT
Start: 2019-11-26 | End: 2019-12-04

## 2019-11-26 RX ORDER — ACETAMINOPHEN 500 MG
650 TABLET ORAL EVERY 6 HOURS
Refills: 0 | Status: DISCONTINUED | OUTPATIENT
Start: 2019-11-26 | End: 2019-12-04

## 2019-11-26 RX ADMIN — POLYETHYLENE GLYCOL 3350 17 GRAM(S): 17 POWDER, FOR SOLUTION ORAL at 05:35

## 2019-11-26 RX ADMIN — Medication 60 MILLIGRAM(S): at 05:35

## 2019-11-26 RX ADMIN — Medication 25 MICROGRAM(S): at 05:35

## 2019-11-26 RX ADMIN — Medication 100 MILLIGRAM(S): at 11:24

## 2019-11-26 RX ADMIN — PANTOPRAZOLE SODIUM 40 MILLIGRAM(S): 20 TABLET, DELAYED RELEASE ORAL at 05:35

## 2019-11-26 RX ADMIN — MYCOPHENOLATE MOFETIL 500 MILLIGRAM(S): 250 CAPSULE ORAL at 21:18

## 2019-11-26 RX ADMIN — Medication 1 MILLIGRAM(S): at 11:24

## 2019-11-26 RX ADMIN — Medication 500 MILLILITER(S): at 18:05

## 2019-11-26 RX ADMIN — ENOXAPARIN SODIUM 40 MILLIGRAM(S): 100 INJECTION SUBCUTANEOUS at 19:28

## 2019-11-26 RX ADMIN — PANTOPRAZOLE SODIUM 40 MILLIGRAM(S): 20 TABLET, DELAYED RELEASE ORAL at 18:51

## 2019-11-26 RX ADMIN — AMLODIPINE BESYLATE 2.5 MILLIGRAM(S): 2.5 TABLET ORAL at 05:35

## 2019-11-26 RX ADMIN — MYCOPHENOLATE MOFETIL 500 MILLIGRAM(S): 250 CAPSULE ORAL at 05:35

## 2019-11-26 RX ADMIN — TIOTROPIUM BROMIDE 1 CAPSULE(S): 18 CAPSULE ORAL; RESPIRATORY (INHALATION) at 07:36

## 2019-11-26 RX ADMIN — Medication 3 MILLILITER(S): at 07:37

## 2019-11-26 NOTE — PROGRESS NOTE ADULT - ATTENDING COMMENTS
Pt seen and examined independently at bedside. Pt feels well today, scheduled to undergo EGD.    PHYSICAL EXAM:  GENERAL: NAD, speaks in full sentences, no signs of respiratory distress, cachetic  HEAD:  Atraumatic, Normocephalic  EYES: Anicteric  NECK: Supple, No JVD  CHEST/LUNG: Clear to auscultation bilaterally; No wheeze; No crackles; No accessory muscles used  HEART: Regular rate and rhythm; No murmurs;   ABDOMEN: Soft, Nontender, Nondistended; Bowel sounds present; No guarding  EXTREMITIES:  2+ Peripheral Pulses, No cyanosis or edema  PSYCH: AAOx3  NEUROLOGY: non-focal  SKIN: No rashes or lesions    . Raynaud's symptoms improved on low dose CCB - may continue amlodipine 2.5 mg daily. Plan for peg per GI given weight loss and dysphagia. Requested primary team's assistance in discussion mesenteric edema with radiologist reading CTs (seen on 9/25 CT but not addressed on subsequent films - would clarify if resolved). Otherwise UTD with malignancy screenings. Check SPEP. Will follow along.       Dermatomyositis scleroderma overlap syndrome  - pt's prednisone increased from 40 mg daily to 60 mg two weeks ago as outpatient due to persisting symptoms  - CK levels normalized  - c/w Methotrexate with folate  - pt evaluated by rheumatology, Cellcept started yesterday  - amlodipine started for Raynaud symptoms, improvement of symptoms  - rheumatology follow up appreciated, will check SPEP    Mesenteric edema  - present on previous imaging  - not mentioned in most recent CT  - can be related to patient's hypoalbuminemia but will reach out to radiology to confirm resolution      Dysphagia  - suspected it is related to scleroderma  - MBS shows severe dysphagia, recommend alternative means to nutrition  - PEG planned for today  - nutrition consult for optimal PEG feeds    Normocytic Anemia   - Stable   - Likely secondary to anemia of inflammation   - iron studies pending     Leukocytosis  - likely due to steroids  - no obvious signs of infection  - resolving    Recent history of UGIB   - had EGD 1 month ago  - found to have non-erosive gastritis, nonspecific erosive esophagitis, hiatal hernia  - hgb stable    COPD   - stable  - no wheezing on exam  - Duonebs as needed     Hypothyroidism   - c/w synthroid    Hypomagnesemia/hypokalemia  - resolved    Hypoalbumenia  - likely related to chronic disease and malnutrition    Cachexia  - severe protein malnutrition, underweight BMI 15  - pt never had strong appetite  - has poor PO intake due to dysphagia  - hold off appetite stimulants for now   - RD consult apprepiated    GI PPX:   DVT PPX: Lovenox   DIet: Dysphagia 1, nectar thick  Full Code    #Progress Note Handoff:  Pending (specify):  PEG  Family discussion: discussed PEG  Disposition: Home___/SNF___/Other________/Unknown at this time__x______ . Pt seen and examined independently at bedside. Pt feels well today, scheduled to undergo EGD.    PHYSICAL EXAM:  GENERAL: NAD, speaks in full sentences, no signs of respiratory distress, cachetic  HEAD:  Atraumatic, Normocephalic  EYES: Anicteric  NECK: Supple, No JVD  CHEST/LUNG: Clear to auscultation bilaterally; No wheeze; No crackles; No accessory muscles used  HEART: Regular rate and rhythm; No murmurs;   ABDOMEN: Soft, Nontender, Nondistended; Bowel sounds present; No guarding  EXTREMITIES:  2+ Peripheral Pulses, No cyanosis or edema  PSYCH: AAOx3  NEUROLOGY: non-focal  SKIN: No rashes or lesions    . Raynaud's symptoms improved on low dose CCB - may continue amlodipine 2.5 mg daily. Plan for peg per GI given weight loss and dysphagia. Requested primary team's assistance in discussion mesenteric edema with radiologist reading CTs (seen on 9/25 CT but not addressed on subsequent films - would clarify if resolved). Otherwise UTD with malignancy screenings. Check SPEP. Will follow along.       Dermatomyositis scleroderma overlap syndrome  - pt's prednisone increased from 40 mg daily to 60 mg two weeks ago as outpatient due to persisting symptoms  - CK levels normalized  - c/w Methotrexate with folate  - pt evaluated by rheumatology, Cellcept started yesterday  - amlodipine started for Raynaud symptoms, improvement of symptoms  - rheumatology follow up appreciated, will check SPEP    Mesenteric edema  - present on previous imaging  - not mentioned in most recent CT  - can be related to patient's hypoalbuminemia but will reach out to radiology to confirm resolution      Dysphagia  - suspected it is related to scleroderma  - MBS shows severe dysphagia, recommend alternative means to nutrition  - PEG planned for today  - nutrition consult for optimal PEG feeds    Hypomagnesmia  - replete levels  - repeat mg in AM    Normocytic Anemia   - Stable   - Likely secondary to anemia of inflammation   - iron studies pending     Leukocytosis  - likely due to steroids  - no obvious signs of infection  - resolving    Recent history of UGIB   - had EGD 1 month ago  - found to have non-erosive gastritis, nonspecific erosive esophagitis, hiatal hernia  - hgb stable    COPD   - stable  - no wheezing on exam  - Duonebs as needed     Hypothyroidism   - c/w synthroid    Hypomagnesemia/hypokalemia  - resolved    Hypoalbumenia  - likely related to chronic disease and malnutrition    Cachexia  - severe protein malnutrition, underweight BMI 15  - pt never had strong appetite  - has poor PO intake due to dysphagia  - hold off appetite stimulants for now   - RD consult apprepiated    GI PPX:   DVT PPX: Lovenox   DIet: Dysphagia 1, nectar thick  Full Code    #Progress Note Handoff:  Pending (specify):  PEG  Family discussion: discussed PEG  Disposition: Home___/SNF___/Other________/Unknown at this time__x______ .

## 2019-11-26 NOTE — PROGRESS NOTE ADULT - SUBJECTIVE AND OBJECTIVE BOX
NEIL PHOENIX 69y Male  MRN#: [id number]     SUBJECTIVE  Patient is a 69y old Male who presents with a chief complaint of Generalized weakness and muscle pain (25 Nov 2019 13:10)  Today is hospital day 5d, and this morning he is lying in bed without distress.   No acute overnight events.   feels well  no cp no sob  no f/c/n/v  doesnt have to strain as much for BMs    OBJECTIVE  PAST MEDICAL & SURGICAL HISTORY  Gout  COPD, mild  Hypothyroid  S/P tonsillectomy    ALLERGIES:  No Known Allergies    MEDICATIONS:  STANDING MEDICATIONS  allopurinol 100 milliGRAM(s) Oral daily  amLODIPine   Tablet 2.5 milliGRAM(s) Oral daily  ceFAZolin   IVPB 2000 milliGRAM(s) IV Intermittent once  folic acid 1 milliGRAM(s) Oral daily  influenza   Vaccine 0.5 milliLiter(s) IntraMuscular once  lactated ringers. 1000 milliLiter(s) IV Continuous <Continuous>  levothyroxine 25 MICROGram(s) Oral daily  methotrexate 15 milliGRAM(s) Oral <User Schedule>  mycophenolate mofetil 500 milliGRAM(s) Oral two times a day  pantoprazole  Injectable 40 milliGRAM(s) IV Push every 12 hours  polyethylene glycol 3350 17 Gram(s) Oral two times a day  predniSONE   Tablet 60 milliGRAM(s) Oral daily  tiotropium 18 MICROgram(s) Capsule 1 Capsule(s) Inhalation daily    PRN MEDICATIONS  ALBUTerol    90 MICROgram(s) HFA Inhaler 2 Puff(s) Inhalation every 6 hours PRN  albuterol/ipratropium for Nebulization 3 milliLiter(s) Nebulizer every 6 hours PRN    HOME MEDICATIONS  Home Medications:  allopurinol 100 mg oral tablet: 1 tab(s) orally once a day (02 Oct 2019 18:28)  DuoNeb 0.5 mg-2.5 mg/3 mL inhalation solution: 3 milliliter(s) inhaled 3 times a day, As Needed (02 Oct 2019 18:28)  levothyroxine 25 mcg (0.025 mg) oral tablet: 1 tab(s) orally once a day (02 Oct 2019 18:28)  pantoprazole 40 mg oral delayed release tablet: 1 tab(s) orally once a day (02 Oct 2019 18:28)  predniSONE 20 mg oral tablet: 2 tab(s) orally once a day in the morning  (21 Nov 2019 16:31)  predniSONE 20 mg oral tablet: 1 tab(s) orally once a day at night  (21 Nov 2019 16:32)  tiotropium 18 mcg inhalation capsule: 1 cap(s) inhaled once a day (09 Oct 2019 12:50)      VITAL SIGNS: Last 24 Hours  T(C): 35.8 (26 Nov 2019 08:02), Max: 35.9 (25 Nov 2019 15:29)  T(F): 96.4 (26 Nov 2019 08:02), Max: 96.7 (25 Nov 2019 15:29)  HR: 91 (26 Nov 2019 08:02) (85 - 95)  BP: 105/61 (26 Nov 2019 08:02) (96/60 - 116/69)  BP(mean): --  RR: 18 (26 Nov 2019 08:02) (18 - 18)  SpO2: --      LABS:                        10.5   8.72  )-----------( 147      ( 26 Nov 2019 06:50 )             34.5     11-26    140  |  103  |  23<H>  ----------------------------<  108<H>  4.1   |  23  |  0.6<L>    Ca    8.3<L>      26 Nov 2019 06:50  Mg     1.7     11-26    TPro  4.9<L>  /  Alb  3.0<L>  /  TBili  0.5  /  DBili  x   /  AST  33  /  ALT  32  /  AlkPhos  85  11-25    LIVER FUNCTIONS - ( 25 Nov 2019 04:59 )  Alb: 3.0 g/dL / Pro: 4.9 g/dL / ALK PHOS: 85 U/L / ALT: 32 U/L / AST: 33 U/L / GGT: x               RADIOLOGY:    PHYSICAL EXAM:  PHYSICAL EXAM:  GENERAL: NAD, AAO x 4, 69y M  CHEST/LUNG: Clear to auscultation bilaterally; No wheeze; No crackles; No accessory muscles used  HEART: Regular rate and rhythm;   ABDOMEN: Soft, Nontender, Nondistended; Bowel sounds present; No guarding  EXTREMITIES:  No cyanosis or edema  NEUROLOGY: non-focal    ADMISSION SUMMARY  Patient is a 69y old Male who presents with a chief complaint of Generalized weakness and muscle pain (25 Nov 2019 13:10)

## 2019-11-26 NOTE — PRE-ANESTHESIA EVALUATION ADULT - NSANTHPMHFT_GEN_ALL_CORE
Scleroderma, polymyositis  Progressive weakness and dysphagia  On prednisone and methotrexate  RA  COPD  Hypothyroidism polymyositis  Progressive weakness and dysphagia  On prednisone and methotrexate  RA  COPD  Hypothyroidism

## 2019-11-26 NOTE — CHART NOTE - NSCHARTNOTEFT_GEN_A_CORE
PACU ANESTHESIA ADMISSION NOTE      Procedure: PEG tube placement  Post op diagnosis:  candida esophagitis, dysphagia     ____  Intubated  TV:______       Rate: ______      FiO2: ______    __x__  Patent Airway    __x__  Full return of protective reflexes    _x___  Full recovery from anesthesia / back to baseline status    Vitals:  T(C): 36.4 (11-26-19 @ 13:30), Max: 36.4 (11-26-19 @ 13:16)  HR: 84  BP: 109/73  RR: 20  SpO2: 100%    Mental Status:  _x__ Awake   ___xx__ Alert   _____ Drowsy   _____ Sedated    Nausea/Vomiting:  __x__ NO  ______Yes,   See Post - Op Orders          Pain Scale (0-10):  _0____    Treatment: ____ None    ____ See Post - Op/PCA Orders    Post - Operative Fluids:   ____ Oral   __x__ See Post - Op Orders    Plan: Discharge:   ____Home       __x___Floor     _____Critical Care    _____  Other:_________________Comments; MAC for PEG placement. No anesthesia complications. Phase 1 recovery completed in the procedure room. Please discharge to floor once criteria are met.

## 2019-11-26 NOTE — CHART NOTE - NSCHARTNOTEFT_GEN_A_CORE
Registered Dietitian Follow-Up     Patient Profile Reviewed                           Yes [x]   No []     Nutrition History Previously Obtained        Yes [x]  No []       Pertinent Medical Interventions: Dysphagia--modified barium swallow 11/25, recommend NPO with PEG placement; pt amenable and to undergo placement today.     Diet order: NPO     Anthropometrics:  - Ht. 70"  - Wt. 54.4kg (11/26) vs. 51kg (11/23)--wt gain likely 2/2 bed-scale inaccuracy, will monitor   - %wt hernandez  - BMI: 17.2   - IBW: 166#      Pertinent Lab Data: (11/26): H/H 10.5/34.5, BUN 23, Cr. 0.6, Glu 108, Mg 1.7      Pertinent Meds: abx, lactated ringers, mag-ox, prednisone, miralax, albuterol, amlodipine, allopurinol, folic acid, synthroid, protonix, spiriva, methotrexate      Physical Findings:  - Appearance: off unit; no edema noted    - GI function: LBM 11/24   - Oral/Mouth cavity: per SLP recs on 11/25--NPO, consider long term non-oral means of nutrition and hydration  - Skin: unstageable to coccyx      Nutrition Requirements  Weight Used: admission 51kg; needs adjusted today      Estimated Calorie Needs: 9210-0584 kcal/day (MSJ x 1.2-1.5 AF)--increased needs d/t severe PCM   Estimated Protein Needs: 61-77 gm/day (1.2-1.5 gm/kg CBW)--same as mentioned above   Estimated Fluids Needs: 1 ml/kcal      Nutrient Intake: not meeting kcal/pro needs      Previous Nutrition Diagnosis: Severe PCM (ongoing)     New Nutrition Diagnosis: Inadequate protein-energy intake related to dysphagia as evidenced by current NPO status      Nutrition Intervention: enteral nutrition     Recommendations:   1. Once nutrition access is obtained, please initiate the following TF regimen: Jevity 1.2 240ml Q4H to provide a total of 1728kcal, 80gm pro, 1162ml free water. Flush with 50ml free water pre/post feeds. Once pt can tolerate TF regimen, can transition to Jevity 1.2 360ml Q6H (same total volume of formula, less # of total feeds daily).      Goal/Expected Outcome: Pt to meet % of estimated nutrient needs within 3 days      Indicator/Monitoring: RD to monitor diet order, energy intake, body composition, nutrition focused physical findings

## 2019-11-26 NOTE — PROGRESS NOTE ADULT - ASSESSMENT
69M admitted for dysphagia on barium swallow has major dyfunction which will no allow for any meaningful swallowing    #Dermatomyositis scleroderma overlap syndrome  - pt's prednisone increased from 40 mg daily to 60 mg two weeks ago as outpatient due to persisting symptoms  - CK levels normalized  -  Methotrexate 15mg weekly with folate  - pt evaluated by rheumatology, Cellcept started   - amlodipine for Raynaud symptoms    #Dysphagia  - suspected it is related to scleroderma  - pt evaluated by GI, no in patient EGD/manometry  - modified barium swallow 11/25, RECS npo with PEG placement  - discussed PEG tube with patient and patient is amenable, likely PEG placement on 11/26    #Normocytic Anemia   - Stable   - Likely secondary to anemia of inflammation   - iron studies signiicant for increased ferrritn likely secondary to acute phase reactant from autoimmune disorders    #Leukocytosis  - likely due to steroids  - no obvious signs of infection  - continue to monitor    #Recent history of UGIB   - had EGD 1 month ago  - found to have non-erosive gastritis, nonspecific erosive esophagitis, hiatal hernia  - hgb stable  - Repeat EGD is due on 11/29 (8 weeks after previous) to monitor for healing  - GI consulted, no EGD today, maybe 11/26 if inpatient or outpt    #COPD   - stable  - no wheezing on exam  - Duonebs as needed     #Hypothyroidism   - c/w synthroid    #Hypercalcemia  - corrected calcium to albumin is normal at 9.7    #Hypomagnesemia/hypokalemia  - replete magnesium and potassium  - recheck levels in AM    #Hypoalbumenia  - likely related to malnutrition    #Cachexia  - severe protein malnutrition, underweight BMI 15  - pt never had strong appetite  - has poor PO intake due to dysphagia  - hold off appetite stimulants for now   - RD consult    DVT PPX: Lovenox (held for procedure)  DIet: npo  Full Code  pending PEG

## 2019-11-27 LAB
ANION GAP SERPL CALC-SCNC: 16 MMOL/L — HIGH (ref 7–14)
BASOPHILS # BLD AUTO: 0.01 K/UL — SIGNIFICANT CHANGE UP (ref 0–0.2)
BASOPHILS NFR BLD AUTO: 0.1 % — SIGNIFICANT CHANGE UP (ref 0–1)
BUN SERPL-MCNC: 17 MG/DL — SIGNIFICANT CHANGE UP (ref 10–20)
CALCIUM SERPL-MCNC: 8 MG/DL — LOW (ref 8.5–10.1)
CHLORIDE SERPL-SCNC: 102 MMOL/L — SIGNIFICANT CHANGE UP (ref 98–110)
CO2 SERPL-SCNC: 22 MMOL/L — SIGNIFICANT CHANGE UP (ref 17–32)
CREAT SERPL-MCNC: 0.6 MG/DL — LOW (ref 0.7–1.5)
EOSINOPHIL # BLD AUTO: 0.01 K/UL — SIGNIFICANT CHANGE UP (ref 0–0.7)
EOSINOPHIL NFR BLD AUTO: 0.1 % — SIGNIFICANT CHANGE UP (ref 0–8)
GLUCOSE BLDC GLUCOMTR-MCNC: 114 MG/DL — HIGH (ref 70–99)
GLUCOSE BLDC GLUCOMTR-MCNC: 133 MG/DL — HIGH (ref 70–99)
GLUCOSE BLDC GLUCOMTR-MCNC: 52 MG/DL — LOW (ref 70–99)
GLUCOSE BLDC GLUCOMTR-MCNC: 56 MG/DL — LOW (ref 70–99)
GLUCOSE BLDC GLUCOMTR-MCNC: 67 MG/DL — LOW (ref 70–99)
GLUCOSE BLDC GLUCOMTR-MCNC: 68 MG/DL — LOW (ref 70–99)
GLUCOSE BLDC GLUCOMTR-MCNC: 73 MG/DL — SIGNIFICANT CHANGE UP (ref 70–99)
GLUCOSE SERPL-MCNC: 82 MG/DL — SIGNIFICANT CHANGE UP (ref 70–99)
HCT VFR BLD CALC: 37.2 % — LOW (ref 42–52)
HGB BLD-MCNC: 11.6 G/DL — LOW (ref 14–18)
IMM GRANULOCYTES NFR BLD AUTO: 0.9 % — HIGH (ref 0.1–0.3)
LYMPHOCYTES # BLD AUTO: 0.64 K/UL — LOW (ref 1.2–3.4)
LYMPHOCYTES # BLD AUTO: 6.2 % — LOW (ref 20.5–51.1)
MAGNESIUM SERPL-MCNC: 1.7 MG/DL — LOW (ref 1.8–2.4)
MCHC RBC-ENTMCNC: 30.2 PG — SIGNIFICANT CHANGE UP (ref 27–31)
MCHC RBC-ENTMCNC: 31.2 G/DL — LOW (ref 32–37)
MCV RBC AUTO: 96.9 FL — HIGH (ref 80–94)
MONOCYTES # BLD AUTO: 0.1 K/UL — SIGNIFICANT CHANGE UP (ref 0.1–0.6)
MONOCYTES NFR BLD AUTO: 1 % — LOW (ref 1.7–9.3)
NEUTROPHILS # BLD AUTO: 9.51 K/UL — HIGH (ref 1.4–6.5)
NEUTROPHILS NFR BLD AUTO: 91.7 % — HIGH (ref 42.2–75.2)
NRBC # BLD: 0 /100 WBCS — SIGNIFICANT CHANGE UP (ref 0–0)
PHOSPHATE SERPL-MCNC: 2.2 MG/DL — SIGNIFICANT CHANGE UP (ref 2.1–4.9)
PLATELET # BLD AUTO: 166 K/UL — SIGNIFICANT CHANGE UP (ref 130–400)
POTASSIUM SERPL-MCNC: 3.9 MMOL/L — SIGNIFICANT CHANGE UP (ref 3.5–5)
POTASSIUM SERPL-SCNC: 3.9 MMOL/L — SIGNIFICANT CHANGE UP (ref 3.5–5)
PROT SERPL-MCNC: 4.8 G/DL — LOW (ref 6–8.3)
PROT SERPL-MCNC: 4.8 G/DL — LOW (ref 6–8.3)
RBC # BLD: 3.84 M/UL — LOW (ref 4.7–6.1)
RBC # FLD: 22.6 % — HIGH (ref 11.5–14.5)
SODIUM SERPL-SCNC: 140 MMOL/L — SIGNIFICANT CHANGE UP (ref 135–146)
WBC # BLD: 10.36 K/UL — SIGNIFICANT CHANGE UP (ref 4.8–10.8)
WBC # FLD AUTO: 10.36 K/UL — SIGNIFICANT CHANGE UP (ref 4.8–10.8)

## 2019-11-27 PROCEDURE — 99232 SBSQ HOSP IP/OBS MODERATE 35: CPT

## 2019-11-27 PROCEDURE — 99233 SBSQ HOSP IP/OBS HIGH 50: CPT

## 2019-11-27 RX ORDER — SODIUM CHLORIDE 9 MG/ML
1000 INJECTION, SOLUTION INTRAVENOUS
Refills: 0 | Status: DISCONTINUED | OUTPATIENT
Start: 2019-11-27 | End: 2019-11-27

## 2019-11-27 RX ORDER — SODIUM CHLORIDE 9 MG/ML
1000 INJECTION, SOLUTION INTRAVENOUS
Refills: 0 | Status: DISCONTINUED | OUTPATIENT
Start: 2019-11-27 | End: 2019-11-28

## 2019-11-27 RX ORDER — DEXTROSE 10 % IN WATER 10 %
250 INTRAVENOUS SOLUTION INTRAVENOUS ONCE
Refills: 0 | Status: DISCONTINUED | OUTPATIENT
Start: 2019-11-27 | End: 2019-11-27

## 2019-11-27 RX ORDER — DEXTROSE 10 % IN WATER 10 %
250 INTRAVENOUS SOLUTION INTRAVENOUS ONCE
Refills: 0 | Status: COMPLETED | OUTPATIENT
Start: 2019-11-27 | End: 2019-11-27

## 2019-11-27 RX ORDER — SODIUM,POTASSIUM PHOSPHATES 278-250MG
1 POWDER IN PACKET (EA) ORAL
Refills: 0 | Status: COMPLETED | OUTPATIENT
Start: 2019-11-27 | End: 2019-11-28

## 2019-11-27 RX ADMIN — Medication 500 MILLILITER(S): at 21:56

## 2019-11-27 RX ADMIN — AMLODIPINE BESYLATE 2.5 MILLIGRAM(S): 2.5 TABLET ORAL at 05:29

## 2019-11-27 RX ADMIN — MAGNESIUM OXIDE 400 MG ORAL TABLET 400 MILLIGRAM(S): 241.3 TABLET ORAL at 11:48

## 2019-11-27 RX ADMIN — Medication 25 MICROGRAM(S): at 05:29

## 2019-11-27 RX ADMIN — Medication 650 MILLIGRAM(S): at 05:42

## 2019-11-27 RX ADMIN — OXYCODONE AND ACETAMINOPHEN 1 TABLET(S): 5; 325 TABLET ORAL at 01:08

## 2019-11-27 RX ADMIN — MYCOPHENOLATE MOFETIL 500 MILLIGRAM(S): 250 CAPSULE ORAL at 14:13

## 2019-11-27 RX ADMIN — Medication 100 MILLIGRAM(S): at 21:59

## 2019-11-27 RX ADMIN — Medication 100 MILLIGRAM(S): at 11:48

## 2019-11-27 RX ADMIN — POLYETHYLENE GLYCOL 3350 17 GRAM(S): 17 POWDER, FOR SOLUTION ORAL at 05:30

## 2019-11-27 RX ADMIN — PANTOPRAZOLE SODIUM 40 MILLIGRAM(S): 20 TABLET, DELAYED RELEASE ORAL at 05:30

## 2019-11-27 RX ADMIN — Medication 500 MILLILITER(S): at 09:14

## 2019-11-27 RX ADMIN — ENOXAPARIN SODIUM 40 MILLIGRAM(S): 100 INJECTION SUBCUTANEOUS at 11:48

## 2019-11-27 RX ADMIN — OXYCODONE AND ACETAMINOPHEN 1 TABLET(S): 5; 325 TABLET ORAL at 11:55

## 2019-11-27 RX ADMIN — OXYCODONE AND ACETAMINOPHEN 1 TABLET(S): 5; 325 TABLET ORAL at 01:39

## 2019-11-27 RX ADMIN — Medication 650 MILLIGRAM(S): at 05:56

## 2019-11-27 RX ADMIN — OXYCODONE AND ACETAMINOPHEN 1 TABLET(S): 5; 325 TABLET ORAL at 12:20

## 2019-11-27 RX ADMIN — TIOTROPIUM BROMIDE 1 CAPSULE(S): 18 CAPSULE ORAL; RESPIRATORY (INHALATION) at 07:54

## 2019-11-27 RX ADMIN — MAGNESIUM OXIDE 400 MG ORAL TABLET 400 MILLIGRAM(S): 241.3 TABLET ORAL at 10:33

## 2019-11-27 RX ADMIN — Medication 1 MILLIGRAM(S): at 11:48

## 2019-11-27 RX ADMIN — MAGNESIUM OXIDE 400 MG ORAL TABLET 400 MILLIGRAM(S): 241.3 TABLET ORAL at 17:52

## 2019-11-27 RX ADMIN — Medication 1 PACKET(S): at 17:52

## 2019-11-27 RX ADMIN — Medication 3 MILLILITER(S): at 07:55

## 2019-11-27 RX ADMIN — PANTOPRAZOLE SODIUM 40 MILLIGRAM(S): 20 TABLET, DELAYED RELEASE ORAL at 17:52

## 2019-11-27 NOTE — PROGRESS NOTE ADULT - ATTENDING COMMENTS
Pt seen and examined independently at bedside. Pt feels well today, s/p PEG yesterday    PHYSICAL EXAM:  GENERAL: NAD, speaks in full sentences, no signs of respiratory distress, cachetic  HEAD:  Atraumatic, Normocephalic  EYES: Anicteric  NECK: Supple, No JVD  CHEST/LUNG: Clear to auscultation bilaterally; No wheeze; No crackles; No accessory muscles used  HEART: Regular rate and rhythm; No murmurs;   ABDOMEN: Soft, Nontender, Nondistended; Bowel sounds present; No guarding PEG present  EXTREMITIES:  2+ Peripheral Pulses, No cyanosis or edema  PSYCH: AAOx3  NEUROLOGY: non-focal  SKIN: No rashes or lesions    Dermatomyositis scleroderma overlap syndrome  - pt's prednisone increased from 40 mg daily to 60 mg two weeks ago as outpatient due to persisting symptoms  - CK levels normalized  - c/w Methotrexate with folate  - pt evaluated by rheumatology, Cellcept started yesterday  - amlodipine started for Raynaud symptoms, improvement of symptoms  - SPEP unremarkable    Mesenteric edema  - present on previous imaging  - not mentioned in most recent CT  - can be related to patient's hypoalbuminemia   - radiology call back pending    Dysphagia  - suspected it is related to scleroderma  - s/p PEG  - c/w Jevity    Hypoglycemia  - agree with resident plan to assess for refeeding syndrome  - CMP, phosphorus levels pending  - pt received d10    Hypomagnesemia  - replete levels  - repeat mg in AM    Normocytic Anemia   - Stable   - Likely secondary to anemia of inflammation   - iron studies pending     Leukocytosis  - likely due to steroids  - no obvious signs of infection  - resolving    Recent history of UGIB   - had EGD 1 month ago  - found to have non-erosive gastritis, nonspecific erosive esophagitis, hiatal hernia  - hgb stable    COPD   - stable  - no wheezing on exam  - Duonebs as needed     Hypothyroidism   - c/w synthroid    Hypomagnesemia/hypokalemia  - resolved    Hypoalbumenia  - likely related to chronic disease and malnutrition    Cachexia  - severe protein malnutrition, underweight BMI 15  - pt never had strong appetite  - has poor PO intake due to dysphagia  - hold off appetite stimulants for now   - RD consult apprepiated    GI PPX:   DVT PPX: Lovenox   DIet: Dysphagia 1, nectar thick  Full Code    #Progress Note Handoff:  Pending (specify):  refeeding syndrome labs, formerly [facility] set up  Family discussion: discussed repeat labs, SNF vs home. Pt declined SNF. Likely d/c in AM  Disposition: Home__x_/SNF___/Other________/Unknown at this time_______ .

## 2019-11-27 NOTE — SWALLOW BEDSIDE ASSESSMENT ADULT - SWALLOW EVAL: RECOMMENDED DIET
Letter sent via [facility] chart NPO alternate means of nutrition/hydration. ok for occasional sips of Nectar-thickened liquids with L head turn

## 2019-11-27 NOTE — PROGRESS NOTE ADULT - ASSESSMENT
. Raynaud's symptoms improved on low dose CCB - may continue amlodipine 2.5 mg daily. Plan for peg per GI given weight loss and dysphagia. Requested primary team's assistance in discussion mesenteric edema with radiologist reading CTs (seen on 9/25 CT but not addressed on subsequent films - would clarify if resolved). Otherwise UTD with malignancy screenings. Check SPEP. Will follow along.       Dermatomyositis scleroderma overlap syndrome  - pt's prednisone increased from 40 mg daily to 60 mg two weeks ago as outpatient due to persisting symptoms  - CK levels normalized  - c/w Methotrexate with folate  Protein Electrophoresis, Serum (11.26.19 @ 06:50)    Protein Total, Serum: 4.8 g/dL    Total Protein, Serum: 4.8 g/dL    - pt evaluated by rheumatology, Cellcept started yesterday  - amlodipine started for Raynaud symptoms, improvement of symptoms  - rheumatology follow up appreciated, will check SPEP    Mesenteric edema  - present on previous imaging  - not mentioned in most recent CT  - can be related to patient's hypoalbuminemia but will reach out to radiology to confirm resolution      Dysphagia  - suspected it is related to scleroderma  - MBS shows severe dysphagia, recommend alternative means to nutrition  - PEG planned for today  - nutrition consult for optimal PEG feeds    Hypomagnesmia  - replete levels  - repeat mg in AM    Normocytic Anemia   - Stable   - Likely secondary to anemia of inflammation   - iron studies pending     Leukocytosis  - likely due to steroids  - no obvious signs of infection  - resolving    Recent history of UGIB   - had EGD 1 month ago  - found to have non-erosive gastritis, nonspecific erosive esophagitis, hiatal hernia  - hgb stable    COPD   - stable  - no wheezing on exam  - Duonebs as needed     Hypothyroidism   - c/w synthroid    Hypomagnesemia/hypokalemia  - resolved    Hypoalbumenia  - likely related to chronic disease and malnutrition    Cachexia  - severe protein malnutrition, underweight BMI 15  - pt never had strong appetite  - has poor PO intake due to dysphagia  - hold off appetite stimulants for now   - RD consult apprepiated    GI PPX:   DVT PPX: Lovenox   DIet: Dysphagia 1, nectar thick  Full Code    #Progress Note Handoff:  Pending (specify):  PEG . Raynaud's symptoms improved on low dose CCB - may continue amlodipine 2.5 mg daily. Plan for peg per GI given weight loss and dysphagia. Requested primary team's assistance in discussion mesenteric edema with radiologist reading CTs (seen on 9/25 CT but not addressed on subsequent films - would clarify if resolved). Otherwise UTD with malignancy screenings. Check SPEP. Will follow along.       #Dermatomyositis scleroderma overlap syndrome  - pt's prednisone increased from 40 mg daily to 60 mg two weeks ago as outpatient due to persisting symptoms  - CK levels normalized  - c/w Methotrexate with folate  Protein Electrophoresis, Serum (11.26.19 @ 06:50)    Protein Total, Serum: 4.8 g/dL    Total Protein, Serum: 4.8 g/dL    - pt evaluated by rheumatology, Cellcept started  - amlodipine started for Raynaud symptoms, improvement of symptoms  - rheumatology follow up appreciated,    #PEG and feeding  -Watch electrolytes for possible refeeding syndrome    #Mesenteric edema  - present on previous imaging  - not mentioned in most recent CT  - can be related to patient's hypoalbuminemia but will reach out to radiology to confirm resolution      #Dysphagia  - suspected it is related to scleroderma  - MBS shows severe dysphagia, recommend alternative means to nutrition  - PEG performed 11/26  - nutrition consult for optimal PEG feeds    #Hypomagnesmia  - replete levels  - repeat mg in AM    #Normocytic Anemia   - Stable   - Likely secondary to anemia of inflammation   - iron studies pending     #Leukocytosis  - likely due to steroids  - no obvious signs of infection  - resolving    #Recent history of UGIB   - had EGD 1 month ago  - found to have non-erosive gastritis, nonspecific erosive esophagitis, hiatal hernia  - hgb stable    #COPD   - stable  - no wheezing on exam  - Duonebs as needed     #Hypothyroidism   - c/w synthroid    #Hypomagnesemia/hypokalemia  - resolved  -mag oxide with meals  -moitor lytes    #Hypoalbumenia  - likely related to chronic disease and malnutrition    #Cachexia  - severe protein malnutrition, underweight BMI 15  - pt never had strong appetite  - has poor PO intake due to dysphagia  - hold off appetite stimulants for now   - RD consult apprepiated    GI PPX:   DVT PPX: Lovenox   DIet: PEG feedings   Full Code      Pending (specify):  electroytes and insurance approval for feeds

## 2019-11-27 NOTE — PROGRESS NOTE ADULT - SUBJECTIVE AND OBJECTIVE BOX
NEIL PHOENIX 69y Male  MRN#: [id number]     SUBJECTIVE  Patient is a 69y old Male who presents with a chief complaint of Generalized weakness and muscle pain (27 Nov 2019 08:36)  Today is hospital day 6d, and this morning he is lying in bed without distress.   No acute overnight events.   Feels well post PEG placmeent  no chest pain no SOB  no f./c/n/v  some pain at PEG site    OBJECTIVE  PAST MEDICAL & SURGICAL HISTORY  Gout  COPD, mild  Hypothyroid  S/P tonsillectomy    ALLERGIES:  No Known Allergies    MEDICATIONS:  STANDING MEDICATIONS  allopurinol 100 milliGRAM(s) Oral daily  amLODIPine   Tablet 2.5 milliGRAM(s) Oral daily  ceFAZolin   IVPB 2000 milliGRAM(s) IV Intermittent once  enoxaparin Injectable 40 milliGRAM(s) SubCutaneous daily  folic acid 1 milliGRAM(s) Oral daily  influenza   Vaccine 0.5 milliLiter(s) IntraMuscular once  lactated ringers. 1000 milliLiter(s) IV Continuous <Continuous>  levothyroxine 25 MICROGram(s) Oral daily  magnesium oxide 400 milliGRAM(s) Oral three times a day with meals  methotrexate 15 milliGRAM(s) Oral <User Schedule>  mycophenolate mofetil Suspension 500 milliGRAM(s) Oral two times a day  pantoprazole  Injectable 40 milliGRAM(s) IV Push every 12 hours  polyethylene glycol 3350 17 Gram(s) Oral two times a day  predniSONE   Tablet 60 milliGRAM(s) Oral daily  tiotropium 18 MICROgram(s) Capsule 1 Capsule(s) Inhalation daily    PRN MEDICATIONS  acetaminophen   Tablet .. 650 milliGRAM(s) Oral every 6 hours PRN  ALBUTerol    90 MICROgram(s) HFA Inhaler 2 Puff(s) Inhalation every 6 hours PRN  albuterol/ipratropium for Nebulization 3 milliLiter(s) Nebulizer every 6 hours PRN  oxycodone    5 mG/acetaminophen 325 mG 1 Tablet(s) Oral every 6 hours PRN    HOME MEDICATIONS  Home Medications:  allopurinol 100 mg oral tablet: 1 tab(s) orally once a day (02 Oct 2019 18:28)  DuoNeb 0.5 mg-2.5 mg/3 mL inhalation solution: 3 milliliter(s) inhaled 3 times a day, As Needed (02 Oct 2019 18:28)  levothyroxine 25 mcg (0.025 mg) oral tablet: 1 tab(s) orally once a day (02 Oct 2019 18:28)  pantoprazole 40 mg oral delayed release tablet: 1 tab(s) orally once a day (02 Oct 2019 18:28)  predniSONE 20 mg oral tablet: 2 tab(s) orally once a day in the morning  (21 Nov 2019 16:31)  predniSONE 20 mg oral tablet: 1 tab(s) orally once a day at night  (21 Nov 2019 16:32)  tiotropium 18 mcg inhalation capsule: 1 cap(s) inhaled once a day (09 Oct 2019 12:50)      VITAL SIGNS: Last 24 Hours  T(C): 35.9 (27 Nov 2019 07:40), Max: 36.7 (27 Nov 2019 03:30)  T(F): 96.7 (27 Nov 2019 07:40), Max: 98 (27 Nov 2019 03:30)  HR: 86 (27 Nov 2019 07:40) (68 - 91)  BP: 100/71 (27 Nov 2019 07:40) (92/70 - 120/59)  BP(mean): --  RR: 18 (27 Nov 2019 07:40) (18 - 18)  SpO2: --      LABS:                        10.5   8.72  )-----------( 147      ( 26 Nov 2019 06:50 )             34.5     11-26    140  |  103  |  23<H>  ----------------------------<  108<H>  4.1   |  23  |  0.6<L>    Ca    8.3<L>      26 Nov 2019 06:50  Mg     1.7     11-26    TPro  4.8<L>  /  Alb  x   /  TBili  x   /  DBili  x   /  AST  x   /  ALT  x   /  AlkPhos  x   11-26    LIVER FUNCTIONS - ( 26 Nov 2019 06:50 )  Alb: x     / Pro: 4.8 g/dL / ALK PHOS: x     / ALT: x     / AST: x     / GGT: x                         CAPILLARY BLOOD GLUCOSE      POCT Blood Glucose.: 133 mg/dL (27 Nov 2019 09:48)      RADIOLOGY:    PHYSICAL EXAM:  PHYSICAL EXAM:  GENERAL: NAD, AAO x 4, 69y M  CHEST/LUNG: Clear to auscultation bilaterally; No wheeze; No crackles; No accessory muscles used  HEART: Regular rate and rhythm; No murmurs;   ABDOMEN: Soft, Nontender, Nondistended; ; No guarding, PEG site clean without exudates or erythema  EXTREMITIES: No cyanosis or edema  NEUROLOGY: non-focal    ADMISSION SUMMARY  Patient is a 69y old Male who presents with a chief complaint of Generalized weakness and muscle pain (27 Nov 2019 08:36) NEIL PHOENIX 69y Male  MRN#: [id number]     SUBJECTIVE  Patient is a 69y old Male who presents with a chief complaint of Generalized weakness and muscle pain (27 Nov 2019 08:36)  Today is hospital day 6d, and this morning he is lying in bed without distress.   No acute overnight events.   Feels well post PEG placmeent  no chest pain no SOB  no f./c/n/v  some pain at PEG site  ROS otherwise negative    OBJECTIVE  PAST MEDICAL & SURGICAL HISTORY  Gout  COPD, mild  Hypothyroid  S/P tonsillectomy    ALLERGIES:  No Known Allergies    MEDICATIONS:  STANDING MEDICATIONS  allopurinol 100 milliGRAM(s) Oral daily  amLODIPine   Tablet 2.5 milliGRAM(s) Oral daily  ceFAZolin   IVPB 2000 milliGRAM(s) IV Intermittent once  enoxaparin Injectable 40 milliGRAM(s) SubCutaneous daily  folic acid 1 milliGRAM(s) Oral daily  influenza   Vaccine 0.5 milliLiter(s) IntraMuscular once  lactated ringers. 1000 milliLiter(s) IV Continuous <Continuous>  levothyroxine 25 MICROGram(s) Oral daily  magnesium oxide 400 milliGRAM(s) Oral three times a day with meals  methotrexate 15 milliGRAM(s) Oral <User Schedule>  mycophenolate mofetil Suspension 500 milliGRAM(s) Oral two times a day  pantoprazole  Injectable 40 milliGRAM(s) IV Push every 12 hours  polyethylene glycol 3350 17 Gram(s) Oral two times a day  predniSONE   Tablet 60 milliGRAM(s) Oral daily  tiotropium 18 MICROgram(s) Capsule 1 Capsule(s) Inhalation daily    PRN MEDICATIONS  acetaminophen   Tablet .. 650 milliGRAM(s) Oral every 6 hours PRN  ALBUTerol    90 MICROgram(s) HFA Inhaler 2 Puff(s) Inhalation every 6 hours PRN  albuterol/ipratropium for Nebulization 3 milliLiter(s) Nebulizer every 6 hours PRN  oxycodone    5 mG/acetaminophen 325 mG 1 Tablet(s) Oral every 6 hours PRN    HOME MEDICATIONS  Home Medications:  allopurinol 100 mg oral tablet: 1 tab(s) orally once a day (02 Oct 2019 18:28)  DuoNeb 0.5 mg-2.5 mg/3 mL inhalation solution: 3 milliliter(s) inhaled 3 times a day, As Needed (02 Oct 2019 18:28)  levothyroxine 25 mcg (0.025 mg) oral tablet: 1 tab(s) orally once a day (02 Oct 2019 18:28)  pantoprazole 40 mg oral delayed release tablet: 1 tab(s) orally once a day (02 Oct 2019 18:28)  predniSONE 20 mg oral tablet: 2 tab(s) orally once a day in the morning  (21 Nov 2019 16:31)  predniSONE 20 mg oral tablet: 1 tab(s) orally once a day at night  (21 Nov 2019 16:32)  tiotropium 18 mcg inhalation capsule: 1 cap(s) inhaled once a day (09 Oct 2019 12:50)      VITAL SIGNS: Last 24 Hours  T(C): 35.9 (27 Nov 2019 07:40), Max: 36.7 (27 Nov 2019 03:30)  T(F): 96.7 (27 Nov 2019 07:40), Max: 98 (27 Nov 2019 03:30)  HR: 86 (27 Nov 2019 07:40) (68 - 91)  BP: 100/71 (27 Nov 2019 07:40) (92/70 - 120/59)  BP(mean): --  RR: 18 (27 Nov 2019 07:40) (18 - 18)  SpO2: --      LABS:                        10.5   8.72  )-----------( 147      ( 26 Nov 2019 06:50 )             34.5     11-26    140  |  103  |  23<H>  ----------------------------<  108<H>  4.1   |  23  |  0.6<L>    Ca    8.3<L>      26 Nov 2019 06:50  Mg     1.7     11-26    TPro  4.8<L>  /  Alb  x   /  TBili  x   /  DBili  x   /  AST  x   /  ALT  x   /  AlkPhos  x   11-26    LIVER FUNCTIONS - ( 26 Nov 2019 06:50 )  Alb: x     / Pro: 4.8 g/dL / ALK PHOS: x     / ALT: x     / AST: x     / GGT: x                         CAPILLARY BLOOD GLUCOSE      POCT Blood Glucose.: 133 mg/dL (27 Nov 2019 09:48)      RADIOLOGY:    PHYSICAL EXAM:  PHYSICAL EXAM:  GENERAL: NAD, AAO x 4, 69y M  CHEST/LUNG: Clear to auscultation bilaterally; No wheeze; No crackles; No accessory muscles used  HEART: Regular rate and rhythm; No murmurs;   ABDOMEN: Soft, Nontender, Nondistended; ; No guarding, PEG site clean without exudates or erythema  EXTREMITIES: No cyanosis or edema  NEUROLOGY: non-focal    ADMISSION SUMMARY  Patient is a 69y old Male who presents with a chief complaint of Generalized weakness and muscle pain (27 Nov 2019 08:36)

## 2019-11-27 NOTE — SWALLOW BEDSIDE ASSESSMENT ADULT - COMMENTS
pt received AAOX4. on RA. pt s/p PEG placement. pt educated on results of MBSS with reccs of NPO, with alternate means of nutrition/hydration as safest nutritional intake. pt educated on using L head turn during consumption of Nectar-thickened liquids, as per occasional request and quality of life. RICARDA Barriga MD aware

## 2019-11-27 NOTE — PROGRESS NOTE ADULT - SUBJECTIVE AND OBJECTIVE BOX
We are following the patient for PEG tube plct      GI HPI Today:  No complaints  Hemianosmically stable   Tolerating feeds   PAST MEDICAL & SURGICAL HISTORY  Gout  COPD, mild  Hypothyroid  S/P tonsillectomy      ALLERGIES:  No Known Allergies      MEDICATIONS:  MEDICATIONS  (STANDING):  allopurinol 100 milliGRAM(s) Oral daily  amLODIPine   Tablet 2.5 milliGRAM(s) Oral daily  ceFAZolin   IVPB 2000 milliGRAM(s) IV Intermittent once  dextrose 10% Bolus 250 milliLiter(s) IV Bolus once  enoxaparin Injectable 40 milliGRAM(s) SubCutaneous daily  folic acid 1 milliGRAM(s) Oral daily  influenza   Vaccine 0.5 milliLiter(s) IntraMuscular once  lactated ringers. 1000 milliLiter(s) (75 mL/Hr) IV Continuous <Continuous>  levothyroxine 25 MICROGram(s) Oral daily  magnesium oxide 400 milliGRAM(s) Oral three times a day with meals  methotrexate 15 milliGRAM(s) Oral <User Schedule>  mycophenolate mofetil Suspension 500 milliGRAM(s) Oral two times a day  pantoprazole  Injectable 40 milliGRAM(s) IV Push every 12 hours  polyethylene glycol 3350 17 Gram(s) Oral two times a day  predniSONE   Tablet 60 milliGRAM(s) Oral daily  tiotropium 18 MICROgram(s) Capsule 1 Capsule(s) Inhalation daily    MEDICATIONS  (PRN):  acetaminophen   Tablet .. 650 milliGRAM(s) Oral every 6 hours PRN Mild Pain (1 - 3)  ALBUTerol    90 MICROgram(s) HFA Inhaler 2 Puff(s) Inhalation every 6 hours PRN Shortness of Breath and/or Wheezing  albuterol/ipratropium for Nebulization 3 milliLiter(s) Nebulizer every 6 hours PRN Shortness of Breath and/or Wheezing  oxycodone    5 mG/acetaminophen 325 mG 1 Tablet(s) Oral every 6 hours PRN Moderate Pain (4 - 6)      REVIEW OF SYSTEMS  General:  No fevers  Eyes:  No reported pain   ENT:  No sore throat   NECK: No stiffness   CV:  No chest pain   Resp:  No shortness of breath  GI:  See HPI  :  No dysuria  Muscle:  No weakness  Neuro:  No tingling  Endocrine:  No polyuria  Heme:  No ecchymosis        VITALS:   T(F): 98 (11-27 @ 03:30), Max: 98 (11-27 @ 03:30)  HR: 90 (11-27 @ 03:30) (68 - 97)  BP: 120/59 (11-27 @ 03:30) (89/63 - 128/71)  BP(mean): 72 (11-24 @ 15:44) (72 - 72)  RR: 18 (11-27 @ 03:30) (17 - 18)  SpO2: --        PHYSICAL EXAM:  GENERAL:  Appears in no distress  HEENT:  Conjunctivae Anicteric   CHEST:  Full & symmetric excursion  HEART:  NS1, S2,   ABDOMEN:  Soft, non-tender, non-distended, PEG tube site clean, bumper adjusted   EXTREMITIES  no edema  SKIN:  No rash  NEURO:  Alert         Blood Work :                        10.5   8.72  )-----------( 147      ( 26 Nov 2019 06:50 )             34.5       11-26    140  |  103  |  23<H>  ----------------------------<  108<H>  4.1   |  23  |  0.6<L>    Ca    8.3<L>      26 Nov 2019 06:50  Mg     1.7     11-26      CBC -  ( 26 Nov 2019 06:50 )  Hemoglobin : 10.5    CBC -  ( 25 Nov 2019 04:59 )  Hemoglobin : 10.5    CBC -  ( 24 Nov 2019 04:59 )  Hemoglobin : 11.5      LIVER FUNCTIONS - ( 25 Nov 2019 04:59 )  Alb: 3.0 [3.5 - 5.2] / Pro: 4.9 [6.0 - 8.0] / ALK PHOS: 85 [30 - 115] / ALT: 32 [0 - 41] / AST: 33 [0 - 41] / GGT: x     LIVER FUNCTIONS - ( 24 Nov 2019 04:59 )  Alb: 3.3 [3.5 - 5.2] / Pro: 5.5 [6.0 - 8.0] / ALK PHOS: 88 [30 - 115] / ALT: 38 [0 - 41] / AST: 52 [0 - 41] / GGT: x     LIVER FUNCTIONS - ( 23 Nov 2019 05:18 )  Alb: 2.8 [3.5 - 5.2] / Pro: 4.5 [6.0 - 8.0] / ALK PHOS: 71 [30 - 115] / ALT: 32 [0 - 41] / AST: 53 [0 - 41] / GGT: x     LIVER FUNCTIONS - ( 22 Nov 2019 06:13 )  Alb: 2.7 [3.5 - 5.2] / Pro: 4.7 [6.0 - 8.0] / ALK PHOS: 73 [30 - 115] / ALT: 31 [0 - 41] / AST: 38 [0 - 41] / GGT: x     LIVER FUNCTIONS - ( 21 Nov 2019 10:07 )  Alb: 3.5 [3.5 - 5.2] / Pro: 5.8 [6.0 - 8.0] / ALK PHOS: 81 [30 - 115] / ALT: 43 [0 - 41] / AST: 56 [0 - 41] / GGT: x We are following the patient for PEG tube plct      GI HPI Today:  No complaints  Hemodynamically stable   Tolerating feeds     PAST MEDICAL &   SURGICAL HISTORY  Gout  COPD, mild  Hypothyroid  S/P tonsillectomy      ALLERGIES:  No Known Allergies      MEDICATIONS:  MEDICATIONS  (STANDING):  allopurinol 100 milliGRAM(s) Oral daily  amLODIPine   Tablet 2.5 milliGRAM(s) Oral daily  ceFAZolin   IVPB 2000 milliGRAM(s) IV Intermittent once  dextrose 10% Bolus 250 milliLiter(s) IV Bolus once  enoxaparin Injectable 40 milliGRAM(s) SubCutaneous daily  folic acid 1 milliGRAM(s) Oral daily  influenza   Vaccine 0.5 milliLiter(s) IntraMuscular once  lactated ringers. 1000 milliLiter(s) (75 mL/Hr) IV Continuous <Continuous>  levothyroxine 25 MICROGram(s) Oral daily  magnesium oxide 400 milliGRAM(s) Oral three times a day with meals  methotrexate 15 milliGRAM(s) Oral <User Schedule>  mycophenolate mofetil Suspension 500 milliGRAM(s) Oral two times a day  pantoprazole  Injectable 40 milliGRAM(s) IV Push every 12 hours  polyethylene glycol 3350 17 Gram(s) Oral two times a day  predniSONE   Tablet 60 milliGRAM(s) Oral daily  tiotropium 18 MICROgram(s) Capsule 1 Capsule(s) Inhalation daily    MEDICATIONS  (PRN):  acetaminophen   Tablet .. 650 milliGRAM(s) Oral every 6 hours PRN Mild Pain (1 - 3)  ALBUTerol    90 MICROgram(s) HFA Inhaler 2 Puff(s) Inhalation every 6 hours PRN Shortness of Breath and/or Wheezing  albuterol/ipratropium for Nebulization 3 milliLiter(s) Nebulizer every 6 hours PRN Shortness of Breath and/or Wheezing  oxycodone    5 mG/acetaminophen 325 mG 1 Tablet(s) Oral every 6 hours PRN Moderate Pain (4 - 6)      REVIEW OF SYSTEMS  General:  No fevers  Eyes:  No reported pain   ENT:  No sore throat   NECK: No stiffness   CV:  No chest pain   Resp:  No shortness of breath  GI:  See HPI  :  No dysuria  Muscle:  No weakness  Neuro:  No tingling  Endocrine:  No polyuria  Heme:  No ecchymosis        VITALS:   T(F): 98 (11-27 @ 03:30), Max: 98 (11-27 @ 03:30)  HR: 90 (11-27 @ 03:30) (68 - 97)  BP: 120/59 (11-27 @ 03:30) (89/63 - 128/71)  BP(mean): 72 (11-24 @ 15:44) (72 - 72)  RR: 18 (11-27 @ 03:30) (17 - 18)  SpO2: --        PHYSICAL EXAM:  GENERAL:  Appears in no distress, thin  HEENT:  Conjunctivae Anicteric   CHEST:  Full & symmetric excursion  HEART:  NS1, S2,   ABDOMEN:  Soft, non-tender, non-distended, PEG tube site clean, bumper adjusted so that it is somewhat mobile when pulled gently  EXTREMITIES  no edema  SKIN:  No rash  NEURO:  Alert         Blood Work :                        10.5   8.72  )-----------( 147      ( 26 Nov 2019 06:50 )             34.5       11-26    140  |  103  |  23<H>  ----------------------------<  108<H>  4.1   |  23  |  0.6<L>    Ca    8.3<L>      26 Nov 2019 06:50  Mg     1.7     11-26      CBC -  ( 26 Nov 2019 06:50 )  Hemoglobin : 10.5    CBC -  ( 25 Nov 2019 04:59 )  Hemoglobin : 10.5    CBC -  ( 24 Nov 2019 04:59 )  Hemoglobin : 11.5      LIVER FUNCTIONS - ( 25 Nov 2019 04:59 )  Alb: 3.0 [3.5 - 5.2] / Pro: 4.9 [6.0 - 8.0] / ALK PHOS: 85 [30 - 115] / ALT: 32 [0 - 41] / AST: 33 [0 - 41] / GGT: x     LIVER FUNCTIONS - ( 24 Nov 2019 04:59 )  Alb: 3.3 [3.5 - 5.2] / Pro: 5.5 [6.0 - 8.0] / ALK PHOS: 88 [30 - 115] / ALT: 38 [0 - 41] / AST: 52 [0 - 41] / GGT: x     LIVER FUNCTIONS - ( 23 Nov 2019 05:18 )  Alb: 2.8 [3.5 - 5.2] / Pro: 4.5 [6.0 - 8.0] / ALK PHOS: 71 [30 - 115] / ALT: 32 [0 - 41] / AST: 53 [0 - 41] / GGT: x     LIVER FUNCTIONS - ( 22 Nov 2019 06:13 )  Alb: 2.7 [3.5 - 5.2] / Pro: 4.7 [6.0 - 8.0] / ALK PHOS: 73 [30 - 115] / ALT: 31 [0 - 41] / AST: 38 [0 - 41] / GGT: x     LIVER FUNCTIONS - ( 21 Nov 2019 10:07 )  Alb: 3.5 [3.5 - 5.2] / Pro: 5.8 [6.0 - 8.0] / ALK PHOS: 81 [30 - 115] / ALT: 43 [0 - 41] / AST: 56 [0 - 41] / GGT: x

## 2019-11-27 NOTE — PROGRESS NOTE ADULT - SUBJECTIVE AND OBJECTIVE BOX
NEIL PHOENIX  [id number]  Male  69y  HPI:  [69 year old  man]    CC: Generalized weakness and muscle pain    PMH: COPD (not on home oxygen), hypothyroidism, gout, RA, dermatomyositis (on prednisone and methotrexate), on 10/2019 admitted for severe UGIB with EGD showing esophagitis and erosive gastritis, stay complicated by ileus vs gastric outlet obstruction, pathology at the time ruled out cancer as well as possible aspiration pneumonia seen by speech and swallow and put on dysphagia 2 diet     History of Present Illness goes back to the since his prior discharge when patient endorses progressive generalized weakness to the point where he was unable to lift himself for a seating position, get up flight of stairs and raise his arms above his head. He also endorses overall fatigue, decreased appetite and PO intake as well as weight loss of over 40 pounds in the past 6 months. In the past 2 days prior to his presentation, he noticed an acute worsening of his weakness and fatigue as well as muscle pains which were diffuse and constant. He contacted his rheumatologist who referred him to the ED.     Patient was recently diagnosed with dermatomyositis in July of 2019 and started on MTX and prednisone. He says since then and despite both treatments he has not felt any improvement. However he says he recently saw a rheumatologist in the clinic (Dr. Fontanez) and since "his numbers" were improving the prednisone was increased to 60. The MTX was held is his recent hospitalization and restarted after discharge.     Interim hx: Feeding tube placement yesterday without complication. Pt feeling well today. Tells me he was able to ambulate with PT twice yesterday. No new symptoms. Planning for d/c to nursing facility prior to home.     PAST MEDICAL & SURGICAL HISTORY:  Gout  COPD, mild  Polymyositis  Hypothyroid  S/P tonsillectomy    FAMILY HISTORY:  No pertinent family history in first degree relatives    MEDICATIONS  (STANDING):  allopurinol 100 milliGRAM(s) Oral daily  enoxaparin Injectable 40 milliGRAM(s) SubCutaneous daily  folic acid 1 milliGRAM(s) Oral daily  influenza   Vaccine 0.5 milliLiter(s) IntraMuscular once  levothyroxine 25 MICROGram(s) Oral daily  methotrexate 20 milliGRAM(s) Oral <User Schedule>  pantoprazole  Injectable 40 milliGRAM(s) IV Push every 12 hours  predniSONE   Tablet 60 milliGRAM(s) Oral daily  predniSONE   Tablet 20 milliGRAM(s) Oral <User Schedule>  sodium chloride 0.9%. 1000 milliLiter(s) (75 mL/Hr) IV Continuous <Continuous>  tiotropium 18 MICROgram(s) Capsule 1 Capsule(s) Inhalation daily    MEDICATIONS  (PRN):  ALBUTerol    90 MICROgram(s) HFA Inhaler 2 Puff(s) Inhalation every 6 hours PRN Shortness of Breath and/or Wheezing    Allergies    No Known Allergies    Intolerances        PHYSICAL EXAM:    	GENERAL: NAD, thin appearing, appears older than stated age  	CHEST/LUNG: No conversational dyspnea  	ABDOMEN: Soft, nontender  	EXTREMITIES: No active synovitis or joint effusions, no joint tenderness   	PSYCH: normal affect  	NEURO: 4+ triceps/biceps/deltoid muscle strength bilaterally, 4 hip flexor muscle strength bilaterally    SKIN: +2 skin thickening at bilateral digits, perioral furrowing, facial and chest telangiectasias, minimal gottron's sign over knees, periungual erythema, cool, dusky distal digits    CBC Full  -  ( 22 Nov 2019 06:13 )  WBC Count : 11.98 K/uL  RBC Count : 3.54 M/uL  Hemoglobin : 10.6 g/dL  Hematocrit : 33.7 %  Platelet Count - Automated : 139 K/uL  Mean Cell Volume : 95.2 fL  Mean Cell Hemoglobin : 29.9 pg  Mean Cell Hemoglobin Concentration : 31.5 g/dL  Auto Neutrophil # : 10.92 K/uL  Auto Lymphocyte # : 0.49 K/uL  Auto Monocyte # : 0.49 K/uL  Auto Eosinophil # : 0.01 K/uL  Auto Basophil # : 0.01 K/uL  Auto Neutrophil % : 91.1 %  Auto Lymphocyte % : 4.1 %  Auto Monocyte % : 4.1 %  Auto Eosinophil % : 0.1 %  Auto Basophil % : 0.1 %    LIVER FUNCTIONS - ( 22 Nov 2019 06:13 )  Alb: 2.7 g/dL / Pro: 4.7 g/dL / ALK PHOS: 73 U/L / ALT: 31 U/L / AST: 38 U/L / GGT: x           11-22    137  |  102  |  28<H>  ----------------------------<  85  4.6   |  25  |  0.6<L>    Ca    8.1<L>      22 Nov 2019 06:13    TPro  4.7<L>  /  Alb  2.7<L>  /  TBili  0.5  /  DBili  0.2  /  AST  38  /  ALT  31  /  AlkPhos  73  11-22    PT/INR - ( 21 Nov 2019 17:48 )   PT: 12.10 sec;   INR: 1.05 ratio         PTT - ( 21 Nov 2019 17:48 )  PTT:24.4 sec      	< from: Xray Chest 2 Views PA/Lat (11.21.19 @ 09:52) >    	Impression:      	Decreased bibasilar opacities.    	================== OTHER SIGNIFICANT FINDINGS ==================    	Final Diagnosis  	Stomach, biopsy:  	- Mild chronic gastritis.  	- Giemsa stain fails to reveal Helicobacter pylori in this  	material.  	- No intestinal metaplasia or dysplasia seen.    	Postoperative Diagnosis  	Esophagitis      	================== PROCEDURES ==================    	< from: EGD (10.04.19 @ 11:30) >    	Impressions:   	 Grade D esophagitis compatible with nonspecific erosive esophagitis.   	 Hiatal Hernia.   	 Erythema in the stomach compatible with non-erosive gastritis. (Biopsy).   	 Normal mucosa in the whole examined duodenum.   	 No evidence of gastric outlet obstruction, gastric mucosa have evidence of NG  tube induced erosions.

## 2019-11-27 NOTE — SWALLOW BEDSIDE ASSESSMENT ADULT - SLP PERTINENT HISTORY OF CURRENT PROBLEM
pt admitted from home for generalized weakness and muscle pain. PMHx: COPD (not on home oxygen), hypothyroidism, gout, RA, dermato vs polymyositis, ?systemic sclerosis. pt recently admitted in 10/2019 admitted for severe UGIB with EGD showing esophagitis and erosive gastritis, stay c/b ileus vs gastric outlet obstruction; pathology at the time r/o cancer. +40lb weight loss in the past 6 months. +elevated WBC likely 2' steroid use as per chart. no active PNA. pt known to acute SLP dept during previous admissions w/ recs for dysphagia 2 w/ thin liquids and a Modified Barium Swallow to further investigate pharyngeal function. pt discharged prior to [facility] [place] and was recommended to f/u as outpatient but never did.
pt admitted from home for generalized weakness and muscle pain. PMHx: COPD (not on home oxygen), hypothyroidism, gout, RA, dermato vs polymyositis, ?systemic sclerosis. pt recently admitted in 10/2019 admitted for severe UGIB with EGD showing esophagitis and erosive gastritis, stay c/b ileus vs gastric outlet obstruction; pathology at the time r/o cancer. +40lb weight loss in the past 6 months. +elevated WBC likely 2' steroid use as per chart. no active PNA. pt known to acute SLP dept during previous admissions w/ recs for dysphagia 2 w/ thin liquids and a Modified Barium Swallow to further investigate pharyngeal function. pt discharged prior to [facility] [place] and was recommended to f/u as outpatient but never did.

## 2019-11-27 NOTE — CONSULT NOTE ADULT - ASSESSMENT
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  - dysphagia   - Hypomagnesemia  - severe protein calorie malnutrition - massive wt loss, wasting of LBM, diminished ADL    PLAN:  - change scheduling of feeds to be more compatible with rehab and life at home  - suggest Jevity 1.2 360 ml x 3 meal-patterned feeds/d for now, in hospital.  - agree with resident that pt is at risk for refeeding syndrome - would cont the Mg O2 via GT with each feeding (now that feeds will be 3/d on meal schedule) x 2 days, then re-evaluate based on blood level. Would also give one packet Neutra-phos via GT with each of the next 3 meals, then f/u level and re-assess.  - change PPI to solutab form, as no other form can be given via a tube. And the solutab must be given in apple juice. Once esophagitis is felt to be treated, and if further prophylaxis desired, would use H2 blocker.  - halve the Miralax dose, as Jevity is a fiber-containing formula.  - on discharge, recommend 360 ml Jevity 1.5 x 3 meal-patterned feeds daily, and add Alonso twice daily for 4 weeks.  - once discharged home, can f/u as outpatient within 2-3 weeks of discharge at [phone] for continued home enteral care.

## 2019-11-27 NOTE — PROGRESS NOTE ADULT - ASSESSMENT
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 Patient  complaining of dysphagia and weight loss     # Pharyngeal dysphagia   Pt lost 40 pounds   SP peg tube plct yesterday   PEG Site clean   External bumper adjusted and loosened     Rec:   Wash PEG site with soap and water peg twice daily   can start tube feeding   Please apply abdominal binder

## 2019-11-27 NOTE — CONSULT NOTE ADULT - SUBJECTIVE AND OBJECTIVE BOX
HPI:  69 y0 male with hx of  generalized weakness to the point where he was unable to lift himself for a seating position, get up flight of stairs and raise his arms above his head. He is c/o fatigue, decreased appetite and PO intake as well as weight loss of over 40 pounds in the past 6 months. In the past 2 days prior to his presentation, he noticed an acute worsening of his weakness and fatigue as well as muscle pains which were diffuse and constant. He contacted his rheumatologist who referred him to the ED.   He denies rash, fevers or chills. Denies joint swelling or pain, denies worsening of his dysphagia. Denies nausea, vomiting, constipation or diarrhea. He does endorse dark stools that he's had for months he says. Denies shortness of breath or chest pain.     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 He says since then and despite both treatments he has not felt any improvement. However he says he recently saw a rheumatologist in the clinic (Dr. Fontanez) and since "his numbers" were improving the prednisone was increased to 60. The MTX was held is his recent hospitalization and restarted after discharge.   In the ED, vitals were stable. Labs showed chronic elevated WBC at 15, stable hemoglobin at 11.9. CXR showed decreasing bibasilar opacities. (21 Nov 2019 16:18)      PAST MEDICAL & SURGICAL HISTORY:  Gout  COPD, mild  Hypothyroid  S/P tonsillectomy     ICU Vital Signs Last 24 Hrs  T(C): 35.9 (27 Nov 2019 07:40), Max: 36.7 (27 Nov 2019 03:30)  T(F): 96.7 (27 Nov 2019 07:40), Max: 98 (27 Nov 2019 03:30)  HR: 86 (27 Nov 2019 07:40) (68 - 91)  BP: 100/71 (27 Nov 2019 07:40) (100/71 - 120/59)  RR: 18 (27 Nov 2019 07:40) (18 - 18)  Height (cm): 177.8 (11-26-19 @ 13:30), 177.8 (10-04-19 @ 13:54), 177.8 (09-21-19 @ 18:48)  Weight (kg): 54.4 (11-26-19 @ 13:30), 59 (10-04-19 @ 13:54), 61.4 (09-20-19 @ 22:35)  BMI (kg/m2): 17.2 (11-26-19 @ 13:30), 18.7 (10-04-19 @ 13:54), 19.4 (09-21-19 @ 18:48)  BSA (m2): 1.68 (11-26-19 @ 13:30), 1.74 (10-04-19 @ 13:54), 1.77 (09-21-19 @ 18:48)  I&O's Detail        PHYSICAL EXAM:  GENERAL: NAD, Alert & Oriented X3  +clavicular and temporal waisting  HEENT: Moist mucous membranes, Good dentition, No lesions, Supple, No JVD, Normal thyroid  ABDOMEN: Soft, Nontender, Nondistended +Peg tube in place and abdominal binder  EXTREMITIES:no edema +decrease in LBM  SKIN: No  lesions  IV ACCESS:  FEEDING ACCESS: PEG tube    MEDICATIONS  (STANDING):  allopurinol 100 milliGRAM(s) Oral daily  amLODIPine   Tablet 2.5 milliGRAM(s) Oral daily  ceFAZolin   IVPB 2000 milliGRAM(s) IV Intermittent once  enoxaparin Injectable 40 milliGRAM(s) SubCutaneous daily  folic acid 1 milliGRAM(s) Oral daily  influenza   Vaccine 0.5 milliLiter(s) IntraMuscular once  lactated ringers. 1000 milliLiter(s) (75 mL/Hr) IV Continuous <Continuous>  levothyroxine 25 MICROGram(s) Oral daily  magnesium oxide 400 milliGRAM(s) Oral three times a day with meals  methotrexate 15 milliGRAM(s) Oral <User Schedule>  mycophenolate mofetil Suspension 500 milliGRAM(s) Oral two times a day  pantoprazole  Injectable 40 milliGRAM(s) IV Push every 12 hours  polyethylene glycol 3350 17 Gram(s) Oral two times a day  predniSONE   Tablet 60 milliGRAM(s) Oral daily  tiotropium 18 MICROgram(s) Capsule 1 Capsule(s) Inhalation daily    MEDICATIONS  (PRN):  acetaminophen   Tablet .. 650 milliGRAM(s) Oral every 6 hours PRN Mild Pain (1 - 3)  ALBUTerol    90 MICROgram(s) HFA Inhaler 2 Puff(s) Inhalation every 6 hours PRN Shortness of Breath and/or Wheezing  albuterol/ipratropium for Nebulization 3 milliLiter(s) Nebulizer every 6 hours PRN Shortness of Breath and/or Wheezing    Allergies: NKDA    LABS:    11-27    140  |  102  |  17  ----------------------------<  82  3.9   |  22  |  0.6<L>    Ca    8.0<L>      27 Nov 2019 11:22  Phos  2.2     11-27  Mg     1.7     11-27    TPro  4.8<L>  /  Alb  x   /  TBili  x   /  DBili  x   /  AST  x   /  ALT  x   /  AlkPhos  x   11-26    CAPILLARY BLOOD GLUCOSE  POCT Blood Glucose.: 114 mg/dL (27 Nov 2019 12:00)  EGD:   < from: EGD (10.04.19 @ 11:30) >  Impressions:    Grade D esophagitis compatible with nonspecific erosive esophagitis.    Hiatal Hernia.    Erythema in the stomach compatible with non-erosive gastritis. (Biopsy).    Normal mucosa in the whole examined duodenum.    No evidence of gastric outlet obstruction, gastric mucosa have evidence of NG  tube induced erosions.     RADIOLOGY:  barium swallow : severe pharyngeal dysphagia for all consistencies. 11/25/19  DIET  Diet, NPO with Tube Feed:   Tube Feeding Modality: Gastrostomy  Jevity 1.2 Mark  Total Volume for 24 Hours (mL): 1440  Bolus  Total Volume of Bolus (mL):  240  Tube Feed Frequency: Every 4 hours   Tube Feed Start Time: 07:00  Bolus Feed Rate (mL per Hour): 240   Bolus Feed Duration (in Hours): 1 (11-26-19 @ 15:54) HPI:  69 y0 male with hx of  generalized weakness to the point where he was unable to lift himself for a seating position, get up flight of stairs and raise his arms above his head. He is c/o fatigue, decreased appetite and PO intake as well as weight loss of over 40 pounds in the past 6 months. In the past 2 days prior to his presentation, he noticed an acute worsening of his weakness and fatigue as well as muscle pains which were diffuse and constant. He contacted his rheumatologist who referred him to the ED.   He denies rash, fevers or chills. Denies joint swelling or pain, denies worsening of his dysphagia. Denies nausea, vomiting, constipation or diarrhea. He does endorse dark stools that he's had for months he says. Denies shortness of breath or chest pain.     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 He says since then and despite both treatments he has not felt any improvement. However he says he recently saw a rheumatologist in the clinic (Dr. Fontanez) and since "his numbers" were improving the prednisone was increased to 60. The MTX was held is his recent hospitalization and restarted after discharge.   In the ED, vitals were stable. Labs showed chronic elevated WBC at 15, stable hemoglobin at 11.9. CXR showed decreasing bibasilar opacities. (21 Nov 2019 16:18)    PAST MEDICAL & SURGICAL HISTORY:  Gout  COPD, mild  Hypothyroid  S/P tonsillectomy     ICU Vital Signs Last 24 Hrs  T(C): 35.9 (27 Nov 2019 07:40), Max: 36.7 (27 Nov 2019 03:30)  T(F): 96.7 (27 Nov 2019 07:40), Max: 98 (27 Nov 2019 03:30)  HR: 86 (27 Nov 2019 07:40) (68 - 91)  BP: 100/71 (27 Nov 2019 07:40) (100/71 - 120/59)  RR: 18 (27 Nov 2019 07:40) (18 - 18)  Height (cm): 177.8 (11-26-19 @ 13:30), 177.8 (10-04-19 @ 13:54), 177.8 (09-21-19 @ 18:48)  Weight (kg): 54.4 (11-26-19 @ 13:30), 59 (10-04-19 @ 13:54), 61.4 (09-20-19 @ 22:35)  BMI (kg/m2): 17.2 (11-26-19 @ 13:30), 18.7 (10-04-19 @ 13:54), 19.4 (09-21-19 @ 18:48)  BSA (m2): 1.68 (11-26-19 @ 13:30), 1.74 (10-04-19 @ 13:54), 1.77 (09-21-19 @ 18:48)    PHYSICAL EXAM:  GENERAL: NAD, Alert & Oriented X3  +clavicular and temporal waisting  HEENT: Moist mucous membranes, Good dentition, No lesions, Supple, No JVD, Normal thyroid  ABDOMEN: Soft, Nontender, Nondistended +Peg tube in place and abdominal binder  EXTREMITIES:no edema +decrease in LBM  SKIN: No  lesions  IV ACCESS:  FEEDING ACCESS: PEG tube    MEDICATIONS  (STANDING):  allopurinol 100 milliGRAM(s) Oral daily  amLODIPine   Tablet 2.5 milliGRAM(s) Oral daily  ceFAZolin   IVPB 2000 milliGRAM(s) IV Intermittent once  enoxaparin Injectable 40 milliGRAM(s) SubCutaneous daily  folic acid 1 milliGRAM(s) Oral daily  influenza   Vaccine 0.5 milliLiter(s) IntraMuscular once  lactated ringers. 1000 milliLiter(s) (75 mL/Hr) IV Continuous <Continuous>  levothyroxine 25 MICROGram(s) Oral daily  magnesium oxide 400 milliGRAM(s) Oral three times a day with meals  methotrexate 15 milliGRAM(s) Oral <User Schedule>  mycophenolate mofetil Suspension 500 milliGRAM(s) Oral two times a day  pantoprazole  Injectable 40 milliGRAM(s) IV Push every 12 hours  polyethylene glycol 3350 17 Gram(s) Oral two times a day  predniSONE   Tablet 60 milliGRAM(s) Oral daily  tiotropium 18 MICROgram(s) Capsule 1 Capsule(s) Inhalation daily    MEDICATIONS  (PRN):  acetaminophen   Tablet .. 650 milliGRAM(s) Oral every 6 hours PRN Mild Pain (1 - 3)  ALBUTerol    90 MICROgram(s) HFA Inhaler 2 Puff(s) Inhalation every 6 hours PRN Shortness of Breath and/or Wheezing  albuterol/ipratropium for Nebulization 3 milliLiter(s) Nebulizer every 6 hours PRN Shortness of Breath and/or Wheezing    Allergies: NKDA    LABS:  11-27    140  |  102  |  17  ----------------------------<  82  3.9   |  22  |  0.6<L>    Ca    8.0<L>      27 Nov 2019 11:22  Phos  2.2     11-27  Mg     1.7     11-27    TPro  4.8<L>  /  Alb  x   /  TBili  x   /  DBili  x   /  AST  x   /  ALT  x   /  AlkPhos  x   11-26    CAPILLARY BLOOD GLUCOSE  POCT Blood Glucose.: 114 mg/dL (27 Nov 2019 12:00)  EGD:   < from: EGD (10.04.19 @ 11:30) >  Impressions:    Grade D esophagitis compatible with nonspecific erosive esophagitis.    Hiatal Hernia.    Erythema in the stomach compatible with non-erosive gastritis. (Biopsy).    Normal mucosa in the whole examined duodenum.    No evidence of gastric outlet obstruction, gastric mucosa have evidence of NG  tube induced erosions.     RADIOLOGY:  barium swallow : severe pharyngeal dysphagia for all consistencies. 11/25/19  DIET  Diet, NPO with Tube Feed:   Tube Feeding Modality: Gastrostomy  Jevity 1.2 Mark  Total Volume for 24 Hours (mL): 1440  Bolus  Total Volume of Bolus (mL):  240  Tube Feed Frequency: Every 4 hours   Tube Feed Start Time: 07:00  Bolus Feed Rate (mL per Hour): 240   Bolus Feed Duration (in Hours): 1 (11-26-19 @ 15:54)

## 2019-11-27 NOTE — PROGRESS NOTE ADULT - ATTENDING COMMENTS
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 Steroids recently increased from prednisone 40 mg daily to 60 mg daily 2 weeks ago by outpatient rheumatologist Dr. Fontanez due to ongoing symptoms. Ab workup was notably unremarkable. Tolerated addition of  mg BID well so far since Friday night - would continue (may consider gradual increase of dose as an outpatient, but would give max 2g daily given MTX dosing as well). Recommend continuation of prednisone 60 mg daily given in the AM (will require lengthy taper), continue MTX 15 mg weekly, continue FA supplementation. Will likely need addition of PJP ppx given steroids requirements - consider Bactrim ppx. Should have calcium/vit D supplementation for bone health. Muscle strength improvement can lag behind CK improvement/normalization - continue PT as this is exceptionally important in the management of patients with inflammatory myopathies. Raynaud's symptoms initially improved on low dose CCB, now with mild duskiness of digits - may continue amlodipine 2.5 mg daily (BP borderline, would hold on increasing to 5 mg daily), should keep digits warm. Requested primary team's assistance in discussion mesenteric edema with radiologist reading CTs (seen on 9/25 CT but not addressed on subsequent films - would clarify if resolved). UTD with malignancy screenings. Will follow along.

## 2019-11-28 LAB
% ALBUMIN: 51.1 % — SIGNIFICANT CHANGE UP
% ALPHA 1: 8.3 % — SIGNIFICANT CHANGE UP
% ALPHA 2: 16.6 % — SIGNIFICANT CHANGE UP
% BETA: 12.8 % — SIGNIFICANT CHANGE UP
% GAMMA: 11.2 % — SIGNIFICANT CHANGE UP
ALBUMIN SERPL ELPH-MCNC: 2.5 G/DL — LOW (ref 3.6–5.5)
ALBUMIN/GLOB SERPL ELPH: 1.1 RATIO — SIGNIFICANT CHANGE UP
ALPHA1 GLOB SERPL ELPH-MCNC: 0.4 G/DL — SIGNIFICANT CHANGE UP (ref 0.1–0.4)
ALPHA2 GLOB SERPL ELPH-MCNC: 0.8 G/DL — SIGNIFICANT CHANGE UP (ref 0.5–1)
ANION GAP SERPL CALC-SCNC: 14 MMOL/L — SIGNIFICANT CHANGE UP (ref 7–14)
B-GLOBULIN SERPL ELPH-MCNC: 0.6 G/DL — SIGNIFICANT CHANGE UP (ref 0.5–1)
BASOPHILS # BLD AUTO: 0.01 K/UL — SIGNIFICANT CHANGE UP (ref 0–0.2)
BASOPHILS NFR BLD AUTO: 0.1 % — SIGNIFICANT CHANGE UP (ref 0–1)
BUN SERPL-MCNC: 12 MG/DL — SIGNIFICANT CHANGE UP (ref 10–20)
CALCIUM SERPL-MCNC: 7.3 MG/DL — LOW (ref 8.5–10.1)
CHLORIDE SERPL-SCNC: 100 MMOL/L — SIGNIFICANT CHANGE UP (ref 98–110)
CO2 SERPL-SCNC: 23 MMOL/L — SIGNIFICANT CHANGE UP (ref 17–32)
CREAT SERPL-MCNC: 0.5 MG/DL — LOW (ref 0.7–1.5)
EOSINOPHIL # BLD AUTO: 0.01 K/UL — SIGNIFICANT CHANGE UP (ref 0–0.7)
EOSINOPHIL NFR BLD AUTO: 0.1 % — SIGNIFICANT CHANGE UP (ref 0–8)
GAMMA GLOBULIN: 0.5 G/DL — LOW (ref 0.6–1.6)
GLUCOSE BLDC GLUCOMTR-MCNC: 113 MG/DL — HIGH (ref 70–99)
GLUCOSE BLDC GLUCOMTR-MCNC: 114 MG/DL — HIGH (ref 70–99)
GLUCOSE BLDC GLUCOMTR-MCNC: 119 MG/DL — HIGH (ref 70–99)
GLUCOSE BLDC GLUCOMTR-MCNC: 170 MG/DL — HIGH (ref 70–99)
GLUCOSE SERPL-MCNC: 78 MG/DL — SIGNIFICANT CHANGE UP (ref 70–99)
HCT VFR BLD CALC: 33.4 % — LOW (ref 42–52)
HGB BLD-MCNC: 10.7 G/DL — LOW (ref 14–18)
IMM GRANULOCYTES NFR BLD AUTO: 0.8 % — HIGH (ref 0.1–0.3)
LYMPHOCYTES # BLD AUTO: 0.56 K/UL — LOW (ref 1.2–3.4)
LYMPHOCYTES # BLD AUTO: 6.1 % — LOW (ref 20.5–51.1)
MAGNESIUM SERPL-MCNC: 1.5 MG/DL — LOW (ref 1.8–2.4)
MCHC RBC-ENTMCNC: 30.3 PG — SIGNIFICANT CHANGE UP (ref 27–31)
MCHC RBC-ENTMCNC: 32 G/DL — SIGNIFICANT CHANGE UP (ref 32–37)
MCV RBC AUTO: 94.6 FL — HIGH (ref 80–94)
MONOCYTES # BLD AUTO: 0.16 K/UL — SIGNIFICANT CHANGE UP (ref 0.1–0.6)
MONOCYTES NFR BLD AUTO: 1.7 % — SIGNIFICANT CHANGE UP (ref 1.7–9.3)
NEUTROPHILS # BLD AUTO: 8.36 K/UL — HIGH (ref 1.4–6.5)
NEUTROPHILS NFR BLD AUTO: 91.2 % — HIGH (ref 42.2–75.2)
NRBC # BLD: 0 /100 WBCS — SIGNIFICANT CHANGE UP (ref 0–0)
PHOSPHATE SERPL-MCNC: 1.9 MG/DL — LOW (ref 2.1–4.9)
PLATELET # BLD AUTO: 123 K/UL — LOW (ref 130–400)
POTASSIUM SERPL-MCNC: 3.3 MMOL/L — LOW (ref 3.5–5)
POTASSIUM SERPL-SCNC: 3.3 MMOL/L — LOW (ref 3.5–5)
PROT PATTERN SERPL ELPH-IMP: SIGNIFICANT CHANGE UP
RBC # BLD: 3.53 M/UL — LOW (ref 4.7–6.1)
RBC # FLD: 22.5 % — HIGH (ref 11.5–14.5)
SODIUM SERPL-SCNC: 137 MMOL/L — SIGNIFICANT CHANGE UP (ref 135–146)
WBC # BLD: 9.17 K/UL — SIGNIFICANT CHANGE UP (ref 4.8–10.8)
WBC # FLD AUTO: 9.17 K/UL — SIGNIFICANT CHANGE UP (ref 4.8–10.8)

## 2019-11-28 PROCEDURE — 71045 X-RAY EXAM CHEST 1 VIEW: CPT | Mod: 26

## 2019-11-28 PROCEDURE — 99233 SBSQ HOSP IP/OBS HIGH 50: CPT

## 2019-11-28 RX ORDER — CEFEPIME 1 G/1
2000 INJECTION, POWDER, FOR SOLUTION INTRAMUSCULAR; INTRAVENOUS EVERY 8 HOURS
Refills: 0 | Status: DISCONTINUED | OUTPATIENT
Start: 2019-11-28 | End: 2019-11-29

## 2019-11-28 RX ORDER — VANCOMYCIN HCL 1 G
1000 VIAL (EA) INTRAVENOUS ONCE
Refills: 0 | Status: COMPLETED | OUTPATIENT
Start: 2019-11-28 | End: 2019-11-28

## 2019-11-28 RX ORDER — POTASSIUM CHLORIDE 20 MEQ
40 PACKET (EA) ORAL ONCE
Refills: 0 | Status: DISCONTINUED | OUTPATIENT
Start: 2019-11-28 | End: 2019-11-28

## 2019-11-28 RX ORDER — SODIUM CHLORIDE 9 MG/ML
500 INJECTION INTRAMUSCULAR; INTRAVENOUS; SUBCUTANEOUS ONCE
Refills: 0 | Status: COMPLETED | OUTPATIENT
Start: 2019-11-28 | End: 2019-11-28

## 2019-11-28 RX ORDER — SODIUM CHLORIDE 9 MG/ML
1000 INJECTION, SOLUTION INTRAVENOUS
Refills: 0 | Status: DISCONTINUED | OUTPATIENT
Start: 2019-11-28 | End: 2019-11-29

## 2019-11-28 RX ORDER — VANCOMYCIN HCL 1 G
1000 VIAL (EA) INTRAVENOUS EVERY 12 HOURS
Refills: 0 | Status: DISCONTINUED | OUTPATIENT
Start: 2019-11-28 | End: 2019-11-29

## 2019-11-28 RX ORDER — POTASSIUM CHLORIDE 20 MEQ
20 PACKET (EA) ORAL ONCE
Refills: 0 | Status: COMPLETED | OUTPATIENT
Start: 2019-11-28 | End: 2019-11-28

## 2019-11-28 RX ORDER — VANCOMYCIN HCL 1 G
VIAL (EA) INTRAVENOUS
Refills: 0 | Status: DISCONTINUED | OUTPATIENT
Start: 2019-11-28 | End: 2019-11-29

## 2019-11-28 RX ORDER — CEFEPIME 1 G/1
INJECTION, POWDER, FOR SOLUTION INTRAMUSCULAR; INTRAVENOUS
Refills: 0 | Status: DISCONTINUED | OUTPATIENT
Start: 2019-11-28 | End: 2019-11-29

## 2019-11-28 RX ORDER — CEFEPIME 1 G/1
2000 INJECTION, POWDER, FOR SOLUTION INTRAMUSCULAR; INTRAVENOUS ONCE
Refills: 0 | Status: COMPLETED | OUTPATIENT
Start: 2019-11-28 | End: 2019-11-28

## 2019-11-28 RX ORDER — MAGNESIUM SULFATE 500 MG/ML
2 VIAL (ML) INJECTION ONCE
Refills: 0 | Status: COMPLETED | OUTPATIENT
Start: 2019-11-28 | End: 2019-11-28

## 2019-11-28 RX ORDER — POTASSIUM CHLORIDE 20 MEQ
20 PACKET (EA) ORAL ONCE
Refills: 0 | Status: DISCONTINUED | OUTPATIENT
Start: 2019-11-28 | End: 2019-11-28

## 2019-11-28 RX ADMIN — Medication 1 PACKET(S): at 11:08

## 2019-11-28 RX ADMIN — Medication 50 GRAM(S): at 11:05

## 2019-11-28 RX ADMIN — AMLODIPINE BESYLATE 2.5 MILLIGRAM(S): 2.5 TABLET ORAL at 05:26

## 2019-11-28 RX ADMIN — MYCOPHENOLATE MOFETIL 500 MILLIGRAM(S): 250 CAPSULE ORAL at 21:05

## 2019-11-28 RX ADMIN — MYCOPHENOLATE MOFETIL 500 MILLIGRAM(S): 250 CAPSULE ORAL at 05:29

## 2019-11-28 RX ADMIN — MAGNESIUM OXIDE 400 MG ORAL TABLET 400 MILLIGRAM(S): 241.3 TABLET ORAL at 11:07

## 2019-11-28 RX ADMIN — CEFEPIME 100 MILLIGRAM(S): 1 INJECTION, POWDER, FOR SOLUTION INTRAMUSCULAR; INTRAVENOUS at 11:05

## 2019-11-28 RX ADMIN — TIOTROPIUM BROMIDE 1 CAPSULE(S): 18 CAPSULE ORAL; RESPIRATORY (INHALATION) at 08:51

## 2019-11-28 RX ADMIN — Medication 100 MILLIGRAM(S): at 11:08

## 2019-11-28 RX ADMIN — CEFEPIME 100 MILLIGRAM(S): 1 INJECTION, POWDER, FOR SOLUTION INTRAMUSCULAR; INTRAVENOUS at 21:03

## 2019-11-28 RX ADMIN — Medication 1 PACKET(S): at 05:28

## 2019-11-28 RX ADMIN — Medication 250 MILLIGRAM(S): at 18:04

## 2019-11-28 RX ADMIN — SODIUM CHLORIDE 500 MILLILITER(S): 9 INJECTION INTRAMUSCULAR; INTRAVENOUS; SUBCUTANEOUS at 08:56

## 2019-11-28 RX ADMIN — ENOXAPARIN SODIUM 40 MILLIGRAM(S): 100 INJECTION SUBCUTANEOUS at 11:07

## 2019-11-28 RX ADMIN — MYCOPHENOLATE MOFETIL 500 MILLIGRAM(S): 250 CAPSULE ORAL at 05:26

## 2019-11-28 RX ADMIN — PANTOPRAZOLE SODIUM 40 MILLIGRAM(S): 20 TABLET, DELAYED RELEASE ORAL at 05:27

## 2019-11-28 RX ADMIN — Medication 250 MILLIGRAM(S): at 11:06

## 2019-11-28 RX ADMIN — MAGNESIUM OXIDE 400 MG ORAL TABLET 400 MILLIGRAM(S): 241.3 TABLET ORAL at 21:04

## 2019-11-28 RX ADMIN — Medication 25 MICROGRAM(S): at 05:27

## 2019-11-28 RX ADMIN — Medication 20 MILLIEQUIVALENT(S): at 11:06

## 2019-11-28 RX ADMIN — PANTOPRAZOLE SODIUM 40 MILLIGRAM(S): 20 TABLET, DELAYED RELEASE ORAL at 18:04

## 2019-11-28 RX ADMIN — Medication 60 MILLIGRAM(S): at 05:29

## 2019-11-28 RX ADMIN — Medication 1 MILLIGRAM(S): at 11:08

## 2019-11-28 NOTE — PROGRESS NOTE ADULT - SUBJECTIVE AND OBJECTIVE BOX
pt seen and examined. has no new symtoms    Vital Signs Last 24 Hrs  T(C): 35.6 (28 Nov 2019 07:50), Max: 36.8 (27 Nov 2019 16:00)  T(F): 96 (28 Nov 2019 07:50), Max: 98.2 (27 Nov 2019 16:00)  HR: 77 (28 Nov 2019 10:19) (75 - 106)  BP: 92/61 (28 Nov 2019 10:19) (88/59 - 131/82)  BP(mean): 100 (27 Nov 2019 16:00) (100 - 100)  RR: 18 (28 Nov 2019 07:50) (18 - 18)    Physical exam:   constitutional NAD, very thin, and dry skin AAOX3, Respiratory  lungs CTA, CVS heart RRR, GI: abdomen Soft NT, ND, BS+, skin: peg in place  neuro exam limited mobilty.     < from: Xray Chest 1 View- PORTABLE-Urgent (11.28.19 @ 09:49) >    Increased bilateral opacifications, right greater than left.    < end of copied text >                          10.7   9.17  )-----------( 123      ( 28 Nov 2019 06:12 )             33.4   11-28    137  |  100  |  12  ----------------------------<  78  3.3<L>   |  23  |  0.5<L>    Ca    7.3<L>      28 Nov 2019 06:12  Phos  1.9     11-28  Mg     1.5     11-28    a/p  #Hypotension, rule our sepsis, pt is immunocompromised. worsened right lower lobe infiltrate.. poss MDR pna. will start abx, received IVF, send bc, uc. ID consult, if he develops any abd symptoms we may need to repeat ct abd, if has diarrhea will send stool for cdiff    ##Dermatomyositis scleroderma overlap syndrome  - cont MTX and prednisone and Cellcept per rheum   - amlodipine started for Raynaud symptoms, cont    #PEG status, local care    #hypokalemia and hypomagnesemia, replaced , repeat    #anemia, chronic,    #Progress Note Handoff  Pending (specify):  Consults ID, tests: cultures  Family discussion: judson pt , full code  Disposition: home

## 2019-11-29 LAB
ALBUMIN SERPL ELPH-MCNC: 2.5 G/DL — LOW (ref 3.5–5.2)
ALP SERPL-CCNC: 73 U/L — SIGNIFICANT CHANGE UP (ref 30–115)
ALT FLD-CCNC: 13 U/L — SIGNIFICANT CHANGE UP (ref 0–41)
ANION GAP SERPL CALC-SCNC: 13 MMOL/L — SIGNIFICANT CHANGE UP (ref 7–14)
APPEARANCE UR: CLEAR — SIGNIFICANT CHANGE UP
AST SERPL-CCNC: 24 U/L — SIGNIFICANT CHANGE UP (ref 0–41)
BACTERIA # UR AUTO: NEGATIVE — SIGNIFICANT CHANGE UP
BASOPHILS # BLD AUTO: 0.01 K/UL — SIGNIFICANT CHANGE UP (ref 0–0.2)
BASOPHILS NFR BLD AUTO: 0.1 % — SIGNIFICANT CHANGE UP (ref 0–1)
BILIRUB SERPL-MCNC: 0.5 MG/DL — SIGNIFICANT CHANGE UP (ref 0.2–1.2)
BILIRUB UR-MCNC: NEGATIVE — SIGNIFICANT CHANGE UP
BUN SERPL-MCNC: 15 MG/DL — SIGNIFICANT CHANGE UP (ref 10–20)
CALCIUM SERPL-MCNC: 7.4 MG/DL — LOW (ref 8.5–10.1)
CHLORIDE SERPL-SCNC: 100 MMOL/L — SIGNIFICANT CHANGE UP (ref 98–110)
CO2 SERPL-SCNC: 22 MMOL/L — SIGNIFICANT CHANGE UP (ref 17–32)
COLOR SPEC: YELLOW — SIGNIFICANT CHANGE UP
CREAT SERPL-MCNC: 0.5 MG/DL — LOW (ref 0.7–1.5)
DIFF PNL FLD: NEGATIVE — SIGNIFICANT CHANGE UP
EOSINOPHIL # BLD AUTO: 0 K/UL — SIGNIFICANT CHANGE UP (ref 0–0.7)
EOSINOPHIL NFR BLD AUTO: 0 % — SIGNIFICANT CHANGE UP (ref 0–8)
EPI CELLS # UR: 1 /HPF — SIGNIFICANT CHANGE UP (ref 0–5)
GLUCOSE BLDC GLUCOMTR-MCNC: 103 MG/DL — HIGH (ref 70–99)
GLUCOSE BLDC GLUCOMTR-MCNC: 126 MG/DL — HIGH (ref 70–99)
GLUCOSE BLDC GLUCOMTR-MCNC: 142 MG/DL — HIGH (ref 70–99)
GLUCOSE BLDC GLUCOMTR-MCNC: 144 MG/DL — HIGH (ref 70–99)
GLUCOSE SERPL-MCNC: 152 MG/DL — HIGH (ref 70–99)
GLUCOSE UR QL: NEGATIVE — SIGNIFICANT CHANGE UP
HCT VFR BLD CALC: 31.8 % — LOW (ref 42–52)
HGB BLD-MCNC: 10.2 G/DL — LOW (ref 14–18)
HYALINE CASTS # UR AUTO: 5 /LPF — SIGNIFICANT CHANGE UP (ref 0–7)
IMM GRANULOCYTES NFR BLD AUTO: 0.4 % — HIGH (ref 0.1–0.3)
KETONES UR-MCNC: SIGNIFICANT CHANGE UP
LEUKOCYTE ESTERASE UR-ACNC: NEGATIVE — SIGNIFICANT CHANGE UP
LYMPHOCYTES # BLD AUTO: 0.16 K/UL — LOW (ref 1.2–3.4)
LYMPHOCYTES # BLD AUTO: 1.5 % — LOW (ref 20.5–51.1)
MAGNESIUM SERPL-MCNC: 1.9 MG/DL — SIGNIFICANT CHANGE UP (ref 1.8–2.4)
MCHC RBC-ENTMCNC: 30.7 PG — SIGNIFICANT CHANGE UP (ref 27–31)
MCHC RBC-ENTMCNC: 32.1 G/DL — SIGNIFICANT CHANGE UP (ref 32–37)
MCV RBC AUTO: 95.8 FL — HIGH (ref 80–94)
MONOCYTES # BLD AUTO: 0.1 K/UL — SIGNIFICANT CHANGE UP (ref 0.1–0.6)
MONOCYTES NFR BLD AUTO: 0.9 % — LOW (ref 1.7–9.3)
NEUTROPHILS # BLD AUTO: 10.44 K/UL — HIGH (ref 1.4–6.5)
NEUTROPHILS NFR BLD AUTO: 97.1 % — HIGH (ref 42.2–75.2)
NITRITE UR-MCNC: NEGATIVE — SIGNIFICANT CHANGE UP
NRBC # BLD: 0 /100 WBCS — SIGNIFICANT CHANGE UP (ref 0–0)
PH UR: 6.5 — SIGNIFICANT CHANGE UP (ref 5–8)
PHOSPHATE SERPL-MCNC: 1.9 MG/DL — LOW (ref 2.1–4.9)
PLATELET # BLD AUTO: 127 K/UL — LOW (ref 130–400)
POTASSIUM SERPL-MCNC: 3.2 MMOL/L — LOW (ref 3.5–5)
POTASSIUM SERPL-SCNC: 3.2 MMOL/L — LOW (ref 3.5–5)
PROT SERPL-MCNC: 4.5 G/DL — LOW (ref 6–8)
PROT UR-MCNC: ABNORMAL
RBC # BLD: 3.32 M/UL — LOW (ref 4.7–6.1)
RBC # FLD: 22 % — HIGH (ref 11.5–14.5)
RBC CASTS # UR COMP ASSIST: 3 /HPF — SIGNIFICANT CHANGE UP (ref 0–4)
SODIUM SERPL-SCNC: 135 MMOL/L — SIGNIFICANT CHANGE UP (ref 135–146)
SP GR SPEC: 1.03 — HIGH (ref 1.01–1.02)
UROBILINOGEN FLD QL: SIGNIFICANT CHANGE UP
WBC # BLD: 10.75 K/UL — SIGNIFICANT CHANGE UP (ref 4.8–10.8)
WBC # FLD AUTO: 10.75 K/UL — SIGNIFICANT CHANGE UP (ref 4.8–10.8)
WBC UR QL: 2 /HPF — SIGNIFICANT CHANGE UP (ref 0–5)

## 2019-11-29 PROCEDURE — 99233 SBSQ HOSP IP/OBS HIGH 50: CPT

## 2019-11-29 PROCEDURE — 99232 SBSQ HOSP IP/OBS MODERATE 35: CPT

## 2019-11-29 RX ORDER — SODIUM CHLORIDE 9 MG/ML
1000 INJECTION, SOLUTION INTRAVENOUS
Refills: 0 | Status: DISCONTINUED | OUTPATIENT
Start: 2019-11-29 | End: 2019-11-29

## 2019-11-29 RX ORDER — PANTOPRAZOLE SODIUM 20 MG/1
40 TABLET, DELAYED RELEASE ORAL DAILY
Refills: 0 | Status: DISCONTINUED | OUTPATIENT
Start: 2019-11-29 | End: 2019-12-04

## 2019-11-29 RX ORDER — SODIUM,POTASSIUM PHOSPHATES 278-250MG
1 POWDER IN PACKET (EA) ORAL
Refills: 0 | Status: COMPLETED | OUTPATIENT
Start: 2019-11-29 | End: 2019-12-01

## 2019-11-29 RX ORDER — SODIUM CHLORIDE 9 MG/ML
1000 INJECTION, SOLUTION INTRAVENOUS
Refills: 0 | Status: COMPLETED | OUTPATIENT
Start: 2019-11-29 | End: 2019-11-29

## 2019-11-29 RX ADMIN — PANTOPRAZOLE SODIUM 40 MILLIGRAM(S): 20 TABLET, DELAYED RELEASE ORAL at 12:00

## 2019-11-29 RX ADMIN — Medication 650 MILLIGRAM(S): at 02:50

## 2019-11-29 RX ADMIN — TIOTROPIUM BROMIDE 1 CAPSULE(S): 18 CAPSULE ORAL; RESPIRATORY (INHALATION) at 07:47

## 2019-11-29 RX ADMIN — Medication 3 MILLILITER(S): at 20:34

## 2019-11-29 RX ADMIN — PANTOPRAZOLE SODIUM 40 MILLIGRAM(S): 20 TABLET, DELAYED RELEASE ORAL at 05:19

## 2019-11-29 RX ADMIN — Medication 1 MILLIGRAM(S): at 12:00

## 2019-11-29 RX ADMIN — Medication 1 PACKET(S): at 18:04

## 2019-11-29 RX ADMIN — MAGNESIUM OXIDE 400 MG ORAL TABLET 400 MILLIGRAM(S): 241.3 TABLET ORAL at 12:00

## 2019-11-29 RX ADMIN — MAGNESIUM OXIDE 400 MG ORAL TABLET 400 MILLIGRAM(S): 241.3 TABLET ORAL at 08:22

## 2019-11-29 RX ADMIN — Medication 3 MILLILITER(S): at 07:50

## 2019-11-29 RX ADMIN — MYCOPHENOLATE MOFETIL 500 MILLIGRAM(S): 250 CAPSULE ORAL at 18:05

## 2019-11-29 RX ADMIN — CEFEPIME 100 MILLIGRAM(S): 1 INJECTION, POWDER, FOR SOLUTION INTRAMUSCULAR; INTRAVENOUS at 13:30

## 2019-11-29 RX ADMIN — SODIUM CHLORIDE 85 MILLILITER(S): 9 INJECTION, SOLUTION INTRAVENOUS at 18:03

## 2019-11-29 RX ADMIN — ENOXAPARIN SODIUM 40 MILLIGRAM(S): 100 INJECTION SUBCUTANEOUS at 12:00

## 2019-11-29 RX ADMIN — Medication 100 MILLIGRAM(S): at 12:00

## 2019-11-29 RX ADMIN — CEFEPIME 100 MILLIGRAM(S): 1 INJECTION, POWDER, FOR SOLUTION INTRAMUSCULAR; INTRAVENOUS at 05:19

## 2019-11-29 RX ADMIN — METHOTREXATE 15 MILLIGRAM(S): 2.5 TABLET ORAL at 12:01

## 2019-11-29 RX ADMIN — Medication 25 MICROGRAM(S): at 05:27

## 2019-11-29 RX ADMIN — Medication 60 MILLIGRAM(S): at 05:26

## 2019-11-29 RX ADMIN — MAGNESIUM OXIDE 400 MG ORAL TABLET 400 MILLIGRAM(S): 241.3 TABLET ORAL at 18:04

## 2019-11-29 RX ADMIN — MYCOPHENOLATE MOFETIL 500 MILLIGRAM(S): 250 CAPSULE ORAL at 05:27

## 2019-11-29 NOTE — PROGRESS NOTE ADULT - SUBJECTIVE AND OBJECTIVE BOX
NEIL PHOENIX  [id number]  Male  69y  HPI:  [69 year old  man]    CC: Generalized weakness and muscle pain    HPI: COPD (not on home oxygen), hypothyroidism, gout, RA, dermatomyositis (on prednisone and methotrexate), on 10/2019 admitted for severe UGIB with EGD showing esophagitis and erosive gastritis, stay complicated by ileus vs gastric outlet obstruction, pathology at the time ruled out cancer as well as possible aspiration pneumonia seen by speech and swallow and put on dysphagia 2 diet     History of Present Illness goes back to the since his prior discharge when patient endorses progressive generalized weakness to the point where he was unable to lift himself for a seating position, get up flight of stairs and raise his arms above his head. He also endorses overall fatigue, decreased appetite and PO intake as well as weight loss of over 40 pounds in the past 6 months. In the past 2 days prior to his presentation, he noticed an acute worsening of his weakness and fatigue as well as muscle pains which were diffuse and constant. He contacted his rheumatologist who referred him to the ED.     Patient was recently diagnosed with dermatomyositis in July of 2019 and started on MTX and prednisone. He says since then and despite both treatments he has not felt any improvement. However he says he recently saw a rheumatologist in the clinic (Dr. Fontanez) and since "his numbers" were improving the prednisone was increased to 60. The MTX was held is his recent hospitalization and restarted after discharge.     Interim hx: Tolerating TF well. Concern by primary team for ? pneumonia based on CXR, no new symptoms per patient. Awaiting ID recommendations. Pt working with PT, awaiting NH placement Monday.    PAST MEDICAL & SURGICAL HISTORY:  Gout  COPD, mild  Polymyositis  Hypothyroid  S/P tonsillectomy    FAMILY HISTORY:  No pertinent family history in first degree relatives    MEDICATIONS  (STANDING):  allopurinol 100 milliGRAM(s) Oral daily  enoxaparin Injectable 40 milliGRAM(s) SubCutaneous daily  folic acid 1 milliGRAM(s) Oral daily  influenza   Vaccine 0.5 milliLiter(s) IntraMuscular once  levothyroxine 25 MICROGram(s) Oral daily  methotrexate 20 milliGRAM(s) Oral <User Schedule>  pantoprazole  Injectable 40 milliGRAM(s) IV Push every 12 hours  predniSONE   Tablet 60 milliGRAM(s) Oral daily  predniSONE   Tablet 20 milliGRAM(s) Oral <User Schedule>  sodium chloride 0.9%. 1000 milliLiter(s) (75 mL/Hr) IV Continuous <Continuous>  tiotropium 18 MICROgram(s) Capsule 1 Capsule(s) Inhalation daily    MEDICATIONS  (PRN):  ALBUTerol    90 MICROgram(s) HFA Inhaler 2 Puff(s) Inhalation every 6 hours PRN Shortness of Breath and/or Wheezing    Allergies    No Known Allergies    Intolerances        PHYSICAL EXAM:    	GENERAL: NAD, thin appearing, appears older than stated age  	CHEST/LUNG: No conversational dyspnea, crackles R>L bases  	CV: RRR  	ABDOMEN: Soft, nontender  	EXTREMITIES: No active synovitis or joint effusions, no joint tenderness   	PSYCH: normal affect  	NEURO: 4+ triceps/biceps/deltoid muscle strength bilaterally, 4 hip flexor muscle strength bilaterally, 4 neck flexor strength    SKIN: +2 skin thickening at bilateral digits, perioral furrowing, facial and chest telangiectasias, periungual erythema    CBC Full  -  ( 22 Nov 2019 06:13 )  WBC Count : 11.98 K/uL  RBC Count : 3.54 M/uL  Hemoglobin : 10.6 g/dL  Hematocrit : 33.7 %  Platelet Count - Automated : 139 K/uL  Mean Cell Volume : 95.2 fL  Mean Cell Hemoglobin : 29.9 pg  Mean Cell Hemoglobin Concentration : 31.5 g/dL  Auto Neutrophil # : 10.92 K/uL  Auto Lymphocyte # : 0.49 K/uL  Auto Monocyte # : 0.49 K/uL  Auto Eosinophil # : 0.01 K/uL  Auto Basophil # : 0.01 K/uL  Auto Neutrophil % : 91.1 %  Auto Lymphocyte % : 4.1 %  Auto Monocyte % : 4.1 %  Auto Eosinophil % : 0.1 %  Auto Basophil % : 0.1 %    LIVER FUNCTIONS - ( 22 Nov 2019 06:13 )  Alb: 2.7 g/dL / Pro: 4.7 g/dL / ALK PHOS: 73 U/L / ALT: 31 U/L / AST: 38 U/L / GGT: x           11-22    137  |  102  |  28<H>  ----------------------------<  85  4.6   |  25  |  0.6<L>    Ca    8.1<L>      22 Nov 2019 06:13    TPro  4.7<L>  /  Alb  2.7<L>  /  TBili  0.5  /  DBili  0.2  /  AST  38  /  ALT  31  /  AlkPhos  73  11-22    PT/INR - ( 21 Nov 2019 17:48 )   PT: 12.10 sec;   INR: 1.05 ratio         PTT - ( 21 Nov 2019 17:48 )  PTT:24.4 sec      	< from: Xray Chest 2 Views PA/Lat (11.21.19 @ 09:52) >    	Impression:      	Decreased bibasilar opacities.    	================== OTHER SIGNIFICANT FINDINGS ==================    	Final Diagnosis  	Stomach, biopsy:  	- Mild chronic gastritis.  	- Giemsa stain fails to reveal Helicobacter pylori in this  	material.  	- No intestinal metaplasia or dysplasia seen.    	Postoperative Diagnosis  	Esophagitis      	================== PROCEDURES ==================    	< from: EGD (10.04.19 @ 11:30) >    	Impressions:   	 Grade D esophagitis compatible with nonspecific erosive esophagitis.   	 Hiatal Hernia.   	 Erythema in the stomach compatible with non-erosive gastritis. (Biopsy).   	 Normal mucosa in the whole examined duodenum.   	 No evidence of gastric outlet obstruction, gastric mucosa have evidence of NG  tube induced erosions.

## 2019-11-29 NOTE — PROGRESS NOTE ADULT - SUBJECTIVE AND OBJECTIVE BOX
Patient is a 69y old  Male who presents with a chief complaint of Generalized weakness and muscle pain (29 Nov 2019 13:49)  pt seen and evaluated   on Peg tube feeding      ICU Vital Signs Last 24 Hrs  T(C): 35.8 (29 Nov 2019 07:38), Max: 35.9 (28 Nov 2019 15:00)  T(F): 96.5 (29 Nov 2019 07:38), Max: 96.6 (28 Nov 2019 15:00)  HR: 92 (29 Nov 2019 07:38) (81 - 94)  BP: 112/65 (29 Nov 2019 07:38) (100/62 - 112/65)  RR: 18 (29 Nov 2019 07:38) (16 - 18)  Drug Dosing Weight  Height (cm): 177.8 (26 Nov 2019 13:30)  Weight (kg): 54.4 (26 Nov 2019 13:30)  BMI (kg/m2): 17.2 (26 Nov 2019 13:30)  BSA (m2): 1.68 (26 Nov 2019 13:30)    I&O's Detail    28 Nov 2019 07:01  -  29 Nov 2019 07:00  --------------------------------------------------------  IN:    dextrose 5% + sodium chloride 0.9%: 75 mL  Total IN: 75 mL    OUT:    Voided: 550 mL  Total OUT: 550 mL    Total NET: -475 mL      29 Nov 2019 07:01  -  29 Nov 2019 14:08  --------------------------------------------------------  IN:    dextrose 5% + sodium chloride 0.9%: 75 mL  Total IN: 75 mL    OUT:  Total OUT: 0 mL    Total NET: 75 mL     PHYSICAL EXAM:  Constitutional:  NAD, Alert & Oriented X3  +clavicular and temporal waisting  ABDOMEN: Soft, Nontender, Nondistended +Peg tube in place and abdominal binder  EXTREMITIES:no edema +decrease in LBM  SKIN: No  lesions  MEDICATIONS  (STANDING):  allopurinol 100 milliGRAM(s) Oral daily  amLODIPine   Tablet 2.5 milliGRAM(s) Oral daily  enoxaparin Injectable 40 milliGRAM(s) SubCutaneous daily  folic acid 1 milliGRAM(s) Oral daily  influenza   Vaccine 0.5 milliLiter(s) IntraMuscular once  lactated ringers 1000 milliLiter(s) (85 mL/Hr) IV Continuous <Continuous>  levothyroxine 25 MICROGram(s) Oral daily  magnesium oxide 400 milliGRAM(s) Oral three times a day with meals  methotrexate 15 milliGRAM(s) Oral <User Schedule>  mycophenolate mofetil Suspension 500 milliGRAM(s) Oral two times a day  pantoprazole   Suspension 40 milliGRAM(s) Oral daily  potassium phosphate / sodium phosphate powder 1 Packet(s) Oral three times a day before meals  predniSONE   Tablet 60 milliGRAM(s) Oral daily  tiotropium 18 MICROgram(s) Capsule 1 Capsule(s) Inhalation daily      Diet, NPO with Tube Feed:   Tube Feeding Modality: Gastrostomy  Jevity 1.2 Mark  Total Volume for 24 Hours (mL): 1080  Bolus  Total Volume of Bolus (mL):  360  Tube Feed Frequency: Every 8 hours   Tube Feed Start Time: 07:00  Bolus Feed Rate (mL per Hour): 500   Bolus Feed Duration (in Hours): 0.75  Alonso(7 Gm Arginine/7 Gm Glut/1.2 Gm HMB     Qty per Day:  2 (11-27-19 @ 16:50)      LABS  11-29    135  |  100  |  15  ----------------------------<  152<H>  3.2<L>   |  22  |  0.5<L>    Ca    7.4<L>      29 Nov 2019 11:09  Phos  1.9     11-29  Mg     1.9     11-29    TPro  4.5<L>  /  Alb  2.5<L>  /  TBili  0.5  /  DBili  x   /  AST  24  /  ALT  13  /  AlkPhos  73  11-29                        10.2   10.75 )-----------( 127      ( 29 Nov 2019 11:09 )             31.8     CAPILLARY BLOOD GLUCOSE  POCT Blood Glucose.: 142 mg/dL (29 Nov 2019 11:21)  POCT Blood Glucose.: 126 mg/dL (29 Nov 2019 07:53)   RADIOLOGY STUDIES  < from: Xray Chest 1 View- PORTABLE-Urgent (11.28.19 @ 09:49) >  Impression:    Increased bilateral opacifications, right greater than left.

## 2019-11-29 NOTE — CONSULT NOTE ADULT - ASSESSMENT
69yMale with a PMH of COPD, hypothyroidism, gout, RA, esophagitis/erosive gastritis, and dermatomyositis who comes to the ED with complaints of generalized weakness, admitted to medicine for dermatomyositis flare, also s/p PEG tube placement.    IMPRESSION  # Dermatomyositis on methotrexate, MMF, and prednisone  # S/p PEG tube placement 11/26  # 11/28 CXR: increased b/l opacifications, R > L  # No Leukocytosis, afebrile, nontachycardic  # 11/28 Blood cx sent, pending results    RECOMMENDATIONS  - f/u pending cultures  -     This is a pended note. All final recommendations to follow pending discussion with ID Attending 69yMale with a PMH of COPD, hypothyroidism, gout, RA, esophagitis/erosive gastritis, and dermatomyositis who comes to the ED with complaints of generalized weakness, admitted to medicine for dermatomyositis flare, also s/p PEG tube placement.    IMPRESSION  # Dermatomyositis on methotrexate, MMF, and prednisone  # S/p PEG tube placement 11/26  # Likely aspiration pneumonitis  # 11/28 CXR: increased b/l opacifications, R > L  # No Leukocytosis, afebrile, nontachycardic  # 11/28 Blood cx sent, pending results    RECOMMENDATIONS  - F/u pending culture  - Discontinue cefepime and vancomycin  - Start Bactrim DS 1 tab, three times a week for PJP prophylaxis    This is a pended note. All final recommendations to follow pending discussion with ID Attending 69yMale with a PMH of COPD, hypothyroidism, gout, RA, esophagitis/erosive gastritis, and dermatomyositis who comes to the ED with complaints of generalized weakness, admitted to medicine for dermatomyositis flare, also s/p PEG tube placement.    IMPRESSION  # Dermatomyositis on methotrexate, MMF, and prednisone  # S/p PEG tube placement 11/26  # Likely aspiration pneumonitis  # 11/28 CXR: increased b/l opacifications, R > L, pt denies productive cough   # No Leukocytosis, afebrile, nontachycardic  # 11/28 Blood cx sent, pending results  # SIRS on admission T95 P96 WBC 15    RECOMMENDATIONS  - sputum cx if able  - Discontinue cefepime and vancomycin, monitor off abx. If hemodynamic compromise, restart  - Start Bactrim DS 1 tab, three times a week for PJP prophylaxis

## 2019-11-29 NOTE — PROGRESS NOTE ADULT - SUBJECTIVE AND OBJECTIVE BOX
NEIL PHOENIX 69y Male  MRN#: [id number]     SUBJECTIVE  Patient is a 69y old Male who presents with a chief complaint of Generalized weakness and muscle pain (28 Nov 2019 12:24)  Today is hospital day 8d, and this morning he is lying in bed without distress.   No acute overnight events.   feels a biut lightheaded  no chest pain  no increased sob  no abdominal pain  no fvers chlls nausea vomiting  no dysuria  has some cough but no production and coughing NOT increased from admiossion    OBJECTIVE  PAST MEDICAL & SURGICAL HISTORY  Gout  COPD, mild  Hypothyroid  S/P tonsillectomy    ALLERGIES:  No Known Allergies    MEDICATIONS:  STANDING MEDICATIONS  allopurinol 100 milliGRAM(s) Oral daily  amLODIPine   Tablet 2.5 milliGRAM(s) Oral daily  cefepime   IVPB      cefepime   IVPB 2000 milliGRAM(s) IV Intermittent every 8 hours  enoxaparin Injectable 40 milliGRAM(s) SubCutaneous daily  folic acid 1 milliGRAM(s) Oral daily  influenza   Vaccine 0.5 milliLiter(s) IntraMuscular once  levothyroxine 25 MICROGram(s) Oral daily  magnesium oxide 400 milliGRAM(s) Oral three times a day with meals  methotrexate 15 milliGRAM(s) Oral <User Schedule>  mycophenolate mofetil Suspension 500 milliGRAM(s) Oral two times a day  pantoprazole  Injectable 40 milliGRAM(s) IV Push every 12 hours  predniSONE   Tablet 60 milliGRAM(s) Oral daily  tiotropium 18 MICROgram(s) Capsule 1 Capsule(s) Inhalation daily  vancomycin  IVPB      vancomycin  IVPB 1000 milliGRAM(s) IV Intermittent every 12 hours    PRN MEDICATIONS  acetaminophen   Tablet .. 650 milliGRAM(s) Oral every 6 hours PRN  ALBUTerol    90 MICROgram(s) HFA Inhaler 2 Puff(s) Inhalation every 6 hours PRN  albuterol/ipratropium for Nebulization 3 milliLiter(s) Nebulizer every 6 hours PRN    HOME MEDICATIONS  Home Medications:  allopurinol 100 mg oral tablet: 1 tab(s) orally once a day (02 Oct 2019 18:28)  DuoNeb 0.5 mg-2.5 mg/3 mL inhalation solution: 3 milliliter(s) inhaled 3 times a day, As Needed (02 Oct 2019 18:28)  levothyroxine 25 mcg (0.025 mg) oral tablet: 1 tab(s) orally once a day (02 Oct 2019 18:28)  pantoprazole 40 mg oral delayed release tablet: 1 tab(s) orally once a day (02 Oct 2019 18:28)  predniSONE 20 mg oral tablet: 2 tab(s) orally once a day in the morning  (21 Nov 2019 16:31)  predniSONE 20 mg oral tablet: 1 tab(s) orally once a day at night  (21 Nov 2019 16:32)  tiotropium 18 mcg inhalation capsule: 1 cap(s) inhaled once a day (09 Oct 2019 12:50)      VITAL SIGNS: Last 24 Hours  T(C): 35.8 (29 Nov 2019 07:38), Max: 35.9 (28 Nov 2019 15:00)  T(F): 96.5 (29 Nov 2019 07:38), Max: 96.6 (28 Nov 2019 15:00)  HR: 92 (29 Nov 2019 07:38) (77 - 94)  BP: 112/65 (29 Nov 2019 07:38) (92/61 - 112/65)  BP(mean): --  RR: 18 (29 Nov 2019 07:38) (16 - 18)  SpO2: --    11-28-19 @ 07:01  -  11-29-19 @ 07:00  --------------------------------------------------------  IN: 75 mL / OUT: 550 mL / NET: -475 mL    11-29-19 @ 07:01  -  11-29-19 @ 09:56  --------------------------------------------------------  IN: 75 mL / OUT: 0 mL / NET: 75 mL        LABS:                        10.7   9.17  )-----------( 123      ( 28 Nov 2019 06:12 )             33.4     11-28    137  |  100  |  12  ----------------------------<  78  3.3<L>   |  23  |  0.5<L>    Ca    7.3<L>      28 Nov 2019 06:12  Phos  1.9     11-28  Mg     1.5     11-28                      CAPILLARY BLOOD GLUCOSE      POCT Blood Glucose.: 126 mg/dL (29 Nov 2019 07:53)      RADIOLOGY:    PHYSICAL EXAM:  PHYSICAL EXAM:  GENERAL: NAD, AAO x 4, 69y M  CHEST/LUNG: Clear to auscultation bilaterally; No wheeze; No crackles; No accessory muscles used  HEART: Regular rate and rhythm; No murmurs;   ABDOMEN: Soft, Nontender, Nondistended; Bowel sounds present; No guarding PEG site clean  EXTREMITIES: 1+ pitting edeam bilateral lower extremities  NEUROLOGY: non-focal    ADMISSION SUMMARY  Patient is a 69y old Male who presents with a chief complaint of Generalized weakness and muscle pain (28 Nov 2019 12:24) NEIL PHOENIX 69y Male  MRN#: [id number]     SUBJECTIVE  Patient is a 69y old Male who presents with a chief complaint of Generalized weakness and muscle pain (28 Nov 2019 12:24)  Today is hospital day 8d, and this morning he is lying in bed without distress.   No acute overnight events.   feels a biut lightheaded  no chest pain  no increased sob  no abdominal pain  no fvers chlls nausea vomiting  no dysuria  has some cough but no production and coughing NOT increased from admiossion    OBJECTIVE  PAST MEDICAL & SURGICAL HISTORY  Gout  COPD, mild  Hypothyroid  S/P tonsillectomy    ALLERGIES:  No Known Allergies    MEDICATIONS:  STANDING MEDICATIONS  allopurinol 100 milliGRAM(s) Oral daily  amLODIPine   Tablet 2.5 milliGRAM(s) Oral daily  cefepime   IVPB      cefepime   IVPB 2000 milliGRAM(s) IV Intermittent every 8 hours  enoxaparin Injectable 40 milliGRAM(s) SubCutaneous daily  folic acid 1 milliGRAM(s) Oral daily  influenza   Vaccine 0.5 milliLiter(s) IntraMuscular once  levothyroxine 25 MICROGram(s) Oral daily  magnesium oxide 400 milliGRAM(s) Oral three times a day with meals  methotrexate 15 milliGRAM(s) Oral <User Schedule>  mycophenolate mofetil Suspension 500 milliGRAM(s) Oral two times a day  pantoprazole  Injectable 40 milliGRAM(s) IV Push every 12 hours  predniSONE   Tablet 60 milliGRAM(s) Oral daily  tiotropium 18 MICROgram(s) Capsule 1 Capsule(s) Inhalation daily  vancomycin  IVPB      vancomycin  IVPB 1000 milliGRAM(s) IV Intermittent every 12 hours    PRN MEDICATIONS  acetaminophen   Tablet .. 650 milliGRAM(s) Oral every 6 hours PRN  ALBUTerol    90 MICROgram(s) HFA Inhaler 2 Puff(s) Inhalation every 6 hours PRN  albuterol/ipratropium for Nebulization 3 milliLiter(s) Nebulizer every 6 hours PRN    HOME MEDICATIONS  Home Medications:  allopurinol 100 mg oral tablet: 1 tab(s) orally once a day (02 Oct 2019 18:28)  DuoNeb 0.5 mg-2.5 mg/3 mL inhalation solution: 3 milliliter(s) inhaled 3 times a day, As Needed (02 Oct 2019 18:28)  levothyroxine 25 mcg (0.025 mg) oral tablet: 1 tab(s) orally once a day (02 Oct 2019 18:28)  pantoprazole 40 mg oral delayed release tablet: 1 tab(s) orally once a day (02 Oct 2019 18:28)  predniSONE 20 mg oral tablet: 2 tab(s) orally once a day in the morning  (21 Nov 2019 16:31)  predniSONE 20 mg oral tablet: 1 tab(s) orally once a day at night  (21 Nov 2019 16:32)  tiotropium 18 mcg inhalation capsule: 1 cap(s) inhaled once a day (09 Oct 2019 12:50)      VITAL SIGNS: Last 24 Hours  T(C): 35.8 (29 Nov 2019 07:38), Max: 35.9 (28 Nov 2019 15:00)  T(F): 96.5 (29 Nov 2019 07:38), Max: 96.6 (28 Nov 2019 15:00)  HR: 92 (29 Nov 2019 07:38) (77 - 94)  BP: 112/65 (29 Nov 2019 07:38) (92/61 - 112/65)  BP(mean): --  RR: 18 (29 Nov 2019 07:38) (16 - 18)  SpO2: --    11-28-19 @ 07:01  -  11-29-19 @ 07:00  --------------------------------------------------------  IN: 75 mL / OUT: 550 mL / NET: -475 mL    11-29-19 @ 07:01  -  11-29-19 @ 09:56  --------------------------------------------------------  IN: 75 mL / OUT: 0 mL / NET: 75 mL        LABS:                        10.7   9.17  )-----------( 123      ( 28 Nov 2019 06:12 )             33.4     11-28    137  |  100  |  12  ----------------------------<  78  3.3<L>   |  23  |  0.5<L>    Ca    7.3<L>      28 Nov 2019 06:12  Phos  1.9     11-28  Mg     1.5     11-28      CAPILLARY BLOOD GLUCOSE      POCT Blood Glucose.: 126 mg/dL (29 Nov 2019 07:53)      RADIOLOGY:    PHYSICAL EXAM:  PHYSICAL EXAM:  GENERAL: NAD, AAO x 4, 69y M  CHEST/LUNG: Clear to auscultation bilaterally; No wheeze; No crackles; No accessory muscles used  HEART: Regular rate and rhythm; No murmurs;   ABDOMEN: Soft, Nontender, Nondistended; Bowel sounds present; No guarding PEG site clean  EXTREMITIES: 1+ pitting edeam bilateral lower extremities  NEUROLOGY: non-focal    ADMISSION SUMMARY  Patient is a 69y old Male who presents with a chief complaint of Generalized weakness and muscle pain (28 Nov 2019 12:24)

## 2019-11-29 NOTE — PROVIDER CONTACT NOTE (OTHER) - ACTION/TREATMENT ORDERED:
provider stated that he will review pt's labs and labs will be reordered for 11, since pt refused morning labs. provider stated that electrolytes may need to be replenished.

## 2019-11-29 NOTE — PROVIDER CONTACT NOTE (OTHER) - SITUATION
pt is c/o feeling dizzy. VS : /65, HR 69. pt is receiving tube feedings. pt is receiving D5NS @75cc/hr. pt's FS is 126. pt denies any nausea.

## 2019-11-29 NOTE — PROGRESS NOTE ADULT - ATTENDING COMMENTS
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 Steroids recently increased from prednisone 40 mg daily to 60 mg daily 2 weeks ago by outpatient rheumatologist Dr. Fontanez due to ongoing symptoms. Ab workup was notably unremarkable. Tolerated addition of  mg BID. Recommend continuation of prednisone 60 mg daily given in the AM (will require lengthy taper). Would continue  mg BID and MTX 15 mg weekly if abx are d/c'd (immunocompromised pt without symptoms of pneumonia but with ? radiographic pneumonia on CXR in short time period since he was previously treated for aspiration pneumonia) - if he is treated for pneumonia with abx would hold MMF and MTX during the timeframe he requires abx therapy. Recommend addition of PJP ppx given steroids requirements - consider Bactrim ppx. Should have calcium/vit D supplementation for bone health. Muscle strength improvement can lag behind CK improvement/normalization - strongly recommend continuing PT as this is exceptionally important in the management of patients with inflammatory myopathies. Continue amlodipine 2.5 mg daily for Raynaud's, should keep digits warm. UTD with malignancy screenings. Will follow along.

## 2019-11-29 NOTE — CONSULT NOTE ADULT - SUBJECTIVE AND OBJECTIVE BOX
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 He also endorses overall fatigue, decreased appetite and PO intake as well as weight loss of over 40 pounds in the past 6 months. In the past 2 days prior to his presentation, he noticed an acute worsening of his weakness and fatigue as well as muscle pains which were diffuse and constant. He contacted his rheumatologist who referred him to the ED.     He denies rash, fevers or chills. Denies joint swelling or pain, denies worsening of his dysphagia. Denies nausea, vomiting, constipation or diarrhea. He does endorse dark stools that he's had for months he says. Denies shortness of breath or chest pain.     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 He says since then and despite both treatments he has not felt any improvement. However he says he recently saw a rheumatologist in the clinic (Dr. Fontanez) and since "his numbers" were improving the prednisone was increased to 60. The MTX was held is his recent hospitalization and restarted after discharge.     In the ED, vitals were stable. Labs showed chronic elevated WBC at 15, stable hemoglobin at 11.9. CXR showed decreasing bibasilar opacities. (21 Nov 2019 16:18)      Infectious Diseases History:  Old Micro:    FAMILY HISTORY:  No pertinent family history in first degree relatives    PAST MEDICAL & SURGICAL HISTORY:  Gout  COPD, mild  Hypothyroid  S/P tonsillectomy      SOCIAL HISTORY  Substance Use: denies use  Tobacco Usage: denies use  Alcohol Usage: denies use      ROS  Patient denies fevers, chills, chest pain, abdominal pain, bloody BM, rashes, joint pain or swelling, and dysuria. States he has decreased appetite. Also complains of muscle pain and weakness throughout his body. States he has some shortness of breath with coughing, though no sputum production.     VITALS:  T(F): 96.5, Max: 96.6 (11-28-19 @ 15:00)  HR: 92  BP: 112/65  RR: 18Vital Signs Last 24 Hrs  T(C): 35.8 (29 Nov 2019 07:38), Max: 35.9 (28 Nov 2019 15:00)  T(F): 96.5 (29 Nov 2019 07:38), Max: 96.6 (28 Nov 2019 15:00)  HR: 92 (29 Nov 2019 07:38) (81 - 94)  BP: 112/65 (29 Nov 2019 07:38) (100/62 - 112/65)  BP(mean): --  RR: 18 (29 Nov 2019 07:38) (16 - 18)  SpO2: --    PHYSICAL EXAM:  Gen: NAD, resting in bed comfortably.  HEENT: Normocephalic, atraumatic  Neck: supple, no lymphadenopathy  CV: Regular rate & regular rhythm  Lungs: Ronchi on R side of chest, on O2 NC  Abdomen: Soft, nontender, nondistended, normoactive BS present; PEG tube in place - site is clean  Ext: LE b/l edema 1+  Skin: slight acrocyanosis    TESTS & MEASUREMENTS:                        10.7   9.17  )-----------( 123      ( 28 Nov 2019 06:12 )             33.4     11-28    137  |  100  |  12  ----------------------------<  78  3.3<L>   |  23  |  0.5<L>    Ca    7.3<L>      28 Nov 2019 06:12  Phos  1.9     11-28  Mg     1.5     11-28      INFECTIOUS DISEASES TESTING  MRSA PCR Result.: Negative (10-07-19 @ 11:10)      RADIOLOGY & ADDITIONAL TESTS:  I have personally reviewed the last Chest xray  CXR  Xray Chest 1 View- PORTABLE-Urgent:   EXAM:  XR CHEST PORTABLE URGENT 1V            PROCEDURE DATE:  11/28/2019            INTERPRETATION:  Clinical History / Reason for exam: Dyspnea    Comparison : Chest radiograph November 23, 2019.    Technique/Positioning: Single AP view of the chest.    Findings:    Support devices: None.    Cardiac/mediastinum/hilum: Unchanged.    Lung parenchyma/Pleura: Increased bilateral opacifications, right greater   than left. No pneumothorax.    Skeleton/soft tissues: Unchanged.    Impression:      Increased bilateral opacifications, right greater than left.                      ELLIOT LANDAU M.D., ATTENDING RADIOLOGIST  This document has been electronically signed. Nov 28 2019 11:23AM             (11-28-19 @ 09:49)      CT      CARDIOLOGY TESTING  12 Lead ECG:   Ventricular Rate 89 BPM    Atrial Rate 89 BPM    P-R Interval 176 ms    QRS Duration 138 ms    Q-T Interval 412 ms    QTC Calculation(Bezet) 501 ms    P Axis 79 degrees    R Axis -54 degrees    T Axis 45 degrees    Diagnosis Line Sinus rhythm with Premature atrial complexes  Left axis deviation  Right bundle branch block  Left anterior fascicular block  Abnormal ECG    Confirmed by TULIO WALTER MD ([id number]) on 11/23/2019 11:22:10 PM (11-23-19 @ 22:02)      All available historical records have been reviewed    MEDICATIONS  allopurinol 100  amLODIPine   Tablet 2.5  cefepime   IVPB   cefepime   IVPB 2000  enoxaparin Injectable 40  folic acid 1  influenza   Vaccine 0.5  levothyroxine 25  magnesium oxide 400  methotrexate 15  mycophenolate mofetil Suspension 500  pantoprazole   Suspension 40  predniSONE   Tablet 60  tiotropium 18 MICROgram(s) Capsule 1  vancomycin  IVPB   vancomycin  IVPB 1000      ANTIBIOTICS:  cefepime   IVPB      cefepime   IVPB 2000 milliGRAM(s) IV Intermittent every 8 hours  vancomycin  IVPB      vancomycin  IVPB 1000 milliGRAM(s) IV Intermittent every 12 hours      All available historical data has been reviewed ALBAN NEIL  69y, Male  Allergy: No Known Allergies      CHIEF COMPLAINT: Generalized weakness and muscle pain (29 Nov 2019 09:56)      HPI:  [69 year old  man]    CC: Generalized weakness and muscle pain    PMH: COPD (not on home oxygen), hypothyroidism, gout, RA, 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 He also endorses overall fatigue, decreased appetite and PO intake as well as weight loss of over 40 pounds in the past 6 months. In the past 2 days prior to his presentation, he noticed an acute worsening of his weakness and fatigue as well as muscle pains which were diffuse and constant. He contacted his rheumatologist who referred him to the ED.     He denies rash, fevers or chills. Denies joint swelling or pain, denies worsening of his dysphagia. Denies nausea, vomiting, constipation or diarrhea. He does endorse dark stools that he's had for months he says. Denies shortness of breath or chest pain.     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 He says since then and despite both treatments he has not felt any improvement. However he says he recently saw a rheumatologist in the clinic (Dr. Fontanez) and since "his numbers" were improving the prednisone was increased to 60. The MTX was held is his recent hospitalization and restarted after discharge.     In the ED, vitals were stable. Labs showed chronic elevated WBC at 15, stable hemoglobin at 11.9. CXR showed decreasing bibasilar opacities. (21 Nov 2019 16:18)      Infectious Diseases History:  Old Micro: NA    FAMILY HISTORY:  No pertinent family history in first degree relatives    PAST MEDICAL & SURGICAL HISTORY:  Gout  COPD, mild  Hypothyroid  S/P tonsillectomy      SOCIAL HISTORY  Substance Use: denies use  Tobacco Usage: denies use  Alcohol Usage: denies use      ROS  Patient denies fevers, chills, chest pain, abdominal pain, bloody BM, rashes, joint pain or swelling, and dysuria. States he has decreased appetite. Also complains of muscle pain and weakness throughout his body. States he has some shortness of breath with coughing, though no sputum production.     VITALS:  T(F): 96.5, Max: 96.6 (11-28-19 @ 15:00)  HR: 92  BP: 112/65  RR: 18Vital Signs Last 24 Hrs  T(C): 35.8 (29 Nov 2019 07:38), Max: 35.9 (28 Nov 2019 15:00)  T(F): 96.5 (29 Nov 2019 07:38), Max: 96.6 (28 Nov 2019 15:00)  HR: 92 (29 Nov 2019 07:38) (81 - 94)  BP: 112/65 (29 Nov 2019 07:38) (100/62 - 112/65)  BP(mean): --  RR: 18 (29 Nov 2019 07:38) (16 - 18)  SpO2: --    PHYSICAL EXAM:  Gen: chronically ill appearing NAD, resting in bed comfortably.  HEENT: Normocephalic, atraumatic  Neck: supple, no lymphadenopathy  CV: Regular rate & regular rhythm  Lungs: Rhonchi on R side of chest, on O2 NC  Abdomen: Soft, nontender, nondistended, normoactive BS present; PEG tube in place - site is clean  Ext: LE b/l edema 1+  Skin: slight acrocyanosis    TESTS & MEASUREMENTS:                        10.7   9.17  )-----------( 123      ( 28 Nov 2019 06:12 )             33.4     11-28    137  |  100  |  12  ----------------------------<  78  3.3<L>   |  23  |  0.5<L>    Ca    7.3<L>      28 Nov 2019 06:12  Phos  1.9     11-28  Mg     1.5     11-28      INFECTIOUS DISEASES TESTING  MRSA PCR Result.: Negative (10-07-19 @ 11:10)      RADIOLOGY & ADDITIONAL TESTS:  I have personally reviewed the last Chest xray  CXR  Xray Chest 1 View- PORTABLE-Urgent:   EXAM:  XR CHEST PORTABLE URGENT 1V            PROCEDURE DATE:  11/28/2019            INTERPRETATION:  Clinical History / Reason for exam: Dyspnea    Comparison : Chest radiograph November 23, 2019.    Technique/Positioning: Single AP view of the chest.    Findings:    Support devices: None.    Cardiac/mediastinum/hilum: Unchanged.    Lung parenchyma/Pleura: Increased bilateral opacifications, right greater   than left. No pneumothorax.    Skeleton/soft tissues: Unchanged.    Impression:      Increased bilateral opacifications, right greater than left.                      ELLIOT LANDAU M.D., ATTENDING RADIOLOGIST  This document has been electronically signed. Nov 28 2019 11:23AM             (11-28-19 @ 09:49)      CT      CARDIOLOGY TESTING  12 Lead ECG:   Ventricular Rate 89 BPM    Atrial Rate 89 BPM    P-R Interval 176 ms    QRS Duration 138 ms    Q-T Interval 412 ms    QTC Calculation(Bezet) 501 ms    P Axis 79 degrees    R Axis -54 degrees    T Axis 45 degrees    Diagnosis Line Sinus rhythm with Premature atrial complexes  Left axis deviation  Right bundle branch block  Left anterior fascicular block  Abnormal ECG    Confirmed by TULIO WALTER MD ([id number]) on 11/23/2019 11:22:10 PM (11-23-19 @ 22:02)      All available historical records have been reviewed    MEDICATIONS  allopurinol 100  amLODIPine   Tablet 2.5  cefepime   IVPB   cefepime   IVPB 2000  enoxaparin Injectable 40  folic acid 1  influenza   Vaccine 0.5  levothyroxine 25  magnesium oxide 400  methotrexate 15  mycophenolate mofetil Suspension 500  pantoprazole   Suspension 40  predniSONE   Tablet 60  tiotropium 18 MICROgram(s) Capsule 1  vancomycin  IVPB   vancomycin  IVPB 1000      ANTIBIOTICS:  cefepime   IVPB      cefepime   IVPB 2000 milliGRAM(s) IV Intermittent every 8 hours  vancomycin  IVPB      vancomycin  IVPB 1000 milliGRAM(s) IV Intermittent every 12 hours      All available historical data has been reviewed

## 2019-11-29 NOTE — CHART NOTE - NSCHARTNOTEFT_GEN_A_CORE
Pt due for comprehensive reassessment,  saw pt and began note however per MD note from today (11/29) and upon screening through EMR, pt is being followed by [facility]. FUENTES to sign off for this reason.

## 2019-11-29 NOTE — PROGRESS NOTE ADULT - ASSESSMENT
69M  admitted for dysphagia on barium swallow has major dyfunction which will no allow for any meaningful swallowing, PEG tube placed on 11/26. Feeds started on 11/27. Electrolyrte abnormalities developed possible consern for refeeeding syndrome. Tachycardia and hypotension on 11/28    #Dermatomyositis scleroderma overlap syndrome  - pt's prednisone increased from 40 mg daily to 60 mg two weeks ago as outpatient due to persisting symptoms  - CK levels normalized  - c/w Methotrexate with folate  Protein Electrophoresis, Serum (11.26.19 @ 06:50)    Protein Total, Serum: 4.8 g/dL    Total Protein, Serum: 4.8 g/dL  - pt evaluated by rheumatology, Cellcept started  - amlodipine started for Raynaud symptoms, improvement of symptoms  - rheumatology follow up appreciated,    #PEG and feeding  -Watch electrolytes for possible refeeding syndrome  -f/u lytes adn replete  -nutrition suport consult    #Tachycardia adn hypotension  -no fevers or leukocytosis  -no dysuria increased cough or porduction no diarrhea and PEGsite clean  -on Vanc and cefepime  -f/u ID consult  -f/u blood cultures    #Mesenteric edema  - present on previous imaging  - not mentioned in most recent CT  - can be related to patient's hypoalbuminemia but will reach out to radiology to confirm resolution      #Dysphagia  - suspected it is related to scleroderma  - MBS shows severe dysphagia, recommend alternative means to nutrition  - PEG performed 11/26  - nutrition consult for optimal PEG feeds    #Hypomagnesmia  - replete levels    #Normocytic Anemia   - Stable   - Likely secondary to anemia of inflammation   - iron studies pending     #Leukocytosis  - likely due to steroids  - no obvious signs of infection  - resolving    #Recent history of UGIB   - had EGD 1 month ago  - found to have non-erosive gastritis, nonspecific erosive esophagitis, hiatal hernia  - hgb stable    #COPD   - stable  - no wheezing on exam  - Duonebs as needed     #Hypothyroidism   - c/w synthroid    #Hypomagnesemia/hypokalemia  - resolved  -mag oxide with meals  -kasie nelson    #Hypoalbumenia  - likely related to chronic disease and malnutrition    #Cachexia  - severe protein malnutrition, underweight BMI 15  - pt never had strong appetite  - has poor PO intake due to dysphagia  - hold off appetite stimulants for now   - RD consult apprepiated    GI PPX:   DVT PPX: Lovenox   DIet: PEG feedings   Full Code      Pending (specify):  electroytes and insurance approval for feeds 69M  admitted for dysphagia on barium swallow has major dyfunction which will no allow for any meaningful swallowing, PEG tube placed on 11/26. Feeds started on 11/27. Electrolyrte abnormalities developed possible consern for refeeeding syndrome. Tachycardia and hypotension on 11/28    #Dermatomyositis scleroderma overlap syndrome  - pt's prednisone increased from 40 mg daily to 60 mg two weeks ago as outpatient due to persisting symptoms  - CK levels normalized  - c/w Methotrexate with folate  Protein Electrophoresis, Serum (11.26.19 @ 06:50)    Protein Total, Serum: 4.8 g/dL    Total Protein, Serum: 4.8 g/dL  - pt evaluated by rheumatology, Cellcept started  - amlodipine started for Raynaud symptoms, improvement of symptoms  - rheumatology follow up appreciated,    #PEG and feeding  -Watch electrolytes for possible refeeding syndrome  -f/u lytes adn replete  -nutrition suport consult    #Magnesium defeciency  -replete    #Hypokalemia and hypophosphatemia  -replete  -adjust feeds  -f/u nutrition support    #Tachycardia and hypotension  -no fevers or leukocytosis  -no dysuria increased cough or porduction no diarrhea and PEGsite clean  -on Vanc and cefepime  -f/u ID consult  -f/u blood cultures    #Mesenteric edema  - present on previous imaging  - not mentioned in most recent CT  - can be related to patient's hypoalbuminemia but will reach out to radiology to confirm resolution      #Dysphagia  - suspected it is related to scleroderma  - MBS shows severe dysphagia, recommend alternative means to nutrition  - PEG performed 11/26  - nutrition consult for optimal PEG feeds    #Normocytic Anemia   - Stable   - Likely secondary to anemia of inflammation   - iron studies pending     #Leukocytosis  - likely due to steroids  - no obvious signs of infection  - resolving    #Recent history of UGIB   - had EGD 1 month ago  - found to have non-erosive gastritis, nonspecific erosive esophagitis, hiatal hernia  - hgb stable    #COPD   - stable  - no wheezing on exam  - Duonebs as needed     #Hypothyroidism   - c/w synthroid    #Hypoalbumenia  - likely related to chronic disease and malnutrition    #Cachexia  - severe protein malnutrition, underweight BMI 15  - pt never had strong appetite  - has poor PO intake due to dysphagia  - hold off appetite stimulants for now   - RD consult apprepiated    #Folate deficiecy  -folate    GI PPX:   DVT PPX: Lovenox   DIet: PEG feedings   Full Code      Pending (specify):  electroytes and insurance approval for feeds

## 2019-11-29 NOTE — PROGRESS NOTE ADULT - ATTENDING COMMENTS
D/W Rheumatology and ID. Stop all IV antibiotics today.   Bactrim for prophylaxis for the immunocompromised patient   C/W PEG feeding.   Monitor daily electrolytes.   D/W Family and friend at the bed side

## 2019-11-29 NOTE — PROGRESS NOTE ADULT - ASSESSMENT
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  - dysphagia   - Hypomagnesemia/hypomagnesemia/hypophosphatemia  - severe protein calorie malnutrition - massive wt loss, wasting of LBM, diminished ADL  continue with tube feed  order review with medical team to correct the electrolytes   check labs and on 11/30 suggest adding 20meq KCL via g-tube x3   check bmp/phos/mg and correct lytes

## 2019-11-30 LAB
ANION GAP SERPL CALC-SCNC: 16 MMOL/L — HIGH (ref 7–14)
BASOPHILS # BLD AUTO: 0.01 K/UL — SIGNIFICANT CHANGE UP (ref 0–0.2)
BASOPHILS NFR BLD AUTO: 0.1 % — SIGNIFICANT CHANGE UP (ref 0–1)
BUN SERPL-MCNC: 14 MG/DL — SIGNIFICANT CHANGE UP (ref 10–20)
CALCIUM SERPL-MCNC: 7.2 MG/DL — LOW (ref 8.5–10.1)
CHLORIDE SERPL-SCNC: 100 MMOL/L — SIGNIFICANT CHANGE UP (ref 98–110)
CO2 SERPL-SCNC: 19 MMOL/L — SIGNIFICANT CHANGE UP (ref 17–32)
CREAT SERPL-MCNC: 0.6 MG/DL — LOW (ref 0.7–1.5)
EOSINOPHIL # BLD AUTO: 0.01 K/UL — SIGNIFICANT CHANGE UP (ref 0–0.7)
EOSINOPHIL NFR BLD AUTO: 0.1 % — SIGNIFICANT CHANGE UP (ref 0–8)
GLUCOSE BLDC GLUCOMTR-MCNC: 117 MG/DL — HIGH (ref 70–99)
GLUCOSE BLDC GLUCOMTR-MCNC: 138 MG/DL — HIGH (ref 70–99)
GLUCOSE BLDC GLUCOMTR-MCNC: 78 MG/DL — SIGNIFICANT CHANGE UP (ref 70–99)
GLUCOSE BLDC GLUCOMTR-MCNC: 95 MG/DL — SIGNIFICANT CHANGE UP (ref 70–99)
GLUCOSE SERPL-MCNC: 96 MG/DL — SIGNIFICANT CHANGE UP (ref 70–99)
HCT VFR BLD CALC: 32.5 % — LOW (ref 42–52)
HGB BLD-MCNC: 9.9 G/DL — LOW (ref 14–18)
IMM GRANULOCYTES NFR BLD AUTO: 0.5 % — HIGH (ref 0.1–0.3)
LYMPHOCYTES # BLD AUTO: 0.48 K/UL — LOW (ref 1.2–3.4)
LYMPHOCYTES # BLD AUTO: 4.8 % — LOW (ref 20.5–51.1)
MAGNESIUM SERPL-MCNC: 2.4 MG/DL — SIGNIFICANT CHANGE UP (ref 1.8–2.4)
MCHC RBC-ENTMCNC: 29.3 PG — SIGNIFICANT CHANGE UP (ref 27–31)
MCHC RBC-ENTMCNC: 30.5 G/DL — LOW (ref 32–37)
MCV RBC AUTO: 96.2 FL — HIGH (ref 80–94)
MONOCYTES # BLD AUTO: 0.11 K/UL — SIGNIFICANT CHANGE UP (ref 0.1–0.6)
MONOCYTES NFR BLD AUTO: 1.1 % — LOW (ref 1.7–9.3)
NEUTROPHILS # BLD AUTO: 9.3 K/UL — HIGH (ref 1.4–6.5)
NEUTROPHILS NFR BLD AUTO: 93.4 % — HIGH (ref 42.2–75.2)
NRBC # BLD: 0 /100 WBCS — SIGNIFICANT CHANGE UP (ref 0–0)
PHOSPHATE SERPL-MCNC: 2.7 MG/DL — SIGNIFICANT CHANGE UP (ref 2.1–4.9)
PLATELET # BLD AUTO: 120 K/UL — LOW (ref 130–400)
POTASSIUM SERPL-MCNC: 4.4 MMOL/L — SIGNIFICANT CHANGE UP (ref 3.5–5)
POTASSIUM SERPL-SCNC: 4.4 MMOL/L — SIGNIFICANT CHANGE UP (ref 3.5–5)
RBC # BLD: 3.38 M/UL — LOW (ref 4.7–6.1)
RBC # FLD: 21.9 % — HIGH (ref 11.5–14.5)
SODIUM SERPL-SCNC: 135 MMOL/L — SIGNIFICANT CHANGE UP (ref 135–146)
TSH SERPL-MCNC: 3.52 UIU/ML — SIGNIFICANT CHANGE UP (ref 0.27–4.2)
WBC # BLD: 9.96 K/UL — SIGNIFICANT CHANGE UP (ref 4.8–10.8)
WBC # FLD AUTO: 9.96 K/UL — SIGNIFICANT CHANGE UP (ref 4.8–10.8)

## 2019-11-30 PROCEDURE — 71045 X-RAY EXAM CHEST 1 VIEW: CPT | Mod: 26

## 2019-11-30 PROCEDURE — 99233 SBSQ HOSP IP/OBS HIGH 50: CPT

## 2019-11-30 RX ADMIN — TIOTROPIUM BROMIDE 1 CAPSULE(S): 18 CAPSULE ORAL; RESPIRATORY (INHALATION) at 08:49

## 2019-11-30 RX ADMIN — Medication 60 MILLIGRAM(S): at 05:23

## 2019-11-30 RX ADMIN — Medication 1 MILLIGRAM(S): at 11:56

## 2019-11-30 RX ADMIN — MYCOPHENOLATE MOFETIL 500 MILLIGRAM(S): 250 CAPSULE ORAL at 18:20

## 2019-11-30 RX ADMIN — AMLODIPINE BESYLATE 2.5 MILLIGRAM(S): 2.5 TABLET ORAL at 05:23

## 2019-11-30 RX ADMIN — Medication 3 MILLILITER(S): at 08:51

## 2019-11-30 RX ADMIN — PANTOPRAZOLE SODIUM 40 MILLIGRAM(S): 20 TABLET, DELAYED RELEASE ORAL at 11:57

## 2019-11-30 RX ADMIN — MAGNESIUM OXIDE 400 MG ORAL TABLET 400 MILLIGRAM(S): 241.3 TABLET ORAL at 08:30

## 2019-11-30 RX ADMIN — MYCOPHENOLATE MOFETIL 500 MILLIGRAM(S): 250 CAPSULE ORAL at 05:25

## 2019-11-30 RX ADMIN — Medication 1 PACKET(S): at 08:30

## 2019-11-30 RX ADMIN — Medication 100 MILLIGRAM(S): at 11:56

## 2019-11-30 RX ADMIN — Medication 25 MICROGRAM(S): at 05:23

## 2019-11-30 RX ADMIN — ENOXAPARIN SODIUM 40 MILLIGRAM(S): 100 INJECTION SUBCUTANEOUS at 11:55

## 2019-11-30 NOTE — PROGRESS NOTE ADULT - ASSESSMENT
69M  admitted for dysphagia on barium swallow has major dyfunction which will no allow for any meaningful swallowing, PEG tube placed on 11/26. Feeds started on 11/27. Electrolyrte abnormalities developed possible consern for refeeeding syndrome.     #Dermatomyositis scleroderma overlap syndrome  - pt's prednisone increased from 40 mg daily to 60 mg two weeks ago as outpatient due to persisting symptoms  - CK levels normalized  - c/w Methotrexate with folate  Protein Electrophoresis, Serum (11.26.19 @ 06:50)    Protein Total, Serum: 4.8 g/dL    Total Protein, Serum: 4.8 g/dL  - pt evaluated by rheumatology, Cellcept started  - amlodipine started for Raynaud symptoms, improvement of symptoms  - rheumatology follow up appreciated,    #PEG and feeding  -Watch electrolytes for possible refeeding syndrome  -f/u lytes adn replete  -nutrition suport consult    #Magnesium defeciency  -replete    #Hypokalemia and hypophosphatemia  -replete  -adjust feeds  -f/u nutrition support    #Tachycardia and hypotension  -no fevers or leukocytosis  -no dysuria increased cough or porduction no diarrhea and PEGsite clean  -on Vanc and cefepime  -f/u ID consult  -f/u blood cultures    #Mesenteric edema  - present on previous imaging  - not mentioned in most recent CT  - can be related to patient's hypoalbuminemia but will reach out to radiology to confirm resolution      #Dysphagia  - suspected it is related to scleroderma  - MBS shows severe dysphagia, recommend alternative means to nutrition  - PEG performed 11/26  - nutrition consult for optimal PEG feeds    #Normocytic Anemia   - Stable   - Likely secondary to anemia of inflammation   - iron studies pending     #Leukocytosis  - likely due to steroids  - no obvious signs of infection  - resolving    #Recent history of UGIB   - had EGD 1 month ago  - found to have non-erosive gastritis, nonspecific erosive esophagitis, hiatal hernia  - hgb stable    #COPD   - stable  - no wheezing on exam  - Duonebs as needed     #Hypothyroidism   - c/w synthroid    #Hypoalbumenia  - likely related to chronic disease and malnutrition    #Cachexia  - severe protein malnutrition, underweight BMI 15  - pt never had strong appetite  - has poor PO intake due to dysphagia  - hold off appetite stimulants for now   - RD consult apprepiated    #Folate deficiecy  -folate    GI PPX:   DVT PPX: Lovenox   DIet: PEG feedings   Full Code      ID appreciated. Of antibiotics. Bactrim for prophylaxis

## 2019-11-30 NOTE — PROGRESS NOTE ADULT - SUBJECTIVE AND OBJECTIVE BOX
NEIL PHOENIX 69y Male  MRN#: [id number]     SUBJECTIVE  Patient is a 69y old Male who presents with a chief complaint of Generalized weakness and muscle pain (28 Nov 2019 12:24)  Today is hospital day 8d, and this morning he is lying in bed without distress.   No acute overnight events.   feels a biut lightheaded  no chest pain  no increased sob  no abdominal pain  no fvers chlls nausea vomiting  no dysuria  has some cough but no production and coughing NOT increased from admiossion    OBJECTIVE  PAST MEDICAL & SURGICAL HISTORY  Gout  COPD, mild  Hypothyroid  S/P tonsillectomy    ALLERGIES:  No Known Allergies    MEDICATIONS:  STANDING MEDICATIONS  allopurinol 100 milliGRAM(s) Oral daily  amLODIPine   Tablet 2.5 milliGRAM(s) Oral daily  cefepime   IVPB      cefepime   IVPB 2000 milliGRAM(s) IV Intermittent every 8 hours  enoxaparin Injectable 40 milliGRAM(s) SubCutaneous daily  folic acid 1 milliGRAM(s) Oral daily  influenza   Vaccine 0.5 milliLiter(s) IntraMuscular once  levothyroxine 25 MICROGram(s) Oral daily  magnesium oxide 400 milliGRAM(s) Oral three times a day with meals  methotrexate 15 milliGRAM(s) Oral <User Schedule>  mycophenolate mofetil Suspension 500 milliGRAM(s) Oral two times a day  pantoprazole  Injectable 40 milliGRAM(s) IV Push every 12 hours  predniSONE   Tablet 60 milliGRAM(s) Oral daily  tiotropium 18 MICROgram(s) Capsule 1 Capsule(s) Inhalation daily  vancomycin  IVPB      vancomycin  IVPB 1000 milliGRAM(s) IV Intermittent every 12 hours    PRN MEDICATIONS  acetaminophen   Tablet .. 650 milliGRAM(s) Oral every 6 hours PRN  ALBUTerol    90 MICROgram(s) HFA Inhaler 2 Puff(s) Inhalation every 6 hours PRN  albuterol/ipratropium for Nebulization 3 milliLiter(s) Nebulizer every 6 hours PRN    HOME MEDICATIONS  Home Medications:  allopurinol 100 mg oral tablet: 1 tab(s) orally once a day (02 Oct 2019 18:28)  DuoNeb 0.5 mg-2.5 mg/3 mL inhalation solution: 3 milliliter(s) inhaled 3 times a day, As Needed (02 Oct 2019 18:28)  levothyroxine 25 mcg (0.025 mg) oral tablet: 1 tab(s) orally once a day (02 Oct 2019 18:28)  pantoprazole 40 mg oral delayed release tablet: 1 tab(s) orally once a day (02 Oct 2019 18:28)  predniSONE 20 mg oral tablet: 2 tab(s) orally once a day in the morning  (21 Nov 2019 16:31)  predniSONE 20 mg oral tablet: 1 tab(s) orally once a day at night  (21 Nov 2019 16:32)  tiotropium 18 mcg inhalation capsule: 1 cap(s) inhaled once a day (09 Oct 2019 12:50)      Vital Signs Last 24 Hrs  T(C): 35.7 (30 Nov 2019 08:00), Max: 36.3 (29 Nov 2019 15:22)  T(F): 96.2 (30 Nov 2019 08:00), Max: 97.4 (29 Nov 2019 15:22)  HR: 94 (30 Nov 2019 08:00) (94 - 99)  BP: 111/68 (30 Nov 2019 08:00) (105/71 - 118/75)  BP(mean): --  RR: 19 (30 Nov 2019 11:00) (18 - 19)  SpO2: 95% (30 Nov 2019 11:00) (95% - 95%)      LABS:                        10.7   9.17  )-----------( 123      ( 28 Nov 2019 06:12 )             33.4     11-28    137  |  100  |  12  ----------------------------<  78  3.3<L>   |  23  |  0.5<L>    Ca    7.3<L>      28 Nov 2019 06:12  Phos  1.9     11-28  Mg     1.5     11-28      CAPILLARY BLOOD GLUCOSE      POCT Blood Glucose.: 126 mg/dL (29 Nov 2019 07:53)      RADIOLOGY:    PHYSICAL EXAM:  PHYSICAL EXAM:  GENERAL: NAD, AAO x 4, 69y M  CHEST/LUNG: Clear to auscultation bilaterally; No wheeze; No crackles; No accessory muscles used  HEART: Regular rate and rhythm; No murmurs;   ABDOMEN: Soft, Nontender, Nondistended; Bowel sounds present; No guarding PEG site clean  EXTREMITIES: 1+ pitting edeam bilateral lower extremities  NEUROLOGY: non-focal    ADMISSION SUMMARY  Patient is a 69y old Male who presents with a chief complaint of Generalized weakness and muscle pain (28 Nov 2019 12:24)

## 2019-12-01 LAB
ANION GAP SERPL CALC-SCNC: 13 MMOL/L — SIGNIFICANT CHANGE UP (ref 7–14)
BASOPHILS # BLD AUTO: 0.02 K/UL — SIGNIFICANT CHANGE UP (ref 0–0.2)
BASOPHILS NFR BLD AUTO: 0.2 % — SIGNIFICANT CHANGE UP (ref 0–1)
BUN SERPL-MCNC: 17 MG/DL — SIGNIFICANT CHANGE UP (ref 10–20)
CALCIUM SERPL-MCNC: 7.5 MG/DL — LOW (ref 8.5–10.1)
CHLORIDE SERPL-SCNC: 100 MMOL/L — SIGNIFICANT CHANGE UP (ref 98–110)
CO2 SERPL-SCNC: 25 MMOL/L — SIGNIFICANT CHANGE UP (ref 17–32)
CREAT SERPL-MCNC: 0.5 MG/DL — LOW (ref 0.7–1.5)
EOSINOPHIL # BLD AUTO: 0.03 K/UL — SIGNIFICANT CHANGE UP (ref 0–0.7)
EOSINOPHIL NFR BLD AUTO: 0.3 % — SIGNIFICANT CHANGE UP (ref 0–8)
GLUCOSE BLDC GLUCOMTR-MCNC: 101 MG/DL — HIGH (ref 70–99)
GLUCOSE BLDC GLUCOMTR-MCNC: 107 MG/DL — HIGH (ref 70–99)
GLUCOSE BLDC GLUCOMTR-MCNC: 90 MG/DL — SIGNIFICANT CHANGE UP (ref 70–99)
GLUCOSE BLDC GLUCOMTR-MCNC: 90 MG/DL — SIGNIFICANT CHANGE UP (ref 70–99)
GLUCOSE SERPL-MCNC: 71 MG/DL — SIGNIFICANT CHANGE UP (ref 70–99)
HCT VFR BLD CALC: 31.8 % — LOW (ref 42–52)
HGB BLD-MCNC: 10 G/DL — LOW (ref 14–18)
IMM GRANULOCYTES NFR BLD AUTO: 0.9 % — HIGH (ref 0.1–0.3)
LYMPHOCYTES # BLD AUTO: 0.77 K/UL — LOW (ref 1.2–3.4)
LYMPHOCYTES # BLD AUTO: 7.3 % — LOW (ref 20.5–51.1)
MAGNESIUM SERPL-MCNC: 2 MG/DL — SIGNIFICANT CHANGE UP (ref 1.8–2.4)
MCHC RBC-ENTMCNC: 29.9 PG — SIGNIFICANT CHANGE UP (ref 27–31)
MCHC RBC-ENTMCNC: 31.4 G/DL — LOW (ref 32–37)
MCV RBC AUTO: 94.9 FL — HIGH (ref 80–94)
MONOCYTES # BLD AUTO: 0.1 K/UL — SIGNIFICANT CHANGE UP (ref 0.1–0.6)
MONOCYTES NFR BLD AUTO: 0.9 % — LOW (ref 1.7–9.3)
NEUTROPHILS # BLD AUTO: 9.57 K/UL — HIGH (ref 1.4–6.5)
NEUTROPHILS NFR BLD AUTO: 90.4 % — HIGH (ref 42.2–75.2)
NRBC # BLD: 0 /100 WBCS — SIGNIFICANT CHANGE UP (ref 0–0)
PHOSPHATE SERPL-MCNC: 1.9 MG/DL — LOW (ref 2.1–4.9)
PLATELET # BLD AUTO: 118 K/UL — LOW (ref 130–400)
POTASSIUM SERPL-MCNC: 3.7 MMOL/L — SIGNIFICANT CHANGE UP (ref 3.5–5)
POTASSIUM SERPL-SCNC: 3.7 MMOL/L — SIGNIFICANT CHANGE UP (ref 3.5–5)
RBC # BLD: 3.35 M/UL — LOW (ref 4.7–6.1)
RBC # FLD: 21.7 % — HIGH (ref 11.5–14.5)
SODIUM SERPL-SCNC: 138 MMOL/L — SIGNIFICANT CHANGE UP (ref 135–146)
WBC # BLD: 10.59 K/UL — SIGNIFICANT CHANGE UP (ref 4.8–10.8)
WBC # FLD AUTO: 10.59 K/UL — SIGNIFICANT CHANGE UP (ref 4.8–10.8)

## 2019-12-01 PROCEDURE — 99233 SBSQ HOSP IP/OBS HIGH 50: CPT

## 2019-12-01 RX ORDER — POTASSIUM PHOSPHATE, MONOBASIC POTASSIUM PHOSPHATE, DIBASIC 236; 224 MG/ML; MG/ML
15 INJECTION, SOLUTION INTRAVENOUS ONCE
Refills: 0 | Status: COMPLETED | OUTPATIENT
Start: 2019-12-01 | End: 2019-12-01

## 2019-12-01 RX ORDER — SODIUM CHLORIDE 9 MG/ML
1000 INJECTION, SOLUTION INTRAVENOUS
Refills: 0 | Status: DISCONTINUED | OUTPATIENT
Start: 2019-12-01 | End: 2019-12-01

## 2019-12-01 RX ADMIN — Medication 1 TABLET(S): at 08:32

## 2019-12-01 RX ADMIN — MYCOPHENOLATE MOFETIL 500 MILLIGRAM(S): 250 CAPSULE ORAL at 06:41

## 2019-12-01 RX ADMIN — ENOXAPARIN SODIUM 40 MILLIGRAM(S): 100 INJECTION SUBCUTANEOUS at 11:50

## 2019-12-01 RX ADMIN — MAGNESIUM OXIDE 400 MG ORAL TABLET 400 MILLIGRAM(S): 241.3 TABLET ORAL at 17:19

## 2019-12-01 RX ADMIN — Medication 25 MICROGRAM(S): at 05:21

## 2019-12-01 RX ADMIN — AMLODIPINE BESYLATE 2.5 MILLIGRAM(S): 2.5 TABLET ORAL at 05:21

## 2019-12-01 RX ADMIN — Medication 60 MILLIGRAM(S): at 05:21

## 2019-12-01 RX ADMIN — MAGNESIUM OXIDE 400 MG ORAL TABLET 400 MILLIGRAM(S): 241.3 TABLET ORAL at 11:50

## 2019-12-01 RX ADMIN — Medication 1 PACKET(S): at 06:42

## 2019-12-01 RX ADMIN — Medication 100 MILLIGRAM(S): at 11:50

## 2019-12-01 RX ADMIN — MAGNESIUM OXIDE 400 MG ORAL TABLET 400 MILLIGRAM(S): 241.3 TABLET ORAL at 08:32

## 2019-12-01 RX ADMIN — POTASSIUM PHOSPHATE, MONOBASIC POTASSIUM PHOSPHATE, DIBASIC 42.5 MILLIMOLE(S): 236; 224 INJECTION, SOLUTION INTRAVENOUS at 12:05

## 2019-12-01 RX ADMIN — PANTOPRAZOLE SODIUM 40 MILLIGRAM(S): 20 TABLET, DELAYED RELEASE ORAL at 11:50

## 2019-12-01 RX ADMIN — MYCOPHENOLATE MOFETIL 500 MILLIGRAM(S): 250 CAPSULE ORAL at 17:19

## 2019-12-01 RX ADMIN — Medication 1 MILLIGRAM(S): at 11:50

## 2019-12-01 RX ADMIN — Medication 1 PACKET(S): at 11:50

## 2019-12-01 NOTE — PHARMACOTHERAPY INTERVENTION NOTE - COMMENTS
Prescriber was contacted with recommendation to d/c NS 1L with K phos 15mmol due to patient already is on K Phos 15mmol in 250ml ns over 6 hrs and Neutro-phos tid for 6 doses.  K=3.7 and IP=1.9

## 2019-12-01 NOTE — PROGRESS NOTE ADULT - ASSESSMENT
69M  admitted for dysphagia on barium swallow has major dyfunction which will no allow for any meaningful swallowing, PEG tube placed on 11/26. Feeds started on 11/27. Electrolyrte abnormalities developed possible consern for refeeeding syndrome.     #Dermatomyositis scleroderma overlap syndrome  - pt's prednisone increased from 40 mg daily to 60 mg two weeks ago as outpatient due to persisting symptoms  - CK levels normalized  - c/w Methotrexate with folate  Protein Electrophoresis, Serum (11.26.19 @ 06:50)    Protein Total, Serum: 4.8 g/dL    Total Protein, Serum: 4.8 g/dL  - pt evaluated by rheumatology, Cellcept started  - amlodipine started for Raynaud symptoms, improvement of symptoms  - rheumatology follow up appreciated,    #PEG and feeding  -Watch electrolytes for possible refeeding syndrome  -f/u lytes adn replete  -nutrition suport consult recs appreciated    #Magnesium defeciency  -replete adn monitor  -mag oxide with meals    #Hypokalemia and hypophosphatemia  -replete  -adjust feeds  -f/u nutrition support  -IV NS with KPHOS    #Tachycardia and hypotension  -no fevers or leukocytosis  -no dysuria increased cough or porduction no diarrhea and PEGsite clean  -on Vanc and cefepime  -f/u ID consult  -f/u blood cultures    #Mesenteric edema  - present on previous imaging  - not mentioned in most recent CT  - can be related to patient's hypoalbuminemia but will reach out to radiology to confirm resolution      #Dysphagia  - suspected it is related to scleroderma  - MBS shows severe dysphagia, recommend alternative means to nutrition  - PEG performed 11/26  - nutrition consult for optimal PEG feeds    #Normocytic Anemia   - Stable   - Likely secondary to anemia of inflammation   - iron studies pending     #Leukocytosis  - likely due to steroids  - no obvious signs of infection  - resolving    #Recent history of UGIB   - had EGD 1 month ago  - found to have non-erosive gastritis, nonspecific erosive esophagitis, hiatal hernia  - hgb stable    #COPD   - stable  - no wheezing on exam  - Duonebs as needed     #Hypothyroidism   - c/w synthroid    #Hypoalbumenia  - likely related to chronic disease and malnutrition    #Cachexia  - severe protein malnutrition, underweight BMI 15  - pt never had strong appetite  - has poor PO intake due to dysphagia  - hold off appetite stimulants for now   - RD consult apprepiated    #Folate deficiecy  -folate    #Immunosupression  -trimethorim for PJP prophylaxis    GI PPX: protonix  DVT PPX: Lovenox   DIet: PEG feedings   Full Code      ID appreciated. Off antibiotics. Bactrim for prophylaxis

## 2019-12-01 NOTE — PROGRESS NOTE ADULT - SUBJECTIVE AND OBJECTIVE BOX
NEIL PHOENIX 69y Male  MRN#: [id number]     SUBJECTIVE  Patient is a 69y old Male who presents with a chief complaint of Generalized weakness and muscle pain (2019 12:26)  Today is hospital day 10d, and this morning he is lying in bed without distress.   No acute overnight events.   feels well  lightheadedness improved  BMs comoing out wityhout diffculty no watery BMs  no chest pain   no SOB  no f/c/v/n    OBJECTIVE  PAST MEDICAL & SURGICAL HISTORY  Gout  COPD, mild  Hypothyroid  S/P tonsillectomy    ALLERGIES:  No Known Allergies    MEDICATIONS:  STANDING MEDICATIONS  allopurinol 100 milliGRAM(s) Oral daily  amLODIPine   Tablet 2.5 milliGRAM(s) Oral daily  enoxaparin Injectable 40 milliGRAM(s) SubCutaneous daily  folic acid 1 milliGRAM(s) Oral daily  influenza   Vaccine 0.5 milliLiter(s) IntraMuscular once  levothyroxine 25 MICROGram(s) Oral daily  magnesium oxide 400 milliGRAM(s) Oral three times a day with meals  methotrexate 15 milliGRAM(s) Oral <User Schedule>  mycophenolate mofetil Suspension 500 milliGRAM(s) Oral two times a day  pantoprazole   Suspension 40 milliGRAM(s) Oral daily  potassium phosphate / sodium phosphate powder 1 Packet(s) Oral three times a day before meals  potassium phosphate IVPB 15 milliMole(s) IV Intermittent once  predniSONE   Tablet 60 milliGRAM(s) Oral daily  sodium chloride 0.9% 1000 milliLiter(s) IV Continuous <Continuous>  tiotropium 18 MICROgram(s) Capsule 1 Capsule(s) Inhalation daily  trimethoprim  160 mG/sulfamethoxazole 800 mG 1 Tablet(s) Oral <User Schedule>    PRN MEDICATIONS  acetaminophen   Tablet .. 650 milliGRAM(s) Oral every 6 hours PRN  ALBUTerol    90 MICROgram(s) HFA Inhaler 2 Puff(s) Inhalation every 6 hours PRN  albuterol/ipratropium for Nebulization 3 milliLiter(s) Nebulizer every 6 hours PRN    HOME MEDICATIONS  Home Medications:  allopurinol 100 mg oral tablet: 1 tab(s) orally once a day (02 Oct 2019 18:28)  DuoNeb 0.5 mg-2.5 mg/3 mL inhalation solution: 3 milliliter(s) inhaled 3 times a day, As Needed (02 Oct 2019 18:28)  levothyroxine 25 mcg (0.025 mg) oral tablet: 1 tab(s) orally once a day (02 Oct 2019 18:28)  pantoprazole 40 mg oral delayed release tablet: 1 tab(s) orally once a day (02 Oct 2019 18:28)  predniSONE 20 mg oral tablet: 2 tab(s) orally once a day in the morning  (2019 16:31)  predniSONE 20 mg oral tablet: 1 tab(s) orally once a day at night  (2019 16:32)  tiotropium 18 mcg inhalation capsule: 1 cap(s) inhaled once a day (09 Oct 2019 12:50)      VITAL SIGNS: Last 24 Hours  T(C): 36.1 (01 Dec 2019 07:18), Max: 36.6 (01 Dec 2019 00:19)  T(F): 97 (01 Dec 2019 07:18), Max: 97.8 (01 Dec 2019 00:19)  HR: 89 (01 Dec 2019 07:18) (80 - 103)  BP: 114/56 (01 Dec 2019 07:18) (101/69 - 131/80)  BP(mean): --  RR: 18 (01 Dec 2019 07:18) (10 - 20)  SpO2: 94% (2019 20:00) (94% - 95%)    19 @ 07:19 @ 07:00  --------------------------------------------------------  IN: 1280 mL / OUT: 400 mL / NET: 880 mL    19 @ 07:01  -  19 @ 10:54  --------------------------------------------------------  IN: 0 mL / OUT: 200 mL / NET: -200 mL        LABS:                        10.0   10.59 )-----------( 118      ( 01 Dec 2019 05:19 )             31.8         138  |  100  |  17  ----------------------------<  71  3.7   |  25  |  0.5<L>    Ca    7.5<L>      01 Dec 2019 05:19  Phos  1.9     12  Mg     2.0     12    TPro  4.5<L>  /  Alb  2.5<L>  /  TBili  0.5  /  DBili  x   /  AST  24  /  ALT  13  /  AlkPhos  73  11    LIVER FUNCTIONS - ( 2019 11:09 )  Alb: 2.5 g/dL / Pro: 4.5 g/dL / ALK PHOS: 73 U/L / ALT: 13 U/L / AST: 24 U/L / GGT: x             Urinalysis Basic - ( 2019 14:55 )    Color: Yellow / Appearance: Clear / S.029 / pH: x  Gluc: x / Ketone: Trace  / Bili: Negative / Urobili: <2 mg/dL   Blood: x / Protein: 30 mg/dL / Nitrite: Negative   Leuk Esterase: Negative / RBC: 3 /HPF / WBC 2 /HPF   Sq Epi: x / Non Sq Epi: 1 /HPF / Bacteria: Negative            Culture - Blood (collected 2019 11:00)  Source: .Blood None  Preliminary Report (2019 17:01):    No growth to date.          CAPILLARY BLOOD GLUCOSE      POCT Blood Glucose.: 101 mg/dL (01 Dec 2019 08:15)      RADIOLOGY:    PHYSICAL EXAM:  PHYSICAL EXAM:  GENERAL: NAD, AAO x 4, 69y M  CHEST/LUNG: Clear to auscultation bilaterally; No wheeze; No crackles; No accessory muscles used  HEART: Regular rate and rhythm; No murmurs;   ABDOMEN: Soft, Nontender, Nondistended; Bowel sounds present; No guarding peg site clean  EXTREMITIES:  1+ pitting edema bilateral lower extremities     ADMISSION SUMMARY  Patient is a 69y old Male who presents with a chief complaint of Generalized weakness and muscle pain (2019 12:26)

## 2019-12-02 ENCOUNTER — TRANSCRIPTION ENCOUNTER (OUTPATIENT)
Age: 69
End: 2019-12-02

## 2019-12-02 LAB
ANION GAP SERPL CALC-SCNC: 11 MMOL/L — SIGNIFICANT CHANGE UP (ref 7–14)
BASOPHILS # BLD AUTO: 0.01 K/UL — SIGNIFICANT CHANGE UP (ref 0–0.2)
BASOPHILS NFR BLD AUTO: 0.1 % — SIGNIFICANT CHANGE UP (ref 0–1)
BUN SERPL-MCNC: 16 MG/DL — SIGNIFICANT CHANGE UP (ref 10–20)
CALCIUM SERPL-MCNC: 7.5 MG/DL — LOW (ref 8.5–10.1)
CHLORIDE SERPL-SCNC: 99 MMOL/L — SIGNIFICANT CHANGE UP (ref 98–110)
CO2 SERPL-SCNC: 25 MMOL/L — SIGNIFICANT CHANGE UP (ref 17–32)
CREAT SERPL-MCNC: 0.6 MG/DL — LOW (ref 0.7–1.5)
EOSINOPHIL # BLD AUTO: 0.02 K/UL — SIGNIFICANT CHANGE UP (ref 0–0.7)
EOSINOPHIL NFR BLD AUTO: 0.2 % — SIGNIFICANT CHANGE UP (ref 0–8)
GLUCOSE BLDC GLUCOMTR-MCNC: 110 MG/DL — HIGH (ref 70–99)
GLUCOSE BLDC GLUCOMTR-MCNC: 111 MG/DL — HIGH (ref 70–99)
GLUCOSE BLDC GLUCOMTR-MCNC: 80 MG/DL — SIGNIFICANT CHANGE UP (ref 70–99)
GLUCOSE BLDC GLUCOMTR-MCNC: 95 MG/DL — SIGNIFICANT CHANGE UP (ref 70–99)
GLUCOSE SERPL-MCNC: 74 MG/DL — SIGNIFICANT CHANGE UP (ref 70–99)
HCT VFR BLD CALC: 30.2 % — LOW (ref 42–52)
HGB BLD-MCNC: 9.5 G/DL — LOW (ref 14–18)
IMM GRANULOCYTES NFR BLD AUTO: 0.7 % — HIGH (ref 0.1–0.3)
LYMPHOCYTES # BLD AUTO: 0.89 K/UL — LOW (ref 1.2–3.4)
LYMPHOCYTES # BLD AUTO: 8.4 % — LOW (ref 20.5–51.1)
MAGNESIUM SERPL-MCNC: 1.8 MG/DL — SIGNIFICANT CHANGE UP (ref 1.8–2.4)
MCHC RBC-ENTMCNC: 30 PG — SIGNIFICANT CHANGE UP (ref 27–31)
MCHC RBC-ENTMCNC: 31.5 G/DL — LOW (ref 32–37)
MCV RBC AUTO: 95.3 FL — HIGH (ref 80–94)
MONOCYTES # BLD AUTO: 0.13 K/UL — SIGNIFICANT CHANGE UP (ref 0.1–0.6)
MONOCYTES NFR BLD AUTO: 1.2 % — LOW (ref 1.7–9.3)
NEUTROPHILS # BLD AUTO: 9.42 K/UL — HIGH (ref 1.4–6.5)
NEUTROPHILS NFR BLD AUTO: 89.4 % — HIGH (ref 42.2–75.2)
NRBC # BLD: 0 /100 WBCS — SIGNIFICANT CHANGE UP (ref 0–0)
PHOSPHATE SERPL-MCNC: 2.6 MG/DL — SIGNIFICANT CHANGE UP (ref 2.1–4.9)
PLATELET # BLD AUTO: 122 K/UL — LOW (ref 130–400)
POTASSIUM SERPL-MCNC: 4.6 MMOL/L — SIGNIFICANT CHANGE UP (ref 3.5–5)
POTASSIUM SERPL-SCNC: 4.6 MMOL/L — SIGNIFICANT CHANGE UP (ref 3.5–5)
RBC # BLD: 3.17 M/UL — LOW (ref 4.7–6.1)
RBC # FLD: 21.6 % — HIGH (ref 11.5–14.5)
SODIUM SERPL-SCNC: 135 MMOL/L — SIGNIFICANT CHANGE UP (ref 135–146)
WBC # BLD: 10.54 K/UL — SIGNIFICANT CHANGE UP (ref 4.8–10.8)
WBC # FLD AUTO: 10.54 K/UL — SIGNIFICANT CHANGE UP (ref 4.8–10.8)

## 2019-12-02 PROCEDURE — 99232 SBSQ HOSP IP/OBS MODERATE 35: CPT

## 2019-12-02 RX ORDER — ACETAMINOPHEN 500 MG
2 TABLET ORAL
Qty: 0 | Refills: 0 | DISCHARGE
Start: 2019-12-02

## 2019-12-02 RX ORDER — MYCOPHENOLATE MOFETIL 250 MG/1
500 CAPSULE ORAL
Qty: 0 | Refills: 0 | DISCHARGE
Start: 2019-12-02

## 2019-12-02 RX ORDER — MYCOPHENOLATE MOFETIL 250 MG/1
0 CAPSULE ORAL
Qty: 0 | Refills: 0 | DISCHARGE
Start: 2019-12-02

## 2019-12-02 RX ORDER — METHOTREXATE 2.5 MG/1
6 TABLET ORAL
Qty: 0 | Refills: 0 | DISCHARGE
Start: 2019-12-02

## 2019-12-02 RX ORDER — FOLIC ACID 0.8 MG
1 TABLET ORAL
Qty: 0 | Refills: 0 | DISCHARGE
Start: 2019-12-02

## 2019-12-02 RX ORDER — ALBUTEROL 90 UG/1
2 AEROSOL, METERED ORAL
Qty: 0 | Refills: 0 | DISCHARGE
Start: 2019-12-02

## 2019-12-02 RX ORDER — AMLODIPINE BESYLATE 2.5 MG/1
1 TABLET ORAL
Qty: 0 | Refills: 0 | DISCHARGE
Start: 2019-12-02

## 2019-12-02 RX ORDER — MAGNESIUM OXIDE 400 MG ORAL TABLET 241.3 MG
1 TABLET ORAL
Qty: 0 | Refills: 0 | DISCHARGE
Start: 2019-12-02

## 2019-12-02 RX ADMIN — MYCOPHENOLATE MOFETIL 500 MILLIGRAM(S): 250 CAPSULE ORAL at 05:04

## 2019-12-02 RX ADMIN — MAGNESIUM OXIDE 400 MG ORAL TABLET 400 MILLIGRAM(S): 241.3 TABLET ORAL at 08:25

## 2019-12-02 RX ADMIN — Medication 25 MICROGRAM(S): at 05:04

## 2019-12-02 RX ADMIN — ENOXAPARIN SODIUM 40 MILLIGRAM(S): 100 INJECTION SUBCUTANEOUS at 11:47

## 2019-12-02 RX ADMIN — TIOTROPIUM BROMIDE 1 CAPSULE(S): 18 CAPSULE ORAL; RESPIRATORY (INHALATION) at 08:25

## 2019-12-02 RX ADMIN — AMLODIPINE BESYLATE 2.5 MILLIGRAM(S): 2.5 TABLET ORAL at 05:04

## 2019-12-02 RX ADMIN — Medication 1 MILLIGRAM(S): at 11:46

## 2019-12-02 RX ADMIN — Medication 100 MILLIGRAM(S): at 11:46

## 2019-12-02 RX ADMIN — Medication 60 MILLIGRAM(S): at 05:04

## 2019-12-02 RX ADMIN — MYCOPHENOLATE MOFETIL 500 MILLIGRAM(S): 250 CAPSULE ORAL at 17:30

## 2019-12-02 RX ADMIN — Medication 3 MILLILITER(S): at 19:25

## 2019-12-02 RX ADMIN — MAGNESIUM OXIDE 400 MG ORAL TABLET 400 MILLIGRAM(S): 241.3 TABLET ORAL at 17:30

## 2019-12-02 RX ADMIN — MAGNESIUM OXIDE 400 MG ORAL TABLET 400 MILLIGRAM(S): 241.3 TABLET ORAL at 11:46

## 2019-12-02 RX ADMIN — PANTOPRAZOLE SODIUM 40 MILLIGRAM(S): 20 TABLET, DELAYED RELEASE ORAL at 11:46

## 2019-12-02 NOTE — PROGRESS NOTE ADULT - SUBJECTIVE AND OBJECTIVE BOX
Patient is a 69y old  Male who presents with a chief complaint of Generalized weakness and muscle pain (02 Dec 2019 11:21)  pt seen and evaluated  on PEG tube feed  ICU Vital Signs Last 24 Hrs  T(C): 35.7 (02 Dec 2019 07:35), Max: 36.2 (02 Dec 2019 00:00)  T(F): 96.3 (02 Dec 2019 07:35), Max: 97.1 (02 Dec 2019 00:00)  HR: 92 (02 Dec 2019 07:35) (89 - 92)  BP: 112/71 (02 Dec 2019 07:35) (112/71 - 121/71)  RR: 18 (02 Dec 2019 07:35) (18 - 18)  Drug Dosing Weight  Height (cm): 177.8 (26 Nov 2019 13:30)  Weight (kg): 54.4 (26 Nov 2019 13:30)  BMI (kg/m2): 17.2 (26 Nov 2019 13:30)  BSA (m2): 1.68 (26 Nov 2019 13:30)    I&O's Detail    01 Dec 2019 07:01  -  02 Dec 2019 07:00  --------------------------------------------------------  IN:    Enteral Tube Flush: 200 mL    ns in tub fed  [id number]: 720 mL  Total IN: 920 mL    OUT:    Voided: 1200 mL  Total OUT: 1200 mL    Total NET: -280 mL    PHYSICAL EXAM:  Constitutional:  NAD, Alert & Oriented X3  +clavicular and temporal waisting  ABDOMEN: Soft, Nontender, Nondistended +Peg tube in place and abdominal binder  EXTREMITIES:no edema +decrease in LBM  SKIN: No  lesions    MEDICATIONS  (STANDING):  allopurinol 100 milliGRAM(s) Oral daily  amLODIPine   Tablet 2.5 milliGRAM(s) Oral daily  enoxaparin Injectable 40 milliGRAM(s) SubCutaneous daily  folic acid 1 milliGRAM(s) Oral daily  influenza   Vaccine 0.5 milliLiter(s) IntraMuscular once  levothyroxine 25 MICROGram(s) Oral daily  magnesium oxide 400 milliGRAM(s) Oral three times a day with meals  methotrexate 15 milliGRAM(s) Oral <User Schedule>  mycophenolate mofetil Suspension 500 milliGRAM(s) Oral two times a day  pantoprazole   Suspension 40 milliGRAM(s) Oral daily  predniSONE   Tablet 60 milliGRAM(s) Oral daily  tiotropium 18 MICROgram(s) Capsule 1 Capsule(s) Inhalation daily  trimethoprim  160 mG/sulfamethoxazole 800 mG 1 Tablet(s) Oral <User Schedule>      Diet, NPO with Tube Feed:   Tube Feeding Modality: Gastrostomy  Jevity 1.2 Mark  Total Volume for 24 Hours (mL): 1080  Bolus  Total Volume of Bolus (mL):  360  Tube Feed Frequency: Every 8 hours   Tube Feed Start Time: 07:00  Bolus Feed Rate (mL per Hour): 500   Bolus Feed Duration (in Hours): 0.75  Alonso(7 Gm Arginine/7 Gm Glut/1.2 Gm HMB     Qty per Day:  2 (11-27-19 @ 16:50)      LABS  12-02    135  |  99  |  16  ----------------------------<  74  4.6   |  25  |  0.6<L>    Ca    7.5<L>      02 Dec 2019 04:51  Phos  2.6     12-02  Mg     1.8     12-02                        9.5    10.54 )-----------( 122      ( 02 Dec 2019 04:51 )             30.2     CAPILLARY BLOOD GLUCOSE  POCT Blood Glucose.: 110 mg/dL (02 Dec 2019 12:01)  POCT Blood Glucose.: 95 mg/dL (02 Dec 2019 07:48)  RADIOLOGY STUDIES  < from: Xray Chest 1 View-PORTABLE IMMEDIATE (11.30.19 @ 09:31) >  Impression:    Bilateral interstitial opacities, worsened on the left. Patient is a 69y old  Male who presents with a chief complaint of Generalized weakness and muscle pain (02 Dec 2019 11:21)  pt seen and evaluated  on PEG tube feed  ICU Vital Signs Last 24 Hrs  T(C): 35.7 (02 Dec 2019 07:35), Max: 36.2 (02 Dec 2019 00:00)  T(F): 96.3 (02 Dec 2019 07:35), Max: 97.1 (02 Dec 2019 00:00)  HR: 92 (02 Dec 2019 07:35) (89 - 92)  BP: 112/71 (02 Dec 2019 07:35) (112/71 - 121/71)  RR: 18 (02 Dec 2019 07:35) (18 - 18)  Drug Dosing Weight  Height (cm): 177.8 (26 Nov 2019 13:30)  Weight (kg): 54.4 (26 Nov 2019 13:30)  BMI (kg/m2): 17.2 (26 Nov 2019 13:30)  BSA (m2): 1.68 (26 Nov 2019 13:30)    I&O's Detail    01 Dec 2019 07:01  -  02 Dec 2019 07:00  --------------------------------------------------------  IN:    Enteral Tube Flush: 200 mL    ns in tub fed  [id number]: 720 mL------ should be 1080ml/ 24h  Total IN: 920 mL    OUT:    Voided: 1200 mL  Total OUT: 1200 mL    Total NET: -280 mL    PHYSICAL EXAM:  Constitutional:  NAD, Alert & Oriented X3  +clavicular and temporal waisting  ABDOMEN: Soft, Nontender, Nondistended +Peg tube in place and abdominal binder  EXTREMITIES:no edema +decrease in LBM, cachectic  SKIN: No  lesions    MEDICATIONS  (STANDING):  allopurinol 100 milliGRAM(s) Oral daily  amLODIPine   Tablet 2.5 milliGRAM(s) Oral daily  enoxaparin Injectable 40 milliGRAM(s) SubCutaneous daily  folic acid 1 milliGRAM(s) Oral daily  influenza   Vaccine 0.5 milliLiter(s) IntraMuscular once  levothyroxine 25 MICROGram(s) Oral daily  magnesium oxide 400 milliGRAM(s) Oral three times a day with meals  methotrexate 15 milliGRAM(s) Oral <User Schedule>  mycophenolate mofetil Suspension 500 milliGRAM(s) Oral two times a day  pantoprazole   Suspension 40 milliGRAM(s) Oral daily  predniSONE   Tablet 60 milliGRAM(s) Oral daily  tiotropium 18 MICROgram(s) Capsule 1 Capsule(s) Inhalation daily  trimethoprim  160 mG/sulfamethoxazole 800 mG 1 Tablet(s) Oral <User Schedule>    Diet, NPO with Tube Feed:   Tube Feeding Modality: Gastrostomy  Jevity 1.2 Mark  Total Volume for 24 Hours (mL): 1080  Bolus  Total Volume of Bolus (mL):  360  Tube Feed Frequency: Every 8 hours   Tube Feed Start Time: 07:00  Bolus Feed Rate (mL per Hour): 500   Bolus Feed Duration (in Hours): 0.75  Alonso(7 Gm Arginine/7 Gm Glut/1.2 Gm HMB     Qty per Day:  2 (11-27-19 @ 16:50)    LABS  12-02    135  |  99  |  16  ----------------------------<  74  4.6   |  25  |  0.6<L>    Ca    7.5<L>      02 Dec 2019 04:51  Phos  2.6     12-02  Mg     1.8     12-02                        9.5    10.54 )-----------( 122      ( 02 Dec 2019 04:51 )             30.2     CAPILLARY BLOOD GLUCOSE  POCT Blood Glucose.: 110 mg/dL (02 Dec 2019 12:01)  POCT Blood Glucose.: 95 mg/dL (02 Dec 2019 07:48)  RADIOLOGY STUDIES  < from: Xray Chest 1 View-PORTABLE IMMEDIATE (11.30.19 @ 09:31) >  Impression:    Bilateral interstitial opacities, worsened on the left.

## 2019-12-02 NOTE — DISCHARGE NOTE PROVIDER - PROVIDER TOKENS
PROVIDER:[TOKEN:[[id number]],FOLLOWUP:[1 week]],PROVIDER:[TOKEN:[[id number]],FOLLOWUP:[2 weeks]],PROVIDER:[TOKEN:[[id number]],FOLLOWUP:[1 week]]

## 2019-12-02 NOTE — DISCHARGE NOTE PROVIDER - NSDCMRMEDTOKEN_GEN_ALL_CORE_FT
acetaminophen 325 mg oral tablet: 2 tab(s) orally every 6 hours, As needed, Mild Pain (1 - 3)  albuterol 90 mcg/inh inhalation aerosol: 2 puff(s) inhaled every 6 hours, As needed, Shortness of Breath and/or Wheezing  allopurinol 100 mg oral tablet: 1 tab(s) orally once a day  amLODIPine 2.5 mg oral tablet: 1 tab(s) orally once a day  DuoNeb 0.5 mg-2.5 mg/3 mL inhalation solution: 3 milliliter(s) inhaled 3 times a day, As Needed  folic acid 1 mg oral tablet: 1 tab(s) orally once a day  levothyroxine 25 mcg (0.025 mg) oral tablet: 1 tab(s) orally once a day  magnesium oxide 400 mg (241.3 mg elemental magnesium) oral tablet: 1 tab(s) orally 3 times a day (with meals)  methotrexate 2.5 mg oral tablet: 6 tab(s) orally   once a week on friday  mycophenolate mofetil 200 mg/mL oral suspension: 500 milligram(s) by gastrostomy tube 2 times a day  pantoprazole 40 mg oral delayed release tablet: 1 tab(s) orally once a day  predniSONE 20 mg oral tablet: 2 tab(s) orally once a day in the morning   predniSONE 20 mg oral tablet: 1 tab(s) orally once a day at night   sulfamethoxazole-trimethoprim 800 mg-160 mg oral tablet: 1 tab(s) orally 3 times a week  for PCP prophylaxis

## 2019-12-02 NOTE — DISCHARGE NOTE PROVIDER - NSDCCPCAREPLAN_GEN_ALL_CORE_FT
PRINCIPAL DISCHARGE DIAGNOSIS  Diagnosis: Difficulty swallowing  Assessment and Plan of Treatment: Your difficulty swallowing is likely related to yourunderlying autoimmune disorder.   Follow the PEG fedding instruction and follow up with your primary doctor and your rheumatologist.  Call 911 and return to the emergency room if you have chest pain, difficulty breathing, high fevers, worsening of your symptoms, feel unwell or have nausea and vomiting.

## 2019-12-02 NOTE — DISCHARGE NOTE PROVIDER - HOSPITAL COURSE
admitted for dysphagia and malnutrition. Found to have severe dysfunction with barium swallow. PEG inserted and feeds started/ Some electrolyte abnormalitites with initial feeds were corrected and patient expressed desire to go to rehab and was discharged

## 2019-12-02 NOTE — PROGRESS NOTE ADULT - SUBJECTIVE AND OBJECTIVE BOX
NEIL PHOENIX 69y Male  MRN#: [id number]     SUBJECTIVE  Patient is a 69y old Male who presents with a chief complaint of Generalized weakness and muscle pain (01 Dec 2019 10:54)  Today is hospital day 11d, and this morning he is lying in bed without distress.   No acute overnight events.   feels better and stronger  no chest pain  no sob  no abdomianl apin  no constipation or diarreha  tolerating feeds well      OBJECTIVE  PAST MEDICAL & SURGICAL HISTORY  Gout  COPD, mild  Hypothyroid  S/P tonsillectomy    ALLERGIES:  No Known Allergies    MEDICATIONS:  STANDING MEDICATIONS  allopurinol 100 milliGRAM(s) Oral daily  amLODIPine   Tablet 2.5 milliGRAM(s) Oral daily  enoxaparin Injectable 40 milliGRAM(s) SubCutaneous daily  folic acid 1 milliGRAM(s) Oral daily  influenza   Vaccine 0.5 milliLiter(s) IntraMuscular once  levothyroxine 25 MICROGram(s) Oral daily  magnesium oxide 400 milliGRAM(s) Oral three times a day with meals  methotrexate 15 milliGRAM(s) Oral <User Schedule>  mycophenolate mofetil Suspension 500 milliGRAM(s) Oral two times a day  pantoprazole   Suspension 40 milliGRAM(s) Oral daily  predniSONE   Tablet 60 milliGRAM(s) Oral daily  tiotropium 18 MICROgram(s) Capsule 1 Capsule(s) Inhalation daily  trimethoprim  160 mG/sulfamethoxazole 800 mG 1 Tablet(s) Oral <User Schedule>    PRN MEDICATIONS  acetaminophen   Tablet .. 650 milliGRAM(s) Oral every 6 hours PRN  ALBUTerol    90 MICROgram(s) HFA Inhaler 2 Puff(s) Inhalation every 6 hours PRN  albuterol/ipratropium for Nebulization 3 milliLiter(s) Nebulizer every 6 hours PRN    HOME MEDICATIONS  Home Medications:  allopurinol 100 mg oral tablet: 1 tab(s) orally once a day (02 Oct 2019 18:28)  DuoNeb 0.5 mg-2.5 mg/3 mL inhalation solution: 3 milliliter(s) inhaled 3 times a day, As Needed (02 Oct 2019 18:28)  levothyroxine 25 mcg (0.025 mg) oral tablet: 1 tab(s) orally once a day (02 Oct 2019 18:28)  pantoprazole 40 mg oral delayed release tablet: 1 tab(s) orally once a day (02 Oct 2019 18:28)  predniSONE 20 mg oral tablet: 2 tab(s) orally once a day in the morning  (21 Nov 2019 16:31)  predniSONE 20 mg oral tablet: 1 tab(s) orally once a day at night  (21 Nov 2019 16:32)  tiotropium 18 mcg inhalation capsule: 1 cap(s) inhaled once a day (09 Oct 2019 12:50)      VITAL SIGNS: Last 24 Hours  T(C): 35.7 (02 Dec 2019 07:35), Max: 36.2 (02 Dec 2019 00:00)  T(F): 96.3 (02 Dec 2019 07:35), Max: 97.1 (02 Dec 2019 00:00)  HR: 92 (02 Dec 2019 07:35) (89 - 92)  BP: 112/71 (02 Dec 2019 07:35) (112/71 - 121/71)  BP(mean): --  RR: 18 (02 Dec 2019 07:35) (18 - 18)  SpO2: --    12-01-19 @ 07:01  -  12-02-19 @ 07:00  --------------------------------------------------------  IN: 920 mL / OUT: 1200 mL / NET: -280 mL        LABS:                        9.5    10.54 )-----------( 122      ( 02 Dec 2019 04:51 )             30.2     12-02    135  |  99  |  16  ----------------------------<  74  4.6   |  25  |  0.6<L>    Ca    7.5<L>      02 Dec 2019 04:51  Phos  2.6     12-02  Mg     1.8     12-02                      CAPILLARY BLOOD GLUCOSE      POCT Blood Glucose.: 95 mg/dL (02 Dec 2019 07:48)      RADIOLOGY:    PHYSICAL EXAM:  PHYSICAL EXAM:  GENERAL: NAD,   CHEST/LUNG: Clear to auscultation bilaterally; No wheeze; No crackles; No accessory muscles used  HEART: Regular rate and rhythm;  ABDOMEN: Soft, Nontender, Nondistended; Bowel sounds present; No guarding peg clean  EXTREMITIES:  No cyanosis or edema      ADMISSION SUMMARY  Patient is a 69y old Male who presents with a chief complaint of Generalized weakness and muscle pain (01 Dec 2019 10:54)

## 2019-12-02 NOTE — DISCHARGE NOTE PROVIDER - CARE PROVIDER_API CALL
Sahil Julien (DO)  Family Medicine  [address] [address]  Phone: [phone]  Fax: [phone]  Follow Up Time: 1 week    Francisca Lara)  Nutritional Support  [address] [place] [address]  Phone: [phone]  Fax: [phone]  Follow Up Time: 2 weeks    Kavitha Fontanez)  Rheumatology  [address] [place] [address]  Phone: [phone]  Fax: [phone]  Follow Up Time: 1 week

## 2019-12-02 NOTE — PROGRESS NOTE ADULT - ATTENDING COMMENTS
Medically stable to discharge. D/W family / friend at the bed side. Patient understand the plan also.   Dispo- SNF. Await Auth

## 2019-12-02 NOTE — PROGRESS NOTE ADULT - ASSESSMENT
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  - dysphagia   -hypomagnesemia/hypophosphatemia  - severe protein calorie malnutrition - massive wt loss, wasting of LBM, diminished ADL  continue with tube feed  check bmp/phos/mg and correct lytes ASSESSMENT/PLAN  69 y old man with hx of COPD (not on home oxygen), hypothyroidism, gout, RA, dermato vs polymyositis (on prednisone and methotrexate), ?systemic sclerosis, on 10/2019 admitted for severe UGIB with EGD showing esophagitis and erosive gastritis, stay complicated by ileus vs gastric outlet obstruction, pathology at the time ruled out cancer.  - dysphagia   -hypomagnesemia/hypophosphatemia = refeeding syndrome  - severe protein calorie malnutrition - massive wt loss, wasting of LBM, diminished ADL  continue with tube feeds but be sure he actually gets 360 ml x 3 feeds/d  check bmp/phos/mg and correct lytes

## 2019-12-02 NOTE — PROGRESS NOTE ADULT - ASSESSMENT
69M  admitted for dysphagia on barium swallow has major dyfunction which will no allow for any meaningful swallowing, PEG tube placed on 11/26. Feeds started on 11/27. Electrolyrte abnormalities developed possible consern for refeeeding syndrome.     #Dermatomyositis scleroderma overlap syndrome  - pt's prednisone increased from 40 mg daily to 60 mg two weeks ago as outpatient due to persisting symptoms  - CK levels normalized  - c/w Methotrexate with folate  Protein Electrophoresis, Serum (11.26.19 @ 06:50)    Protein Total, Serum: 4.8 g/dL    Total Protein, Serum: 4.8 g/dL  - pt evaluated by rheumatology, Cellcept started  - amlodipine started for Raynaud symptoms, improvement of symptoms  - rheumatology follow up appreciated,    #PEG and feeding  -Watch electrolytes for possible refeeding syndrome  -f/u lytes adn replete  -nutrition suport consult recs appreciated    #Magnesium defeciency  -replete adn monitor  -mag oxide with meals    #Hypokalemia and hypophosphatemia  -replete  -adjust feeds  -f/u nutrition support  -IV NS with KPHOS    #Tachycardia and hypotension  -no fevers or leukocytosis  -no dysuria increased cough or porduction no diarrhea and PEGsite clean  -on Vanc and cefepime  -f/u ID consult  -f/u blood cultures    #Mesenteric edema  - present on previous imaging  - not mentioned in most recent CT  - can be related to patient's hypoalbuminemia but will reach out to radiology to confirm resolution      #Dysphagia  - suspected it is related to scleroderma  - MBS shows severe dysphagia, recommend alternative means to nutrition  - PEG performed 11/26  - nutrition consult for optimal PEG feeds    #Normocytic Anemia   - Stable   - Likely secondary to anemia of inflammation   - iron studies pending     #Leukocytosis  - likely due to steroids  - no obvious signs of infection  - resolving    #Recent history of UGIB   - had EGD 1 month ago  - found to have non-erosive gastritis, nonspecific erosive esophagitis, hiatal hernia  - hgb stable    #COPD   - stable  - no wheezing on exam  - Duonebs as needed     #Hypothyroidism   - c/w synthroid    #Hypoalbumenia  - likely related to chronic disease and malnutrition    #Cachexia  - severe protein malnutrition, underweight BMI 15  - pt never had strong appetite  - has poor PO intake due to dysphagia  - hold off appetite stimulants for now   - RD consult apprepiated    #Folate deficiecy  -folate    #Immunosupression  -trimethorim for PJP prophylaxis    GI PPX: protonix  DVT PPX: Lovenox   DIet: PEG feedings   Full Code      ID appreciated. Off antibiotics. Bactrim for prophylaxis   pending auth

## 2019-12-03 LAB
ANION GAP SERPL CALC-SCNC: 12 MMOL/L — SIGNIFICANT CHANGE UP (ref 7–14)
BASOPHILS # BLD AUTO: 0 K/UL — SIGNIFICANT CHANGE UP (ref 0–0.2)
BASOPHILS NFR BLD AUTO: 0 % — SIGNIFICANT CHANGE UP (ref 0–1)
BUN SERPL-MCNC: 18 MG/DL — SIGNIFICANT CHANGE UP (ref 10–20)
CALCIUM SERPL-MCNC: 7.7 MG/DL — LOW (ref 8.5–10.1)
CHLORIDE SERPL-SCNC: 99 MMOL/L — SIGNIFICANT CHANGE UP (ref 98–110)
CO2 SERPL-SCNC: 26 MMOL/L — SIGNIFICANT CHANGE UP (ref 17–32)
CREAT SERPL-MCNC: 0.6 MG/DL — LOW (ref 0.7–1.5)
CULTURE RESULTS: SIGNIFICANT CHANGE UP
EOSINOPHIL # BLD AUTO: 0.07 K/UL — SIGNIFICANT CHANGE UP (ref 0–0.7)
EOSINOPHIL NFR BLD AUTO: 0.7 % — SIGNIFICANT CHANGE UP (ref 0–8)
GLUCOSE BLDC GLUCOMTR-MCNC: 103 MG/DL — HIGH (ref 70–99)
GLUCOSE BLDC GLUCOMTR-MCNC: 103 MG/DL — HIGH (ref 70–99)
GLUCOSE BLDC GLUCOMTR-MCNC: 153 MG/DL — HIGH (ref 70–99)
GLUCOSE BLDC GLUCOMTR-MCNC: 74 MG/DL — SIGNIFICANT CHANGE UP (ref 70–99)
GLUCOSE SERPL-MCNC: 91 MG/DL — SIGNIFICANT CHANGE UP (ref 70–99)
HCT VFR BLD CALC: 31.5 % — LOW (ref 42–52)
HGB BLD-MCNC: 9.9 G/DL — LOW (ref 14–18)
IMM GRANULOCYTES NFR BLD AUTO: 1.1 % — HIGH (ref 0.1–0.3)
LYMPHOCYTES # BLD AUTO: 0.93 K/UL — LOW (ref 1.2–3.4)
LYMPHOCYTES # BLD AUTO: 9.1 % — LOW (ref 20.5–51.1)
MAGNESIUM SERPL-MCNC: 1.8 MG/DL — SIGNIFICANT CHANGE UP (ref 1.8–2.4)
MCHC RBC-ENTMCNC: 29.8 PG — SIGNIFICANT CHANGE UP (ref 27–31)
MCHC RBC-ENTMCNC: 31.4 G/DL — LOW (ref 32–37)
MCV RBC AUTO: 94.9 FL — HIGH (ref 80–94)
MONOCYTES # BLD AUTO: 0.09 K/UL — LOW (ref 0.1–0.6)
MONOCYTES NFR BLD AUTO: 0.9 % — LOW (ref 1.7–9.3)
NEUTROPHILS # BLD AUTO: 9.03 K/UL — HIGH (ref 1.4–6.5)
NEUTROPHILS NFR BLD AUTO: 88.2 % — HIGH (ref 42.2–75.2)
NRBC # BLD: 0 /100 WBCS — SIGNIFICANT CHANGE UP (ref 0–0)
PHOSPHATE SERPL-MCNC: 2.3 MG/DL — SIGNIFICANT CHANGE UP (ref 2.1–4.9)
PLATELET # BLD AUTO: 130 K/UL — SIGNIFICANT CHANGE UP (ref 130–400)
POTASSIUM SERPL-MCNC: 3.9 MMOL/L — SIGNIFICANT CHANGE UP (ref 3.5–5)
POTASSIUM SERPL-SCNC: 3.9 MMOL/L — SIGNIFICANT CHANGE UP (ref 3.5–5)
RBC # BLD: 3.32 M/UL — LOW (ref 4.7–6.1)
RBC # FLD: 21.2 % — HIGH (ref 11.5–14.5)
SODIUM SERPL-SCNC: 137 MMOL/L — SIGNIFICANT CHANGE UP (ref 135–146)
SPECIMEN SOURCE: SIGNIFICANT CHANGE UP
WBC # BLD: 10.23 K/UL — SIGNIFICANT CHANGE UP (ref 4.8–10.8)
WBC # FLD AUTO: 10.23 K/UL — SIGNIFICANT CHANGE UP (ref 4.8–10.8)

## 2019-12-03 PROCEDURE — 99232 SBSQ HOSP IP/OBS MODERATE 35: CPT

## 2019-12-03 RX ORDER — CHLORHEXIDINE GLUCONATE 213 G/1000ML
1 SOLUTION TOPICAL
Refills: 0 | Status: DISCONTINUED | OUTPATIENT
Start: 2019-12-03 | End: 2019-12-04

## 2019-12-03 RX ADMIN — AMLODIPINE BESYLATE 2.5 MILLIGRAM(S): 2.5 TABLET ORAL at 05:12

## 2019-12-03 RX ADMIN — MYCOPHENOLATE MOFETIL 500 MILLIGRAM(S): 250 CAPSULE ORAL at 17:36

## 2019-12-03 RX ADMIN — ALBUTEROL 2 PUFF(S): 90 AEROSOL, METERED ORAL at 14:05

## 2019-12-03 RX ADMIN — MAGNESIUM OXIDE 400 MG ORAL TABLET 400 MILLIGRAM(S): 241.3 TABLET ORAL at 08:32

## 2019-12-03 RX ADMIN — Medication 3 MILLILITER(S): at 09:13

## 2019-12-03 RX ADMIN — Medication 1 TABLET(S): at 08:34

## 2019-12-03 RX ADMIN — Medication 60 MILLIGRAM(S): at 05:12

## 2019-12-03 RX ADMIN — MAGNESIUM OXIDE 400 MG ORAL TABLET 400 MILLIGRAM(S): 241.3 TABLET ORAL at 12:24

## 2019-12-03 RX ADMIN — PANTOPRAZOLE SODIUM 40 MILLIGRAM(S): 20 TABLET, DELAYED RELEASE ORAL at 11:23

## 2019-12-03 RX ADMIN — TIOTROPIUM BROMIDE 1 CAPSULE(S): 18 CAPSULE ORAL; RESPIRATORY (INHALATION) at 09:11

## 2019-12-03 RX ADMIN — MAGNESIUM OXIDE 400 MG ORAL TABLET 400 MILLIGRAM(S): 241.3 TABLET ORAL at 17:36

## 2019-12-03 RX ADMIN — ENOXAPARIN SODIUM 40 MILLIGRAM(S): 100 INJECTION SUBCUTANEOUS at 11:24

## 2019-12-03 RX ADMIN — Medication 25 MICROGRAM(S): at 05:13

## 2019-12-03 RX ADMIN — MYCOPHENOLATE MOFETIL 500 MILLIGRAM(S): 250 CAPSULE ORAL at 05:13

## 2019-12-03 RX ADMIN — Medication 100 MILLIGRAM(S): at 11:23

## 2019-12-03 RX ADMIN — Medication 1 MILLIGRAM(S): at 11:23

## 2019-12-03 NOTE — PROGRESS NOTE ADULT - SUBJECTIVE AND OBJECTIVE BOX
NEIL PHOENIX 69y Male  MRN#: [id number]     SUBJECTIVE  Patient is a 69y old Male who presents with a chief complaint of Generalized weakness and muscle pain (02 Dec 2019 13:26)  Today is hospital day 12d, and this morning he is lying in bed without distress.   No acute overnight events.   feels stroger today than yesterday  apprecaites with great enthusiasm the efforts and works by all hospital staff especially nurses  no chest pain  no sob  no f/c/n/v  tolerating feeds well  no abdominal pain  having normal BMs    OBJECTIVE  PAST MEDICAL & SURGICAL HISTORY  Gout  COPD, mild  Hypothyroid  S/P tonsillectomy    ALLERGIES:  No Known Allergies    MEDICATIONS:  STANDING MEDICATIONS  allopurinol 100 milliGRAM(s) Oral daily  amLODIPine   Tablet 2.5 milliGRAM(s) Oral daily  chlorhexidine 4% Liquid 1 Application(s) Topical <User Schedule>  enoxaparin Injectable 40 milliGRAM(s) SubCutaneous daily  folic acid 1 milliGRAM(s) Oral daily  influenza   Vaccine 0.5 milliLiter(s) IntraMuscular once  levothyroxine 25 MICROGram(s) Oral daily  magnesium oxide 400 milliGRAM(s) Oral three times a day with meals  methotrexate 15 milliGRAM(s) Oral <User Schedule>  mycophenolate mofetil Suspension 500 milliGRAM(s) Oral two times a day  pantoprazole   Suspension 40 milliGRAM(s) Oral daily  predniSONE   Tablet 60 milliGRAM(s) Oral daily  tiotropium 18 MICROgram(s) Capsule 1 Capsule(s) Inhalation daily  trimethoprim  160 mG/sulfamethoxazole 800 mG 1 Tablet(s) Oral <User Schedule>    PRN MEDICATIONS  acetaminophen   Tablet .. 650 milliGRAM(s) Oral every 6 hours PRN  ALBUTerol    90 MICROgram(s) HFA Inhaler 2 Puff(s) Inhalation every 6 hours PRN  albuterol/ipratropium for Nebulization 3 milliLiter(s) Nebulizer every 6 hours PRN    HOME MEDICATIONS  Home Medications:  acetaminophen 325 mg oral tablet: 2 tab(s) orally every 6 hours, As needed, Mild Pain (1 - 3) (02 Dec 2019 11:43)  albuterol 90 mcg/inh inhalation aerosol: 2 puff(s) inhaled every 6 hours, As needed, Shortness of Breath and/or Wheezing (02 Dec 2019 11:43)  allopurinol 100 mg oral tablet: 1 tab(s) orally once a day (02 Oct 2019 18:28)  amLODIPine 2.5 mg oral tablet: 1 tab(s) orally once a day (02 Dec 2019 11:43)  DuoNeb 0.5 mg-2.5 mg/3 mL inhalation solution: 3 milliliter(s) inhaled 3 times a day, As Needed (02 Oct 2019 18:28)  folic acid 1 mg oral tablet: 1 tab(s) orally once a day (02 Dec 2019 11:43)  levothyroxine 25 mcg (0.025 mg) oral tablet: 1 tab(s) orally once a day (02 Oct 2019 18:28)  magnesium oxide 400 mg (241.3 mg elemental magnesium) oral tablet: 1 tab(s) orally 3 times a day (with meals) (02 Dec 2019 11:43)  methotrexate 2.5 mg oral tablet: 6 tab(s) orally   once a week on friday (02 Dec 2019 13:40)  mycophenolate mofetil 200 mg/mL oral suspension: 500 milligram(s) by gastrostomy tube 2 times a day (02 Dec 2019 13:40)  pantoprazole 40 mg oral delayed release tablet: 1 tab(s) orally once a day (02 Oct 2019 18:28)  predniSONE 20 mg oral tablet: 2 tab(s) orally once a day in the morning  (21 Nov 2019 16:31)  predniSONE 20 mg oral tablet: 1 tab(s) orally once a day at night  (21 Nov 2019 16:32)  sulfamethoxazole-trimethoprim 800 mg-160 mg oral tablet: 1 tab(s) orally 3 times a week  for PCP prophylaxis (02 Dec 2019 13:41)      VITAL SIGNS: Last 24 Hours  T(C): 36.1 (03 Dec 2019 08:34), Max: 36.1 (03 Dec 2019 08:34)  T(F): 97 (03 Dec 2019 08:34), Max: 97 (03 Dec 2019 08:34)  HR: 89 (03 Dec 2019 08:34) (84 - 111)  BP: 121/75 (03 Dec 2019 08:34) (105/66 - 121/75)  BP(mean): --  RR: 18 (03 Dec 2019 08:34) (18 - 18)  SpO2: --    12-02-19 @ 07:01  -  12-03-19 @ 07:00  --------------------------------------------------------  IN: 880 mL / OUT: 1000 mL / NET: -120 mL        LABS:                        9.9    10.23 )-----------( 130      ( 03 Dec 2019 05:32 )             31.5     12-03    137  |  99  |  18  ----------------------------<  91  3.9   |  26  |  0.6<L>    Ca    7.7<L>      03 Dec 2019 05:32  Phos  2.3     12-03  Mg     1.8     12-03                      CAPILLARY BLOOD GLUCOSE      POCT Blood Glucose.: 103 mg/dL (03 Dec 2019 08:20)      RADIOLOGY:    PHYSICAL EXAM:  PHYSICAL EXAM:  GENERAL: NAD, AAO x 4, 69y M  HEAD:  Atraumatic, Normocephalic  EYES: EOMI, conjunctiva clear and sclera white  NECK: Supple, No JVD  CHEST/LUNG: Clear to auscultation bilaterally; No wheeze; No crackles; No accessory muscles used  HEART: Regular rate and rhythm; No murmurs;   ABDOMEN: Soft, Nontender, Nondistended; Bowel sounds present; No guarding PEG site clean  EXTREMITIES:   1+ pitting edema bilateral lower extremities     ADMISSION SUMMARY  Patient is a 69y old Male who presents with a chief complaint of Generalized weakness and muscle pain (02 Dec 2019 13:26)

## 2019-12-04 VITALS
SYSTOLIC BLOOD PRESSURE: 107 MMHG | RESPIRATION RATE: 18 BRPM | HEART RATE: 98 BPM | DIASTOLIC BLOOD PRESSURE: 62 MMHG | TEMPERATURE: 97 F

## 2019-12-04 LAB
ANION GAP SERPL CALC-SCNC: 13 MMOL/L — SIGNIFICANT CHANGE UP (ref 7–14)
BASOPHILS # BLD AUTO: 0.02 K/UL — SIGNIFICANT CHANGE UP (ref 0–0.2)
BASOPHILS NFR BLD AUTO: 0.2 % — SIGNIFICANT CHANGE UP (ref 0–1)
BUN SERPL-MCNC: 16 MG/DL — SIGNIFICANT CHANGE UP (ref 10–20)
CALCIUM SERPL-MCNC: 7.7 MG/DL — LOW (ref 8.5–10.1)
CHLORIDE SERPL-SCNC: 97 MMOL/L — LOW (ref 98–110)
CO2 SERPL-SCNC: 27 MMOL/L — SIGNIFICANT CHANGE UP (ref 17–32)
CREAT SERPL-MCNC: 0.6 MG/DL — LOW (ref 0.7–1.5)
EOSINOPHIL # BLD AUTO: 0.05 K/UL — SIGNIFICANT CHANGE UP (ref 0–0.7)
EOSINOPHIL NFR BLD AUTO: 0.5 % — SIGNIFICANT CHANGE UP (ref 0–8)
GLUCOSE BLDC GLUCOMTR-MCNC: 116 MG/DL — HIGH (ref 70–99)
GLUCOSE BLDC GLUCOMTR-MCNC: 93 MG/DL — SIGNIFICANT CHANGE UP (ref 70–99)
GLUCOSE SERPL-MCNC: 67 MG/DL — LOW (ref 70–99)
HCT VFR BLD CALC: 31.1 % — LOW (ref 42–52)
HGB BLD-MCNC: 9.8 G/DL — LOW (ref 14–18)
IMM GRANULOCYTES NFR BLD AUTO: 0.7 % — HIGH (ref 0.1–0.3)
LYMPHOCYTES # BLD AUTO: 0.83 K/UL — LOW (ref 1.2–3.4)
LYMPHOCYTES # BLD AUTO: 7.8 % — LOW (ref 20.5–51.1)
MAGNESIUM SERPL-MCNC: 1.9 MG/DL — SIGNIFICANT CHANGE UP (ref 1.8–2.4)
MCHC RBC-ENTMCNC: 30.2 PG — SIGNIFICANT CHANGE UP (ref 27–31)
MCHC RBC-ENTMCNC: 31.5 G/DL — LOW (ref 32–37)
MCV RBC AUTO: 95.7 FL — HIGH (ref 80–94)
MONOCYTES # BLD AUTO: 0.13 K/UL — SIGNIFICANT CHANGE UP (ref 0.1–0.6)
MONOCYTES NFR BLD AUTO: 1.2 % — LOW (ref 1.7–9.3)
NEUTROPHILS # BLD AUTO: 9.58 K/UL — HIGH (ref 1.4–6.5)
NEUTROPHILS NFR BLD AUTO: 89.6 % — HIGH (ref 42.2–75.2)
NRBC # BLD: 0 /100 WBCS — SIGNIFICANT CHANGE UP (ref 0–0)
PHOSPHATE SERPL-MCNC: 2.7 MG/DL — SIGNIFICANT CHANGE UP (ref 2.1–4.9)
PLATELET # BLD AUTO: 119 K/UL — LOW (ref 130–400)
POTASSIUM SERPL-MCNC: 4.2 MMOL/L — SIGNIFICANT CHANGE UP (ref 3.5–5)
POTASSIUM SERPL-SCNC: 4.2 MMOL/L — SIGNIFICANT CHANGE UP (ref 3.5–5)
RBC # BLD: 3.25 M/UL — LOW (ref 4.7–6.1)
RBC # FLD: 21.2 % — HIGH (ref 11.5–14.5)
SODIUM SERPL-SCNC: 137 MMOL/L — SIGNIFICANT CHANGE UP (ref 135–146)
WBC # BLD: 10.69 K/UL — SIGNIFICANT CHANGE UP (ref 4.8–10.8)
WBC # FLD AUTO: 10.69 K/UL — SIGNIFICANT CHANGE UP (ref 4.8–10.8)

## 2019-12-04 PROCEDURE — 99239 HOSP IP/OBS DSCHRG MGMT >30: CPT

## 2019-12-04 RX ADMIN — Medication 25 MICROGRAM(S): at 05:17

## 2019-12-04 RX ADMIN — Medication 60 MILLIGRAM(S): at 05:17

## 2019-12-04 RX ADMIN — Medication 1 MILLIGRAM(S): at 12:08

## 2019-12-04 RX ADMIN — AMLODIPINE BESYLATE 2.5 MILLIGRAM(S): 2.5 TABLET ORAL at 05:17

## 2019-12-04 RX ADMIN — MYCOPHENOLATE MOFETIL 500 MILLIGRAM(S): 250 CAPSULE ORAL at 05:17

## 2019-12-04 RX ADMIN — ENOXAPARIN SODIUM 40 MILLIGRAM(S): 100 INJECTION SUBCUTANEOUS at 12:08

## 2019-12-04 RX ADMIN — MAGNESIUM OXIDE 400 MG ORAL TABLET 400 MILLIGRAM(S): 241.3 TABLET ORAL at 12:08

## 2019-12-04 RX ADMIN — MAGNESIUM OXIDE 400 MG ORAL TABLET 400 MILLIGRAM(S): 241.3 TABLET ORAL at 07:45

## 2019-12-04 RX ADMIN — Medication 3 MILLILITER(S): at 13:49

## 2019-12-04 RX ADMIN — Medication 100 MILLIGRAM(S): at 12:08

## 2019-12-04 RX ADMIN — PANTOPRAZOLE SODIUM 40 MILLIGRAM(S): 20 TABLET, DELAYED RELEASE ORAL at 12:11

## 2019-12-04 RX ADMIN — TIOTROPIUM BROMIDE 1 CAPSULE(S): 18 CAPSULE ORAL; RESPIRATORY (INHALATION) at 10:20

## 2019-12-04 NOTE — PROGRESS NOTE ADULT - ASSESSMENT
69M  admitted for dysphagia on barium swallow has major dyfunction which will no allow for any meaningful swallowing, PEG tube placed on 11/26. Feeds started on 11/27. Electrolyrte abnormalities developed possible consern for refeeeding syndrome.     #Dermatomyositis scleroderma overlap syndrome  - pt's prednisone increased from 40 mg daily to 60 mg two weeks ago as outpatient due to persisting symptoms  - CK levels normalized  - c/w Methotrexate with folate  Protein Electrophoresis, Serum (11.26.19 @ 06:50)    Protein Total, Serum: 4.8 g/dL    Total Protein, Serum: 4.8 g/dL  - pt evaluated by rheumatology, Cellcept started  - amlodipine started for Raynaud symptoms, improvement of symptoms  - rheumatology follow up appreciated,    #PEG and feeding  -Watch electrolytes for possible refeeding syndrome  -f/u lytes adn replete  -nutrition suport consult recs appreciated    #Magnesium defeciency  -replete adn monitor  -mag oxide with meals    #Hypokalemia and hypophosphatemia  -replete  -adjust feeds  -f/u nutrition support  -IV NS with KPHOS    #Tachycardia and hypotension  -no fevers or leukocytosis  -no dysuria increased cough or porduction no diarrhea and PEGsite clean  -on Vanc and cefepime  -f/u ID consult  -f/u blood cultures    #Mesenteric edema  - present on previous imaging  - not mentioned in most recent CT  - can be related to patient's hypoalbuminemia but will reach out to radiology to confirm resolution      #Dysphagia  - suspected it is related to scleroderma  - MBS shows severe dysphagia, recommend alternative means to nutrition  - PEG performed 11/26  - nutrition consult for optimal PEG feeds    #Normocytic Anemia   - Stable   - Likely secondary to anemia of inflammation   - iron studies pending     #Leukocytosis  - likely due to steroids  - no obvious signs of infection  - resolving    #Recent history of UGIB   - had EGD 1 month ago  - found to have non-erosive gastritis, nonspecific erosive esophagitis, hiatal hernia  - hgb stable    #COPD   - stable  - no wheezing on exam  - Duonebs as needed     #Hypothyroidism   - c/w synthroid    #Hypoalbumenia  - likely related to chronic disease and malnutrition    #Cachexia  - severe protein malnutrition, underweight BMI 15  - pt never had strong appetite  - has poor PO intake due to dysphagia  - hold off appetite stimulants for now   - RD consult apprepiated    #Folate deficiecy  -folate    #Immunosupression  -trimethorim for PJP prophylaxis    GI PPX: protonix  DVT PPX: Lovenox   DIet: PEG feedings   Full Code    pending auth for snf

## 2019-12-04 NOTE — PROGRESS NOTE ADULT - REASON FOR ADMISSION
Generalized weakness and muscle pain

## 2019-12-04 NOTE — PROGRESS NOTE ADULT - ASSESSMENT
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  - dysphagia   -hypomagnesemia/hypophosphatemia = refeeding syndrome  - severe protein calorie malnutrition - massive wt loss, wasting of LBM, diminished ADL  continue with tube feeds jevity 1.2 at  360 ml x 3 feeds/d and 2 jessica packets   check bmp/phos/mg and correct lytes

## 2019-12-04 NOTE — PROGRESS NOTE ADULT - SUBJECTIVE AND OBJECTIVE BOX
NEIL PHOENIX 69y Male  MRN#: [id number]     SUBJECTIVE  Patient is a 69y old Male who presents with a chief complaint of Generalized weakness and muscle pain (03 Dec 2019 09:03)  Today is hospital day 13d, and this morning he is lying in bed without distress.   No acute overnight events.   feels well no chest pain no sob  tolerating feeds wel  explained that discharge delayed by insurance declining the rehab      OBJECTIVE  PAST MEDICAL & SURGICAL HISTORY  Gout  COPD, mild  Hypothyroid  S/P tonsillectomy    ALLERGIES:  No Known Allergies    MEDICATIONS:  STANDING MEDICATIONS  allopurinol 100 milliGRAM(s) Oral daily  amLODIPine   Tablet 2.5 milliGRAM(s) Oral daily  chlorhexidine 4% Liquid 1 Application(s) Topical <User Schedule>  enoxaparin Injectable 40 milliGRAM(s) SubCutaneous daily  folic acid 1 milliGRAM(s) Oral daily  influenza   Vaccine 0.5 milliLiter(s) IntraMuscular once  levothyroxine 25 MICROGram(s) Oral daily  magnesium oxide 400 milliGRAM(s) Oral three times a day with meals  methotrexate 15 milliGRAM(s) Oral <User Schedule>  mycophenolate mofetil Suspension 500 milliGRAM(s) Oral two times a day  pantoprazole   Suspension 40 milliGRAM(s) Oral daily  predniSONE   Tablet 60 milliGRAM(s) Oral daily  tiotropium 18 MICROgram(s) Capsule 1 Capsule(s) Inhalation daily  trimethoprim  160 mG/sulfamethoxazole 800 mG 1 Tablet(s) Oral <User Schedule>    PRN MEDICATIONS  acetaminophen   Tablet .. 650 milliGRAM(s) Oral every 6 hours PRN  ALBUTerol    90 MICROgram(s) HFA Inhaler 2 Puff(s) Inhalation every 6 hours PRN  albuterol/ipratropium for Nebulization 3 milliLiter(s) Nebulizer every 6 hours PRN    HOME MEDICATIONS  Home Medications:  acetaminophen 325 mg oral tablet: 2 tab(s) orally every 6 hours, As needed, Mild Pain (1 - 3) (02 Dec 2019 11:43)  albuterol 90 mcg/inh inhalation aerosol: 2 puff(s) inhaled every 6 hours, As needed, Shortness of Breath and/or Wheezing (02 Dec 2019 11:43)  allopurinol 100 mg oral tablet: 1 tab(s) orally once a day (02 Oct 2019 18:28)  amLODIPine 2.5 mg oral tablet: 1 tab(s) orally once a day (02 Dec 2019 11:43)  DuoNeb 0.5 mg-2.5 mg/3 mL inhalation solution: 3 milliliter(s) inhaled 3 times a day, As Needed (02 Oct 2019 18:28)  folic acid 1 mg oral tablet: 1 tab(s) orally once a day (02 Dec 2019 11:43)  levothyroxine 25 mcg (0.025 mg) oral tablet: 1 tab(s) orally once a day (02 Oct 2019 18:28)  magnesium oxide 400 mg (241.3 mg elemental magnesium) oral tablet: 1 tab(s) orally 3 times a day (with meals) (02 Dec 2019 11:43)  methotrexate 2.5 mg oral tablet: 6 tab(s) orally   once a week on friday (02 Dec 2019 13:40)  mycophenolate mofetil 200 mg/mL oral suspension: 500 milligram(s) by gastrostomy tube 2 times a day (02 Dec 2019 13:40)  pantoprazole 40 mg oral delayed release tablet: 1 tab(s) orally once a day (02 Oct 2019 18:28)  predniSONE 20 mg oral tablet: 2 tab(s) orally once a day in the morning  (21 Nov 2019 16:31)  predniSONE 20 mg oral tablet: 1 tab(s) orally once a day at night  (21 Nov 2019 16:32)  sulfamethoxazole-trimethoprim 800 mg-160 mg oral tablet: 1 tab(s) orally 3 times a week  for PCP prophylaxis (02 Dec 2019 13:41)      VITAL SIGNS: Last 24 Hours  T(C): 36.1 (04 Dec 2019 07:32), Max: 36.1 (03 Dec 2019 16:00)  T(F): 97 (04 Dec 2019 07:32), Max: 97 (04 Dec 2019 07:32)  HR: 98 (04 Dec 2019 07:32) (90 - 99)  BP: 107/62 (04 Dec 2019 07:32) (107/62 - 119/76)  BP(mean): --  RR: 18 (04 Dec 2019 07:32) (18 - 18)  SpO2: --      LABS:                        9.8    10.69 )-----------( 119      ( 04 Dec 2019 05:27 )             31.1     12-04    137  |  97<L>  |  16  ----------------------------<  67<L>  4.2   |  27  |  0.6<L>    Ca    7.7<L>      04 Dec 2019 05:27  Phos  2.7     12-04  Mg     1.9     12-04                      CAPILLARY BLOOD GLUCOSE      POCT Blood Glucose.: 93 mg/dL (04 Dec 2019 07:47)      RADIOLOGY:    PHYSICAL EXAM:  PHYSICAL EXAM:  GENERAL: NAD, AAO x 4, 69y M  CHEST/LUNG: Clear to auscultation bilaterally; No wheeze; No crackles; No accessory muscles used  HEART: Regular rate and rhythm; No murmurs;   ABDOMEN: Soft, Nontender, Nondistended; Bowel sounds present; No guarding peg site cklean  EXTREMITIES:  2+ Peripheral Pulses, No cyanosis or edema      ADMISSION SUMMARY  Patient is a 69y old Male who presents with a chief complaint of Generalized weakness and muscle pain (03 Dec 2019 09:03)

## 2019-12-04 NOTE — PROGRESS NOTE ADULT - SUBJECTIVE AND OBJECTIVE BOX
Patient is a 69y old  Male who presents with a chief complaint of Generalized weakness and muscle pain (04 Dec 2019 11:10)      Last 24 hrs:         ICU Vital Signs Last 24 Hrs  T(C): 36.1 (04 Dec 2019 07:32), Max: 36.1 (03 Dec 2019 16:00)  T(F): 97 (04 Dec 2019 07:32), Max: 97 (04 Dec 2019 07:32)  HR: 98 (04 Dec 2019 07:32) (90 - 99)  BP: 107/62 (04 Dec 2019 07:32) (107/62 - 119/76)  RR: 18 (04 Dec 2019 07:32) (18 - 18)    Drug Dosing Weight  Height (cm): 177.8 (26 Nov 2019 13:30)  Weight (kg): 54.4 (26 Nov 2019 13:30)  BMI (kg/m2): 17.2 (26 Nov 2019 13:30)  BSA (m2): 1.68 (26 Nov 2019 13:30)    I&O's Detail     PHYSICAL EXAM:  Constitutional: NAD, Alert & Oriented X3  +clavicular and temporal waisting  ABDOMEN: Soft, Nontender, Nondistended +Peg tube in place and abdominal binder  EXTREMITIES:no edema +decrease in LBM, cachectic  SKIN: No  lesions    MEDICATIONS  (STANDING):  allopurinol 100 milliGRAM(s) Oral daily  amLODIPine   Tablet 2.5 milliGRAM(s) Oral daily  chlorhexidine 4% Liquid 1 Application(s) Topical <User Schedule>  enoxaparin Injectable 40 milliGRAM(s) SubCutaneous daily  folic acid 1 milliGRAM(s) Oral daily  influenza   Vaccine 0.5 milliLiter(s) IntraMuscular once  levothyroxine 25 MICROGram(s) Oral daily  magnesium oxide 400 milliGRAM(s) Oral three times a day with meals  methotrexate 15 milliGRAM(s) Oral <User Schedule>  mycophenolate mofetil Suspension 500 milliGRAM(s) Oral two times a day  pantoprazole   Suspension 40 milliGRAM(s) Oral daily  predniSONE   Tablet 60 milliGRAM(s) Oral daily  tiotropium 18 MICROgram(s) Capsule 1 Capsule(s) Inhalation daily  trimethoprim  160 mG/sulfamethoxazole 800 mG 1 Tablet(s) Oral <User Schedule>      Diet, NPO with Tube Feed:   Tube Feeding Modality: Gastrostomy  Jevity 1.2 Mark  Total Volume for 24 Hours (mL): 1080  Bolus  Total Volume of Bolus (mL):  360  Tube Feed Frequency: Every 8 hours   Tube Feed Start Time: 07:00  Bolus Feed Rate (mL per Hour): 500   Bolus Feed Duration (in Hours): 0.75  Alonso(7 Gm Arginine/7 Gm Glut/1.2 Gm HMB     Qty per Day:  2 (11-27-19 @ 16:50)      LABS  12-04    137  |  97<L>  |  16  ----------------------------<  67<L>  4.2   |  27  |  0.6<L>    Ca    7.7<L>      04 Dec 2019 05:27  Phos  2.7     12-04  Mg     1.9     12-04                        9.8    10.69 )-----------( 119      ( 04 Dec 2019 05:27 )             31.1     CAPILLARY BLOOD GLUCOSE  POCT Blood Glucose.: 116 mg/dL (04 Dec 2019 11:56)  POCT Blood Glucose.: 93 mg/dL (04 Dec 2019 07:47)   RADIOLOGY STUDIES  < from: Xray Chest 1 View-PORTABLE IMMEDIATE (11.30.19 @ 09:31) >  Impression:    Bilateral interstitial opacities, worsened on the left.

## 2019-12-05 ENCOUNTER — OUTPATIENT (OUTPATIENT)
Dept: OUTPATIENT SERVICES | Facility: HOSPITAL | Age: 69
LOS: 1 days | Discharge: HOME | End: 2019-12-05

## 2019-12-05 DIAGNOSIS — R79.9 ABNORMAL FINDING OF BLOOD CHEMISTRY, UNSPECIFIED: ICD-10-CM

## 2019-12-05 DIAGNOSIS — Z90.89 ACQUIRED ABSENCE OF OTHER ORGANS: Chronic | ICD-10-CM

## 2019-12-06 DIAGNOSIS — K44.9 DIAPHRAGMATIC HERNIA WITHOUT OBSTRUCTION OR GANGRENE: ICD-10-CM

## 2019-12-06 DIAGNOSIS — M35.1 OTHER OVERLAP SYNDROMES: ICD-10-CM

## 2019-12-06 DIAGNOSIS — I45.2 BIFASCICULAR BLOCK: ICD-10-CM

## 2019-12-06 DIAGNOSIS — Z87.891 PERSONAL HISTORY OF NICOTINE DEPENDENCE: ICD-10-CM

## 2019-12-06 DIAGNOSIS — E88.09 OTHER DISORDERS OF PLASMA-PROTEIN METABOLISM, NOT ELSEWHERE CLASSIFIED: ICD-10-CM

## 2019-12-06 DIAGNOSIS — E43 UNSPECIFIED SEVERE PROTEIN-CALORIE MALNUTRITION: ICD-10-CM

## 2019-12-06 DIAGNOSIS — B37.81 CANDIDAL ESOPHAGITIS: ICD-10-CM

## 2019-12-06 DIAGNOSIS — J69.0 PNEUMONITIS DUE TO INHALATION OF FOOD AND VOMIT: ICD-10-CM

## 2019-12-06 DIAGNOSIS — E83.39 OTHER DISORDERS OF PHOSPHORUS METABOLISM: ICD-10-CM

## 2019-12-06 DIAGNOSIS — Z60.2 PROBLEMS RELATED TO LIVING ALONE: ICD-10-CM

## 2019-12-06 DIAGNOSIS — E87.6 HYPOKALEMIA: ICD-10-CM

## 2019-12-06 DIAGNOSIS — D72.829 ELEVATED WHITE BLOOD CELL COUNT, UNSPECIFIED: ICD-10-CM

## 2019-12-06 DIAGNOSIS — J44.9 CHRONIC OBSTRUCTIVE PULMONARY DISEASE, UNSPECIFIED: ICD-10-CM

## 2019-12-06 DIAGNOSIS — E03.9 HYPOTHYROIDISM, UNSPECIFIED: ICD-10-CM

## 2019-12-06 DIAGNOSIS — Z79.52 LONG TERM (CURRENT) USE OF SYSTEMIC STEROIDS: ICD-10-CM

## 2019-12-06 DIAGNOSIS — R00.0 TACHYCARDIA, UNSPECIFIED: ICD-10-CM

## 2019-12-06 DIAGNOSIS — M33.20 POLYMYOSITIS, ORGAN INVOLVEMENT UNSPECIFIED: ICD-10-CM

## 2019-12-06 DIAGNOSIS — E83.42 HYPOMAGNESEMIA: ICD-10-CM

## 2019-12-06 DIAGNOSIS — R60.0 LOCALIZED EDEMA: ICD-10-CM

## 2019-12-06 DIAGNOSIS — M06.9 RHEUMATOID ARTHRITIS, UNSPECIFIED: ICD-10-CM

## 2019-12-06 DIAGNOSIS — K29.70 GASTRITIS, UNSPECIFIED, WITHOUT BLEEDING: ICD-10-CM

## 2019-12-06 DIAGNOSIS — R13.13 DYSPHAGIA, PHARYNGEAL PHASE: ICD-10-CM

## 2019-12-06 DIAGNOSIS — I95.9 HYPOTENSION, UNSPECIFIED: ICD-10-CM

## 2019-12-06 DIAGNOSIS — M10.9 GOUT, UNSPECIFIED: ICD-10-CM

## 2019-12-06 DIAGNOSIS — I73.00 RAYNAUD'S SYNDROME WITHOUT GANGRENE: ICD-10-CM

## 2019-12-06 DIAGNOSIS — D64.9 ANEMIA, UNSPECIFIED: ICD-10-CM

## 2019-12-06 DIAGNOSIS — E11.9 TYPE 2 DIABETES MELLITUS WITHOUT COMPLICATIONS: ICD-10-CM

## 2019-12-06 DIAGNOSIS — E53.8 DEFICIENCY OF OTHER SPECIFIED B GROUP VITAMINS: ICD-10-CM

## 2019-12-06 SDOH — SOCIAL STABILITY - SOCIAL INSECURITY: PROBLEMS RELATED TO LIVING ALONE: Z60.2

## 2019-12-09 ENCOUNTER — INPATIENT (INPATIENT)
Facility: HOSPITAL | Age: 69
LOS: 3 days | End: 2019-12-13
Attending: HOSPITALIST | Admitting: HOSPITALIST
Payer: MEDICARE

## 2019-12-09 VITALS
HEART RATE: 110 BPM | SYSTOLIC BLOOD PRESSURE: 126 MMHG | DIASTOLIC BLOOD PRESSURE: 81 MMHG | RESPIRATION RATE: 28 BRPM | OXYGEN SATURATION: 84 %

## 2019-12-09 DIAGNOSIS — Z93.1 GASTROSTOMY STATUS: Chronic | ICD-10-CM

## 2019-12-09 DIAGNOSIS — Z90.89 ACQUIRED ABSENCE OF OTHER ORGANS: Chronic | ICD-10-CM

## 2019-12-09 LAB
ALBUMIN SERPL ELPH-MCNC: 3.2 G/DL — LOW (ref 3.5–5.2)
ALP SERPL-CCNC: 87 U/L — SIGNIFICANT CHANGE UP (ref 30–115)
ALT FLD-CCNC: 20 U/L — SIGNIFICANT CHANGE UP (ref 0–41)
ANION GAP SERPL CALC-SCNC: 18 MMOL/L — HIGH (ref 7–14)
AST SERPL-CCNC: 27 U/L — SIGNIFICANT CHANGE UP (ref 0–41)
BASE EXCESS BLDV CALC-SCNC: 7.3 MMOL/L — HIGH (ref -2–2)
BASOPHILS # BLD AUTO: 0.01 K/UL — SIGNIFICANT CHANGE UP (ref 0–0.2)
BASOPHILS NFR BLD AUTO: 0.1 % — SIGNIFICANT CHANGE UP (ref 0–1)
BILIRUB SERPL-MCNC: 0.4 MG/DL — SIGNIFICANT CHANGE UP (ref 0.2–1.2)
BUN SERPL-MCNC: 27 MG/DL — HIGH (ref 10–20)
CA-I SERPL-SCNC: 1.17 MMOL/L — SIGNIFICANT CHANGE UP (ref 1.12–1.3)
CALCIUM SERPL-MCNC: 8.6 MG/DL — SIGNIFICANT CHANGE UP (ref 8.5–10.1)
CHLORIDE SERPL-SCNC: 95 MMOL/L — LOW (ref 98–110)
CO2 SERPL-SCNC: 25 MMOL/L — SIGNIFICANT CHANGE UP (ref 17–32)
CREAT SERPL-MCNC: 0.6 MG/DL — LOW (ref 0.7–1.5)
EOSINOPHIL # BLD AUTO: 0 K/UL — SIGNIFICANT CHANGE UP (ref 0–0.7)
EOSINOPHIL NFR BLD AUTO: 0 % — SIGNIFICANT CHANGE UP (ref 0–8)
GAS PNL BLDV: 137 MMOL/L — SIGNIFICANT CHANGE UP (ref 136–145)
GAS PNL BLDV: SIGNIFICANT CHANGE UP
GLUCOSE SERPL-MCNC: 128 MG/DL — HIGH (ref 70–99)
HCO3 BLDV-SCNC: 33 MMOL/L — HIGH (ref 22–29)
HCT VFR BLD CALC: 34 % — LOW (ref 42–52)
HCT VFR BLDA CALC: 35.3 % — SIGNIFICANT CHANGE UP (ref 34–44)
HGB BLD CALC-MCNC: 11.5 G/DL — LOW (ref 14–18)
HGB BLD-MCNC: 10.4 G/DL — LOW (ref 14–18)
IMM GRANULOCYTES NFR BLD AUTO: 0.8 % — HIGH (ref 0.1–0.3)
LACTATE BLDV-MCNC: 1.3 MMOL/L — SIGNIFICANT CHANGE UP (ref 0.5–1.6)
LACTATE SERPL-SCNC: 1.6 MMOL/L — SIGNIFICANT CHANGE UP (ref 0.7–2)
LYMPHOCYTES # BLD AUTO: 0.6 K/UL — LOW (ref 1.2–3.4)
LYMPHOCYTES # BLD AUTO: 6.1 % — LOW (ref 20.5–51.1)
MCHC RBC-ENTMCNC: 29.7 PG — SIGNIFICANT CHANGE UP (ref 27–31)
MCHC RBC-ENTMCNC: 30.6 G/DL — LOW (ref 32–37)
MCV RBC AUTO: 97.1 FL — HIGH (ref 80–94)
MONOCYTES # BLD AUTO: 0.33 K/UL — SIGNIFICANT CHANGE UP (ref 0.1–0.6)
MONOCYTES NFR BLD AUTO: 3.3 % — SIGNIFICANT CHANGE UP (ref 1.7–9.3)
NEUTROPHILS # BLD AUTO: 8.85 K/UL — HIGH (ref 1.4–6.5)
NEUTROPHILS NFR BLD AUTO: 89.7 % — HIGH (ref 42.2–75.2)
NRBC # BLD: 0 /100 WBCS — SIGNIFICANT CHANGE UP (ref 0–0)
NT-PROBNP SERPL-SCNC: 3676 PG/ML — HIGH (ref 0–300)
PCO2 BLDV: 50 MMHG — SIGNIFICANT CHANGE UP (ref 41–51)
PH BLDV: 7.42 — SIGNIFICANT CHANGE UP (ref 7.26–7.43)
PLATELET # BLD AUTO: 305 K/UL — SIGNIFICANT CHANGE UP (ref 130–400)
PO2 BLDV: 17 MMHG — LOW (ref 20–40)
POTASSIUM BLDV-SCNC: 4 MMOL/L — SIGNIFICANT CHANGE UP (ref 3.3–5.6)
POTASSIUM SERPL-MCNC: 4.3 MMOL/L — SIGNIFICANT CHANGE UP (ref 3.5–5)
POTASSIUM SERPL-SCNC: 4.3 MMOL/L — SIGNIFICANT CHANGE UP (ref 3.5–5)
PROT SERPL-MCNC: 6 G/DL — SIGNIFICANT CHANGE UP (ref 6–8)
RBC # BLD: 3.5 M/UL — LOW (ref 4.7–6.1)
RBC # FLD: 20 % — HIGH (ref 11.5–14.5)
SAO2 % BLDV: 17 % — SIGNIFICANT CHANGE UP
SODIUM SERPL-SCNC: 138 MMOL/L — SIGNIFICANT CHANGE UP (ref 135–146)
TROPONIN T SERPL-MCNC: 0.08 NG/ML — CRITICAL HIGH
WBC # BLD: 9.87 K/UL — SIGNIFICANT CHANGE UP (ref 4.8–10.8)
WBC # FLD AUTO: 9.87 K/UL — SIGNIFICANT CHANGE UP (ref 4.8–10.8)

## 2019-12-09 PROCEDURE — 93010 ELECTROCARDIOGRAM REPORT: CPT

## 2019-12-09 PROCEDURE — 71045 X-RAY EXAM CHEST 1 VIEW: CPT | Mod: 26

## 2019-12-09 PROCEDURE — 99053 MED SERV 10PM-8AM 24 HR FAC: CPT

## 2019-12-09 PROCEDURE — 99291 CRITICAL CARE FIRST HOUR: CPT

## 2019-12-09 RX ORDER — ALLOPURINOL 300 MG
1 TABLET ORAL
Qty: 0 | Refills: 0 | DISCHARGE

## 2019-12-09 RX ORDER — IPRATROPIUM/ALBUTEROL SULFATE 18-103MCG
3 AEROSOL WITH ADAPTER (GRAM) INHALATION
Refills: 0 | Status: COMPLETED | OUTPATIENT
Start: 2019-12-09 | End: 2019-12-09

## 2019-12-09 RX ORDER — TIOTROPIUM BROMIDE 18 UG/1
2.5 CAPSULE ORAL; RESPIRATORY (INHALATION)
Qty: 0 | Refills: 0 | DISCHARGE

## 2019-12-09 RX ORDER — IPRATROPIUM/ALBUTEROL SULFATE 18-103MCG
3 AEROSOL WITH ADAPTER (GRAM) INHALATION
Qty: 0 | Refills: 0 | DISCHARGE

## 2019-12-09 RX ORDER — ZINC OXIDE 200 MG/G
0 OINTMENT TOPICAL
Qty: 0 | Refills: 0 | DISCHARGE

## 2019-12-09 RX ORDER — MORPHINE SULFATE 50 MG/1
4 CAPSULE, EXTENDED RELEASE ORAL ONCE
Refills: 0 | Status: DISCONTINUED | OUTPATIENT
Start: 2019-12-09 | End: 2019-12-09

## 2019-12-09 RX ADMIN — Medication 3 MILLILITER(S): at 22:39

## 2019-12-09 RX ADMIN — Medication 3 MILLILITER(S): at 23:05

## 2019-12-09 RX ADMIN — MORPHINE SULFATE 4 MILLIGRAM(S): 50 CAPSULE, EXTENDED RELEASE ORAL at 23:30

## 2019-12-09 RX ADMIN — Medication 3 MILLILITER(S): at 22:25

## 2019-12-09 NOTE — ED ADULT NURSE REASSESSMENT NOTE - NS ED NURSE REASSESS COMMENT FT1
Pt c/o sudden left chest pain at ~ 22:35. Dr. CARMEN Reinoso re: pt's chest pain. MD came to re-evaluate pt.

## 2019-12-09 NOTE — ED ADULT NURSE NOTE - INTERVENTIONS DEFINITIONS
[place] to call system/Instruct patient to call for assistance/Non-slip footwear when patient is off stretcher/Call bell, personal items and telephone within reach/Reinforce activity limits and safety measures with patient and family/Room bathroom lighting operational/Stretcher in lowest position, wheels locked, appropriate side rails in place/Monitor gait and stability/Physically safe environment: no spills, clutter or unnecessary equipment/Provide visual clues: red socks/Provide visual cue, wrist band, yellow gown, etc./Monitor for mental status changes and reorient to person, place, and time/Review medications for side effects contributing to fall risk

## 2019-12-09 NOTE — ED PROVIDER NOTE - CLINICAL SUMMARY MEDICAL DECISION MAKING FREE TEXT BOX
I personally evaluated the patient. I reviewed the Resident’s or Physician Assistant’s note (as assigned above), and agree with the findings and plan except as documented in my note. patient evaluated for sob and new illeus, patient evaluated by surgery bedside, requested to have CT scan, patient not a candidate for oral contrast, + risk of vomiting and aspiration when lying flat, patient peg connected to ahn to drain, patient feels better after contents removed, ct displays illeus, as per surgery, admit to medicine, patient also not in new onset afib, rate controlled after adequate fluid therapy, patient endorsed and accepted to icu. ICu team bedside in the ED

## 2019-12-09 NOTE — ED PROVIDER NOTE - ATTENDING CONTRIBUTION TO CARE
65 year old male, pmhx of many medical problems as listed in the chart, + history of peg tube for esophageal stenosis, patient come sin with sob, found to be mildly hypoxic upon arrival, patient denies hemoptysis, no loc, no fever.     CONSTITUTIONAL: Well-developed; well-nourished; + mild tachypnea, + speaking in full sentences. Sitting up and providing appropriate history and physical examination  SKIN: skin exam is warm and dry, no acute rash.  HEAD: Normocephalic; atraumatic.  EYES: PERRL, 3 mm bilateral, no nystagmus, EOM intact; conjunctiva and sclera clear.  ENT: No nasal discharge; airway clear.  NECK: Supple; non tender. + full passive ROM in all directions. No JVD  CARD: S1, S2 normal; no murmurs, gallops, or rubs. Regular rate and rhythm. + Symmetric Strong Pulses  RESP: No wheezes, rales or rhonchi. Good air movement bilaterally  ABD: + tender and distended abdomen. No Rebound,, No signs of peritonitis, No CVA tenderness. No pulsatile abdominal mass. + Strong and Symmetric Pulses  EXT: Normal ROM. No clubbing, cyanosis or edema. Dp and Pt Pulses intact. Cap refill less than 3 seconds  NEURO:  Alert, oriented, grossly unremarkable. No Focal deficits. GCS 15. NIH 0  PSYCH: Cooperative, appropriate.

## 2019-12-09 NOTE — ED PROVIDER NOTE - PROGRESS NOTE DETAILS
CARMENAndharia: patient found to have dilated loops of large bowel on CXR, additional abdominal x-rays were obtained and surgical team was notified. Patient's PEG tube was opened to gravity, approximately 100ccs of brown fluid came out. Surgical team at bedside, advised to obtain CT abd with contrast through PEG tube - which showed an ileus and stated no intervention at this time. Patient is currently persistently tachycardic, repeat EKG shows new onset afib with RVR, will give 10mg of cardizem and start drip. Will discuss with surgery regarding starting anticoagulation vs possible surgery in the near future. Will admit patient to MICU.

## 2019-12-09 NOTE — ED PROVIDER NOTE - CARE PLAN
Principal Discharge DX:	Respiratory distress  Secondary Diagnosis:	Ileus  Secondary Diagnosis:	New onset atrial fibrillation Principal Discharge DX:	Ileus  Secondary Diagnosis:	New onset atrial fibrillation

## 2019-12-09 NOTE — ED ADULT NURSE REASSESSMENT NOTE - NS ED NURSE REASSESS COMMENT FT1
Pt reassessed. Pt states breathing slightly better. Chest pain subsiding. No new complaints. Pt tolerating 40% Fi02 on Bipap. Maintained cardiac monitoring, pulse oximetry & BP monitoring. HOB remained elevated at 60 degrees. Warm blankets & pillow given. Pt updated re: plan of care and pending test results. Will continue to monitor pt and provide care as planned.

## 2019-12-09 NOTE — ED PROVIDER NOTE - PHYSICAL EXAMINATION
CONSTITUTIONAL: cachectic appearing elderly male, moderate respiratory distress  SKIN: warm, dry  HEAD: Normocephalic; atraumatic.  EYES: no conj injection  ENT: No nasal discharge; airway clear.  NECK: Supple; non tender.  CARD: S1, S2 normal; no murmurs, gallops, or rubs. +tachycardia  RESP: + tachypneic, rales b/l, moderate respiratory distress speaking in 2-3 words  ABD: soft ntnd; + g tube in place   EXT: + cyanotic finger tips  NEURO: Alert, oriented, grossly unremarkable  PSYCH: Cooperative, appropriate.

## 2019-12-09 NOTE — ED PROVIDER NOTE - NS ED ROS FT
Constitutional: See HPI.  Eyes: No visual changes, eye pain or discharge.  ENMT: No hearing changes, pain, discharge or infections. No neck pain or stiffness.  Cardiac: +SOB; No chest pain, or edema. No chest pain with exertion.  Respiratory: +respiratory distress; No cough  GI: No nausea, vomiting, diarrhea or abdominal pain.  : No dysuria, frequency or burning.  MS: No myalgia, muscle weakness, joint pain or back pain.  Neuro: No headache or weakness. No LOC.  Skin: No skin rash.  Endo: No hx of DM, thyroid disease  Except as documented in HPI, all other review of systems is negative

## 2019-12-09 NOTE — ED PROVIDER NOTE - OBJECTIVE STATEMENT
68 y/o male with pmhx of COPD, dermatomyositis presents with respiratory distress. Patient is brought in from nursing home, poor historian. Per EMS, patient usually uses 4L NC, however was found to be hypoxic to 80s on 4L NC - improved on NRB. Patient admits to chest pain that started in the ED. Patient was recently discharged from hospital for evaluation of dysphagia. Denies recent illness, URI, fevers/chills, n/v/d, abdominal pain, leg swelling.

## 2019-12-10 LAB
ALBUMIN SERPL ELPH-MCNC: 2.6 G/DL — LOW (ref 3.5–5.2)
ALP SERPL-CCNC: 65 U/L — SIGNIFICANT CHANGE UP (ref 30–115)
ALT FLD-CCNC: 17 U/L — SIGNIFICANT CHANGE UP (ref 0–41)
ANION GAP SERPL CALC-SCNC: 14 MMOL/L — SIGNIFICANT CHANGE UP (ref 7–14)
APTT BLD: 27.3 SEC — SIGNIFICANT CHANGE UP (ref 27–39.2)
AST SERPL-CCNC: 26 U/L — SIGNIFICANT CHANGE UP (ref 0–41)
BASOPHILS # BLD AUTO: 0.01 K/UL — SIGNIFICANT CHANGE UP (ref 0–0.2)
BASOPHILS NFR BLD AUTO: 0.1 % — SIGNIFICANT CHANGE UP (ref 0–1)
BILIRUB SERPL-MCNC: 0.3 MG/DL — SIGNIFICANT CHANGE UP (ref 0.2–1.2)
BLD GP AB SCN SERPL QL: SIGNIFICANT CHANGE UP
BUN SERPL-MCNC: 27 MG/DL — HIGH (ref 10–20)
CALCIUM SERPL-MCNC: 8 MG/DL — LOW (ref 8.5–10.1)
CHLORIDE SERPL-SCNC: 96 MMOL/L — LOW (ref 98–110)
CK MB CFR SERPL CALC: 4.9 NG/ML — SIGNIFICANT CHANGE UP (ref 0.6–6.3)
CK SERPL-CCNC: 86 U/L — SIGNIFICANT CHANGE UP (ref 0–225)
CO2 SERPL-SCNC: 26 MMOL/L — SIGNIFICANT CHANGE UP (ref 17–32)
CREAT SERPL-MCNC: 0.7 MG/DL — SIGNIFICANT CHANGE UP (ref 0.7–1.5)
EOSINOPHIL # BLD AUTO: 0 K/UL — SIGNIFICANT CHANGE UP (ref 0–0.7)
EOSINOPHIL NFR BLD AUTO: 0 % — SIGNIFICANT CHANGE UP (ref 0–8)
GAS PNL BLDA: SIGNIFICANT CHANGE UP
GLUCOSE SERPL-MCNC: 75 MG/DL — SIGNIFICANT CHANGE UP (ref 70–99)
HCT VFR BLD CALC: 33.4 % — LOW (ref 42–52)
HGB BLD-MCNC: 10.6 G/DL — LOW (ref 14–18)
IMM GRANULOCYTES NFR BLD AUTO: 0.4 % — HIGH (ref 0.1–0.3)
INR BLD: 1.13 RATIO — SIGNIFICANT CHANGE UP (ref 0.65–1.3)
LYMPHOCYTES # BLD AUTO: 0.53 K/UL — LOW (ref 1.2–3.4)
LYMPHOCYTES # BLD AUTO: 4.9 % — LOW (ref 20.5–51.1)
MAGNESIUM SERPL-MCNC: 1.8 MG/DL — SIGNIFICANT CHANGE UP (ref 1.8–2.4)
MCHC RBC-ENTMCNC: 30.4 PG — SIGNIFICANT CHANGE UP (ref 27–31)
MCHC RBC-ENTMCNC: 31.7 G/DL — LOW (ref 32–37)
MCV RBC AUTO: 95.7 FL — HIGH (ref 80–94)
MONOCYTES # BLD AUTO: 0.2 K/UL — SIGNIFICANT CHANGE UP (ref 0.1–0.6)
MONOCYTES NFR BLD AUTO: 1.8 % — SIGNIFICANT CHANGE UP (ref 1.7–9.3)
NEUTROPHILS # BLD AUTO: 10.05 K/UL — HIGH (ref 1.4–6.5)
NEUTROPHILS NFR BLD AUTO: 92.8 % — HIGH (ref 42.2–75.2)
NRBC # BLD: 0 /100 WBCS — SIGNIFICANT CHANGE UP (ref 0–0)
PHOSPHATE SERPL-MCNC: 4 MG/DL — SIGNIFICANT CHANGE UP (ref 2.1–4.9)
PLATELET # BLD AUTO: 300 K/UL — SIGNIFICANT CHANGE UP (ref 130–400)
POTASSIUM SERPL-MCNC: 4.6 MMOL/L — SIGNIFICANT CHANGE UP (ref 3.5–5)
POTASSIUM SERPL-SCNC: 4.6 MMOL/L — SIGNIFICANT CHANGE UP (ref 3.5–5)
PROT SERPL-MCNC: 4.9 G/DL — LOW (ref 6–8)
PROTHROM AB SERPL-ACNC: 13 SEC — HIGH (ref 9.95–12.87)
RBC # BLD: 3.49 M/UL — LOW (ref 4.7–6.1)
RBC # FLD: 20 % — HIGH (ref 11.5–14.5)
SODIUM SERPL-SCNC: 136 MMOL/L — SIGNIFICANT CHANGE UP (ref 135–146)
T4 AB SER-ACNC: 3.2 UG/DL — LOW (ref 4.6–12)
TROPONIN T SERPL-MCNC: 0.09 NG/ML — CRITICAL HIGH
TSH SERPL-MCNC: 13.2 UIU/ML — HIGH (ref 0.27–4.2)
WBC # BLD: 10.83 K/UL — HIGH (ref 4.8–10.8)
WBC # FLD AUTO: 10.83 K/UL — HIGH (ref 4.8–10.8)

## 2019-12-10 PROCEDURE — 99223 1ST HOSP IP/OBS HIGH 75: CPT

## 2019-12-10 PROCEDURE — 71260 CT THORAX DX C+: CPT | Mod: 26

## 2019-12-10 PROCEDURE — 93970 EXTREMITY STUDY: CPT | Mod: 26

## 2019-12-10 PROCEDURE — 99221 1ST HOSP IP/OBS SF/LOW 40: CPT

## 2019-12-10 PROCEDURE — 99497 ADVNCD CARE PLAN 30 MIN: CPT | Mod: 25

## 2019-12-10 PROCEDURE — 74018 RADEX ABDOMEN 1 VIEW: CPT | Mod: 26

## 2019-12-10 PROCEDURE — 93010 ELECTROCARDIOGRAM REPORT: CPT

## 2019-12-10 PROCEDURE — 74177 CT ABD & PELVIS W/CONTRAST: CPT | Mod: 26

## 2019-12-10 RX ORDER — HYDROCORTISONE 20 MG
100 TABLET ORAL ONCE
Refills: 0 | Status: COMPLETED | OUTPATIENT
Start: 2019-12-10 | End: 2019-12-10

## 2019-12-10 RX ORDER — MORPHINE SULFATE 50 MG/1
2 CAPSULE, EXTENDED RELEASE ORAL
Refills: 0 | Status: DISCONTINUED | OUTPATIENT
Start: 2019-12-10 | End: 2019-12-13

## 2019-12-10 RX ORDER — ONDANSETRON 8 MG/1
4 TABLET, FILM COATED ORAL ONCE
Refills: 0 | Status: COMPLETED | OUTPATIENT
Start: 2019-12-10 | End: 2019-12-10

## 2019-12-10 RX ORDER — SODIUM CHLORIDE 9 MG/ML
1000 INJECTION INTRAMUSCULAR; INTRAVENOUS; SUBCUTANEOUS ONCE
Refills: 0 | Status: COMPLETED | OUTPATIENT
Start: 2019-12-10 | End: 2019-12-10

## 2019-12-10 RX ORDER — SODIUM CHLORIDE 9 MG/ML
2000 INJECTION, SOLUTION INTRAVENOUS ONCE
Refills: 0 | Status: COMPLETED | OUTPATIENT
Start: 2019-12-10 | End: 2019-12-10

## 2019-12-10 RX ORDER — HEPARIN SODIUM 5000 [USP'U]/ML
5000 INJECTION INTRAVENOUS; SUBCUTANEOUS EVERY 8 HOURS
Refills: 0 | Status: DISCONTINUED | OUTPATIENT
Start: 2019-12-10 | End: 2019-12-10

## 2019-12-10 RX ORDER — VANCOMYCIN HCL 1 G
1500 VIAL (EA) INTRAVENOUS ONCE
Refills: 0 | Status: COMPLETED | OUTPATIENT
Start: 2019-12-10 | End: 2019-12-10

## 2019-12-10 RX ORDER — VANCOMYCIN HCL 1 G
1000 VIAL (EA) INTRAVENOUS EVERY 12 HOURS
Refills: 0 | Status: DISCONTINUED | OUTPATIENT
Start: 2019-12-10 | End: 2019-12-10

## 2019-12-10 RX ORDER — MEROPENEM 1 G/30ML
1000 INJECTION INTRAVENOUS EVERY 8 HOURS
Refills: 0 | Status: DISCONTINUED | OUTPATIENT
Start: 2019-12-10 | End: 2019-12-10

## 2019-12-10 RX ORDER — DILTIAZEM HCL 120 MG
5 CAPSULE, EXT RELEASE 24 HR ORAL
Qty: 125 | Refills: 0 | Status: DISCONTINUED | OUTPATIENT
Start: 2019-12-10 | End: 2019-12-10

## 2019-12-10 RX ORDER — CHLORHEXIDINE GLUCONATE 213 G/1000ML
1 SOLUTION TOPICAL
Refills: 0 | Status: DISCONTINUED | OUTPATIENT
Start: 2019-12-10 | End: 2019-12-13

## 2019-12-10 RX ORDER — DILTIAZEM HCL 120 MG
10 CAPSULE, EXT RELEASE 24 HR ORAL ONCE
Refills: 0 | Status: COMPLETED | OUTPATIENT
Start: 2019-12-10 | End: 2019-12-10

## 2019-12-10 RX ORDER — BUDESONIDE AND FORMOTEROL FUMARATE DIHYDRATE 160; 4.5 UG/1; UG/1
2 AEROSOL RESPIRATORY (INHALATION)
Refills: 0 | Status: DISCONTINUED | OUTPATIENT
Start: 2019-12-10 | End: 2019-12-13

## 2019-12-10 RX ORDER — IPRATROPIUM/ALBUTEROL SULFATE 18-103MCG
3 AEROSOL WITH ADAPTER (GRAM) INHALATION EVERY 6 HOURS
Refills: 0 | Status: DISCONTINUED | OUTPATIENT
Start: 2019-12-10 | End: 2019-12-13

## 2019-12-10 RX ORDER — MORPHINE SULFATE 50 MG/1
4 CAPSULE, EXTENDED RELEASE ORAL ONCE
Refills: 0 | Status: DISCONTINUED | OUTPATIENT
Start: 2019-12-10 | End: 2019-12-10

## 2019-12-10 RX ORDER — VANCOMYCIN HCL 1 G
1500 VIAL (EA) INTRAVENOUS EVERY 12 HOURS
Refills: 0 | Status: DISCONTINUED | OUTPATIENT
Start: 2019-12-10 | End: 2019-12-10

## 2019-12-10 RX ORDER — MORPHINE SULFATE 50 MG/1
2 CAPSULE, EXTENDED RELEASE ORAL EVERY 4 HOURS
Refills: 0 | Status: DISCONTINUED | OUTPATIENT
Start: 2019-12-10 | End: 2019-12-12

## 2019-12-10 RX ORDER — MORPHINE SULFATE 50 MG/1
5 CAPSULE, EXTENDED RELEASE ORAL
Refills: 0 | Status: DISCONTINUED | OUTPATIENT
Start: 2019-12-10 | End: 2019-12-10

## 2019-12-10 RX ADMIN — Medication 5 MG/HR: at 02:57

## 2019-12-10 RX ADMIN — SODIUM CHLORIDE 4000 MILLILITER(S): 9 INJECTION INTRAMUSCULAR; INTRAVENOUS; SUBCUTANEOUS at 03:00

## 2019-12-10 RX ADMIN — HEPARIN SODIUM 5000 UNIT(S): 5000 INJECTION INTRAVENOUS; SUBCUTANEOUS at 06:58

## 2019-12-10 RX ADMIN — ONDANSETRON 4 MILLIGRAM(S): 8 TABLET, FILM COATED ORAL at 00:49

## 2019-12-10 RX ADMIN — Medication 100 MILLIGRAM(S): at 08:37

## 2019-12-10 RX ADMIN — Medication 10 MILLIGRAM(S): at 00:51

## 2019-12-10 RX ADMIN — Medication 300 MILLIGRAM(S): at 09:06

## 2019-12-10 RX ADMIN — Medication 3 MILLILITER(S): at 23:44

## 2019-12-10 RX ADMIN — MORPHINE SULFATE 4 MILLIGRAM(S): 50 CAPSULE, EXTENDED RELEASE ORAL at 00:15

## 2019-12-10 RX ADMIN — MORPHINE SULFATE 2 MILLIGRAM(S): 50 CAPSULE, EXTENDED RELEASE ORAL at 22:57

## 2019-12-10 RX ADMIN — SODIUM CHLORIDE 2000 MILLILITER(S): 9 INJECTION, SOLUTION INTRAVENOUS at 01:00

## 2019-12-10 RX ADMIN — Medication 5 MG/HR: at 14:49

## 2019-12-10 RX ADMIN — MORPHINE SULFATE 4 MILLIGRAM(S): 50 CAPSULE, EXTENDED RELEASE ORAL at 01:41

## 2019-12-10 RX ADMIN — MORPHINE SULFATE 2 MILLIGRAM(S): 50 CAPSULE, EXTENDED RELEASE ORAL at 23:37

## 2019-12-10 RX ADMIN — SODIUM CHLORIDE 6000 MILLILITER(S): 9 INJECTION, SOLUTION INTRAVENOUS at 07:47

## 2019-12-10 RX ADMIN — MORPHINE SULFATE 5 MILLIGRAM(S): 50 CAPSULE, EXTENDED RELEASE ORAL at 14:26

## 2019-12-10 RX ADMIN — SODIUM CHLORIDE 2000 MILLILITER(S): 9 INJECTION, SOLUTION INTRAVENOUS at 00:49

## 2019-12-10 NOTE — CONSULT NOTE ADULT - ASSESSMENT
68 y/o male with pmhx COPD (not on home oxygen), hypothyroidism, gout, RA, dermatomyositis/ scleroderma overlap sd  (on prednisone /MMF/ methotrexate), hx GOO in 10/2019 and PEG tube plct on 11/2019 was sent in from NH for SOB and hypoxia. Pt found to have distended abdomen and ileus on CT scan with pneumatosis intestinalis and portal venous gas     #Ileus and pneumatosis intestinalis with portal venous gas/ Increased lactate  rule out mesenteric ischemia  rec:  IV hydration   IV abx with anaerobic coverage   Surgery follow up   Keep NPO   Please put peg tube to gravity   Follow BMP and correct electrolytes   Daily lactate and ABG   PPI once daily   Daily KUB     # SOB with multifocal pneumonia on CT chest   IV abx   On BIPAP   ICU monitoring     # New onset Afib   Started on cardizem   Follow with cardiology 68 y/o male with pmhx COPD (not on home oxygen), hypothyroidism, gout, RA, dermatomyositis/ scleroderma overlap sd  (on prednisone /MMF/ methotrexate), hx GOO in 10/2019 and PEG tube plct on 11/2019 was sent in from NH for SOB and hypoxia. Pt found to have distended abdomen and ileus on CT scan with pneumatosis intestinalis and portal venous gas     #Ileus and pneumatosis intestinalis with portal venous gas/ Increased lactate  rule out mesenteric ischemia  rec:  IV hydration   IV abx with anaerobic coverage   Surgery follow up  Keep NPO   Please put peg tube to gravity   Follow BMP and correct electrolytes   Daily lactate and ABG   PPI once daily   Daily KUB     # SOB with multifocal pneumonia on CT chest   IV abx   On BIPAP   ICU monitoring     # New onset Afib   Started on cardizem   Follow with cardiology 68 y/o male with pmhx COPD (not on home oxygen), hypothyroidism, gout, RA, dermatomyositis/ scleroderma overlap sd  (on prednisone /MMF/ methotrexate), hx GOO in 10/2019 and PEG tube plct on 11/2019 was sent in from NH for SOB and hypoxia. Pt found to have distended abdomen and ileus on CT scan with pneumatosis intestinalis and portal venous gas     #Ileus and pneumatosis intestinalis (in the setting of COPD/ steroid intake) with portal venous gas/ Increased lactate concerning for impending mesenteric ischemia  rec:  IV hydration   IV abx with anaerobic coverage   Surgery follow up  Keep NPO   Please put peg tube to gravity   Follow BMP and correct electrolytes   Daily lactate and ABG   PPI once daily   Daily KUB     # SOB with multifocal pneumonia on CT chest   IV abx   On BIPAP   ICU monitoring     # New onset Afib   Started on cardizem   Follow with cardiology

## 2019-12-10 NOTE — CONSULT NOTE ADULT - ASSESSMENT
IMPRESSION:    Acute hypoxemic respiratory failure  Possible recurrent aspirations  HO Polymyositis   Ileus     PLAN:    CNS:  NO depressants     HEENT: Oral care.  Aspiration precautions     PULMONARY:  HOB @ 45 degrees.  Wean O2 as tolerated     CARDIOVASCULAR:  IVF bolus.  Wean Cardizem.  FU With Cardiology     GI: GI prophylaxis.  NPO>  PEG to gravity.  FU With Surgery and GI     RENAL:  Follow up lytes.  Correct as needed    INFECTIOUS DISEASE: Follow up cultures.  Renata and Vanc for now     HEMATOLOGICAL:  DVT prophylaxis.  LE Duplex     ENDOCRINE:  Follow up FS.  Insulin protocol if needed.  Stress doses steroids     MUSCULOSKELETAL:  Bed chair position    Overall prognosis poor    Advance directives

## 2019-12-10 NOTE — H&P ADULT - NSHPLABSRESULTS_GEN_ALL_CORE
Christopher Hinds, DO   You 2 hours ago (11:47 AM)      Noted I spoke with radiology tech and asked he be sent down to the clinic after his CT so we can get an him IV fluids after his imaging. Please get him on the schedule when Madiha is here for a 30 minute appointment he can be double booked.     Routing Comment      Left message for patient to return call.     10.4   9.87  )-----------( [phone]      ( 09 Dec 2019 22:20 )             34.0     12-09    138  |  95<L>  |  27<H>  ----------------------------<  128<H>  4.3   |  25  |  0.6<L>    Ca    8.6      09 Dec 2019 22:20    TPro  6.0  /  Alb  3.2<L>  /  TBili  0.4  /  DBili  x   /  AST  27  /  ALT  20  /  AlkPhos  87  12-09

## 2019-12-10 NOTE — CONSULT NOTE ADULT - ASSESSMENT
Consult Summary  69 year old with Dermatomyositis; Has G-tube since last hospitalization that was placed for chronic ileus and dysphagia; Scleredema; Gout; COPD, mild Hypothyroid. This admission for hypoxia. Work revealed new-onset AF, ileus vs obstruction, no evidence of severe abdominal pain that will indicated mesenteric ischemia; multifocal pneumonia.     symptoms - pain that is indicative of esophageal dysmotility; dyspnea at times relieved with bipap        Morphine Equivalent Daily Dose (MEDD): 0      Over 20 mintues minutes spent discussing Advance Care Planning/goals of care with patient and his HCP - Dr. Su. Patient understands poor prognosis and prefers that details of decisions be done between health care team and Dr. Su. Patient is working with his  to get his affairs in order. Patient will likely decline and die in hospital, but may plateau.     Recommendations:    CMO/DNR/I    Roxanol 5mg SL x 1  Morphine 2mg IVP every 4H ATC and every 1H PRN for pain/dyspnea  Ativan 0.5 mg IVP every 4H PRN for aggitation/anxiety  DC cardizem  metoprolol per primary team to address palpitations     Deprescribe:   No blood work, IV, pulse ox, VS, abx, IVF, escalation of care.     If plateaus then hospice consult.     Please Call [phone] PRN Consult Summary  69 year old with Dermatomyositis; Has G-tube since last hospitalization that was placed for chronic ileus and dysphagia; Scleredema; Gout; COPD, mild Hypothyroid. This admission for hypoxia. Work revealed new-onset AF, ileus vs obstruction, no evidence of severe abdominal pain that will indicated mesenteric ischemia; multifocal pneumonia.     symptoms - pain that is indicative of esophageal dysmotility; dyspnea at times relieved with bipap        Morphine Equivalent Daily Dose (MEDD): 0      Over 20 mintues minutes spent discussing Advance Care Planning/goals of care with patient and his HCP - Dr. Su. Patient understands poor prognosis and prefers that details of decisions be done between health care team and Dr. Su. Patient is working with his  to get his affairs in order. Patient will likely decline and die in hospital, but may plateau.     Recommendations:    CMO/DNR/I    Roxanol 5mg SL x 1  Morphine 2mg IVP every 4H ATC and every 1H PRN for pain/dyspnea  Ativan 0.5 mg IVP every 4H PRN for aggitation/anxiety  DC cardizem  IV metoprolol for rate control / relief of palpitations and dyspnea (no need for tele monitoring)    Deprescribe:   No blood work, IV, pulse ox, VS, abx, IVF, escalation of care.     If pt remains stable, plan to consider hospice consult.     Please Call [phone] PRN

## 2019-12-10 NOTE — PROGRESS NOTE ADULT - ASSESSMENT
IMPRESSION:    Acute hypoxemic respiratory failure  Possible recurrent aspirations  HO Polymyositis   Ileus     PLAN:    CNS:  NO depressants     HEENT: Oral care.  Aspiration precautions     PULMONARY:  HOB @ 45 degrees.  cw bipap, wean as tolerated    CARDIOVASCULAR:  IVF bolus.  Wean Cardizem.  FU With Cardiology     GI: GI prophylaxis.  NPO>  PEG to gravity.  FU With Surgery and GI     RENAL:  Follow up lytes.  Correct as needed    INFECTIOUS DISEASE: Follow up cultures.  Renata and Vanc for now     HEMATOLOGICAL:  DVT prophylaxis.  LE Duplex     ENDOCRINE:  Follow up FS.  Insulin protocol if needed.  Stress doses steroids     MUSCULOSKELETAL:  Bed chair position    Overall prognosis poor    DNR / DNI / Palliative eval pending

## 2019-12-10 NOTE — CONSULT NOTE ADULT - SUBJECTIVE AND OBJECTIVE BOX
Patient is a 69y old  Male who presents with a chief complaint of SOB (10 Dec 2019 11:09)        HPI: 70 y/o male with pmhx COPD (not on home oxygen), hypothyroidism, gout, RA, 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   Poor historian historian  Denies any fever, chills, nausea, vomiting, abdominal pain, constipation, loose stools, headache, dizziness. chest pain, palpitations.     In ED vitals: 120/99, 131, 98.9, 18, 98% on 3 litres of NC, labs no inc WBC count, BMP unremarkable, Trops 0.08, BNP 3000, CT abd and pelvis with oral and IV contrast showed small bowel pneumatosis, portal venous gas, 4.7 cm small bowel dilatation consistent with ileus. Evaluated by surgery no surgical intervention for now. EKG showed new onset  Afib started on cardizem drip.  Recently admitted in the hospital on  for severe muscle pain and found to have severe dysphagia s/p PEG tube insertion discharged on dec 4th to [facility]. (10 Dec 2019 01:03)            PAST MEDICAL & SURGICAL HISTORY:  Dermatomyositis  Dysphagia: Has G-tube  Scleredema  Gout  COPD, mild  Hypothyroid  Gastrostomy in place: G-Tube  S/P tonsillectomy                  PREVIOUS DIAGNOSTIC TESTING:      ECHO  FINDINGS:  < from: Transthoracic Echocardiogram (10.04.19 @ 10:02) >  Summary:   1. Normal global left ventricular systolic function.   2. Moderate mitral annular calcification.   3. Thickening of the anterior mitral valve leaflet.   4. There is mild aortic root calcification.    < end of copied text >      STRESS  FINDINGS:    CATHETERIZATION  FINDINGS:    ELECTROPHYSIOLOGY STUDY  FINDINGS:    CAROTID ULTRASOUND:  FINDINGS    VENOUS DUPLEX SCAN:  FINDINGS:    CHEST CT PULMONARY ANGIO with IV Contrast:  FINDINGS:< from: CT Chest w/ IV Cont (12.10.19 @ 03:45) >  IMPRESSION:    No evidence of acute pulmonary embolus.    Bilateral lower lobe consolidations with air bronchograms and diffuse   patchy ground glass opacities throughout the lungs which have increased   since the prior study dated 2019. Small left pleural effusion.   Findings are consistent with multifocal pneumonia.    Distended esophagus with oral contrast material from recent study   compatible with reflux, which may increase the risk for aspiration.    < end of copied text >      MEDICATIONS  (STANDING):  chlorhexidine 4% Liquid 1 Application(s) Topical <User Schedule>  diltiazem Infusion 5 mG/Hr (5 mL/Hr) IV Continuous <Continuous>  heparin  Injectable 5000 Unit(s) SubCutaneous every 8 hours  meropenem  IVPB 1000 milliGRAM(s) IV Intermittent every 8 hours  vancomycin  IVPB 1000 milliGRAM(s) IV Intermittent every 12 hours    MEDICATIONS  (PRN):      FAMILY HISTORY:  No pertinent family history in first degree relatives      SOCIAL HISTORY:    CIGARETTES: Former smoker (~40 PY), quit 4 years ago    ALCOHOL: Negative    Past Surgical History:    Allergies:    No Known Allergies      REVIEW OF SYSTEMS:    CONSTITUTIONAL: No fever, weight loss, chills, shakes, or fatigue  EYES: No eye pain, visual disturbances, or discharge  ENMT:  No difficulty hearing, tinnitus, vertigo; No sinus or throat pain  NECK: No pain or stiffness  BREASTS: No pain, masses, or nipple discharge  RESPIRATORY: No cough, wheezing, hemoptysis, or shortness of breath  CARDIOVASCULAR: No chest pain, dyspnea, palpitations, dizziness, syncope, paroxysmal nocturnal dyspnea, orthopnea, or arm or leg swelling  GASTROINTESTINAL: No abdominal  or epigastric pain, nausea, vomiting, hematemesis, diarrhea, constipation, melena or bright red blood.  GENITOURINARY: No dysuria, nocturia, hematuria, or urinary incontinence  NEUROLOGICAL: No headaches, memory loss, slurred speech, limb weakness, loss of strength, numbness, or tremors  SKIN: No itching, burning, rashes, or lesions   LYMPH NODES: No enlarged glands  ENDOCRINE: No heat or cold intolerance, or hair loss  MUSCULOSKELETAL: No joint pain or swelling, muscle, back, or extremity pain  PSYCHIATRIC: No depression, anxiety, or difficulty sleeping  HEME/LYMPH: No easy bruising or bleeding gums  ALLERY AND IMMUNOLOGIC: No hives or rash.      Vital Signs Last 24 Hrs  T(C): 38.1 (10 Dec 2019 07:30), Max: 38.1 (10 Dec 2019 07:30)  T(F): 100.5 (10 Dec 2019 07:30), Max: 100.5 (10 Dec 2019 07:30)  HR: 110 (10 Dec 2019 10:52) (108 - 148)  BP: 100/64 (10 Dec 2019 10:52) (90/67 - 140/71)  BP(mean): --  RR: 18 (10 Dec 2019 10:52) (16 - 40)  SpO2: 99% (10 Dec 2019 10:52) (84% - 100%)    PHYSICAL EXAM:        GENERAL: In no apparent distress, well nourished, and hydrated.  HEAD:  Atraumatic, Normocephalic  EYES: EOMI, PERRLA, conjunctiva and sclera clear  ENMT: No tonsillar erythema, exudates, or enlargements; ist mucous membranes, Good dentition, No lesions  NECK: Supple and normal thyroid.  No JVD or carotid bruit.  Carotid pulse is 2+ bilaterally.  HEART: Regular rate and rhythm; No murmurs, rubs, or gallops.  PULMONARY: Clear to auscultation and perfusion.  No rales, wheezing, or rhonchi bilaterally.  ABDOMEN: Soft, Nontender, Nondistended; Bowel sounds present  EXTREMITIES:  2+ Peripheral Pulses, No clubbing, cyanosis, or edema  LYMPH: No lymphadenopathy noted  NEUROLOGICAL: Grossly nonfocal      INTERPRETATION OF TELEMETRY:    ECG: A-fib with RVR, RBBB    I&O's Detail    10 Dec 2019 07:01  -  10 Dec 2019 11:53  --------------------------------------------------------  IN:    diltiazem Infusion: 37.5 mL    IV PiggyBack: 500 mL  Total IN: 537.5 mL    OUT:  Total OUT: 0 mL    Total NET: 537.5 mL          LABS:                        10.6   10.83 )-----------( 300      ( 10 Dec 2019 05:51 )             33.4     12-10    136  |  96<L>  |  27<H>  ----------------------------<  75  4.6   |  26  |  0.7    Ca    8.0<L>      10 Dec 2019 05:51  Phos  4.0     12-10  Mg     1.8     12-10    TPro  4.9<L>  /  Alb  2.6<L>  /  TBili  0.3  /  DBili  x   /  AST  26  /  ALT  17  /  AlkPhos  65  12-10    CARDIAC MARKERS ( 10 Dec 2019 05:51 )  x     / 0.09 ng/mL / 86 U/L / x     / 4.9 ng/mL  CARDIAC MARKERS ( 09 Dec 2019 22:20 )  x     / 0.08 ng/mL / x     / x     / x          PT/INR - ( 10 Dec 2019 00:17 )   PT: 13.00 sec;   INR: 1.13 ratio         PTT - ( 10 Dec 2019 00:17 )  PTT:27.3 sec    BNPSerum Pro-Brain Natriuretic Peptide: 3676 pg/mL ( @ 22:20)    I&O's Detail    10 Dec 2019 07:01  -  10 Dec 2019 11:53  --------------------------------------------------------  IN:    diltiazem Infusion: 37.5 mL    IV PiggyBack: 500 mL  Total IN: 537.5 mL    OUT:  Total OUT: 0 mL    Total NET: 537.5 mL        Daily Height in cm: 177.8 (10 Dec 2019 08:39)    Daily Weight in k.1 (10 Dec 2019 02:39)    RADIOLOGY & ADDITIONAL STUDIES: Patient is a 69y old  Male who presents with a chief complaint of SOB (10 Dec 2019 11:09)        HPI: 68 y/o male with pmhx COPD (not on home oxygen), hypothyroidism, gout, RA, 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   Poor historian historian  Denies any fever, chills, nausea, vomiting, abdominal pain, constipation, loose stools, headache, dizziness. chest pain, palpitations.     In ED vitals: 120/99, 131, 98.9, 18, 98% on 3 litres of NC, labs no inc WBC count, BMP unremarkable, Trops 0.08, BNP 3000, CT abd and pelvis with oral and IV contrast showed small bowel pneumatosis, portal venous gas, 4.7 cm small bowel dilatation consistent with ileus. Evaluated by surgery no surgical intervention for now. EKG showed new onset  Afib started on cardizem drip.  Recently admitted in the hospital on  for severe muscle pain and found to have severe dysphagia s/p PEG tube insertion discharged on dec 4th to [facility]. (10 Dec 2019 01:03)            PAST MEDICAL & SURGICAL HISTORY:  Dermatomyositis  Dysphagia: Has G-tube  Scleredema  Gout  COPD, mild  Hypothyroid  Gastrostomy in place: G-Tube  S/P tonsillectomy                  PREVIOUS DIAGNOSTIC TESTING:      ECHO  FINDINGS:  < from: Transthoracic Echocardiogram (10.04.19 @ 10:02) >  Summary:   1. Normal global left ventricular systolic function.   2. Moderate mitral annular calcification.   3. Thickening of the anterior mitral valve leaflet.   4. There is mild aortic root calcification.    < end of copied text >      STRESS  FINDINGS:    CATHETERIZATION  FINDINGS:    ELECTROPHYSIOLOGY STUDY  FINDINGS:    CAROTID ULTRASOUND:  FINDINGS    VENOUS DUPLEX SCAN:  FINDINGS:    CHEST CT PULMONARY ANGIO with IV Contrast:  FINDINGS:< from: CT Chest w/ IV Cont (12.10.19 @ 03:45) >  IMPRESSION:    No evidence of acute pulmonary embolus.    Bilateral lower lobe consolidations with air bronchograms and diffuse   patchy ground glass opacities throughout the lungs which have increased   since the prior study dated 2019. Small left pleural effusion.   Findings are consistent with multifocal pneumonia.    Distended esophagus with oral contrast material from recent study   compatible with reflux, which may increase the risk for aspiration.    < end of copied text >      MEDICATIONS  (STANDING):  chlorhexidine 4% Liquid 1 Application(s) Topical <User Schedule>  diltiazem Infusion 5 mG/Hr (5 mL/Hr) IV Continuous <Continuous>  heparin  Injectable 5000 Unit(s) SubCutaneous every 8 hours  meropenem  IVPB 1000 milliGRAM(s) IV Intermittent every 8 hours  vancomycin  IVPB 1000 milliGRAM(s) IV Intermittent every 12 hours    MEDICATIONS  (PRN):      FAMILY HISTORY:  No pertinent family history in first degree relatives      SOCIAL HISTORY:    CIGARETTES: Former smoker (~40 PY), quit 4 years ago    ALCOHOL: Negative    Past Surgical History:    Allergies:    No Known Allergies      REVIEW OF SYSTEMS:    CONSTITUTIONAL: No fever, weight loss, chills, shakes, or fatigue  EYES: No eye pain, visual disturbances, or discharge  ENMT:  No difficulty hearing, tinnitus, vertigo; No sinus or throat pain  NECK: No pain or stiffness  BREASTS: No pain, masses, or nipple discharge  RESPIRATORY: + cough, no wheezing, hemoptysis.  CARDIOVASCULAR: occasional palpitations  GASTROINTESTINAL: No abdominal  or epigastric pain, nausea, vomiting, hematemesis, diarrhea, constipation, melena or bright red blood.  GENITOURINARY: No dysuria, nocturia, hematuria, or urinary incontinence  NEUROLOGICAL: muscle weakness and atrophy  SKIN: No itching, burning, rashes, or lesions   LYMPH NODES: No enlarged glands  ENDOCRINE: No heat or cold intolerance, or hair loss  MUSCULOSKELETAL: severe muscle weakness  PSYCHIATRIC: No depression, anxiety, or difficulty sleeping  HEME/LYMPH: No easy bruising or bleeding gums  ALLERY AND IMMUNOLOGIC: No hives or rash.      Vital Signs Last 24 Hrs  T(C): 38.1 (10 Dec 2019 07:30), Max: 38.1 (10 Dec 2019 07:30)  T(F): 100.5 (10 Dec 2019 07:30), Max: 100.5 (10 Dec 2019 07:30)  HR: 110 (10 Dec 2019 10:52) (108 - 148)  BP: 100/64 (10 Dec 2019 10:52) (90/67 - 140/71)  BP(mean): --  RR: 18 (10 Dec 2019 10:52) (16 - 40)  SpO2: 99% (10 Dec 2019 10:52) (84% - 100%)    PHYSICAL EXAM:        GENERAL: In acute distress, tachypneic with accessory muscle use. Cachexia.   HEAD:  Atraumatic, Normocephalic  EYES: EOMI, PERRLA, conjunctiva and sclera clear  ENMT: No tonsillar erythema, exudates, or enlargements; ist mucous membranes, Good dentition, No lesions  NECK: Supple and normal thyroid.  No JVD or carotid bruit.  Carotid pulse is 2+ bilaterally.  HEART: irregularly irregular; No murmurs, rubs, or gallops.  PULMONARY: bilateral crackles and rhonchi  No rales, wheezing.  ABDOMEN: Soft, Nontender, Nondistended; Bowel sounds present  EXTREMITIES:  2+ Peripheral Pulses, No clubbing, cyanosis,  +2 shin edema reji.  LYMPH: No lymphadenopathy noted  NEUROLOGICAL: Grossly nonfocal      INTERPRETATION OF TELEMETRY:    ECG: A-fib with RVR, RBBB    I&O's Detail    10 Dec 2019 07:01  -  10 Dec 2019 11:53  --------------------------------------------------------  IN:    diltiazem Infusion: 37.5 mL    IV PiggyBack: 500 mL  Total IN: 537.5 mL    OUT:  Total OUT: 0 mL    Total NET: 537.5 mL          LABS:                        10.6   10.83 )-----------( 300      ( 10 Dec 2019 05:51 )             33.4     12-10    136  |  96<L>  |  27<H>  ----------------------------<  75  4.6   |  26  |  0.7    Ca    8.0<L>      10 Dec 2019 05:51  Phos  4.0     12-10  Mg     1.8     12-10    TPro  4.9<L>  /  Alb  2.6<L>  /  TBili  0.3  /  DBili  x   /  AST  26  /  ALT  17  /  AlkPhos  65  12-10    CARDIAC MARKERS ( 10 Dec 2019 05:51 )  x     / 0.09 ng/mL / 86 U/L / x     / 4.9 ng/mL  CARDIAC MARKERS ( 09 Dec 2019 22:20 )  x     / 0.08 ng/mL / x     / x     / x          PT/INR - ( 10 Dec 2019 00:17 )   PT: 13.00 sec;   INR: 1.13 ratio         PTT - ( 10 Dec 2019 00:17 )  PTT:27.3 sec    BNPSerum Pro-Brain Natriuretic Peptide: 3676 pg/mL ( @ 22:20)    I&O's Detail    10 Dec 2019 07:01  -  10 Dec 2019 11:53  --------------------------------------------------------  IN:    diltiazem Infusion: 37.5 mL    IV PiggyBack: 500 mL  Total IN: 537.5 mL    OUT:  Total OUT: 0 mL    Total NET: 537.5 mL        Daily Height in cm: 177.8 (10 Dec 2019 08:39)    Daily Weight in k.1 (10 Dec 2019 02:39)    RADIOLOGY & ADDITIONAL STUDIES:

## 2019-12-10 NOTE — CONSULT NOTE ADULT - ASSESSMENT
ASSESSMENT:  70 y/o male NH due to hypoxia on 4L of O2. abdominal xray showed dilated loops of bowel. patient also noted to be tachycardic to 130s and found to have new onset afib  labs remarkable for elevated troponins and BNP. CTAP shows ileus     PLAN:   NPO, IV hydration  PEG to gravity  serial abdominal exams  replete electrolytes  will continue to follow ASSESSMENT:  69M NH due to hypoxia on 4L of O2. abdominal xray showed dilated loops of bowel. patient also noted to be tachycardic to 130s and found to have new onset afib  labs remarkable for elevated troponins and BNP. CTAP: Fluid-filled dilated small bowel without evidence of transition point and without decompressed colon favoring ileus. Foci of small bowel pneumatosis and portal venous gas. Clinical correlation is needed as findings can be seen in bowel ischemia. No free air identified. Patient with history of severe COPD, and steroid use.     PLAN:   NPO, IV hydration  PEG to gravity  serial abdominal exams  replete electrolytes  will continue to follow

## 2019-12-10 NOTE — CONSULT NOTE ADULT - SUBJECTIVE AND OBJECTIVE BOX
HPI: 70 y/o male with PMHx of Dermatomyositis, dysphagia s/p peg, scleroderma, gout, COPD, hypothyroid was brought from the NH due to hypoxia on 4L of O2. patient was recently discharged from the hospital few days ago. Patient is a poor historian, States that he is having normal bowel movements and is passing gas, Denies any abdominal pain or vomiting     PAST MEDICAL & SURGICAL HISTORY:  Dermatomyositis  Dysphagia: Has G-tube  Scleredema  Gout  COPD, mild  Hypothyroid  Gastrostomy in place: G-Tube  S/P tonsillectomy    FAMILY HISTORY:  No pertinent family history in first degree relatives    ALLERGIES: No Known Allergies    Home Medications:  acetaminophen 325 mg oral tablet: 2 tab(s) orally every 6 hours, As needed, Mild Pain (1 - 3) (09 Dec 2019 22:59)  albuterol 90 mcg/inh inhalation aerosol: 2 puff(s) inhaled every 6 hours, As needed, Shortness of Breath and/or Wheezing (09 Dec 2019 22:59)  allopurinol 100 mg oral tablet: 1 tab(s) orally once a day (09 Dec 2019 22:59)  amLODIPine 2.5 mg oral tablet: 1 tab(s) orally once a day (09 Dec 2019 22:59)  DuoNeb 0.5 mg-2.5 mg/3 mL inhalation solution: 3 milliliter(s) inhaled 3 times a day, As Needed (09 Dec 2019 22:59)  folic acid 1 mg oral tablet: 1 tab(s) orally once a day (09 Dec 2019 22:59)  levothyroxine 25 mcg (0.025 mg) oral tablet: 1 tab(s) orally once a day (02 Oct 2019 18:28)  magnesium oxide 400 mg (241.3 mg elemental magnesium) oral tablet: 1 tab(s) orally 3 times a day (with meals) (02 Dec 2019 11:43)  methotrexate 2.5 mg oral tablet: 6 tab(s) orally   once a week on friday (02 Dec 2019 13:40)  mycophenolate mofetil 200 mg/mL oral suspension: 500 milligram(s) by gastrostomy tube 2 times a day (02 Dec 2019 13:40)  pantoprazole 40 mg oral delayed release tablet: 1 tab(s) orally once a day (02 Oct 2019 18:28)  PeriGuard topical ointment:  (09 Dec 2019 23:04)  predniSONE 20 mg oral tablet: 2 tab(s) orally once a day in the morning  (21 Nov 2019 16:31)  predniSONE 20 mg oral tablet: 1 tab(s) orally once a day at night  (21 Nov 2019 16:32)  Spiriva Respimat: 2.5 microgram(s) inhaled once a day (09 Dec 2019 23:04)  sulfamethoxazole-trimethoprim 800 mg-160 mg oral tablet: 1 tab(s) orally 3 times a week  for PCP prophylaxis (02 Dec 2019 13:41)    ----------------------------------------------------------------    Vitals:   T(C): 37.2 (12-09-19 @ 22:15), Max: 37.2 (12-09-19 @ 22:15)  HR: 121 (12-09-19 @ 23:00) (110 - 133)  BP: 138/98 (12-09-19 @ 23:00) (120/99 - 138/98)  RR: 26 (12-09-19 @ 23:00) (26 - 30)  SpO2: 100% (12-09-19 @ 23:00) (84% - 100%)    PHYSICAL EXAM:  GENERAL: NAD,  ABDOMEN: Soft, Nontender, mildly distended, peg tube in place. surgical scar below the umbilicus noted (patient not sure what surgery he had)  --------------------------------------------------------------------------------------------    LABS  CBC (12-09 @ 22:20)                              10.4<L>                         9.87    )----------------(  305        89.7<H>% Neutrophils, 6.1<L>% Lymphocytes, ANC: 8.85<H>                              34.0<L>    BMP (12-09 @ 22:20)             138     |  95<L>   |  27<H> 		Ca++ --      Ca 8.6                ---------------------------------( 128<H>		Mg --                 4.3     |  25      |  0.6<L>			Ph --        LFTs (12-09 @ 22:20)      TPro 6.0 / Alb 3.2<L> / TBili 0.4 / DBili -- / AST 27 / ALT 20 / AlkPhos 87    Coags (12-10 @ 00:17)  aPTT 27.3 / INR 1.13 / PT 13.00<H>      ABG (12-09 @ 22:20)      /  /  /  /  / %     Lactate:  1.6    VBG (12-09 @ 22:13)     7.42 / 50 / 17<L> / 33<H> / 7.3<H> / 17%     Lactate: 1.3    --------------------------------------------------------------------------------------------  IMAGING:   pending HPI: 70 y/o male with PMHx of Dermatomyositis, dysphagia s/p peg, scleroderma, gout, COPD, hypothyroid was brought from the NH due to hypoxia on 4L of O2. patient was recently discharged from the hospital few days ago. Patient is a poor historian, States that he is having normal bowel movements and is passing gas, Denies any abdominal pain or vomiting     PAST MEDICAL & SURGICAL HISTORY:  Dermatomyositis  Dysphagia: Has G-tube  Scleredema  Gout  COPD, mild  Hypothyroid  Gastrostomy in place: G-Tube  S/P tonsillectomy    FAMILY HISTORY:  No pertinent family history in first degree relatives    ALLERGIES: No Known Allergies    Home Medications:  acetaminophen 325 mg oral tablet: 2 tab(s) orally every 6 hours, As needed, Mild Pain (1 - 3) (09 Dec 2019 22:59)  albuterol 90 mcg/inh inhalation aerosol: 2 puff(s) inhaled every 6 hours, As needed, Shortness of Breath and/or Wheezing (09 Dec 2019 22:59)  allopurinol 100 mg oral tablet: 1 tab(s) orally once a day (09 Dec 2019 22:59)  amLODIPine 2.5 mg oral tablet: 1 tab(s) orally once a day (09 Dec 2019 22:59)  DuoNeb 0.5 mg-2.5 mg/3 mL inhalation solution: 3 milliliter(s) inhaled 3 times a day, As Needed (09 Dec 2019 22:59)  folic acid 1 mg oral tablet: 1 tab(s) orally once a day (09 Dec 2019 22:59)  levothyroxine 25 mcg (0.025 mg) oral tablet: 1 tab(s) orally once a day (02 Oct 2019 18:28)  magnesium oxide 400 mg (241.3 mg elemental magnesium) oral tablet: 1 tab(s) orally 3 times a day (with meals) (02 Dec 2019 11:43)  methotrexate 2.5 mg oral tablet: 6 tab(s) orally   once a week on friday (02 Dec 2019 13:40)  mycophenolate mofetil 200 mg/mL oral suspension: 500 milligram(s) by gastrostomy tube 2 times a day (02 Dec 2019 13:40)  pantoprazole 40 mg oral delayed release tablet: 1 tab(s) orally once a day (02 Oct 2019 18:28)  PeriGuard topical ointment:  (09 Dec 2019 23:04)  predniSONE 20 mg oral tablet: 2 tab(s) orally once a day in the morning  (21 Nov 2019 16:31)  predniSONE 20 mg oral tablet: 1 tab(s) orally once a day at night  (21 Nov 2019 16:32)  Spiriva Respimat: 2.5 microgram(s) inhaled once a day (09 Dec 2019 23:04)  sulfamethoxazole-trimethoprim 800 mg-160 mg oral tablet: 1 tab(s) orally 3 times a week  for PCP prophylaxis (02 Dec 2019 13:41)    ----------------------------------------------------------------    Vitals:   T(C): 37.2 (12-09-19 @ 22:15), Max: 37.2 (12-09-19 @ 22:15)  HR: 121 (12-09-19 @ 23:00) (110 - 133)  BP: 138/98 (12-09-19 @ 23:00) (120/99 - 138/98)  RR: 26 (12-09-19 @ 23:00) (26 - 30)  SpO2: 100% (12-09-19 @ 23:00) (84% - 100%)    PHYSICAL EXAM:  GENERAL: NAD,  ABDOMEN: Soft, Nontender, mildly distended, peg tube in place. surgical scar below the umbilicus noted (patient not sure what surgery he had)  --------------------------------------------------------------------------------------------    LABS  CBC (12-09 @ 22:20)                              10.4<L>                         9.87    )----------------(  305        89.7<H>% Neutrophils, 6.1<L>% Lymphocytes, ANC: 8.85<H>                              34.0<L>    BMP (12-09 @ 22:20)             138     |  95<L>   |  27<H> 		Ca++ --      Ca 8.6                ---------------------------------( 128<H>		Mg --                 4.3     |  25      |  0.6<L>			Ph --        LFTs (12-09 @ 22:20)      TPro 6.0 / Alb 3.2<L> / TBili 0.4 / DBili -- / AST 27 / ALT 20 / AlkPhos 87    Coags (12-10 @ 00:17)  aPTT 27.3 / INR 1.13 / PT 13.00<H>      ABG (12-09 @ 22:20)      /  /  /  /  / %     Lactate:  1.6    VBG (12-09 @ 22:13)     7.42 / 50 / 17<L> / 33<H> / 7.3<H> / 17%     Lactate: 1.3    --------------------------------------------------------------------------------------------  IMAGING:  CT Abdomen and Pelvis w/ Oral Cont and w/ IV Cont (12.10.19 @ 00:17) >  IMPRESSION:  *Foci of small bowel pneumatosis and portal venous gas. Clinical   correlation is needed as findings can be seen in bowel ischemia. No free   air identified.    Fluid-filled dilated small bowel without evidence of transition point and   without decompressed colon favoring ileus.    Persistent bibasilar consolidations.    Distended esophagus filled with oral contrast compatible with reflux and   which may place patient at increased risk for aspiration.

## 2019-12-10 NOTE — CONSULT NOTE ADULT - SUBJECTIVE AND OBJECTIVE BOX
GI HPI:  68 y/o male with pmhx COPD (not on home oxygen), hypothyroidism, gout, RA, dermatomyositis/ scleroderma overlap sd  (on prednisone /MMF/ methotrexate), hx GOO in 10/2019 and PEG tube plct on 11/2019 was sent in from NH for SOB and hypoxia . Denies any fever,  nausea, vomiting, abdominal pain, constipation, loose stools, headache, dizziness. chest pain, palpitations.   Pt reports having reflux and had 1 BM today, brown and loose and he is passing flatus          Previous EGD: 11/26/2019  Esophageal candidiasis.    Erythema in the stomach compatible with non-erosive gastritis.    Normal mucosa in the whole examined duodenum.    Normal mucosa in the stomach.    PEG tube bumper in place.       Previous colonoscopy: nothing on records   PAST MEDICAL & SURGICAL HISTORY  Dermatomyositis  Dysphagia: Has G-tube  Scleredema  Gout  COPD, mild  Hypothyroid  Gastrostomy in place: G-Tube  S/P tonsillectomy        SOCIAL HISTORY:  smoker: non smoker  Alcohol: Non alcoholic  Drug: Denies use of drugs   FAMILY HISTORY:  FAMILY HISTORY:  No pertinent family history in first degree relatives      ALLERGIES:  No Known Allergies      MEDICATIONS:  MEDICATIONS  (STANDING):  chlorhexidine 4% Liquid 1 Application(s) Topical <User Schedule>  diltiazem Infusion 5 mG/Hr (5 mL/Hr) IV Continuous <Continuous>  heparin  Injectable 5000 Unit(s) SubCutaneous every 8 hours  meropenem  IVPB 1000 milliGRAM(s) IV Intermittent every 8 hours  vancomycin  IVPB 1500 milliGRAM(s) IV Intermittent once    MEDICATIONS  (PRN):      HOME MEDICATIONS:  Home Medications:  acetaminophen 325 mg oral tablet: 2 tab(s) orally every 6 hours, As needed, Mild Pain (1 - 3) (09 Dec 2019 22:59)  albuterol 90 mcg/inh inhalation aerosol: 2 puff(s) inhaled every 6 hours, As needed, Shortness of Breath and/or Wheezing (09 Dec 2019 22:59)  allopurinol 100 mg oral tablet: 1 tab(s) orally once a day (09 Dec 2019 22:59)  amLODIPine 2.5 mg oral tablet: 1 tab(s) orally once a day (09 Dec 2019 22:59)  DuoNeb 0.5 mg-2.5 mg/3 mL inhalation solution: 3 milliliter(s) inhaled 3 times a day, As Needed (09 Dec 2019 22:59)  folic acid 1 mg oral tablet: 1 tab(s) orally once a day (09 Dec 2019 22:59)  levothyroxine 25 mcg (0.025 mg) oral tablet: 1 tab(s) orally once a day (02 Oct 2019 18:28)  magnesium oxide 400 mg (241.3 mg elemental magnesium) oral tablet: 1 tab(s) orally 3 times a day (with meals) (02 Dec 2019 11:43)  methotrexate 2.5 mg oral tablet: 6 tab(s) orally   once a week on friday (02 Dec 2019 13:40)  mycophenolate mofetil 200 mg/mL oral suspension: 500 milligram(s) by gastrostomy tube 2 times a day (02 Dec 2019 13:40)  pantoprazole 40 mg oral delayed release tablet: 1 tab(s) orally once a day (02 Oct 2019 18:28)  PeriGuard topical ointment:  (09 Dec 2019 23:04)  predniSONE 20 mg oral tablet: 2 tab(s) orally once a day in the morning  (21 Nov 2019 16:31)  predniSONE 20 mg oral tablet: 1 tab(s) orally once a day at night  (21 Nov 2019 16:32)  Spiriva Respimat: 2.5 microgram(s) inhaled once a day (09 Dec 2019 23:04)  sulfamethoxazole-trimethoprim 800 mg-160 mg oral tablet: 1 tab(s) orally 3 times a week  for PCP prophylaxis (02 Dec 2019 13:41)      ROS:     REVIEW OF SYSTEMS  General:  No fevers, + chills   Eyes:  No reported pain   ENT:  No sore throat   NECK: No stiffness   CV:  No chest pain   Resp:  +++ shortness of breath  GI:  See HPI  :  No dysuria  Muscle:  +++ weakness  Neuro:  No tingling  Endocrine:  No polyuria  Heme:  No ecchymosis          VITALS:   T(F): 100.5 (12-10 @ 07:30), Max: 100.5 (12-10 @ 07:30)  HR: 119 (12-10 @ 07:45) (108 - 148)  BP: 106/60 (12-10 @ 07:45) (90/67 - 140/71)  BP(mean): --  RR: 38 (12-10 @ 07:45) (16 - 40)  SpO2: 91% (12-10 @ 07:45) (84% - 100%)    I&O's Summary      PHYSICAL EXAM:  GENERAL:  Appears SOB   HEENT:  Conjunctivae  anicteric  CHEST:  Full & symmetric excursion  HEART:  N S1, S2  ABDOMEN: non-tender,+++++ distended and tense   EXTEREMITIES:  no  edema  SKIN:  No rash  NEURO:  Alert, No asterixis         LABS:                        10.6   10.83 )-----------( 300      ( 10 Dec 2019 05:51 )             33.4     PT/INR - ( 10 Dec 2019 00:17 )  INR: 1.13          PTT - ( 10 Dec 2019 00:17 )  PTT:27.3   LIVER FUNCTIONS - ( 10 Dec 2019 05:51 )  Alb: 2.6 g/dL / Pro: 4.9 g/dL / ALK PHOS: 65 U/L / ALT: 17 U/L / AST: 26 U/L / GGT: x           12-10    136  |  96<L>  |  27<H>  ----------------------------<  75  4.6   |  26  |  0.7    Ca    8.0<L>      10 Dec 2019 05:51  Phos  4.0     12-10  Mg     1.8     12-10      CARDIAC MARKERS ( 10 Dec 2019 05:51 )  x     / x     / 86 U/L / x     / 4.9 ng/mL  CARDIAC MARKERS ( 09 Dec 2019 22:20 )  x     / 0.08 ng/mL / x     / x     / x                    RADIOLOGY:    < from: CT Abdomen and Pelvis w/ Oral Cont and w/ IV Cont (12.10.19 @ 00:17) >    EXAM:  CT ABDOMEN AND PELVIS OC IC            PROCEDURE DATE:  12/10/2019            INTERPRETATION:  CLINICAL STATEMENT: Dilated bowel loops.      TECHNIQUE: Contiguous CT images were obtained of the abdomen and pelvis.  Intravenous Contrast:  Optiray 320 intravenous contrast administered.    Oral contrast was administered through the PEG.      COMPARISON:  CT abdomen pelvis dated 10/5/2019    FINDINGS:    LOWER CHEST: Redemonstrated are bibasilar consolidations overall   decreased in the lower lobes although certain areas appear more focal.   Slightly increased opacities in the lingular segment and visualized   portion of the right middle lobe.    LIVER: Portal venous gas. No focal mass    SPLEEN: Unremarkable.    PANCREAS: Unremarkable.    GALLBLADDER AND BILIARY TREE: Unremarkable CT appearance of the   gallbladder. No biliary ductal dilatation.    ADRENALS: Unremarkable.    KIDNEYS: Symmetric pattern of renal enhancement. No hydronephrosis    LYMPH NODES: There are no enlarged abdominal or pelvic lymph nodes.    VASCULATURE: The abdominal aorta is normal in caliber and demonstrates   atherosclerotic changes. Portal venous gas.    BOWEL: A PEG is present. Much of the oral contrast administered through   the PEG has refluxed intothe esophagus with a contrast-filled dilated   esophagus noted. There are dilated loops of small bowel measuring up to   4.7 cm without evidence for transition point and with nondecompressed   colon most reflective of ileus. Fluid-filled small and large bowel. Areas   of likely small bowel pneumatosis are noted.     PERITONEUM/RETROPERITONEUM/MESENTERY: Small volume ascites. No free air.    PELVIC VISCERA: Unremarkable.    BONES AND SOFT TISSUES: No acute osseous abnormality is noted.     IMPRESSION:    *Foci of small bowel pneumatosis and portal venous gas. Clinical   correlation is needed as findings can be seen in bowel ischemia. No free   air identified.    Fluid-filled dilated small bowel without evidence of transition point and   without decompressed colon favoring ileus.    Persistent bibasilar consolidations.    Distended esophagus filled with oral contrast compatible with reflux and   which may place patient at increased risk for aspiration.    *Spoke with TIFFANY BUTT MD on 12/10/2019 1:04 AM with readback.                  ROLAND VALENCIA M.D., ATTENDING RADIOLOGIST  This document has been electronically signed. Dec 10 2019  1:20AM                < end of copied text > GI HPI:  68 y/o male with pmhx COPD (not on home oxygen), hypothyroidism, gout, RA, dermatomyositis/ scleroderma overlap sd  (on prednisone /MMF/ methotrexate), hx GOO in 10/2019 and PEG tube plct on 11/2019 was sent in from NH for SOB and hypoxia . Denies any fever,  nausea, vomiting, abdominal pain, constipation, loose stools, headache, dizziness. chest pain, palpitations.   Pt reports having reflux and had 1 BM today, brown and loose and he is passing flatus          Previous EGD: 11/26/2019  Esophageal candidiasis.    Erythema in the stomach compatible with non-erosive gastritis.    Normal mucosa in the whole examined duodenum.    Normal mucosa in the stomach.    PEG tube bumper in place.       Previous colonoscopy: nothing on records   PAST MEDICAL & SURGICAL HISTORY  Dermatomyositis  Dysphagia: Has G-tube  Scleredema  Gout  COPD, mild  Hypothyroid  Gastrostomy in place: G-Tube  S/P tonsillectomy        SOCIAL HISTORY:  smoker: non smoker  Alcohol: Non alcoholic  Drug: Denies use of drugs   FAMILY HISTORY:  FAMILY HISTORY:  No pertinent family history in first degree relatives      ALLERGIES:  No Known Allergies      MEDICATIONS:  MEDICATIONS  (STANDING):  chlorhexidine 4% Liquid 1 Application(s) Topical <User Schedule>  diltiazem Infusion 5 mG/Hr (5 mL/Hr) IV Continuous <Continuous>  heparin  Injectable 5000 Unit(s) SubCutaneous every 8 hours  meropenem  IVPB 1000 milliGRAM(s) IV Intermittent every 8 hours  vancomycin  IVPB 1500 milliGRAM(s) IV Intermittent once          HOME MEDICATIONS:  Home Medications:  acetaminophen 325 mg oral tablet: 2 tab(s) orally every 6 hours, As needed, Mild Pain (1 - 3) (09 Dec 2019 22:59)  albuterol 90 mcg/inh inhalation aerosol: 2 puff(s) inhaled every 6 hours, As needed, Shortness of Breath and/or Wheezing (09 Dec 2019 22:59)  allopurinol 100 mg oral tablet: 1 tab(s) orally once a day (09 Dec 2019 22:59)  amLODIPine 2.5 mg oral tablet: 1 tab(s) orally once a day (09 Dec 2019 22:59)  DuoNeb 0.5 mg-2.5 mg/3 mL inhalation solution: 3 milliliter(s) inhaled 3 times a day, As Needed (09 Dec 2019 22:59)  folic acid 1 mg oral tablet: 1 tab(s) orally once a day (09 Dec 2019 22:59)  levothyroxine 25 mcg (0.025 mg) oral tablet: 1 tab(s) orally once a day (02 Oct 2019 18:28)  magnesium oxide 400 mg (241.3 mg elemental magnesium) oral tablet: 1 tab(s) orally 3 times a day (with meals) (02 Dec 2019 11:43)  methotrexate 2.5 mg oral tablet: 6 tab(s) orally   once a week on friday (02 Dec 2019 13:40)  mycophenolate mofetil 200 mg/mL oral suspension: 500 milligram(s) by gastrostomy tube 2 times a day (02 Dec 2019 13:40)  pantoprazole 40 mg oral delayed release tablet: 1 tab(s) orally once a day (02 Oct 2019 18:28)  PeriGuard topical ointment:  (09 Dec 2019 23:04)  predniSONE 20 mg oral tablet: 2 tab(s) orally once a day in the morning  (21 Nov 2019 16:31)  predniSONE 20 mg oral tablet: 1 tab(s) orally once a day at night  (21 Nov 2019 16:32)  Spiriva Respimat: 2.5 microgram(s) inhaled once a day (09 Dec 2019 23:04)  sulfamethoxazole-trimethoprim 800 mg-160 mg oral tablet: 1 tab(s) orally 3 times a week  for PCP prophylaxis (02 Dec 2019 13:41)      ROS:     REVIEW OF SYSTEMS  General:  No fevers, + chills   Eyes:  No reported pain   ENT:  No sore throat   NECK: No stiffness   CV:  No chest pain   Resp:  +++ shortness of breath  GI:  See HPI  :  No dysuria  Muscle:  +++ weakness  Neuro:  No tingling  Endocrine:  No polyuria  Heme:  No ecchymosis          VITALS:   T(F): 100.5 (12-10 @ 07:30), Max: 100.5 (12-10 @ 07:30)  HR: 119 (12-10 @ 07:45) (108 - 148)  BP: 106/60 (12-10 @ 07:45) (90/67 - 140/71)  BP(mean): --  RR: 38 (12-10 @ 07:45) (16 - 40)  SpO2: 91% (12-10 @ 07:45) (84% - 100%)    I&O's Summary      PHYSICAL EXAM:  GENERAL:  Appears SOB   HEENT:  Conjunctivae  anicteric  CHEST:  Full & symmetric excursion  HEART:  N S1, S2  ABDOMEN: non-tender,+++++ distended and tense   EXTEREMITIES:  no  edema  SKIN:  No rash  NEURO:  Alert, No asterixis         LABS:                        10.6   10.83 )-----------( 300      ( 10 Dec 2019 05:51 )             33.4     PT/INR - ( 10 Dec 2019 00:17 )  INR: 1.13          PTT - ( 10 Dec 2019 00:17 )  PTT:27.3   LIVER FUNCTIONS - ( 10 Dec 2019 05:51 )  Alb: 2.6 g/dL / Pro: 4.9 g/dL / ALK PHOS: 65 U/L / ALT: 17 U/L / AST: 26 U/L / GGT: x           12-10    136  |  96<L>  |  27<H>  ----------------------------<  75  4.6   |  26  |  0.7    Ca    8.0<L>      10 Dec 2019 05:51  Phos  4.0     12-10  Mg     1.8     12-10      CARDIAC MARKERS ( 10 Dec 2019 05:51 )  x     / x     / 86 U/L / x     / 4.9 ng/mL  CARDIAC MARKERS ( 09 Dec 2019 22:20 )  x     / 0.08 ng/mL / x     / x     / x                    RADIOLOGY:    < from: CT Abdomen and Pelvis w/ Oral Cont and w/ IV Cont (12.10.19 @ 00:17) >    EXAM:  CT ABDOMEN AND PELVIS OC IC            PROCEDURE DATE:  12/10/2019            INTERPRETATION:  CLINICAL STATEMENT: Dilated bowel loops.      TECHNIQUE: Contiguous CT images were obtained of the abdomen and pelvis.  Intravenous Contrast:  Optiray 320 intravenous contrast administered.    Oral contrast was administered through the PEG.      COMPARISON:  CT abdomen pelvis dated 10/5/2019    FINDINGS:    LOWER CHEST: Redemonstrated are bibasilar consolidations overall   decreased in the lower lobes although certain areas appear more focal.   Slightly increased opacities in the lingular segment and visualized   portion of the right middle lobe.    LIVER: Portal venous gas. No focal mass    SPLEEN: Unremarkable.    PANCREAS: Unremarkable.    GALLBLADDER AND BILIARY TREE: Unremarkable CT appearance of the   gallbladder. No biliary ductal dilatation.    ADRENALS: Unremarkable.    KIDNEYS: Symmetric pattern of renal enhancement. No hydronephrosis    LYMPH NODES: There are no enlarged abdominal or pelvic lymph nodes.    VASCULATURE: The abdominal aorta is normal in caliber and demonstrates   atherosclerotic changes. Portal venous gas.    BOWEL: A PEG is present. Much of the oral contrast administered through   the PEG has refluxed intothe esophagus with a contrast-filled dilated   esophagus noted. There are dilated loops of small bowel measuring up to   4.7 cm without evidence for transition point and with nondecompressed   colon most reflective of ileus. Fluid-filled small and large bowel. Areas   of likely small bowel pneumatosis are noted.     PERITONEUM/RETROPERITONEUM/MESENTERY: Small volume ascites. No free air.    PELVIC VISCERA: Unremarkable.    BONES AND SOFT TISSUES: No acute osseous abnormality is noted.     IMPRESSION:    *Foci of small bowel pneumatosis and portal venous gas. Clinical   correlation is needed as findings can be seen in bowel ischemia. No free   air identified.    Fluid-filled dilated small bowel without evidence of transition point and   without decompressed colon favoring ileus.    Persistent bibasilar consolidations.    Distended esophagus filled with oral contrast compatible with reflux and   which may place patient at increased risk for aspiration.    *Spoke with TIFFANY BUTT MD on 12/10/2019 1:04 AM with readback.                  ROLAND VALENCIA M.D., ATTENDING RADIOLOGIST  This document has been electronically signed. Dec 10 2019  1:20AM                < end of copied text >

## 2019-12-10 NOTE — CHART NOTE - NSCHARTNOTEFT_GEN_A_CORE
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   Poor historian historian  Denies any fever, chills, nausea, vomiting, abdominal pain, constipation, loose stools, headache, dizziness. chest pain, palpitations.     In ED vitals: 120/99, 131, 98.9, 18, 98% on 3 litres of NC, labs no inc WBC count, BMP unremarkable, Trops 0.08, BNP 3000, CT abd and pelvis with oral and IV contrast showed small bowel pneumatosis, portal venous gas, 4.7 cm small bowel dilatation consistent with ileus. Evaluated by surgery no surgical intervention for now. EKG showed new onset  Afib started on cardizem drip.  Recently admitted in the hospital on Nov 21 st for severe muscle pain and found to have severe dysphagia s/p PEG tube insertion discharged on dec 4th to [facility]. (10 Dec 2019 01:03)    Currently admitted to medicine with the primary diagnosis of Ileus / pneumatosis / possible ischemic bowel       Today is hospital day 01. This morning he is resting in bed, on venti mask, desaturated to 80s, was placed on bipap, HR fluctuates between 100-140 on monitor. s/p 2 L LR bolus stat, Hydrocortisone 100 mg iv push, Cardizem drip to control HR.   Gastro and GS consult placed  I talked to the pt about his advanced directives, Pt deferred all decisions to Dr Tony Su (gynecologist at [facility]), Pt signed the HCP forms and Dr Jimenez with Pt's consent made him DNR / DNI and requested for palliative eval. Palliative had discussion with HCP and pt and decided to not escalate care. Roxanol prn is ordered.

## 2019-12-10 NOTE — CONSULT NOTE ADULT - SUBJECTIVE AND OBJECTIVE BOX
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   Poor historian historian  Denies any fever, chills, nausea, vomiting, abdominal pain, constipation, loose stools, headache, dizziness. chest pain, palpitations.     In ED vitals: 120/99, 131, 98.9, 18, 98% on 3 litres of NC, labs no inc WBC count, BMP unremarkable, Trops 0.08, BNP 3000, CT abd and pelvis with oral and IV contrast showed small bowel pneumatosis, portal venous gas, 4.7 cm small bowel dilatation consistent with ileus. Evaluated by surgery no surgical intervention for now. EKG showed new onset  Afib started on cardizem drip.  Recently admitted in the hospital on Nov 21 st for severe muscle pain and found to have severe dysphagia s/p PEG tube insertion discharged on dec 4th to [facility]. (10 Dec 2019 01:03)      PAST MEDICAL & SURGICAL HISTORY:  Dermatomyositis  Dysphagia: Has G-tube  Scleredema  Gout  COPD, mild  Hypothyroid  Gastrostomy in place: G-Tube  S/P tonsillectomy      SOCIAL HX:   Smoking         Former                 ETOH                            Other    FAMILY HISTORY:  No pertinent family history in first degree relatives  :  No known cardiovacular family hisotry     Review Of Systems:     CONSTITUTIONAL:   no fever   no chills.  no weight gain   no weight loss    EYES:   no discharge,   no pain  no redness,   no visual changes.    ENT:   Ears: no ear pain and no hearing problems.  Nose: no nasal congestion and no nasal drainage.  Mouth/Throat: no dysphagia,  no hoarseness and no throat pain.  Neck: no lumps, no pain, no stiffness and no swollen glands.     CARDIOVASCULAR:   no chest pain,   no swelling  no palpitaions  no syncope    RESPIRATORY:  SOB,  no wheezing ,  no respiratory difficulty  no sputum production    GASTROINTESTINAL:   Per HPI.    GENITOURINARY:  no dysuria,   no frequency,   no urgency  no hematuria.    MUSCULOSKELETAL:   Per HPI    SKIN:   no jaundice,   no lesions,   no pruritis,   no rashes.    NEURO:   no loss of consciousness,   no gait abnormality,   no headache,   no sensory deficits,   no weakness.    PSYCHIATRIC:   no known mental health issues  no anxiety  no depression    ALLERGIC/IMMUNOLOGIC:   No active allergic or immunologic issues      Allergies    No Known Allergies    Intolerances          PHYSICAL EXAM    ICU Vital Signs Last 24 Hrs  T(C): 36 (10 Dec 2019 02:39), Max: 37.2 (09 Dec 2019 22:15)  T(F): 96.8 (10 Dec 2019 02:39), Max: 98.9 (09 Dec 2019 22:15)  HR: 129 (10 Dec 2019 04:26) (108 - 139)  BP: 136/76 (10 Dec 2019 04:26) (90/67 - 140/71)  BP(mean): --  ABP: --  ABP(mean): --  RR: 18 (10 Dec 2019 04:26) (16 - 30)  SpO2: 89% (10 Dec 2019 04:26) (84% - 100%)      CONSTITUTIONAL:   Ill appearing. NAD    ENT:   Airway patent,   Mouth with normal mucosa.   No thrush    EYES:   pupils equal,   round and reactive to light.    CARDIAC:   Normal rate,   Irregular rhythm.    Heart sounds S1, S2.   No edema      Vascular:   normal systolic impulse  no bruits    RESPIRATORY:   No wheezing   Normal chest expansion  No use of accessory muscles    GASTROINTESTINAL:  Abdomen soft, Distended.  + PEG  Non-tender,   No guarding,   + BS    GENITOURINARY  normal genitalia for sex  no edema    MUSCULOSKELETAL:   Range of motion is not limited,  Nno clubbing, cyanosis    NEUROLOGICAL:   Alert and oriented   No motor or sensory deficits.  Pertinent DTRs normal    SKIN:   Skin normal color for race,   Warm and dry  No evidence of rash.    PSYCHIATRIC:   Normal mood and affect.   No apparent risk to self or others.    HEME LYMPH:   No cervical  lymphadenopathy.  No inguinal lymphadenopathy              LABS:                          10.4   9.87  )-----------( 305      ( 09 Dec 2019 22:20 )             34.0                                               12-09    138  |  95<L>  |  27<H>  ----------------------------<  128<H>  4.3   |  25  |  0.6<L>    Ca    8.6      09 Dec 2019 22:20    TPro  6.0  /  Alb  3.2<L>  /  TBili  0.4  /  DBili  x   /  AST  27  /  ALT  20  /  AlkPhos  87  12-09      PT/INR - ( 10 Dec 2019 00:17 )   PT: 13.00 sec;   INR: 1.13 ratio         PTT - ( 10 Dec 2019 00:17 )  PTT:27.3 sec                                           CARDIAC MARKERS ( 09 Dec 2019 22:20 )  x     / 0.08 ng/mL / x     / x     / x                                                LIVER FUNCTIONS - ( 09 Dec 2019 22:20 )  Alb: 3.2 g/dL / Pro: 6.0 g/dL / ALK PHOS: 87 U/L / ALT: 20 U/L / AST: 27 U/L / GGT: x                                                                                                                                       X-Rays reviewed:                                                                                    ECHO    CXR interpreted by me: Bibasilar infiltrates     MEDICATIONS  (STANDING):  chlorhexidine 4% Liquid 1 Application(s) Topical <User Schedule>  diltiazem Infusion 5 mG/Hr (5 mL/Hr) IV Continuous <Continuous>  heparin  Injectable 5000 Unit(s) SubCutaneous every 8 hours    MEDICATIONS  (PRN):

## 2019-12-10 NOTE — CONSULT NOTE ADULT - SUBJECTIVE AND OBJECTIVE BOX
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   Poor historian historian  Denies any fever, chills, nausea, vomiting, abdominal pain, constipation, loose stools, headache, dizziness. chest pain, palpitations.     In ED vitals: 120/99, 131, 98.9, 18, 98% on 3 litres of NC, labs no inc WBC count, BMP unremarkable, Trops 0.08, BNP 3000, CT abd and pelvis with oral and IV contrast showed small bowel pneumatosis, portal venous gas, 4.7 cm small bowel dilatation consistent with ileus. Evaluated by surgery no surgical intervention for now. EKG showed new onset  Afib started on cardizem drip.  Recently admitted in the hospital on Nov 21 st for severe muscle pain and found to have severe dysphagia s/p PEG tube insertion discharged on dec 4th to [facility]. (10 Dec 2019 01:03)    Since admission:   SOB and desatted on room air to 81% on 3 litres NC found to have small bowel ileus and ?new onset A fib.     Surgery input:   69M NH due to hypoxia on 4L of O2. abdominal xray showed dilated loops of bowel. patient also noted to be tachycardic to 130s and found to have new onset afib; labs remarkable for elevated troponins and BNP. CTAP: Fluid-filled dilated small bowel without evidence of transition point and without decompressed colon favoring ileus. Foci of small bowel pneumatosis and portal venous gas. Clinical correlation is needed as findings can be seen in bowel ischemia. No free air identified. Patient with history of severe COPD, and steroid use.   PLAN:   NPO, IV hydration  PEG to gravity  serial abdominal exams  replete electrolytes  will continue to follow     Crit Care Input:   Acute hypoxemic respiratory failure  Possible recurrent aspirations  HO Polymyositis   Ileus   Overall prognosis poor; Advance directives    GI Input:    8 y/o male with pmhx COPD (not on home oxygen), hypothyroidism, gout, RA, dermatomyositis/ scleroderma overlap sd  (on prednisone /MMF/ methotrexate), hx GOO in 10/2019 and PEG tube plct on 11/2019 was sent in from NH for SOB and hypoxia. Pt found to have distended abdomen and ileus on CT scan with pneumatosis intestinalis and portal venous gas     #Ileus and pneumatosis intestinalis with portal venous gas/ Increased lactate  rule out mesenteric ischemia  rec:  IV hydration   IV abx with anaerobic coverage   Surgery follow up   Keep NPO   Please put peg tube to gravity   Follow BMP and correct electrolytes   Daily lactate and ABG   PPI once daily   Daily KUB   # SOB with multifocal pneumonia on CT chest   IV abx   On BIPAP   ICU monitoring   # New onset Afib   Started on cardizem   Follow with cardiology     PAST MEDICAL & SURGICAL HISTORY:  Dermatomyositis  Dysphagia: Has G-tube  Scleredema  Gout  COPD, mild  Hypothyroid  Gastrostomy in place: G-Tube  S/P tonsillectomy      Subjective and Objective:  Today,  Discussed with     Focused Palliative Care Evaluation:                   Symptoms:                                      Pain                                     Dyspnea                                     N/V                                     Appetite                                     Anxiety                                     Other _____________________                     Support Devices:              PHYSICAL EXAM:      Constitutional:    Eyes:    ENMT:    Neck:    Breasts:    Back:    Respiratory:    Cardiovascular:    Gastrointestinal:    Genitourinary:    Rectal:    Extremities:    Vascular:    Neurological:    Skin:    Lymph Nodes:    Musculoskeletal:    Psychiatric:        T(C): 38.1, Max: 38.1 (07:30)  HR: 110 (108 - 148)  BP: 100/64 (90/67 - 140/71)  RR: 18 (16 - 40)  SpO2: 99% (84% - 100%)      LABS/STUDIES:  12-10    136  |  96<L>  |  27<H>  ----------------------------<  75  4.6   |  26  |  0.7      Ca    8.0<L>      10 Dec 2019 05:51  Phos  4.0     12-10  Mg     1.8     12-10    TPro  4.9<L>  /  Alb  2.6<L>  /  TBili  0.3  /  DBili  x   /  AST  26  /  ALT  17  /  AlkPhos  65  12-10                            10.6   10.83 )-----------( 300      ( 10 Dec 2019 05:51 )             33.4       EXAM:  XR KUB 1 VIEW            PROCEDURE DATE:  12/10/2019      INTERPRETATION:  CLINICAL HISTORY:Ileus    COMPARISON: December 9, 2019    TECHNIQUE: Supine abdominal radiographs    FINDINGS:  Multiple dilated, gas-filled small and large bowel loops again seen   throughout the abdomen. Limited evaluation for pneumoperitoneum due to   supine technique. Fusion contrast seen within the urinary bladder. Stable   degenerative changes.    IMPRESSION:  Unchanged dilated, gas-filled small and large bowel loops throughout the   abdomen      EXAM:  XR MODIFIED BARIUM SWALLOW            PROCEDURE DATE:  11/25/2019  (prior admission)    INTERPRETATION:  Clinical History / Reason for exam: Dysphagia.    Procedure: Various consistencies of food were given to the patient and   swallowing was observed under fluoroscopy.  This study was performed in   conjunction with the speech pathology department.    Findings/  Impression:  Dysphagia.  Please refer to report from the department of speech pathology for   detailed description of the findings and recommendations.    MEDICATIONS  (STANDING):  chlorhexidine 4% Liquid 1 Application(s) Topical <User Schedule>  diltiazem Infusion 5 mG/Hr (5 mL/Hr) IV Continuous <Continuous>  heparin  Injectable 5000 Unit(s) SubCutaneous every 8 hours  meropenem  IVPB 1000 milliGRAM(s) IV Intermittent every 8 hours  vancomycin  IVPB 1000 milliGRAM(s) IV Intermittent every 12 hours    MEDICATIONS  (PRN):          iStop:         PPS  Level    __________       Note PPS = Palliative Performance Scale; (c)2001, [employer]       Range from 100% meaning Full ambulation/self-care/intake/Level of Consciousness                                                                              to        10% meaning Bedbound/Unable to do any activity/extensive disease /Total Care/ No PO intake/ LOC=Full/drowsy/+/-confusion        (0% = death)                     Prior to acute illness, patient's functionality reportedly REQUESTED OF: DR Ndiaye    Chart reviewed, Hospital Day 1    NEIL PHOENIX 69yMale  HPI:  68 y/o male with pmhx COPD (not on home oxygen), hypothyroidism, gout, RA, 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   Poor historian historian  Denies any fever, chills, nausea, vomiting, abdominal pain, constipation, loose stools, headache, dizziness. chest pain, palpitations.     In ED vitals: 120/99, 131, 98.9, 18, 98% on 3 litres of NC, labs no inc WBC count, BMP unremarkable, Trops 0.08, BNP 3000, CT abd and pelvis with oral and IV contrast showed small bowel pneumatosis, portal venous gas, 4.7 cm small bowel dilatation consistent with ileus. Evaluated by surgery no surgical intervention for now. EKG showed new onset  Afib started on cardizem drip.  Recently admitted in the hospital on Nov 21 st for severe muscle pain and found to have severe dysphagia s/p PEG tube insertion discharged on dec 4th to [facility]. (10 Dec 2019 01:03)    Since admission:   SOB and desatted on room air to 81% on 3 litres NC found to have small bowel ileus and ?new onset A fib.     Surgery input:   69M NH due to hypoxia on 4L of O2. abdominal xray showed dilated loops of bowel. patient also noted to be tachycardic to 130s and found to have new onset afib; labs remarkable for elevated troponins and BNP. CTAP: Fluid-filled dilated small bowel without evidence of transition point and without decompressed colon favoring ileus. Foci of small bowel pneumatosis and portal venous gas. Clinical correlation is needed as findings can be seen in bowel ischemia. No free air identified. Patient with history of severe COPD, and steroid use.   PLAN:   NPO, IV hydration  PEG to gravity  serial abdominal exams  replete electrolytes  will continue to follow     Crit Care Input:   Acute hypoxemic respiratory failure  Possible recurrent aspirations  HO Polymyositis   Ileus   Overall prognosis poor; Advance directives    GI Input:    8 y/o male with pmhx COPD (not on home oxygen), hypothyroidism, gout, RA, dermatomyositis/ scleroderma overlap sd  (on prednisone /MMF/ methotrexate), hx GOO in 10/2019 and PEG tube plct on 11/2019 was sent in from NH for SOB and hypoxia. Pt found to have distended abdomen and ileus on CT scan with pneumatosis intestinalis and portal venous gas     #Ileus and pneumatosis intestinalis with portal venous gas/ Increased lactate  rule out mesenteric ischemia  rec:  IV hydration   IV abx with anaerobic coverage   Surgery follow up   Keep NPO   Please put peg tube to gravity   Follow BMP and correct electrolytes   Daily lactate and ABG   PPI once daily   Daily KUB   # SOB with multifocal pneumonia on CT chest   IV abx   On BIPAP   ICU monitoring   # New onset Afib   Started on cardizem   Follow with cardiology     PAST MEDICAL & SURGICAL HISTORY:  Dermatomyositis  Dysphagia: Has G-tube  Scleredema  Gout  COPD, mild  Hypothyroid  Gastrostomy in place: G-Tube  S/P tonsillectomy      Subjective and Objective:  Today,  Discussed with house staff and nursing  Seen on three rounds. #1 on Bipap able to talk - no symptoms; #2 - off Bipap and feeling OK - had a pain episode that described as chest/epigastric; #3 had intense pain episode that had him pause breathing - subsided after a few minutes - has difficulty describing it, but agrees that is is like a spasm - in chest/feeding tube    Focused Palliative Care Evaluation:                   Symptoms:                                      Pain - see above- no patter of aggravating factors.                                      Dyspnea - + relieved with bipap                                      N/V 0                                     Appetite 0 npo dysphagia                                      Anxiety 0                                     Other denies palpitations                     Support Devices:              PHYSICAL EXAM:      Constitutional: chronically ill frail; catechetic     Eyes: sclera white    Respiratory: pools oral secreations; clear anteriorly     Cardiovascular: MALA 110-140's    Gastrointestinal: distended firm; tympanic; PEG - gravity for large amount red-brown secretions    Extremities: + 1    Neurological: A&O x 4 - no focal deficits    T(C): 38.1, Max: 38.1 (07:30)  HR: 110 (108 - 148)  BP: 100/64 (90/67 - 140/71)  RR: 18 (16 - 40)  SpO2: 99% (84% - 100%)    LABS/STUDIES:  12-10    136  |  96<L>  |  27<H>  ----------------------------<  75  4.6   |  26  |  0.7      Ca    8.0<L>      10 Dec 2019 05:51  Phos  4.0     12-10  Mg     1.8     12-10    TPro  4.9<L>  /  Alb  2.6<L>  /  TBili  0.3  /  DBili  x   /  AST  26  /  ALT  17  /  AlkPhos  65  12-10                            10.6   10.83 )-----------( 300      ( 10 Dec 2019 05:51 )             33.4       EXAM:  XR KUB 1 VIEW            PROCEDURE DATE:  12/10/2019      INTERPRETATION:  CLINICAL HISTORY:Ileus    COMPARISON: December 9, 2019    TECHNIQUE: Supine abdominal radiographs    FINDINGS:  Multiple dilated, gas-filled small and large bowel loops again seen   throughout the abdomen. Limited evaluation for pneumoperitoneum due to   supine technique. Fusion contrast seen within the urinary bladder. Stable   degenerative changes.    IMPRESSION:  Unchanged dilated, gas-filled small and large bowel loops throughout the   abdomen      EXAM:  XR MODIFIED BARIUM SWALLOW            PROCEDURE DATE:  11/25/2019  (prior admission)    INTERPRETATION:  Clinical History / Reason for exam: Dysphagia.    Procedure: Various consistencies of food were given to the patient and   swallowing was observed under fluoroscopy.  This study was performed in   conjunction with the speech pathology department.    Findings/  Impression:  Dysphagia.  Please refer to report from the department of speech pathology for   detailed description of the findings and recommendations.    MEDICATIONS  (STANDING):  chlorhexidine 4% Liquid 1 Application(s) Topical <User Schedule>  diltiazem Infusion 5 mG/Hr (5 mL/Hr) IV Continuous <Continuous>  heparin  Injectable 5000 Unit(s) SubCutaneous every 8 hours  meropenem  IVPB 1000 milliGRAM(s) IV Intermittent every 8 hours  vancomycin  IVPB 1000 milliGRAM(s) IV Intermittent every 12 hours    MEDICATIONS  (PRN):        iStop:    Reference #: [id number]    There are no results for the search terms that you entered.      PPS  Level    30%       Note PPS = Palliative Performance Scale; (c)2001, [employer]       Range from 100% meaning Full ambulation/self-care/intake/Level of Consciousness                                                                              to        10% meaning Bedbound/Unable to do any activity/extensive disease /Total Care/ No PO intake/ LOC=Full/drowsy/+/-confusion        (0% = death)                     Prior to acute illness, patient's functionality reportedly has been deteriorating over the at 6 months and life, in general, has been hard.

## 2019-12-10 NOTE — CONSULT NOTE ADULT - ATTENDING COMMENTS
70 yo male patient with multiple medical problems as above mentioned.  Abdominal distension, N/V.  dehydrated.  abdomen soft, distended, tympanic, No peritoneal signs. non-tender.    CT scan with dilated large bowel, competent ileocecal valve questionable pneumatosis and portal vein air.  Normal WC and lactate.    a/p:  Large bowel ileus with incompetent Ileocecal valve.  can not r/o mechanical LB obstruction.  NPO, IVF.  Resuscitate.  IV abx.  G-tube to gravity.  will followup closely.
Consult was canceled by primary team as pt care was transferred to palliative care.
Pt seen with above NP. Pt with fleeting pain episodes as described above.  Plan to institute comfort measures only at this time. No po meds in v/o venting PEG and significant dysphagia.    Recs as above  We will follow

## 2019-12-10 NOTE — CONSULT NOTE ADULT - ASSESSMENT
Patient is a 69y old  Male who presents with a chief complaint of SOB.    Dermatomyositis  Dysphagia: Has G-tube  Systemic sclerosis   Gout  COPD, mild  Hypothyroid  Ileus   HFpEF  New-onset a-fib (CHADSVASC 2)    Recommendations: Patient is a 69y old  Male who presents with a chief complaint of SOB.    Severe dermatomyositis  Dysphagia: Has G-tube  Systemic sclerosis   Gout  COPD, mild  Hypothyroid  Ileus   Acute hypoxic respiratory failure  Multifocal pneumonia  HFpEF  New-onset a-fib (CHADSVASC 1)    Recommendations:   Treat underlying hypoxia  Continue cardizem drip and titrate as tolerated   Can add digoxin 0.25 mg PO once daily for HR control  Monitor K, Mg and Cr  Poor prognosis

## 2019-12-10 NOTE — PROGRESS NOTE ADULT - SUBJECTIVE AND OBJECTIVE BOX
SUBJECTIVE:  Patient is a 69y old Male who presents with a chief complaint of SOB (10 Dec 2019 08:59)  Currently admitted to medicine with the primary diagnosis of Ileus / pneumatosis / possible ischemic bowel       Today is hospital day . This morning he is resting in bed, on venti mask, desaturated to 80s, was placed on bipap, HR fluctuates between 100-140 on monitor. s/p 2 L LR bolus stat, Hydrocortisone 100 mg iv push, Cardizem drip to control HR.   Gastro and GS consult placed  I talked to the pt about his advanced directives, Pt deferred all decisions to Dr Tony Su (gynecologist at [facility], [facility]), Pt signed the HCP forms and Dr Jimenez with Pt's consent made him DNR / DNI and requested for palliative eval. Pending palliative eval      PAST MEDICAL & SURGICAL HISTORY  Dermatomyositis  Dysphagia: Has G-tube  Scleredema  Gout  COPD, mild  Hypothyroid  Gastrostomy in place: G-Tube  S/P tonsillectomy    SOCIAL HISTORY:  Negative for smoking/alcohol/drug use.     ALLERGIES:  No Known Allergies    MEDICATIONS:  STANDING MEDICATIONS  chlorhexidine 4% Liquid 1 Application(s) Topical <User Schedule>  diltiazem Infusion 5 mG/Hr IV Continuous <Continuous>  heparin  Injectable 5000 Unit(s) SubCutaneous every 8 hours  meropenem  IVPB 1000 milliGRAM(s) IV Intermittent every 8 hours  vancomycin  IVPB 1000 milliGRAM(s) IV Intermittent every 12 hours    PRN MEDICATIONS    Home Medications:  acetaminophen 325 mg oral tablet: 2 tab(s) orally every 6 hours, As needed, Mild Pain (1 - 3) (09 Dec 2019 22:59)  albuterol 90 mcg/inh inhalation aerosol: 2 puff(s) inhaled every 6 hours, As needed, Shortness of Breath and/or Wheezing (09 Dec 2019 22:59)  allopurinol 100 mg oral tablet: 1 tab(s) orally once a day (09 Dec 2019 22:59)  amLODIPine 2.5 mg oral tablet: 1 tab(s) orally once a day (09 Dec 2019 22:59)  DuoNeb 0.5 mg-2.5 mg/3 mL inhalation solution: 3 milliliter(s) inhaled 3 times a day, As Needed (09 Dec 2019 22:59)  folic acid 1 mg oral tablet: 1 tab(s) orally once a day (09 Dec 2019 22:59)  levothyroxine 25 mcg (0.025 mg) oral tablet: 1 tab(s) orally once a day (02 Oct 2019 18:28)  magnesium oxide 400 mg (241.3 mg elemental magnesium) oral tablet: 1 tab(s) orally 3 times a day (with meals) (02 Dec 2019 11:43)  methotrexate 2.5 mg oral tablet: 6 tab(s) orally   once a week on friday (02 Dec 2019 13:40)  mycophenolate mofetil 200 mg/mL oral suspension: 500 milligram(s) by gastrostomy tube 2 times a day (02 Dec 2019 13:40)  pantoprazole 40 mg oral delayed release tablet: 1 tab(s) orally once a day (02 Oct 2019 18:28)  PeriGuard topical ointment:  (09 Dec 2019 23:04)  predniSONE 20 mg oral tablet: 2 tab(s) orally once a day in the morning  (21 Nov 2019 16:31)  predniSONE 20 mg oral tablet: 1 tab(s) orally once a day at night  (21 Nov 2019 16:32)  Spiriva Respimat: 2.5 microgram(s) inhaled once a day (09 Dec 2019 23:04)  sulfamethoxazole-trimethoprim 800 mg-160 mg oral tablet: 1 tab(s) orally 3 times a week  for PCP prophylaxis (02 Dec 2019 13:41)    Vital Signs Last 24 Hrs  T(C): 38.1 (10 Dec 2019 07:30), Max: 38.1 (10 Dec 2019 07:30)  T(F): 100.5 (10 Dec 2019 07:30), Max: 100.5 (10 Dec 2019 07:30)  HR: 110 (10 Dec 2019 10:52) (108 - 148)  BP: 100/64 (10 Dec 2019 10:52) (90/67 - 140/71)  BP(mean): --  RR: 18 (10 Dec 2019 10:52) (16 - 40)  SpO2: 99% (10 Dec 2019 10:52) (84% - 100%)      LABS:                        10.6   10.83 )-----------( 300      ( 10 Dec 2019 05:51 )             33.4     12-10    136  |  96<L>  |  27<H>  ----------------------------<  75  4.6   |  26  |  0.7    Ca    8.0<L>      10 Dec 2019 05:51  Phos  4.0     12-10  Mg     1.8     12-10    TPro  4.9<L>  /  Alb  2.6<L>  /  TBili  0.3  /  DBili  x   /  AST  26  /  ALT  17  /  AlkPhos  65  12-10    PT/INR - ( 10 Dec 2019 00:17 )   PT: 13.00 sec;   INR: 1.13 ratio         PTT - ( 10 Dec 2019 00:17 )  PTT:27.3 sec    ABG - ( 10 Dec 2019 07:39 )  pH, Arterial: 7.51  pH, Blood: x     /  pCO2: 34    /  pO2: 62    / HCO3: 28    / Base Excess: 4.5   /  SaO2: 92                Creatine Kinase, Serum: 86 U/L (12-10-19 @ 05:51)  Troponin T, Serum: 0.09 ng/mL <HH> (12-10-19 @ 05:51)  Troponin T, Serum: 0.08 ng/mL <HH> (12-09-19 @ 22:20)  Lactate, Blood: 1.6 mmol/L (12-09-19 @ 22:20)      CARDIAC MARKERS ( 10 Dec 2019 05:51 )  x     / 0.09 ng/mL / 86 U/L / x     / 4.9 ng/mL  CARDIAC MARKERS ( 09 Dec 2019 22:20 )  x     / 0.08 ng/mL / x     / x     / x          RADIOLOGY:    PHYSICAL EXAM:  GEN: Resp distress on bipap  LUNGS: B/l lower lung field ronchi  HEART: S1/S2 present. RRR.   ABD: Soft, slightly distended and tender on deep palpation in all quadrants, peg tube to gravity in place draining dark sanguinous fluid?  NEURO: AAOX3

## 2019-12-10 NOTE — H&P ADULT - HISTORY OF PRESENT ILLNESS
70 y/o male with pmhx of COPD, dermatomyositis presents with respiratory distress. Patient is brought in from nursing home, poor historian. Per EMS, patient usually uses 4L NC, however was found to be hypoxic to 80s on 4L NC - improved on NRB. Patient admits to chest pain that started in the ED. Patient was recently discharged from hospital for evaluation of dysphagia. Denies recent illness, URI, fevers/chills, n/v/d, abdominal pain, leg swelling. 70 y/o male with pmhx of COPD, dermatomyositis presents with respiratory distress. Patient is brought in from nursing home, poor historian. Per EMS, patient usually uses 4L NC, however was found to be hypoxic to 80s on 4L NC - improved on NRB. Patient admits to chest pain that started in the ED. Patient was recently discharged from hospital for evaluation of dysphagia. Denies recent illness, URI, fevers/chills, n/v/d, abdominal pain, leg swelling.    In ED vitals: 120/99, 131, 98.9, 18, 98% on 3 litres of NC, labs no inc WBC count, BMP unremarkable, Trops 0.08, BNP 3000, CT abd and pelvis with oral and IV contrast showed small bowel pneumatosis, portal venous gas, 4.7 cm small bowel dilatation consistent with ileus. Evaluated by surgery no surgical intervention for now. EKG showed new onset  Afib started on cardizem drip.  Recently admitted in the hospital on Nov 21 st for severe muscle pain and found to have severe dysphagia s/p PEG tube insertion discharged on dec 4th to [facility]. 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   Poor historian historian  Denies any fever, chills, nausea, vomiting, abdominal pain, constipation, loose stools, headache, dizziness. chest pain, palpitations.     In ED vitals: 120/99, 131, 98.9, 18, 98% on 3 litres of NC, labs no inc WBC count, BMP unremarkable, Trops 0.08, BNP 3000, CT abd and pelvis with oral and IV contrast showed small bowel pneumatosis, portal venous gas, 4.7 cm small bowel dilatation consistent with ileus. Evaluated by surgery no surgical intervention for now. EKG showed new onset  Afib started on cardizem drip.  Recently admitted in the hospital on Nov 21 st for severe muscle pain and found to have severe dysphagia s/p PEG tube insertion discharged on dec 4th to [facility].

## 2019-12-10 NOTE — H&P ADULT - NSHPPHYSICALEXAM_GEN_ALL_CORE
ICU Vital Signs Last 24 Hrs  T(C): 37.2 (09 Dec 2019 22:15), Max: 37.2 (09 Dec 2019 22:15)  T(F): 98.9 (09 Dec 2019 22:15), Max: 98.9 (09 Dec 2019 22:15)  HR: 121 (09 Dec 2019 23:00) (110 - 133)  BP: 138/98 (09 Dec 2019 23:00) (120/99 - 138/98)  BP(mean): --  ABP: --  ABP(mean): --  RR: 26 (09 Dec 2019 23:00) (26 - 30)  SpO2: 100% (09 Dec 2019 23:00) (84% - 100%)    PHYSICAL EXAM:  GENERAL: NAD, speaks in full sentences, no signs of respiratory distress  HEAD:  Atraumatic, Normocephalic  EYES: EOMI, PERRLA, conjunctiva and sclera clear  NECK: Supple, No JVD  CHEST/LUNG: Clear to auscultation bilaterally; No wheeze; No crackles; No accessory muscles used  HEART: Regular rate and rhythm; No murmurs;   ABDOMEN: Soft, Nontender, Nondistended; Bowel sounds present; No guarding  EXTREMITIES:  2+ Peripheral Pulses, No cyanosis or edema  PSYCH: AAOx3  NEUROLOGY: non-focal ICU Vital Signs Last 24 Hrs  T(C): 37.2 (09 Dec 2019 22:15), Max: 37.2 (09 Dec 2019 22:15)  T(F): 98.9 (09 Dec 2019 22:15), Max: 98.9 (09 Dec 2019 22:15)  HR: 121 (09 Dec 2019 23:00) (110 - 133)  BP: 138/98 (09 Dec 2019 23:00) (120/99 - 138/98)  BP(mean): --  ABP: --  ABP(mean): --  RR: 26 (09 Dec 2019 23:00) (26 - 30)  SpO2: 100% (09 Dec 2019 23:00) (84% - 100%)    PHYSICAL EXAM:  GENERAL: NAD, speaks in full sentences  HEAD:  Atraumatic, Normocephalic  EYES: conjunctiva and sclera clear  NECK: Supple  CHEST/LUNG: Clear to auscultation bilaterally  HEART: irregular  ABDOMEN: Distended, hypoactive bowel sounds  EXTREMITIES:  No edema  PSYCH: AAOx3  NEUROLOGY: non-focal

## 2019-12-10 NOTE — H&P ADULT - ASSESSMENT
#)Diet: NPO PEG tube to gravity  #)Activity: bedrest  #)DVT ppx: hep SC  #)GI ppx:   #)Dispo:   #)Code; 68 y/o male with pmhx COPD (not on home oxygen), hypothyroidism, gout, RA, dermato vs polymyositis (on prednisone and methotrexate), ?systemic sclerosis was sent in from NH for SOB and desatted on room air to 81% on 3 litres NC found to have small bowel ileus and ?new onset A fib.     #)Small bowel ileus in CT scan   -evaluated by surgery   -appreciated surgery recs  -NPO, PEG tube to gravity  -serial abdominal exams  -replete electrolytes    #)New onset a fib vs MAT   -HR was fluctuating between 120-150  -CHADs VASc is 1 no need for anticoagulation   -Rate control with cardizem drip  -EP consult  -2d echo (Last echo in oct normal EF, no valvular abnormalities)    #)Elevated trops 0.08  -No ischemic changes in EKG  -trend trops    #)COPD not on home oxygen  -No wheeze, no exacerbation    #)hypothyroidsim (On levothyroxine)DM (On prednsione, MTX, mycophenolate) hold medications for now given ileus.     #)Diet: NPO PEG tube to gravity  #)Activity: bedrest  #)DVT ppx: hep SC  #)GI ppx: not indicated  #)Dispo: from [facility]  #)Code; Full

## 2019-12-11 LAB
ALBUMIN SERPL ELPH-MCNC: 2.4 G/DL — LOW (ref 3.5–5.2)
ALP SERPL-CCNC: 70 U/L — SIGNIFICANT CHANGE UP (ref 30–115)
ALT FLD-CCNC: 17 U/L — SIGNIFICANT CHANGE UP (ref 0–41)
ANION GAP SERPL CALC-SCNC: 16 MMOL/L — HIGH (ref 7–14)
ANISOCYTOSIS BLD QL: SLIGHT — SIGNIFICANT CHANGE UP
AST SERPL-CCNC: 28 U/L — SIGNIFICANT CHANGE UP (ref 0–41)
BASOPHILS # BLD AUTO: 0 K/UL — SIGNIFICANT CHANGE UP (ref 0–0.2)
BASOPHILS NFR BLD AUTO: 0 % — SIGNIFICANT CHANGE UP (ref 0–1)
BILIRUB SERPL-MCNC: 0.4 MG/DL — SIGNIFICANT CHANGE UP (ref 0.2–1.2)
BUN SERPL-MCNC: 27 MG/DL — HIGH (ref 10–20)
CALCIUM SERPL-MCNC: 7.8 MG/DL — LOW (ref 8.5–10.1)
CHLORIDE SERPL-SCNC: 99 MMOL/L — SIGNIFICANT CHANGE UP (ref 98–110)
CO2 SERPL-SCNC: 24 MMOL/L — SIGNIFICANT CHANGE UP (ref 17–32)
CREAT SERPL-MCNC: 0.6 MG/DL — LOW (ref 0.7–1.5)
EOSINOPHIL # BLD AUTO: 0 K/UL — SIGNIFICANT CHANGE UP (ref 0–0.7)
EOSINOPHIL NFR BLD AUTO: 0 % — SIGNIFICANT CHANGE UP (ref 0–8)
GIANT PLATELETS BLD QL SMEAR: PRESENT — SIGNIFICANT CHANGE UP
GLUCOSE BLDC GLUCOMTR-MCNC: 100 MG/DL — HIGH (ref 70–99)
GLUCOSE BLDC GLUCOMTR-MCNC: 46 MG/DL — LOW (ref 70–99)
GLUCOSE BLDC GLUCOMTR-MCNC: 58 MG/DL — LOW (ref 70–99)
GLUCOSE BLDC GLUCOMTR-MCNC: 83 MG/DL — SIGNIFICANT CHANGE UP (ref 70–99)
GLUCOSE BLDC GLUCOMTR-MCNC: 97 MG/DL — SIGNIFICANT CHANGE UP (ref 70–99)
GLUCOSE SERPL-MCNC: 43 MG/DL — CRITICAL LOW (ref 70–99)
HCT VFR BLD CALC: 26.2 % — LOW (ref 42–52)
HGB BLD-MCNC: 8.1 G/DL — LOW (ref 14–18)
LACTATE SERPL-SCNC: 1.3 MMOL/L — SIGNIFICANT CHANGE UP (ref 0.7–2)
LYMPHOCYTES # BLD AUTO: 0.74 K/UL — LOW (ref 1.2–3.4)
LYMPHOCYTES # BLD AUTO: 4.4 % — LOW (ref 20.5–51.1)
MAGNESIUM SERPL-MCNC: 1.9 MG/DL — SIGNIFICANT CHANGE UP (ref 1.8–2.4)
MANUAL SMEAR VERIFICATION: SIGNIFICANT CHANGE UP
MCHC RBC-ENTMCNC: 29.8 PG — SIGNIFICANT CHANGE UP (ref 27–31)
MCHC RBC-ENTMCNC: 30.9 G/DL — LOW (ref 32–37)
MCV RBC AUTO: 96.3 FL — HIGH (ref 80–94)
MICROCYTES BLD QL: SLIGHT — SIGNIFICANT CHANGE UP
MONOCYTES # BLD AUTO: 0.15 K/UL — SIGNIFICANT CHANGE UP (ref 0.1–0.6)
MONOCYTES NFR BLD AUTO: 0.9 % — LOW (ref 1.7–9.3)
NEUTROPHILS # BLD AUTO: 16.03 K/UL — HIGH (ref 1.4–6.5)
NEUTROPHILS NFR BLD AUTO: 88.6 % — HIGH (ref 42.2–75.2)
NEUTS BAND # BLD: 6.1 % — HIGH (ref 0–6)
PHOSPHATE SERPL-MCNC: 3.4 MG/DL — SIGNIFICANT CHANGE UP (ref 2.1–4.9)
PLAT MORPH BLD: NORMAL — SIGNIFICANT CHANGE UP
PLATELET # BLD AUTO: 229 K/UL — SIGNIFICANT CHANGE UP (ref 130–400)
POIKILOCYTOSIS BLD QL AUTO: SLIGHT — SIGNIFICANT CHANGE UP
POLYCHROMASIA BLD QL SMEAR: SIGNIFICANT CHANGE UP
POTASSIUM SERPL-MCNC: 3.8 MMOL/L — SIGNIFICANT CHANGE UP (ref 3.5–5)
POTASSIUM SERPL-SCNC: 3.8 MMOL/L — SIGNIFICANT CHANGE UP (ref 3.5–5)
PROT SERPL-MCNC: 4.5 G/DL — LOW (ref 6–8)
RBC # BLD: 2.72 M/UL — LOW (ref 4.7–6.1)
RBC # FLD: 20 % — HIGH (ref 11.5–14.5)
RBC BLD AUTO: ABNORMAL
SCHISTOCYTES BLD QL AUTO: SLIGHT — SIGNIFICANT CHANGE UP
SODIUM SERPL-SCNC: 139 MMOL/L — SIGNIFICANT CHANGE UP (ref 135–146)
WBC # BLD: 16.93 K/UL — HIGH (ref 4.8–10.8)
WBC # FLD AUTO: 16.93 K/UL — HIGH (ref 4.8–10.8)

## 2019-12-11 PROCEDURE — 99231 SBSQ HOSP IP/OBS SF/LOW 25: CPT

## 2019-12-11 PROCEDURE — 99233 SBSQ HOSP IP/OBS HIGH 50: CPT

## 2019-12-11 RX ORDER — DEXTROSE 10 % IN WATER 10 %
1000 INTRAVENOUS SOLUTION INTRAVENOUS
Refills: 0 | Status: DISCONTINUED | OUTPATIENT
Start: 2019-12-11 | End: 2019-12-11

## 2019-12-11 RX ORDER — DEXTROSE 10 % IN WATER 10 %
250 INTRAVENOUS SOLUTION INTRAVENOUS
Refills: 0 | Status: DISCONTINUED | OUTPATIENT
Start: 2019-12-11 | End: 2019-12-12

## 2019-12-11 RX ADMIN — MORPHINE SULFATE 2 MILLIGRAM(S): 50 CAPSULE, EXTENDED RELEASE ORAL at 07:41

## 2019-12-11 RX ADMIN — Medication 500 MILLILITER(S): at 16:46

## 2019-12-11 RX ADMIN — Medication 3 MILLILITER(S): at 08:53

## 2019-12-11 RX ADMIN — MORPHINE SULFATE 2 MILLIGRAM(S): 50 CAPSULE, EXTENDED RELEASE ORAL at 02:44

## 2019-12-11 RX ADMIN — MORPHINE SULFATE 2 MILLIGRAM(S): 50 CAPSULE, EXTENDED RELEASE ORAL at 21:47

## 2019-12-11 RX ADMIN — BUDESONIDE AND FORMOTEROL FUMARATE DIHYDRATE 2 PUFF(S): 160; 4.5 AEROSOL RESPIRATORY (INHALATION) at 21:02

## 2019-12-11 RX ADMIN — Medication 3 MILLILITER(S): at 19:58

## 2019-12-11 RX ADMIN — MORPHINE SULFATE 2 MILLIGRAM(S): 50 CAPSULE, EXTENDED RELEASE ORAL at 06:11

## 2019-12-11 RX ADMIN — MORPHINE SULFATE 2 MILLIGRAM(S): 50 CAPSULE, EXTENDED RELEASE ORAL at 20:59

## 2019-12-11 RX ADMIN — Medication 3 MILLILITER(S): at 02:11

## 2019-12-11 RX ADMIN — MORPHINE SULFATE 2 MILLIGRAM(S): 50 CAPSULE, EXTENDED RELEASE ORAL at 21:02

## 2019-12-11 RX ADMIN — MORPHINE SULFATE 2 MILLIGRAM(S): 50 CAPSULE, EXTENDED RELEASE ORAL at 18:45

## 2019-12-11 RX ADMIN — BUDESONIDE AND FORMOTEROL FUMARATE DIHYDRATE 2 PUFF(S): 160; 4.5 AEROSOL RESPIRATORY (INHALATION) at 08:34

## 2019-12-11 NOTE — PATIENT PROFILE ADULT - FALL HARM RISK TYPE OF ASSESSMENT
Della Breaux is a 73 year old female here for  Chief Complaint   Patient presents with   • Cancer     right breast cancer     Denies latex allergy or sensitivity.    Medication verified, no changes.  PCP and Pharmacy verified.    Social History     Tobacco Use   Smoking Status Former Smoker   • Packs/day: 0.10   • Years: 0.25   • Pack years: 0.02   • Types: Cigarettes   • Last attempt to quit: 1987   • Years since quittin.7   Smokeless Tobacco Never Used     Advance Directives Filed: No    ECO - Ambulatory/capable of self-care, unable to perform work activities.  Up & about more than 50% of the day.    Height: No.  Ht Readings from Last 1 Encounters:   19 5' 6\" (1.676 m)     Weight:Yes, shoes on.  Wt Readings from Last 3 Encounters:   19 (!) 139.8 kg   19 (!) 139.6 kg   18 (!) 143.8 kg       BMI: There is no height or weight on file to calculate BMI.    REVIEW OF SYSTEMS  GENERAL:  Patient denies headache, fevers, chills, night sweats, excessive fatigue, change in appetite, weight loss, dizziness  ALLERGIC/IMMUNOLOGIC: Verified allergies: No  EYES:  Patient denies significant visual difficulties, double vision, blurred vision  ENT/MOUTH: Patient denies problems with hearing, sore throat, sinus drainage, mouth sores  ENDOCRINE:  Patient denies diabetes, thyroid disease, hormone replacement, hot flashes  HEMATOLOGIC/LYMPHATIC: Patient denies easy bruising, bleeding, tender lymph nodes, swollen lymph nodes  BREASTS: Patient denies abnormal masses of breast, nipple discharge, pain  RESPIRATORY:  Patient denies lung pain with breathing, cough, coughing up blood, shortness of breath  CARDIOVASCULAR:  Patient denies anginal chest pain, palpitations, shortness of breath when lying flat, peripheral edema  GASTROINTESTINAL: Patient denies abdominal pain , nausea, vomiting, diarrhea, GI bleeding, constipation, change in bowel habits, heartburn, sensation of feeling full, difficulty  swallowing  : Patient denies abnormal genital masses, blood in the urine, frequency, urgency, burning with urination, hesitancy, incontinence, vaginal bleeding, discharge  MUSCULOSKELETAL:  Patient denies joint pain, bone pain, joint swelling, redness, decreased range of motion, but complains of: bone pain,   SKIN:  Patient denies chronic rashes, inflammation, ulcerations, skin changes, itching  NEUROLOGIC:  Patient denies loss of balance, areas of focal weakness, abnormal gait, sensory problems, numbness, tingling  PSYCHIATRIC: Patient denies insomnia, depression, anxiety   admission

## 2019-12-11 NOTE — PHARMACOTHERAPY INTERVENTION NOTE - COMMENTS
MD ordered D10% at 250ml/hr for 30min.;  I spoke to MD to clarify rate and order changed to 500ml/hr for 30min.

## 2019-12-11 NOTE — PROGRESS NOTE ADULT - SUBJECTIVE AND OBJECTIVE BOX
GENERAL SURGERY PROGRESS NOTE     NEIL [place]EN[place]  69y  Male  Hospital day :1d  POD:  Procedure:   OVERNIGHT EVENTS:    T(F): 97.2 (12-10-19 @ 22:20), Max: 100.5 (12-10-19 @ 07:30)  HR: 97 (12-10-19 @ 22:20) (78 - 148)  BP: 97/61 (12-10-19 @ 22:20) (97/61 - 113/64)  ABP: --  ABP(mean): --  RR: 20 (12-10-19 @ 22:20) (17 - 40)  SpO2: 92% (12-10-19 @ 22:30) (85% - 99%)    DIET/FLUIDS:   NG:                                                                                DRAINS:     BM:     EMESIS:     URINE:      GI proph:    AC/ proph:   ABx:     PHYSICAL EXAM:  GENERAL: NAD, well-appearing  CHEST/LUNG: Clear to auscultation bilaterally  HEART: Regular rate and rhythm  ABDOMEN: Soft, Nontender, Nondistended;   EXTREMITIES:  No clubbing, cyanosis, or edema      LABS  Labs:  CAPILLARY BLOOD GLUCOSE                              10.6   10.83 )-----------( 300      ( 10 Dec 2019 05:51 )             33.4       Auto Neutrophil %: 92.8 % (12-10-19 @ 05:51)  Auto Immature Granulocyte %: 0.4 % (12-10-19 @ 05:51)    12-10    136  |  96<L>  |  27<H>  ----------------------------<  75  4.6   |  26  |  0.7      Calcium, Total Serum: 8.0 mg/dL (12-10-19 @ 05:51)      LFTs:             4.9  | 0.3  | 26       ------------------[65      ( 10 Dec 2019 05:51 )  2.6  | x    | 17          Lipase:x      Amylase:x         Blood Gas Arterial, Lactate: 3.4 mmoL/L (12-10-19 @ 07:39)  Lactate, Blood: 1.6 mmol/L (12-09-19 @ 22:20)  Blood Gas Venous - Lactate: 1.3 mmoL/L (12-09-19 @ 22:13)    ABG - ( 10 Dec 2019 07:39 )  pH: 7.51  /  pCO2: 34    /  pO2: 62    / HCO3: 28    / Base Excess: 4.5   /  SaO2: 92                Coags:     13.00  ----< 1.13    ( 10 Dec 2019 00:17 )     27.3        CARDIAC MARKERS ( 10 Dec 2019 05:51 )  x     / 0.09 ng/mL / 86 U/L / x     / 4.9 ng/mL  CARDIAC MARKERS ( 09 Dec 2019 22:20 )  x     / 0.08 ng/mL / x     / x     / x          Serum Pro-Brain Natriuretic Peptide: 3676 pg/mL (12-09-19 @ 22:20)              RADIOLOGY & ADDITIONAL TESTS:      A/P

## 2019-12-11 NOTE — PATIENT PROFILE ADULT - BILL PAYMENT
"Subjective:       Patient ID: Manfred Shah is a 55 y.o. male.    Chief Complaint: Follow-up    Patient is a 55-year-old white male with CIDP (chronic inflammatory demyelinating polyneuropathy) diagnosed in June 2018 by neurologist, Dr. Rowdy Tran, left foot drop secondary to CIDP, history of Abscess of spinal cord treated by Dr. Rodriguez S/P Laminectomy in August 2018, Neurogenic bladder with urge incontinence and self-catheterization and BPH followed by Dr. Sanderson, RLS, Hypertension, Hyperlipidemia, Impaired Fasting Glucose, Elevated Liver Enzymes, Heberden's nodes with joint inflammation and pain to bilateral hands followed by [facility],  and Sleep Apnea with CPAP use that is here today blood pressure check/follow up.    Patient had all chronic problems addressed at November 2019 visit and not due to repeat labs and follow up until May 2020.  However, HYPERTENSION was uncontrolled in November 2019 so medications adjusted and patient is here today for follow up.     Patient has Hypertension that was controlled on amlodipine 10 mg daily, losartan /12.5 mg daily, and Bystolic 10 mg daily in the past HOWEVER the INSURANCE stopped covering Bystolic 10 mg in July 2019.  We stopped the Bystolic and changed patient to Metoprolol XL 50 mg daily and patient was supposed to follow up since August but due to some cancelled appointments - did not make follow up until November 2019.  Blood pressure was UNCONTROLLED on Losartan /12.5 mg daily, Amlodipine 10 mg daily and Metoprolol XL 50 mg daily so the Losartan HCT was stopped and changed to Valsartan /12.5 mg daily and the Amlodipine and Metoprolol were continued at present doses.  Blood pressure did improve but is still elevated.   BP (!) 146/86   Pulse 82   Temp 98 °F (36.7 °C) (Oral)   Resp 16   Ht 6' 5" (1.956 m)   Wt (!) 171.7 kg (378 lb 10.2 oz)   SpO2 96%   BMI 44.90 kg/m²            Current Outpatient Medications   Medication Sig " Dispense Refill    amLODIPine (NORVASC) 10 MG tablet Take 1 tablet (10 mg total) by mouth once daily. 30 tablet 1    aspirin (ECOTRIN) 81 MG EC tablet Take 81 mg by mouth once daily.      fish oil-omega-3 fatty acids 300-1,000 mg capsule Take 2 g by mouth once daily.      ELVIS 0.5-0.4 mg CM24 TAKE 1 CAPSULE BY MOUTH ONCE DAILY 30 capsule 11    lysine 500 mg Tab Take 500 mg by mouth once daily.      meloxicam (MOBIC) 15 MG tablet Take 1 tablet (15 mg total) by mouth once daily. 30 tablet 2    metoprolol succinate (TOPROL-XL) 100 MG 24 hr tablet Take 1 tablet (100 mg total) by mouth once daily. 30 tablet 1    rOPINIRole (REQUIP) 2 MG tablet Take 1 tablet (2 mg total) by mouth every evening. 90 tablet 1    rosuvastatin (CRESTOR) 10 MG tablet TAKE 1 TABLET BY MOUTH ONCE DAILY IN THE EVENING 90 tablet 0    sodium,potassium,mag sulfates (SUPREP BOWEL PREP KIT) 17.5-3.13-1.6 gram SolR Take 177 mLs by mouth once daily for 2 days as directed 1 kit 0    valsartan-hydrochlorothiazide (DIOVAN-HCT) 320-12.5 mg per tablet Take 1 tablet by mouth once daily. 30 tablet 1     No current facility-administered medications for this visit.        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 Tray Rodriguez    CIDP (chronic inflammatory demyelinating polyneuropathy) 06/2018    followed by Dr. Rowdy Tran - Neurologist in [place].      Elevated liver enzymes 10/8/2018    Elevated PSA     followed by Dr. Sanderson    Foot drop, left foot     due to neurological condition/[place]    Hyperlipidemia     Hypertension     Impaired fasting glucose     Neurogenic bladder 09/05/2018    followed by Dr. Sanderson    RLS (restless legs syndrome) 10/8/2018    Self-catheterizes urinary bladder     Urologist    Sleep apnea with use of continuous positive airway pressure (CPAP) 01/10/2018    followed by [facility] in [place]    Urge incontinence  9/11/2018       Past Surgical History:   Procedure Laterality Date    ADENOIDECTOMY      HERNIA REPAIR      LUMBAR LAMINECTOMY  2012    [facility]    LUMBAR LAMINECTOMY  08/2018    Dr. Rodriguez - [facility]; complications from surgery - abscess to spinal cord - discharge summary scanned to media file    PLANTAR FASCIA RELEASE      TONSILLECTOMY      VASECTOMY      X-STOP IMPLANTATION         Family History   Problem Relation Age of Onset    Cancer Mother         Breast Cancer    Heart disease Mother         pacemaker    Hyperlipidemia Mother         taking Crestor    Hypertension Mother         taking Amlodipine    Diabetes Mother         Prediabetes    Diabetes Father     Cancer Father         unknown type of cancer    Hypertension Father     Rheum arthritis Father     No Known Problems Sister     Prostate cancer Neg Hx     Kidney disease Neg Hx        Social History     Socioeconomic History    Marital status:      Spouse name: Not on file    Number of children: Not on file    Years of education: Not on file    Highest education level: Not on file   Occupational History    Occupation: teacher   Social Needs    Financial resource strain: Not on file    Food insecurity:     Worry: Not on file     Inability: Not on file    Transportation needs:     Medical: Not on file     Non-medical: Not on file   Tobacco Use    Smoking status: Never Smoker    Smokeless tobacco: Never Used   Substance and Sexual Activity    Alcohol use: No    Drug use: No    Sexual activity: Yes     Partners: Female   Lifestyle    Physical activity:     Days per week: Not on file     Minutes per session: Not on file    Stress: Not on file   Relationships    Social connections:     Talks on phone: Not on file     Gets together: Not on file     Attends [employer] service: Not on file     Active member of club or organization: Not on file     Attends meetings of clubs or organizations: Not on file      "Relationship status: Not on file   Other Topics Concern    Not on file   Social History Narrative    Not on file       Review of Systems   Constitutional: Negative for activity change and unexpected weight change.   HENT: Negative for hearing loss, postnasal drip, rhinorrhea, sneezing and trouble swallowing.    Eyes: Negative for discharge and visual disturbance.   Respiratory: Negative for chest tightness and wheezing.    Cardiovascular: Negative for chest pain and palpitations.   Gastrointestinal: Negative for blood in stool, constipation, diarrhea and vomiting.        Bowel incontinence as well stating that he does not feel when he needs to go but if he goes on scheduled bathroom breaks, he tends to do well.   Endocrine: Negative for polydipsia and polyuria.   Genitourinary: Positive for difficulty urinating and urgency. Negative for hematuria.        Self-catheterizes due to neurogenic bladder   Musculoskeletal: Positive for arthralgias, back pain and joint swelling. Negative for neck pain.        Swelling and nodes to DIP joints   Neurological: Positive for weakness and numbness. Negative for headaches.        Left foot drop due to CIDP   Psychiatric/Behavioral: Negative for confusion and dysphoric mood.         Objective:     Vitals:    12/10/19 1522 12/10/19 1545   BP:  (!) 146/86   Pulse: 82    Resp: 16    Temp: 98 °F (36.7 °C)    TempSrc: Oral    SpO2: 96%    Weight: (!) 171.7 kg (378 lb 10.2 oz)    Height: 6' 5" (1.956 m)           Physical Exam   Constitutional: He is oriented to person, place, and time. He appears well-developed and well-nourished.   + obesity with Body mass index is 44.9 kg/m².   HENT:   Head: Normocephalic.   Right Ear: External ear normal.   Left Ear: External ear normal.   Nose: Nose normal.   Mouth/Throat: Oropharynx is clear and moist. No oropharyngeal exudate.   Eyes: Pupils are equal, round, and reactive to light. EOM are normal. Right eye exhibits no discharge. Left eye " exhibits no discharge. No scleral icterus.   Neck: Normal range of motion. Neck supple. No tracheal deviation present. No thyromegaly present.   Cardiovascular: Normal rate, regular rhythm and normal heart sounds.   No murmur heard.  Pulmonary/Chest: Effort normal and breath sounds normal. No respiratory distress.   Abdominal: Soft. He exhibits no distension.   Musculoskeletal: He exhibits tenderness and deformity. He exhibits no edema.        Right hand: He exhibits tenderness and deformity.        Left hand: He exhibits tenderness and deformity.        Hands:  Ambulatory with brace on to left foot to treat the left foot drop.    + nodes/swelling to the DIP joints of both hands -followed by [facility]   Lymphadenopathy:     He has no cervical adenopathy.   Neurological: He is alert and oriented to person, place, and time. Coordination normal.   Skin: Skin is warm and dry. No rash noted.   Psychiatric: He has a normal mood and affect. His behavior is normal.         Assessment:         ICD-10-CM ICD-9-CM   1. Essential hypertension I10 401.9       Plan:       Essential hypertension  -  Increase the Metoprolol from 50 to 100 mg daily.  Continue the Valsartan HCT and Amlodipine at present doses.  Recheck in 4 to 6 weeks.  -     metoprolol succinate (TOPROL-XL) 100 MG 24 hr tablet; Take 1 tablet (100 mg total) by mouth once daily.  Dispense: 30 tablet; Refill: 1      Follow up in about 6 weeks (around 1/21/2020) for blood pressure check.     Patient's Medications   New Prescriptions    No medications on file   Previous Medications    AMLODIPINE (NORVASC) 10 MG TABLET    Take 1 tablet (10 mg total) by mouth once daily.    ASPIRIN (ECOTRIN) 81 MG EC TABLET    Take 81 mg by mouth once daily.    FISH OIL-OMEGA-3 FATTY ACIDS 300-1,000 MG CAPSULE    Take 2 g by mouth once daily.    ELVIS 0.5-0.4 MG CM24    TAKE 1 CAPSULE BY MOUTH ONCE DAILY    LYSINE 500 MG TAB    Take 500 mg by mouth once daily.    MELOXICAM  (MOBIC) 15 MG TABLET    Take 1 tablet (15 mg total) by mouth once daily.    ROPINIROLE (REQUIP) 2 MG TABLET    Take 1 tablet (2 mg total) by mouth every evening.    ROSUVASTATIN (CRESTOR) 10 MG TABLET    TAKE 1 TABLET BY MOUTH ONCE DAILY IN THE EVENING    SODIUM,POTASSIUM,MAG SULFATES (SUPREP BOWEL PREP KIT) 17.5-3.13-1.6 GRAM SOLR    Take 177 mLs by mouth once daily for 2 days as directed    VALSARTAN-HYDROCHLOROTHIAZIDE (DIOVAN-HCT) 320-12.5 MG PER TABLET    Take 1 tablet by mouth once daily.   Modified Medications    Modified Medication Previous Medication    METOPROLOL SUCCINATE (TOPROL-XL) 100 MG 24 HR TABLET metoprolol succinate (TOPROL-XL) 50 MG 24 hr tablet       Take 1 tablet (100 mg total) by mouth once daily.    Take 1 tablet (50 mg total) by mouth once daily.   Discontinued Medications    METAXALONE (SKELAXIN) 800 MG TABLET    1 tablet    METOPROLOL SUCCINATE 100 MG CSPX        NEBIVOLOL (BYSTOLIC) 5 MG TAB    1 tablet      no

## 2019-12-11 NOTE — PROGRESS NOTE ADULT - ASSESSMENT
69yMale being evaluated for comfort care.    Pt hemodynamically stable, comfortable on current regimen.  No further complaints    Recommendations:  Hospice c/s for dispo planning at SNF  cont morphine as written (pt may refuse), will convert to po in 24hrs  ongoing CMO/DNR/DNI  dc further lab work  PEG feeds as tolerated  Supportive care       We will follow  [phone]

## 2019-12-11 NOTE — PROGRESS NOTE ADULT - SUBJECTIVE AND OBJECTIVE BOX
NEIL PHOENIX 69y Male  MRN#: [id number]         SUBJECTIVE  Patient is a 69y old Male who presents with a chief complaint of SOB (11 Dec 2019 05:38)  Currently admitted to medicine with the primary diagnosis of Ileus    Patient has no complaints    OBJECTIVE  PAST MEDICAL & SURGICAL HISTORY  Dermatomyositis  Dysphagia: Has G-tube  Scleredema  Gout  COPD, mild  Hypothyroid  Gastrostomy in place: G-Tube  S/P tonsillectomy    ALLERGIES:  No Known Allergies    MEDICATIONS:  STANDING MEDICATIONS  albuterol/ipratropium for Nebulization 3 milliLiter(s) Nebulizer every 6 hours  budesonide 160 MICROgram(s)/formoterol 4.5 MICROgram(s) Inhaler 2 Puff(s) Inhalation two times a day  chlorhexidine 4% Liquid 1 Application(s) Topical <User Schedule>  dextrose 10%. 1000 milliLiter(s) IV Continuous <Continuous>  morphine  - Injectable 2 milliGRAM(s) IV Push every 4 hours    PRN MEDICATIONS  LORazepam   Injectable 0.5 milliGRAM(s) IV Push every 4 hours PRN  morphine  - Injectable 2 milliGRAM(s) IV Push every 1 hour PRN      VITAL SIGNS: Last 24 Hours  T(C): 36.2 (10 Dec 2019 22:20), Max: 37.2 (10 Dec 2019 13:14)  T(F): 97.2 (10 Dec 2019 22:20), Max: 99 (10 Dec 2019 13:14)  HR: 97 (10 Dec 2019 22:20) (78 - 119)  BP: 97/61 (10 Dec 2019 22:20) (97/61 - 102/62)  BP(mean): 78 (10 Dec 2019 13:14) (78 - 78)  RR: 20 (10 Dec 2019 22:20) (20 - 20)  SpO2: 91% (11 Dec 2019 09:45) (91% - 97%)    LABS:                        8.1    16.93 )-----------( 229      ( 11 Dec 2019 07:50 )             26.2     12-11    139  |  99  |  27<H>  ----------------------------<  43<LL>  3.8   |  24  |  0.6<L>    Ca    7.8<L>      11 Dec 2019 07:50  Phos  3.4     12-11  Mg     1.9     12-11    TPro  4.5<L>  /  Alb  2.4<L>  /  TBili  0.4  /  DBili  x   /  AST  28  /  ALT  17  /  AlkPhos  70  12-11    PT/INR - ( 10 Dec 2019 00:17 )   PT: 13.00 sec;   INR: 1.13 ratio         PTT - ( 10 Dec 2019 00:17 )  PTT:27.3 sec    ABG - ( 10 Dec 2019 07:39 )  pH, Arterial: 7.51  pH, Blood: x     /  pCO2: 34    /  pO2: 62    / HCO3: 28    / Base Excess: 4.5   /  SaO2: 92                Lactate, Blood: 1.3 mmol/L (12-11-19 @ 07:50)      CARDIAC MARKERS ( 10 Dec 2019 05:51 )  x     / 0.09 ng/mL / 86 U/L / x     / 4.9 ng/mL  CARDIAC MARKERS ( 09 Dec 2019 22:20 )  x     / 0.08 ng/mL / x     / x     / x          RADIOLOGY:      PHYSICAL EXAM:    GENERAL: NAD, well-developed, AAOx3  HEENT:  Atraumatic, Normocephalic. EOMI, PERRLA, conjunctiva and sclera clear, No JVD  PULMONARY: Clear to auscultation bilaterally; No wheeze  CARDIOVASCULAR: Regular rate and rhythm; No murmurs, rubs, or gallops  GASTROINTESTINAL: PEG tube  MUSCULOSKELETAL:  2+ Peripheral Pulses, No clubbing, cyanosis, or edema  NEUROLOGY: non-focal  SKIN: No rashes or lesions    Assessment and Plan  70 y/o male with pmhx COPD (not on home oxygen), hypothyroidism, gout, RA, dermato vs polymyositis (on prednisone and methotrexate), ?systemic sclerosis was sent in from NH for SOB and desatted on room air to 81% on 3 litres NC found to have small bowel ileus and ?new onset A fib. Patient is DNR/DNI/Comfort care    #)Small bowel ileus in CT scan   -evaluated by surgery --> no surgical intervention.   - DNR/DNI/Comfort care  - seen by palliative team  - will consult hospice today  - Morphine/Ativan as needed      #)New onset a fib vs MAT   - Rate control for comfort care      #)COPD not on home oxygen  - O2 for comfort care

## 2019-12-11 NOTE — PROGRESS NOTE ADULT - SUBJECTIVE AND OBJECTIVE BOX
69yMale with diagnosis: RESPIRATORY DISTESS;ILUES;NEW ONSET ATRIAL FIBRILLATION      Patient seen, reports pain and dyspnea improved, Had BM, passing flatus.  Resp status stable      PHYSICAL EXAM  unchanged      T(C): , Max: 37.1 (15:47)  T(F): 97.2  HR: 97 (78 - 97)  BP: 97/61 (97/61 - 99/54)  RR: 20 (20 - 20)  SpO2: 91% (91% - 97%)              LABS:                          8.1    16.93 )-----------( 229      ( 11 Dec 2019 07:50 )             26.2                                                                                      12-11    139  |  99  |  27<H>  ----------------------------<  43<LL>  3.8   |  24  |  0.6<L>    Ca    7.8<L>      11 Dec 2019 07:50  Phos  3.4     12-11  Mg     1.9     12-11    TPro  4.5<L>  /  Alb  2.4<L>  /  TBili  0.4  /  DBili  x   /  AST  28  /  ALT  17  /  AlkPhos  70  12-11                                                      MEDICATIONS  (STANDING):  albuterol/ipratropium for Nebulization 3 milliLiter(s) Nebulizer every 6 hours  budesonide 160 MICROgram(s)/formoterol 4.5 MICROgram(s) Inhaler 2 Puff(s) Inhalation two times a day  chlorhexidine 4% Liquid 1 Application(s) Topical <User Schedule>  dextrose 10%. 1000 milliLiter(s) (500 mL/Hr) IV Continuous <Continuous>  morphine  - Injectable 2 milliGRAM(s) IV Push every 4 hours    MEDICATIONS  (PRN):  LORazepam   Injectable 0.5 milliGRAM(s) IV Push every 4 hours PRN Agitation  morphine  - Injectable 2 milliGRAM(s) IV Push every 1 hour PRN extra pain or dyspnea

## 2019-12-12 VITALS — OXYGEN SATURATION: 90 %

## 2019-12-12 DIAGNOSIS — R64 CACHEXIA: ICD-10-CM

## 2019-12-12 DIAGNOSIS — M33.19: ICD-10-CM

## 2019-12-12 LAB — GLUCOSE BLDC GLUCOMTR-MCNC: 107 MG/DL — HIGH (ref 70–99)

## 2019-12-12 PROCEDURE — 99233 SBSQ HOSP IP/OBS HIGH 50: CPT

## 2019-12-12 RX ORDER — ROBINUL 0.2 MG/ML
0.4 INJECTION INTRAMUSCULAR; INTRAVENOUS ONCE
Refills: 0 | Status: COMPLETED | OUTPATIENT
Start: 2019-12-12 | End: 2019-12-12

## 2019-12-12 RX ORDER — MORPHINE SULFATE 50 MG/1
2 CAPSULE, EXTENDED RELEASE ORAL
Qty: 100 | Refills: 0 | Status: DISCONTINUED | OUTPATIENT
Start: 2019-12-12 | End: 2019-12-13

## 2019-12-12 RX ADMIN — MORPHINE SULFATE 2 MILLIGRAM(S): 50 CAPSULE, EXTENDED RELEASE ORAL at 13:36

## 2019-12-12 RX ADMIN — Medication 0.5 MILLIGRAM(S): at 10:20

## 2019-12-12 RX ADMIN — Medication 3 MILLILITER(S): at 20:17

## 2019-12-12 RX ADMIN — MORPHINE SULFATE 2 MILLIGRAM(S): 50 CAPSULE, EXTENDED RELEASE ORAL at 05:13

## 2019-12-12 RX ADMIN — CHLORHEXIDINE GLUCONATE 1 APPLICATION(S): 213 SOLUTION TOPICAL at 06:39

## 2019-12-12 RX ADMIN — ROBINUL 0.4 MILLIGRAM(S): 0.2 INJECTION INTRAMUSCULAR; INTRAVENOUS at 18:00

## 2019-12-12 RX ADMIN — Medication 3 MILLILITER(S): at 09:30

## 2019-12-12 RX ADMIN — MORPHINE SULFATE 2 MG/HR: 50 CAPSULE, EXTENDED RELEASE ORAL at 14:30

## 2019-12-12 RX ADMIN — MORPHINE SULFATE 2 MILLIGRAM(S): 50 CAPSULE, EXTENDED RELEASE ORAL at 10:15

## 2019-12-12 NOTE — PROGRESS NOTE ADULT - SUBJECTIVE AND OBJECTIVE BOX
NEIL PHOENIX 69y Male  MRN#: [id number]       SUBJECTIVE  Patient is a 69y old Male who presents with a chief complaint of SOB (12 Dec 2019 07:21)  Currently admitted to medicine with the primary diagnosis of Ileus    Patient not doing well. comfort measures      OBJECTIVE  PAST MEDICAL & SURGICAL HISTORY  Dermatomyositis  Dysphagia: Has G-tube  Scleredema  Gout  COPD, mild  Hypothyroid  Gastrostomy in place: G-Tube  S/P tonsillectomy    ALLERGIES:  No Known Allergies    MEDICATIONS:  STANDING MEDICATIONS  albuterol/ipratropium for Nebulization 3 milliLiter(s) Nebulizer every 6 hours  budesonide 160 MICROgram(s)/formoterol 4.5 MICROgram(s) Inhaler 2 Puff(s) Inhalation two times a day  chlorhexidine 4% Liquid 1 Application(s) Topical <User Schedule>  dextrose 10%. 250 milliLiter(s) IV Continuous <Continuous>  morphine  - Injectable 2 milliGRAM(s) IV Push every 4 hours    PRN MEDICATIONS  LORazepam   Injectable 0.5 milliGRAM(s) IV Push every 4 hours PRN  morphine  - Injectable 2 milliGRAM(s) IV Push every 1 hour PRN      VITAL SIGNS: Last 24 Hours  T(C): --  T(F): --  HR: --  BP: --  BP(mean): --  RR: --  SpO2: 90% (12 Dec 2019 10:12) (90% - 90%)    LABS:                        8.1    16.93 )-----------( 229      ( 11 Dec 2019 07:50 )             26.2     12-11    139  |  99  |  27<H>  ----------------------------<  43<LL>  3.8   |  24  |  0.6<L>    Ca    7.8<L>      11 Dec 2019 07:50  Phos  3.4     12-11  Mg     1.9     12-11    TPro  4.5<L>  /  Alb  2.4<L>  /  TBili  0.4  /  DBili  x   /  AST  28  /  ALT  17  /  AlkPhos  70  12-11                  RADIOLOGY:          PHYSICAL EXAM:    GENERAL: NAD, well-developed, AAOx3  HEENT:  Atraumatic, Normocephalic. EOMI, PERRLA, conjunctiva and sclera clear, No JVD  PULMONARY: Clear to auscultation bilaterally; No wheeze  CARDIOVASCULAR: Regular rate and rhythm; No murmurs, rubs, or gallops  GASTROINTESTINAL: PEG tube  MUSCULOSKELETAL:  2+ Peripheral Pulses, No clubbing, cyanosis, or edema  NEUROLOGY: non-focal  SKIN: No rashes or lesions    Assessment and Plan  70 y/o male with pmhx COPD (not on home oxygen), hypothyroidism, gout, RA, dermato vs polymyositis (on prednisone and methotrexate), ?systemic sclerosis was sent in from NH for SOB and desatted on room air to 81% on 3 litres NC found to have small bowel ileus and ?new onset A fib. Patient is DNR/DNI/Comfort care    #)Small bowel ileus in CT scan   -evaluated by surgery --> no surgical intervention.   - DNR/DNI/Comfort care  - seen by palliative team  - Hospice consult  - Morphine/Ativan as needed  - NPO has PEG. no tube feeds      #)New onset a fib vs MAT   - Rate control for comfort care      #)COPD not on home oxygen  - O2 for comfort care

## 2019-12-12 NOTE — PROGRESS NOTE ADULT - ASSESSMENT
69yMale being evaluated forcomfort care.    Pt in end stages of life with anxiety related to near-death awareness with terminal restlessness.  Symptoms improved w morphine given IV.  HCP at bedside, decision made to initiate morphine gtt for comfort.  Pt will  in hospital   All questions answered in detail  Support provided      Recommendations:  start morphine gtt @2mg/hr - will titrate as needed  morphine 2mg IVP PRN resp distress  robinul 0.4mg IVP x 1 for secretions   NO FURTHER IVF including fluid challenges  ativan PRN as written for restlessness  Supportive care

## 2019-12-12 NOTE — PROGRESS NOTE ADULT - SUBJECTIVE AND OBJECTIVE BOX
Patient is a 69y old  Male who presents with a chief complaint of SOB (11 Dec 2019 15:12)        SUBJECTIVE:  Looks and feels much better.  On RA.  No SOb at rest       REVIEW OF SYSTEMS:    CONSTITUTIONAL:   no fever   no chills.  no weight gain   no weight loss    EYES:   no discharge,   no pain    ENT:   Ears: no ear pain and no hearing problems.  Nose: no nasal congestion and no nasal drainage.    CARDIOVASCULAR:   no chest pain,   no palpitaions  no syncope    RESPIRATORY:  no SOB,  no wheezing ,  no respiratory difficulty  no sputum production    GASTROINTESTINAL:   Per HPI     GENITOURINARY:  no dysuria,   no urgency  no hematuria.    MUSCULOSKELETAL:   no back pain,   no musculoskeletal pain,  no weakness.    SKIN:   no jaundice,    no rashes.    NEURO:   no loss of consciousness,   no headache,   no weakness.    PSYCHIATRIC:   no known mental health issues  no anxiety  no depression    ALLERGIC/IMMUNOLOGIC:   No active allergic or immunologic issues      PHYSICAL EXAM  Vital Signs Last 24 Hrs  T(C): --  T(F): --  HR: --  BP: --  BP(mean): --  RR: --  SpO2: 91% (11 Dec 2019 09:45) (91% - 96%)    CONSTITUTIONAL:  Well nourished.  NAD    ENT:   Airway patent,   No thrush    CARDIAC:   Normal rate,   regular rhythm.    no edema      RESPIRATORY:   NO Wheezing  Normal chest expansion  Not tachypneic,  No use of accessory muscles    GASTROINTESTINAL:  Abdomen soft,   non-tender,   no guarding,   + BS    MUSCULOSKELETAL:   range of motion is not limited,  no clubbing, cyanosis    NEUROLOGICAL:   Alert and oriented   no motor or deficits.    SKIN:   Skin normal color for race,   warm, dry   No evidence of rash.      12-11-19 @ 07:01  -  12-12-19 @ 07:00  --------------------------------------------------------  IN:    Osmolite: 150 mL  Total IN: 150 mL    OUT:    Voided: 300 mL  Total OUT: 300 mL    Total NET: -150 mL          LABS:                          8.1    16.93 )-----------( 229      ( 11 Dec 2019 07:50 )             26.2                                               12-11    139  |  99  |  27<H>  ----------------------------<  43<LL>  3.8   |  24  |  0.6<L>    Ca    7.8<L>      11 Dec 2019 07:50  Phos  3.4     12-11  Mg     1.9     12-11    TPro  4.5<L>  /  Alb  2.4<L>  /  TBili  0.4  /  DBili  x   /  AST  28  /  ALT  17  /  AlkPhos  70  12-11                                                                                           LIVER FUNCTIONS - ( 11 Dec 2019 07:50 )  Alb: 2.4 g/dL / Pro: 4.5 g/dL / ALK PHOS: 70 U/L / ALT: 17 U/L / AST: 28 U/L / GGT: x                                                                                            ABG - ( 10 Dec 2019 07:39 )  pH, Arterial: 7.51  pH, Blood: x     /  pCO2: 34    /  pO2: 62    / HCO3: 28    / Base Excess: 4.5   /  SaO2: 92                  MEDICATIONS  (STANDING):  albuterol/ipratropium for Nebulization 3 milliLiter(s) Nebulizer every 6 hours  budesonide 160 MICROgram(s)/formoterol 4.5 MICROgram(s) Inhaler 2 Puff(s) Inhalation two times a day  chlorhexidine 4% Liquid 1 Application(s) Topical <User Schedule>  dextrose 10%. 250 milliLiter(s) (500 mL/Hr) IV Continuous <Continuous>  morphine  - Injectable 2 milliGRAM(s) IV Push every 4 hours    MEDICATIONS  (PRN):  LORazepam   Injectable 0.5 milliGRAM(s) IV Push every 4 hours PRN Agitation  morphine  - Injectable 2 milliGRAM(s) IV Push every 1 hour PRN extra pain or dyspnea      X-Rays reviewed    CXR interpreted by me:

## 2019-12-12 NOTE — CHART NOTE - NSCHARTNOTEFT_GEN_A_CORE
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    Encountered patient at initial meeting resting in bed, SOB, and alert and oriented x3.  Identifies his friend and health care proxy, Dr. Su as his decision maker, and asked that all decisions regarding his medical care be deferred to Dr. Su.  Pt. is an only child,  with no children, but has an excellent support network of friends that are very involved in patient's care.  Patient understands his medical condition and poor prognosis, and was appropriately tearful at initial meeting.  Although patient is not Shinto, he states he is spiritual in his own way.    Please refer to [facility] regarding discussion of MOLST and DNR/I and CMO.    [   ]   anxious         [   ]   depressed           [   ]  overwhelmed          [   ]   angry         [x   ]accepting       [   ]   not accepting           [   ]   other     Patient Mental Status:      [ x  ]   alert              [ x  ]   oriented         [    ]   confused         [   ] lethargic         [   ]   non-responsive   [   ]   other     Advance Directives:     [   ]    Health Care Surrogate: Name:                             [  x ]    Health Care Proxy: Name: Dr. Su  [ x  ]    MOLST  [   ]    Living Will  [ x  ]    DNR  [  x ]    DNI    Patient Needs:     [ x  ]   Supportive Counseling                  [   ]   Family Meeting            [   ]    Education                           [ x  ]   Advance Care Planning         Caregiver Name: Pt. resides in NH with adequate support system of close friends.  Caregiver needs:     [   ]   Supportive Counseling      [   ]   Family Conference      [   ]   Education    [   ]   Other     Referral:      [   ]   Community Resources         [   ]   Cancer Support Group     [ x ]    Hospice       [   ]   Bereavement support     [ x  ]   Pastoral Care      [   ]  [facility] [place]       [   ]  Child Life Services     [   ]   Other                    Spectra #: [phone]

## 2019-12-12 NOTE — CHART NOTE - NSCHARTNOTEFT_GEN_A_CORE
Registered Dietitian Limited Assessment:  -RD assessment for BMI <18 in pt with EN PTA. As per prior SLP assessment with MBS at 11/25/19 admit, pt with severe pharyngeal dysphagia to all consistencies offered and recommended NPO with alternate means of nutrition and PEG placed 12/4/19. Pt received bolus feeds of Jevity 1.2 @ 240mL q4H at NH, as per paperchart. Pt currently admitted with primary diagnosis of colonic ileus noted on CT scan. As per surgery, no acute surgical intervention at this time and pt has opted for DNR/DNI with CMO. Ileus noted as improving and pt cleared to resume feeds as per surgery so LIP initiated feeds at Osmolite 1.5 @ 60mL/hr x24H, which is too high of rate for pt with recent resolving ileus. Upon RD assessment, pt c/o significant abd pain and nausea a/w feed. Hospice assessment also completed right after RD assessment. RD collaborated with hospice team as feeds appear to be causing pt more discomfort, pt has decided to stop all PEG feeds and proceed with end of life care. Palliative care to f/u as well. No further nutrition interventions at this time. PO pleasure feeds as per SLP recommendations only. RD sign off.

## 2019-12-12 NOTE — PROGRESS NOTE ADULT - SUBJECTIVE AND OBJECTIVE BOX
69yMale with diagnosis: RESPIRATORY DISTESS;ILUES;NEW ONSET ATRIAL FIBRILLATION      Patient seen, clinical condition deteriorating, increased abdominal pain, unable to tolerate PEG feeds (causing more pain), dyspneic, using accessory muscles for respiration. Nearing end of life.       PHYSICAL EXAM  ++secretions  abdomen distended, diffusely tender    T(C): --  T(F): --  HR: --  BP: --  RR: --  SpO2: 90% (90% - 90%)              LABS:                          8.1    16.93 )-----------( 229      ( 11 Dec 2019 07:50 )             26.2                                                                                      12-11    139  |  99  |  27<H>  ----------------------------<  43<LL>  3.8   |  24  |  0.6<L>    Ca    7.8<L>      11 Dec 2019 07:50  Phos  3.4     12-11  Mg     1.9     12-11    TPro  4.5<L>  /  Alb  2.4<L>  /  TBili  0.4  /  DBili  x   /  AST  28  /  ALT  17  /  AlkPhos  70  12-11                                                      MEDICATIONS  (STANDING):  albuterol/ipratropium for Nebulization 3 milliLiter(s) Nebulizer every 6 hours  budesonide 160 MICROgram(s)/formoterol 4.5 MICROgram(s) Inhaler 2 Puff(s) Inhalation two times a day  chlorhexidine 4% Liquid 1 Application(s) Topical <User Schedule>  morphine  Infusion 2 mG/Hr (2 mL/Hr) IV Continuous <Continuous>    MEDICATIONS  (PRN):  LORazepam   Injectable 0.5 milliGRAM(s) IV Push every 4 hours PRN Agitation  morphine  - Injectable 2 milliGRAM(s) IV Push every 1 hour PRN extra pain or dyspnea

## 2019-12-12 NOTE — PROGRESS NOTE ADULT - ASSESSMENT
IMPRESSION:    Acute hypoxemic respiratory failure resolved   Possible recurrent aspirations  HO Polymyositis   Ileus improving     PLAN:    CNS:  NO depressants     HEENT: Oral care.  Aspiration precautions     PULMONARY:  HOB @ 45 degrees.     CARDIOVASCULAR: I=O.  FU with cardiology      GI: GI prophylaxis.  FU With Surgery and GI     RENAL:  Follow up lytes.  Correct as needed    INFECTIOUS DISEASE: Follow up cultures.      HEMATOLOGICAL:  DVT prophylaxis.     ENDOCRINE:  Follow up FS.  Insulin protocol if needed.      MUSCULOSKELETAL:  Bed chair position    Recall PRN IMPRESSION:    Acute hypoxemic respiratory failure resolved   Possible recurrent aspirations  HO Polymyositis   Ileus improving     PLAN:    CNS:  NO depressants     HEENT: Oral care.  Aspiration precautions     PULMONARY:  HOB @ 45 degrees.     CARDIOVASCULAR: I=O.  FU with cardiology      GI: GI prophylaxis.  FU With Surgery and GI     RENAL:  Follow up lytes.  Correct as needed    INFECTIOUS DISEASE: Follow up cultures.  Would finish ABX course    HEMATOLOGICAL:  DVT prophylaxis.     ENDOCRINE:  Follow up FS.  Insulin protocol if needed.      MUSCULOSKELETAL:  Bed chair position    Willl need repeat CT chest as OP in 2 weeks

## 2019-12-13 ENCOUNTER — RESULT REVIEW (OUTPATIENT)
Age: 69
End: 2019-12-13

## 2019-12-13 PROCEDURE — 99231 SBSQ HOSP IP/OBS SF/LOW 25: CPT

## 2019-12-13 RX ADMIN — Medication 3 MILLILITER(S): at 09:44

## 2019-12-13 NOTE — PROGRESS NOTE ADULT - ATTENDING COMMENTS
Stable and afebrile.  passing flatus and had a BM.  G-tube with minimal drainage.  Abdomen soft, NT, ND, BS present.  WC 10.    a/p:  Colonic ileus resolved.  GI for colonoscopy at some point.  No surgical intervention.  Start G-tube feeds as per medicine.
pt seen and examined at bedside independently  reports improvement in abdominal pain, +flatus    #small bowel ileus - clinically improving.  restarting tube feeds   #acute hypoxic resp failure - continue supplemental O2  #dermatomyositis  #dysphagia s/p PEG  #new onset afib/MAT   #COPD     pt now comfort measures, DNR/DNI    Progress Note Handoff  Pending:  hospice eval  Patient/Family discussion: discussed plan w/ pt  Disposition: back to NH w/ hospice
pt seen and examined independently.    pt has acutely worsened since seeing him yesterday, very altered and moaning in pain  more hypoxic, currently on 4L via NC, ?aspiration from tube feeds  abdomen appears more distended  tube feeds stopped, continue holding tube feeds  pain control and comfort measures  continue O2   prognosis appears poor
pt seen and examined independently   looks much more comfortable today, still confused  belly softer, denies pain  +scattered rhonchi    continue on comfort measures only  on morphine drip  no PEG feeds  scopolamine patch

## 2019-12-13 NOTE — PROGRESS NOTE ADULT - ASSESSMENT
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 Patient is DNR/DNI/Comfort care    #)Small bowel ileus in CT scan   -evaluated by surgery --> no surgical intervention.   - DNR/DNI/Comfort care  - seen by palliative team  - Hospice consult  - Morphine/Ativan as needed  - NPO has PEG. no tube feeds    #)New onset a fib vs MAT   - Rate control for comfort care    #)COPD not on home oxygen  - O2 for comfort care

## 2019-12-13 NOTE — DISCHARGE NOTE FOR THE EXPIRED PATIENT - HOSPITAL COURSE
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 Poor historian. Denies any fever, chills, nausea, vomiting, abdominal pain, constipation, loose stools, headache, dizziness. chest pain, palpitations.     In ED vitals: 120/99, 131, 98.9, 18, 98% on 3 litres of NC, labs no inc WBC count, BMP unremarkable, Trops 0.08, BNP 3000, CT abd and pelvis with oral and IV contrast showed small bowel pneumatosis, portal venous gas, 4.7 cm small bowel dilatation consistent with ileus. Evaluated by surgery no surgical intervention for now. EKG showed new onset  Afib started on cardizem drip.    Patient is DNR / DNI / Comfort care. palliative care were consulted, patient put on morphine drip and ativan as needed  patient was not breathing at 7: 05 pm. no CPR was done  patient confirmed dead

## 2019-12-13 NOTE — PROGRESS NOTE ADULT - SUBJECTIVE AND OBJECTIVE BOX
69yMale with diagnosis: RESPIRATORY DISTESS;ILUES;NEW ONSET ATRIAL FIBRILLATION      Patient seen on three rounds to day. #1 sleeping no respiratory distress; #2 sleeping - easly awakened, but a bit confused, able to make needs known - no respiratory distress; mouth care given with comfort attained; #3 awake with room full of visitors - questions answered; encouraged them to go at patient's speed, eg - if he is asking for ice cream/ice then ok to comfort feed.       PHYSICAL EXAM    A&O to self; not fully able to engage in conversation did well with Y/N questions. Said No to inquiry if wanted pain med.   Breathing easy and unlabored O2 NC no secretions  + radial pulses  abd less distended; PEG in place  trace LE edema  hands/feet slightly cool                                                                                MEDICATIONS  (STANDING):  albuterol/ipratropium for Nebulization 3 milliLiter(s) Nebulizer every 6 hours  budesonide 160 MICROgram(s)/formoterol 4.5 MICROgram(s) Inhaler 2 Puff(s) Inhalation two times a day  chlorhexidine 4% Liquid 1 Application(s) Topical <User Schedule>  morphine  Infusion 2 mG/Hr (2 mL/Hr) IV Continuous <Continuous>    MEDICATIONS  (PRN):  LORazepam   Injectable 0.5 milliGRAM(s) IV Push every 4 hours PRN Agitation 0 doses/24 H  morphine  - Injectable 2 milliGRAM(s) IV Push every 1 hour PRN extra pain or dyspnea 0 doses/ 24H

## 2019-12-13 NOTE — PROGRESS NOTE ADULT - SUBJECTIVE AND OBJECTIVE BOX
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 Poor historian. Denies any fever, chills, nausea, vomiting, abdominal pain, constipation, loose stools, headache, dizziness. chest pain, palpitations.     In ED vitals: 120/99, 131, 98.9, 18, 98% on 3 litres of NC, labs no inc WBC count, BMP unremarkable, Trops 0.08, BNP 3000, CT abd and pelvis with oral and IV contrast showed small bowel pneumatosis, portal venous gas, 4.7 cm small bowel dilatation consistent with ileus. Evaluated by surgery no surgical intervention for now. EKG showed new onset  Afib started on cardizem drip.    Recently admitted in the hospital on Nov 21 st for severe muscle pain and found to have severe dysphagia s/p PEG tube insertion discharged on Dec 4th to [facility]. (10 Dec 2019 01:03)    Currently admitted to medicine with the primary diagnosis of Ileus     Today is hospital day 3d.     INTERVAL HPI / OVERNIGHT EVENTS:  Patient was examined and seen at bedside. This morning he is resting comfortably in bed and reports no new issues or overnight events.     ROS: Otherwise unremarkable     PAST MEDICAL & SURGICAL HISTORY  Dermatomyositis  Dysphagia: Has G-tube  Scleredema  Gout  COPD, mild  Hypothyroid  Gastrostomy in place: G-Tube  S/P tonsillectomy    ALLERGIES  No Known Allergies    MEDICATIONS  STANDING MEDICATIONS  albuterol/ipratropium for Nebulization 3 milliLiter(s) Nebulizer every 6 hours  budesonide 160 MICROgram(s)/formoterol 4.5 MICROgram(s) Inhaler 2 Puff(s) Inhalation two times a day  chlorhexidine 4% Liquid 1 Application(s) Topical <User Schedule>  morphine  Infusion 2 mG/Hr IV Continuous <Continuous>    PRN MEDICATIONS  LORazepam   Injectable 0.5 milliGRAM(s) IV Push every 4 hours PRN  morphine  - Injectable 2 milliGRAM(s) IV Push every 1 hour PRN    VITALS:  T(F): --  HR: --  BP: --  RR: --  SpO2: 90%    PHYSICAL EXAM  GEN: NAD, Resting comfortably in bed  PULM: Clear to auscultation bilaterally, No wheezes  CVS: Regular rate and rhythm, S1-S2, no murmurs  ABD: Soft, non-tender, non-distended, no guarding  EXT: No edema  NEURO: AAOx3, no focal deficits    LABS                        8.1    16.93 )-----------( 229      ( 11 Dec 2019 07:50 )             26.2     12-11    139  |  99  |  27<H>  ----------------------------<  43<LL>  3.8   |  24  |  0.6<L>    Ca    7.8<L>      11 Dec 2019 07:50  Phos  3.4     12-11  Mg     1.9     12-11    TPro  4.5<L>  /  Alb  2.4<L>  /  TBili  0.4  /  DBili  x   /  AST  28  /  ALT  17  /  AlkPhos  70  12-11    RADIOLOGY HPI:  70 y/o male with pmhx COPD (not on home oxygen), hypothyroidism, gout, RA, 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 Poor historian. Denies any fever, chills, nausea, vomiting, abdominal pain, constipation, loose stools, headache, dizziness. chest pain, palpitations.     In ED vitals: 120/99, 131, 98.9, 18, 98% on 3 litres of NC, labs no inc WBC count, BMP unremarkable, Trops 0.08, BNP 3000, CT abd and pelvis with oral and IV contrast showed small bowel pneumatosis, portal venous gas, 4.7 cm small bowel dilatation consistent with ileus. Evaluated by surgery no surgical intervention for now. EKG showed new onset  Afib started on cardizem drip.    Recently admitted in the hospital on Nov 21 st for severe muscle pain and found to have severe dysphagia s/p PEG tube insertion discharged on Dec 4th to [facility]. (10 Dec 2019 01:03)    Currently admitted to medicine with the primary diagnosis of Ileus     Today is hospital day 3d.     INTERVAL HPI / OVERNIGHT EVENTS:  Patient was examined and seen at bedside. This morning he is resting comfortably in bed and reports no new issues or overnight events. Patient was somewhat conversational but difficult to understand. Mentioned no complaints and denied cp, or abdominal pain. Admitted to having SOB.    ROS: Otherwise unremarkable     PAST MEDICAL & SURGICAL HISTORY  Dermatomyositis  Dysphagia: Has G-tube  Scleredema  Gout  COPD, mild  Hypothyroid  Gastrostomy in place: G-Tube  S/P tonsillectomy    ALLERGIES  No Known Allergies    MEDICATIONS  STANDING MEDICATIONS  albuterol/ipratropium for Nebulization 3 milliLiter(s) Nebulizer every 6 hours  budesonide 160 MICROgram(s)/formoterol 4.5 MICROgram(s) Inhaler 2 Puff(s) Inhalation two times a day  chlorhexidine 4% Liquid 1 Application(s) Topical <User Schedule>  morphine  Infusion 2 mG/Hr IV Continuous <Continuous>    PRN MEDICATIONS  LORazepam   Injectable 0.5 milliGRAM(s) IV Push every 4 hours PRN  morphine  - Injectable 2 milliGRAM(s) IV Push every 1 hour PRN    VITALS:  T(F): --  HR: --  BP: --  RR: --  SpO2: 90%    LABS                        8.1    16.93 )-----------( 229      ( 11 Dec 2019 07:50 )             26.2     12-11    139  |  99  |  27<H>  ----------------------------<  43<LL>  3.8   |  24  |  0.6<L>    Ca    7.8<L>      11 Dec 2019 07:50  Phos  3.4     12-11  Mg     1.9     12-11    TPro  4.5<L>  /  Alb  2.4<L>  /  TBili  0.4  /  DBili  x   /  AST  28  /  ALT  17  /  AlkPhos  70  12-11    RADIOLOGY

## 2019-12-27 DIAGNOSIS — J96.01 ACUTE RESPIRATORY FAILURE WITH HYPOXIA: ICD-10-CM

## 2019-12-27 DIAGNOSIS — M10.9 GOUT, UNSPECIFIED: ICD-10-CM

## 2019-12-27 DIAGNOSIS — R13.10 DYSPHAGIA, UNSPECIFIED: ICD-10-CM

## 2019-12-27 DIAGNOSIS — M34.9 SYSTEMIC SCLEROSIS, UNSPECIFIED: ICD-10-CM

## 2019-12-27 DIAGNOSIS — K22.2 ESOPHAGEAL OBSTRUCTION: ICD-10-CM

## 2019-12-27 DIAGNOSIS — Z93.1 GASTROSTOMY STATUS: ICD-10-CM

## 2019-12-27 DIAGNOSIS — M06.9 RHEUMATOID ARTHRITIS, UNSPECIFIED: ICD-10-CM

## 2019-12-27 DIAGNOSIS — Z87.891 PERSONAL HISTORY OF NICOTINE DEPENDENCE: ICD-10-CM

## 2019-12-27 DIAGNOSIS — K63.89 OTHER SPECIFIED DISEASES OF INTESTINE: ICD-10-CM

## 2019-12-27 DIAGNOSIS — K56.7 ILEUS, UNSPECIFIED: ICD-10-CM

## 2019-12-27 DIAGNOSIS — J18.9 PNEUMONIA, UNSPECIFIED ORGANISM: ICD-10-CM

## 2019-12-27 DIAGNOSIS — I48.91 UNSPECIFIED ATRIAL FIBRILLATION: ICD-10-CM

## 2019-12-27 DIAGNOSIS — R06.02 SHORTNESS OF BREATH: ICD-10-CM

## 2019-12-27 DIAGNOSIS — B36.9 SUPERFICIAL MYCOSIS, UNSPECIFIED: ICD-10-CM

## 2019-12-27 DIAGNOSIS — J44.9 CHRONIC OBSTRUCTIVE PULMONARY DISEASE, UNSPECIFIED: ICD-10-CM

## 2019-12-27 DIAGNOSIS — Z66 DO NOT RESUSCITATE: ICD-10-CM

## 2019-12-27 DIAGNOSIS — E03.9 HYPOTHYROIDISM, UNSPECIFIED: ICD-10-CM

## 2019-12-27 DIAGNOSIS — M33.10 OTHER DERMATOMYOSITIS, ORGAN INVOLVEMENT UNSPECIFIED: ICD-10-CM

## 2019-12-27 DIAGNOSIS — Z51.5 ENCOUNTER FOR PALLIATIVE CARE: ICD-10-CM

## 2019-12-27 DIAGNOSIS — R79.89 OTHER SPECIFIED ABNORMAL FINDINGS OF BLOOD CHEMISTRY: ICD-10-CM

## 2020-02-26 NOTE — DISCHARGE NOTE NURSING/CASE MANAGEMENT/SOCIAL WORK - NSFLUVACAGEDISCH_IMM_ALL_CORE
FODMAP diet??      1. Daily laxative regimen to make you more regular. Goal is 1-2 easy bowel movements per day. Need to take every day or several times per week. Options:    A.   Stool softeners - Colace/docusate - 2-6 pills per day (all at once or 1-2
Adult

## 2020-08-07 NOTE — PROGRESS NOTE ADULT - NSHPATTENDINGPLANDISCUSS_GEN_ALL_CORE
[General Appearance - Alert] : alert [General Appearance - In No Acute Distress] : in no acute distress [Sclera] : the sclera and conjunctiva were normal [Neck Appearance] : the appearance of the neck was normal [Respiration, Rhythm And Depth] : normal respiratory rhythm and effort [Auscultation Breath Sounds / Voice Sounds] : lungs were clear to auscultation bilaterally House staff [Heart Rate And Rhythm] : heart rate was normal and rhythm regular [Edema] : there was no peripheral edema [Bowel Sounds] : normal bowel sounds [Abdomen Soft] : soft [Abnormal Walk] : normal gait [] : no rash [Skin Lesions] : no skin lesions [Affect] : the affect was normal [FreeTextEntry1] : No active synovitis or joint effusions. No joint tenderness or significant deformities. Full passive ROM bilateral wrists, elbows, shoulders, knees, ankles.

## 2021-03-12 NOTE — ED ADULT NURSE NOTE - SUICIDE SCREENING QUESTION 1
No

## 2021-10-21 NOTE — DISCHARGE NOTE NURSING/CASE MANAGEMENT/SOCIAL WORK - PATIENT PORTAL LINK FT
Nephrology Progress Note    Patient: Anila Linares Date: 10/21/2021   : 1980 Attending: Juan Manuel Unger DO   40 year old female    Reason for Consultation: need for hemodialysis  Subjective: Pt seen and examined.  She underwent hemodialysis yesterday, 3.0liters of fluid were removed.  She denies any shortness of breath and is currently afebrile.  She is tolerating oral intake with no nausea or vomiting.  No events overnight.    Vital Last Value 24 Hour Range   Temperature 98.6 °F (37 °C) (10/21/21 05) Temp  Min: 97.7 °F (36.5 °C)  Max: 98.6 °F (37 °C)   Pulse 62 (10/21/21 08) Pulse  Min: 61  Max: 76   Respiratory 16 (10/21/21 0519) Resp  Min: 12  Max: 21   Non-Invasive  Blood Pressure (!) 144/82 (10/21/21 0851) BP  Min: 122/78  Max: 159/85   Arterial   Blood Pressure   No data recorded   Pulse Oximetry 100 % (10/21/21 0851) SpO2  Min: 95 %  Max: 100 %     Vital Today Admitted   Weight 94.8 kg (208 lb 15.9 oz) (10/15/21 0600) Weight: 93 kg (205 lb) (10/13/21 105)   Height N/A Height: 5' 3\" (160 cm) (10/13/21 105)   BMI N/A BMI (Calculated): 36.31 (10/13/21 105)     Weight over the past 48 Hours:  No data found.     Intake/Output:    Last Stool Occurrence: 1 (10/19/21 1800)    No intake/output data recorded.    I/O last 3 completed shifts:  In: -   Out: 3000 [Other:3000]      Intake/Output Summary (Last 24 hours) at 10/21/2021 1052  Last data filed at 10/20/2021 1415  Gross per 24 hour   Intake --   Output 3000 ml   Net -3000 ml         Physical Exam:     Constitutional:  Normal appearance and NAD  CV: Regular Rate and Rhythm, normal heart sounds, no murmur, rub or damián  Pulmonary: CTAB; no wheezes, crackles or rhonchi  Abdominal: Bowel sounds are normal, soft, non-distended, non tender.  Extremities: No edema  Musculoskeletal: No deformities and normal ROM  Skin: + dryness   Neurological: No focal deficit present, alert and oriented to person, place, and time, no asterixis  Psychiatric:   Appropriate affect and mood; cooperative       Medications/Infusions:  Scheduled:   • epoetin jesus-epbx  4,000 Units Intravenous Once per day on Mon Wed Fri   • sodium chloride (PF)  2 mL Intracatheter 2 times per day   • pantoprazole  40 mg Oral Nightly   • iron sucrose (VENOFER) IVPB  200 mg Intravenous Once per day on Mon Wed Fri   • amLODIPine  10 mg Oral Daily   • aspirin  81 mg Oral Daily   • betamethasone dipropionate  1 application Topical Daily   • carvedilol  25 mg Oral BID   • cloNIDine  0.1 mg Oral BID   • darunavir  800 mg Oral Daily   • dolutegravir  50 mg Oral BID   • FLUoxetine  40 mg Oral Daily   • fluticasone  1 spray Each Nare BID   • furosemide  20 mg Oral Daily   • hydroCORTisone  1 application Topical Daily   • levETIRAcetam  250 mg Oral BID   • polyethylene glycol  17 g Oral Daily   • ritonavir  100 mg Oral Daily with breakfast   • rOPINIRole  1 mg Oral Nightly   • senna  1 tablet Oral Daily   • traZODone  150 mg Oral Nightly   • heparin (porcine)  5,000 Units Subcutaneous 3 times per day       Continuous Infusions:       Laboratory Results:    Recent Labs   Lab 10/21/21  0827 10/20/21  0647 10/19/21  0641 10/18/21  0739 10/17/21  0718   WBC 4.7 5.6 3.5* 4.1* 3.8*   HGB 8.8* 8.4* 9.1* 8.5* 8.6*   * 121* 128* 110* 119*     Recent Labs   Lab 10/21/21  0827 10/20/21  0647 10/19/21  0641 10/18/21  0739 10/17/21  0718   SODIUM 131* 138 141 137 139   POTASSIUM 4.0 5.0 4.3 5.5* 5.0   CHLORIDE 93* 98 99 98 99   CO2 29 28 31 24 27   BUN 24* 46* 35* 77* 52*   CREATININE 4.97* 7.76* 5.49* 9.77* 7.52*   CALCIUM 8.1* 8.1* 8.2* 7.7* 7.9*   MG  --  2.2  --   --   --    ALBUMIN 3.2* 3.2*  --   --   --      Phosphate Results     None        Serum Creatinine Results (72h ago through now)     Date/Time Serum Creatinine Range    10/20/21 0647 7.76 mg/dL   0.51 - 0.95 mg/dL    10/19/21 0641 5.49 mg/dL   0.51 - 0.95 mg/dL    10/18/21 0739 9.77 mg/dL   0.51 - 0.95 mg/dL            CONTRAINDICATION:  Hemodialysis    Contact provider for phosphorus replacement orders.       Urine Panel  Lab Results   Component Value Date    5UNITR NEGATIVE 06/02/2020    UKET Negative 10/13/2021    USPG 1.005 10/13/2021    UPROT 100  (A) 10/13/2021    UWBC Moderate (A) 10/13/2021    URBC Negative 10/13/2021    UBILI Negative 10/13/2021    UPH >8.5 (H) 10/13/2021    UBACTR FEW (A) 06/11/2020             Medical records were extensively reviewed.    ASSESSMENT/PLAN:   40-year-old female with a history of ESRD on hemodialysis QMWF, HIV, essential hypertension admitted with fevers.  1)  ESRD:  Pt s/p dialysis yesterday, 3.0 liters of fluid were removed.  There is no acute need for dialysis, will hold off.  We will continue to provide dialytic support QMWF while she is in the hospital.  --IV venofer and IV epogen with dialysis.    2)  Fevers:  S/p bronchoscopy.  Pt is now afebrile.  --discussed with Dr Unger.    Alec Chen MD    [facility] [place]  Contact via [employer]  Office/Answering service [phone]           You can access the FollowMyHealth Patient Portal offered by [facility] by registering at the following website: [email or web address]. By joining [employer]’s FollowMyHealth portal, you will also be able to view your health information using other applications (apps) compatible with our system.

## 2021-11-05 NOTE — PRE-ANESTHESIA EVALUATION ADULT - TEMPERATURE IN CELSIUS (DEGREES C)
36.9 Bcc Infiltrative Histology Text: In the dermis there are angulated tumor aggregates and small strands and cords of basaloid cells in a mucinous/myxoid stroma infiltrating between normal structures in the dermis.

## 2022-01-17 NOTE — PHYSICAL THERAPY INITIAL EVALUATION ADULT - PHYSICAL ASSIST/NONPHYSICAL ASSIST: STAND/SIT, REHAB EVAL
Needs eye exam  Information on Shing[employer], check with medicare part D  UA, micro albumin today  Continue present management  Follow up in 6 months      Medicare Wellness Visit  Plan for Preventive Care    A good way for you to stay healthy is to use preventive care.  Medicare covers many services that can help you stay healthy.* The goal of these services is to find any health problems as quickly as possible. Finding problems early can help make them easier to treat.  Your personal plan below lists the services you may need and when they are due.     Health Maintenance Summary     DTaP/Tdap/Td Vaccine (1 - Tdap)  Overdue since 6/2/1992    Shingles Vaccine (1 of 2)  Overdue - never done    Diabetes Eye Exam (Yearly)  Overdue since 8/5/2021    Diabetes Foot Exam (Yearly)  Due since 1/11/2022    Medicare Wellness Visit (Yearly)  Due since 1/11/2022    COVID-19 Vaccine (4 - Booster for Pfizer series)  Next due on 3/2/2022    Diabetes A1C (Every 6 Months)  Next due on 7/12/2022    Depression Screening (Yearly)  Next due on 1/10/2023    DM/CKD GFR (Yearly)  Ordered on 1/3/2022    Breast Cancer Screening (Every 2 Years)  Ordered on 10/12/2021    Colorectal Cancer Screen- (Colonoscopy - Every 10 Years)  Next due on 11/30/2031    Pneumococcal Vaccine 65+   Completed    Hepatitis C Screening   Completed    Osteoporosis Screening   Ordered on 1/17/2022    Influenza Vaccine   Completed    Hepatitis B Vaccine   Aged Out    Meningococcal Vaccine   Aged Out    HPV Vaccine   Aged Out           Preventive Care for Women and Men    Heart Screenings (Cardiovascular):  · Blood tests are used to check your cholesterol, lipid and triglyceride levels. High levels can increase your risk for heart disease and stroke. High levels can be treated with medications, diet and exercise. Lowering your levels can help keep your heart and blood vessels healthy.  Your provider will order these tests if they are needed.    · An ultrasound is done to see  if you have an abdominal aortic aneurysm (AAA).  This is an enlargement of one of the main blood vessels that delivers blood to the body.   In the [place], 9,000 deaths are caused by AAA.  You may not even know you have this problem and as many as 1 in 3 people will have a serious problem if it is not treated.  Early diagnosis allows for more effective treatment and cure.  If you have a family history of AAA or are a male age 65-75 who has smoked, you are at higher risk of an AAA.  Your provider can order this test, if needed.    Colorectal Screening:  · There are many tests that are used to check for cancer of your colon and rectum. You and your provider should discuss what test is best for you and when to have it done.  Options include:  · Screening Colonoscopy: exam of the entire colon, seen through a flexible lighted tube.  · Flexible Sigmoidoscopy: exam of the last third (sigmoid portion) of the colon and rectum, seen through a flexible lighted tube.  · Cologuard DNA stool test: a sample of your stool is used to screen for cancer and unseen blood in your stool.  · Fecal Occult Blood Test: a sample of your stool is studied to find any unseen blood    Flu Shot:  · An immunization that helps to prevent influenza (the flu). You should get this every year. The best time to get the shot is in the fall.    Pneumococcal Shot:  • Vaccines are available that can help prevent pneumococcal disease, which is any type of infection caused by Streptococcus pneumoniae bacteria.   Their use can prevent some cases of pneumonia, meningitis, and sepsis. There are two types of pneumococcal vaccines:   o Conjugate vaccines (PCV-13 or Prevnar 13®) - helps protect against the 13 types of pneumococcal bacteria that are the most common causes of serious infections in children and adults.    o Polysaccharide vaccine (PPSV23 or Saduxhcyb72®) - helps protect against 23 types of pneumococcal bacteria for patients who are recommended to  get it.  These vaccines should be given at least 12 months apart.  A booster is usually not needed.     Hepatitis B Shot:  · An immunization that helps to protect people from getting Hepatitis B. Hepatitis B is a virus that spreads through contact with infected blood or body fluids. Many people with the virus do not have symptoms.  The virus can lead to serious problems, such as liver disease. Some people are at higher risk than others. Your doctor will tell you if you need this shot.     Diabetes Screening:  · A test to measure sugar (glucose) in your blood is called a fasting blood sugar. Fasting means you cannot have food or drink for at least 8 hours before the test. This test can detect diabetes long before you may notice symptoms.    Glaucoma Screening:  · Glaucoma screening is performed by your eye doctor. The test measures the fluid pressure inside your eyes to determine if you have glaucoma.     Hepatitis C Screening:  · A blood test to see if you have the hepatitis C virus.  Hepatitis C attacks the liver and is a major cause of chronic liver disease.  Medicare will cover a single screening for all adults born between 1945 & 1965, or high risk patients (people who have injected illegal drugs or people who have had blood transfusions).  High risk patients who continue to inject illegal drugs can be screened for Hepatitis C every year.    Smoking and Tobacco-Use Cessation Counseling:  · Tobacco is the single greatest cause of disease and early death in our country today. Medication and counseling together can increase a person’s chance of quitting for good.   · Medicare covers two quitting attempts per year, with four counseling sessions per attempt (eight sessions in a 12 month period)    Preventive Screening tests for Women    Screening Mammograms and Breast Exams:  · An x-ray of your breasts to check for breast cancer before you or your doctor may be able to feel it.  If breast cancer is found early it can  usually be treated with success.    Pelvic Exams and Pap Tests:  · An exam to check for cervical and vaginal cancer. A Pap test is a lab test in which cells are taken from your cervix and sent to the lab to look for signs of cervical cancer. If cancer of the cervix is found early, chances for a cure are good. Testing can generally end at age 65, or if a woman has a hysterectomy for a benign condition. Your provider may recommend more frequent testing if certain abnormal results are found.    Bone Mass Measurements:  · A painless x-ray of your bone density to see if you are at risk for a broken bone. Bone density refers to the thickness of bones or how tightly the bone tissue is packed.    Preventive Screening tests for Men    Prostate Screening:  · Should you have a prostate cancer test (PSA)?  It is up to you to decide if you want a prostate cancer test. Talk to your clinician to find out if the test is right for you.  Things for you to consider and talk about should include:  · Benefits and harms of the test  · Your family history  · How your race/ethnicity may influence the test  · If the test may impact other medical conditions you have  · Your values on screenings and treatments    *Medicare pays for many preventive services to keep you healthy. For some of these services, you might have to pay a deductible, coinsurance, and / or copayment.  The amounts vary depending on the type of services you need and the kind of Medicare health plan you have.    For further details on screenings offered by Medicare please visit: https://www.medicare.gov/coverage/preventive-screening-services    verbal cues

## 2022-02-02 NOTE — DISCHARGE NOTE PROVIDER - HOSPITAL COURSE
Anticipated Discharge Disposition: Hospice with SNF placement     Action: ANETTE spoke with [facility], Fran Green, [phone]. She had just met with pt. ANETTE and RN SIDRA explained tentative discharge plan. ANETTE met with pt at bedside. Pt difficult to understand, however, expressed agreement to hospice at SNF facility. 1st choice is [facility], and 2nd [facility]. ANETTE faxed choice form to [facility].      Barriers to Discharge: Hospice arrangements and SNF acceptance     Plan: Case coordination to continue to follow up with medical team to discuss discharge barriers.    Patient sent to hospital by PMD for elevated CPK an worsening myalgia. Patient sent to hospital by PMD for elevated CPK an worsening myalgia. Initial CPK 1589, trending downward on discharge 771. Antibody testing for WILFREDO, AntiJo-1, Anti-RO, Anti-La are negative. No malignancies noted on chest and abdominal CT. Received prednisone and methotrexate as per Rheumatology consult. Patient reports symptoms improved after taking medication. Patient sent to hospital by PMD for elevated CPK an worsening myalgia. Initial CPK 1589, trending downward on discharge 771. Antibody testing for WILFREDO, AntiJo-1, Anti-RO, Anti-La are negative. No malignancies noted on chest and abdominal CT. Received prednisone and methotrexate as per Rheumatology consult. Patient reports symptoms improved after taking medication.        Attending Attestation:    Patient was seen independently. Latest vital signs and labs were reviewed. Case was discussed with resident in morning rounds for assessment and plan.  Patient is medically stable for discharge to home. About 32 mins spent on discharge disposition Patient sent to hospital by PMD for elevated CPK an worsening myalgia. Initial CPK 1589, trending downward on discharge 771. Antibody testing for WILFREDO, AntiJo-1, Anti-RO, Anti-La are negative. No malignancies noted on chest and abdominal CT. Received prednisone and methotrexate as per Rheumatology consult. Patient reports symptoms improved after taking medication. Stable for discharge to home with follow up with rheumatology as outpatient.        Attending Attestation:    Patient was seen independently. Latest vital signs and labs were reviewed. Case was discussed with resident in morning rounds for assessment and plan.  Patient is medically stable for discharge to home. About 32 mins spent on discharge disposition

## 2023-12-09 NOTE — PHYSICAL THERAPY INITIAL EVALUATION ADULT - PERTINENT HX OF CURRENT PROBLEM, REHAB EVAL
History of Present Illness goes back to the since his prior discharge when patient endorses progressive generalized weakness to the point where he was unable to lift himself for a seating position, get up flight of stairs and raise his arms above his head. He also endorses overall fatigue, decreased appetite and PO intake as well as weight loss of over 40 pounds
yes

## 2024-07-10 NOTE — PATIENT PROFILE ADULT - HAS THE PATIENT RECEIVED THE INFLUENZA VACCINE THIS SEASON?
Renal condition is worsening.  Patient will get copy of recent blood work. If GFR worse will refer to Nephrology. He stated that he has seen a Nephrologist 2-3 years ago.  Renal condition will be reassessed in 6 months.   no...

## 2024-08-15 NOTE — SWALLOW BEDSIDE ASSESSMENT ADULT - ASR SWALLOW LINGUAL MOBILITY
Called and spoke with mom, states she will take pt to urgent care as discussed.   within functional limits